# Patient Record
Sex: FEMALE | Race: WHITE | NOT HISPANIC OR LATINO | Employment: OTHER | URBAN - METROPOLITAN AREA
[De-identification: names, ages, dates, MRNs, and addresses within clinical notes are randomized per-mention and may not be internally consistent; named-entity substitution may affect disease eponyms.]

---

## 2017-01-02 ENCOUNTER — PATIENT MESSAGE (OUTPATIENT)
Dept: OTOLARYNGOLOGY | Facility: CLINIC | Age: 67
End: 2017-01-02

## 2017-01-02 ENCOUNTER — PATIENT MESSAGE (OUTPATIENT)
Dept: CARDIOLOGY | Facility: CLINIC | Age: 67
End: 2017-01-02

## 2017-01-03 ENCOUNTER — OFFICE VISIT (OUTPATIENT)
Dept: DERMATOLOGY | Facility: CLINIC | Age: 67
End: 2017-01-03
Payer: MEDICARE

## 2017-01-03 DIAGNOSIS — L63.9 ALOPECIA AREATA: Primary | ICD-10-CM

## 2017-01-03 DIAGNOSIS — I11.0 HYPERTENSIVE CHF (CONGESTIVE HEART FAILURE): Chronic | ICD-10-CM

## 2017-01-03 DIAGNOSIS — I50.32 DIASTOLIC CHF, CHRONIC: Chronic | ICD-10-CM

## 2017-01-03 PROCEDURE — 99999 PR PBB SHADOW E&M-EST. PATIENT-LVL I: CPT | Mod: PBBFAC,,, | Performed by: DERMATOLOGY

## 2017-01-03 PROCEDURE — 99212 OFFICE O/P EST SF 10 MIN: CPT | Mod: 25,S$PBB,, | Performed by: DERMATOLOGY

## 2017-01-03 PROCEDURE — 99211 OFF/OP EST MAY X REQ PHY/QHP: CPT | Mod: PBBFAC,PO | Performed by: DERMATOLOGY

## 2017-01-03 PROCEDURE — 11900 INJECT SKIN LESIONS </W 7: CPT | Mod: PBBFAC,PO | Performed by: DERMATOLOGY

## 2017-01-03 PROCEDURE — 11900 INJECT SKIN LESIONS </W 7: CPT | Mod: S$PBB,,, | Performed by: DERMATOLOGY

## 2017-01-03 RX ORDER — TRIAMCINOLONE ACETONIDE 40 MG/ML
40 INJECTION, SUSPENSION INTRA-ARTICULAR; INTRAMUSCULAR ONCE
Status: COMPLETED | OUTPATIENT
Start: 2017-01-03 | End: 2017-01-03

## 2017-01-03 RX ORDER — NEBIVOLOL 10 MG/1
10 TABLET ORAL DAILY
Qty: 30 TABLET | Refills: 5 | Status: SHIPPED | OUTPATIENT
Start: 2017-01-03 | End: 2017-04-18 | Stop reason: SDUPTHER

## 2017-01-03 RX ORDER — CLOBETASOL PROPIONATE 0.46 MG/ML
SOLUTION TOPICAL
Qty: 60 ML | Refills: 3 | Status: SHIPPED | OUTPATIENT
Start: 2017-01-03 | End: 2017-02-28 | Stop reason: SDUPTHER

## 2017-01-03 RX ADMIN — TRIAMCINOLONE ACETONIDE 10 MG: 10 INJECTION, SUSPENSION INTRA-ARTICULAR; INTRALESIONAL at 09:01

## 2017-01-03 RX ADMIN — TRIAMCINOLONE ACETONIDE 40 MG: 40 INJECTION, SUSPENSION INTRA-ARTICULAR; INTRAMUSCULAR at 09:01

## 2017-01-03 NOTE — PROGRESS NOTES
Subjective:       Patient ID:  Deirdre Yanez is a 66 y.o. female who presents for   Chief Complaint   Patient presents with    Follow-up     HPI Comments: History of alopecia areata, Hepatitis B (Hepatitis B core antibody positive- HbsAb negative, + intolerance to lamivudine, currently undergoing vaccination against HBV) and Hep C (s/p treatment with possible cure), currently s/p ILK x 12 and IM kenalog x 7, last seen on 11/8/16. She is s/p 3 cc of ILK 10 at the last visit. She currently uses clobetasol solution bid, rogaine bid and biotin 5000 mcg qD. She discontinued vitamin supplements, except for astaxanthin 12 mg qD. + improvement with hair growth of the occipital scalp. Dr. Galvan has improved future intermittent IM steroids while pt being vaccinated for HBV.      She has new diagnosis CHF, currently on spironolactone.             Review of Systems   Constitutional: Negative for fever and chills.   Gastrointestinal: Negative for nausea and vomiting.   Skin: Negative for daily sunscreen use, activity-related sunscreen use and recent sunburn.   Hematologic/Lymphatic: Does not bruise/bleed easily.        Objective:    Physical Exam   Constitutional: She appears well-developed and well-nourished. No distress.   Neurological: She is alert and oriented to person, place, and time. She is not disoriented.   Psychiatric: She has a normal mood and affect.   Skin:   Areas Examined (abnormalities noted in diagram):   Scalp / Hair Palpated and Inspected  Head / Face Inspection Performed  Neck Inspection Performed  RUE Inspected  LUE Inspection Performed  Nails and Digits Inspection Performed                 Assessment / Plan:        Alopecia areata  -     triamcinolone acetonide injection 40 mg; Inject 1 mL (40 mg total) into the muscle once.  -     triamcinolone acetonide injection 10 mg; 1 mL (10 mg total) by Other route one time.  -     clobetasol (TEMOVATE) 0.05 % external solution; Use on scalp twice daily   Dispense: 60 mL; Refill: 3  -     Recommend pt continue to follow with Dr. Galvan for Hep B monitoring. ILK #13 and IM steroids #8 done today. Continue clobetasol solution twice daily, rogaine bid, and biotin. After risks, benefits and alternatives explained and side effect profile reviewed, including hypopigmentation, telangiectasia and atrophy, the patient verbally consented to ILK injection. A total of 3 cc of kenalog 10 mg/ml was injected into the areas of alopecia of the scalp. Pt tolerated well without side effects. RTC in 8 weeks for repeat injection.           Return in about 8 weeks (around 2/28/2017).

## 2017-01-03 NOTE — MR AVS SNAPSHOT
[facility] - Dermatology  [address] [place] [address]  Phone: [phone]  Fax: [phone]                  Deirdre Yanez   1/3/2017 8:30 AM   Office Visit    Description:  Female : 1950   Provider:  Darlene Cedillo MD   Department:  [facility] - Dermatology           Reason for Visit     Follow-up           Diagnoses this Visit        Comments    Alopecia areata    -  Primary            To Do List           Future Appointments        Provider Department Dept Phone    2017 2:00 PM Beti lBackburn MD [facility] Internal Medicine [phone]    2017 9:00 AM GASTRO NURSE, [facility] Gastroenterology [phone]    2017 10:10 AM LABORATORY, [address] [facility] [phone]    3/16/2017 9:40 AM LABORATORY, [facility] - Laboratory [phone]    6/15/2017 9:00 AM GASTRO NURSE, [facility] Gastroenterology [phone]      Goals (5 Years of Data)     None      Follow-Up and Disposition     Return in about 8 weeks (around 2017).    Follow-up and Disposition History       These Medications        Disp Refills Start End    clobetasol (TEMOVATE) 0.05 % external solution 60 mL 3 1/3/2017     Use on scalp twice daily    Pharmacy: [facility] [address] [place] [address] [address] AT [facility] [address] Ph #: [phone]         [facility]     [facility] Care [facility] -  Assistance  Registered nurses in the [facility] provide clinical advisement, health education, appointment booking, and other advisory services.  Call for this free service at [phone].             Medications           Message regarding Medications     Verify the changes and/or additions to your medication regime listed below are the same as discussed with your clinician today.  If any of these changes or additions are incorrect, please notify your healthcare provider.        These medications were administered today        Dose Freq     triamcinolone acetonide injection 40 mg 40 mg Once    Sig: Inject 1 mL (40 mg total) into the muscle once.    Class: Normal    Route: Intramuscular    triamcinolone acetonide injection 10 mg 10 mg Clinic/HOD 1 time    Si mL (10 mg total) by Other route one time.    Class: Normal    Route: Other      STOP taking these medications     spironolactone (ALDACTONE) 25 MG tablet TAKE 1 TABLET BY MOUTH ONCE DAILY           Verify that the below list of medications is an accurate representation of the medications you are currently taking.  If none reported, the list may be blank. If incorrect, please contact your healthcare provider. Carry this list with you in case of emergency.           Current Medications     b complex vitamins tablet Take 1 tablet by mouth once daily.    clobetasol (TEMOVATE) 0.05 % external solution Use on scalp twice daily    fluticasone (FLONASE) 50 mcg/actuation nasal spray USE 2 SPRAYS IN EACH NOSTRIL ONCE DAILY    metformin (GLUCOPHAGE-XR) 500 MG 24 hr tablet Take 1-2 tablets (500-1,000 mg total) by mouth daily with breakfast.    minoxidil (ROGAINE) 2 % external solution Apply topically 2 (two) times daily.    nebivolol (BYSTOLIC) 10 MG Tab Take 1 tablet (10 mg total) by mouth once daily.           Clinical Reference Information           Allergies as of 1/3/2017     Arb-angiotensin Receptor Antagonist    Metoprolol    Ace Inhibitors    Sulfa (Sulfonamide Antibiotics)      Immunizations Administered on Date of Encounter - 1/3/2017     None      Administrations This Visit     triamcinolone acetonide injection 10 mg     Admin Date Action Dose Route Administered By             2017 Given 10 mg Other Darlene Cedillo MD

## 2017-01-06 ENCOUNTER — HOSPITAL ENCOUNTER (OUTPATIENT)
Dept: RADIOLOGY | Facility: HOSPITAL | Age: 67
Discharge: HOME | End: 2017-01-06
Attending: INTERNAL MEDICINE
Payer: MEDICARE

## 2017-01-06 ENCOUNTER — OFFICE VISIT (OUTPATIENT)
Dept: INTERNAL MEDICINE | Facility: CLINIC | Age: 67
End: 2017-01-06
Payer: MEDICARE

## 2017-01-06 VITALS
WEIGHT: 227.06 LBS | HEART RATE: 66 BPM | BODY MASS INDEX: 32.51 KG/M2 | OXYGEN SATURATION: 98 % | DIASTOLIC BLOOD PRESSURE: 94 MMHG | HEIGHT: 70 IN | SYSTOLIC BLOOD PRESSURE: 182 MMHG | TEMPERATURE: 97 F

## 2017-01-06 DIAGNOSIS — M79.642 LEFT HAND PAIN: ICD-10-CM

## 2017-01-06 DIAGNOSIS — I10 ESSENTIAL HYPERTENSION: Chronic | ICD-10-CM

## 2017-01-06 DIAGNOSIS — E78.00 PURE HYPERCHOLESTEROLEMIA: Primary | ICD-10-CM

## 2017-01-06 DIAGNOSIS — E11.8 TYPE 2 DIABETES MELLITUS WITH COMPLICATION, WITHOUT LONG-TERM CURRENT USE OF INSULIN: ICD-10-CM

## 2017-01-06 PROCEDURE — 73130 X-RAY EXAM OF HAND: CPT | Mod: 26,LT,, | Performed by: RADIOLOGY

## 2017-01-06 PROCEDURE — 99214 OFFICE O/P EST MOD 30 MIN: CPT | Mod: S$PBB,,, | Performed by: INTERNAL MEDICINE

## 2017-01-06 PROCEDURE — 99999 PR PBB SHADOW E&M-EST. PATIENT-LVL III: CPT | Mod: PBBFAC,,, | Performed by: INTERNAL MEDICINE

## 2017-01-06 PROCEDURE — 73130 X-RAY EXAM OF HAND: CPT | Mod: TC,PO,LT

## 2017-01-06 RX ORDER — NAPROXEN SODIUM 220 MG/1
81 TABLET, FILM COATED ORAL DAILY
Refills: 0 | COMMUNITY
Start: 2017-01-06 | End: 2019-08-07 | Stop reason: ALTCHOICE

## 2017-01-06 NOTE — PROGRESS NOTES
"Subjective:       Patient ID: Deirdre Yanez is a 66 y.o. female.    Chief Complaint: Follow-up    HPI Comments: Here for follow up of medical problems.  Doing well on metformin XR and tolerating well.  BP not check ed lately, but was normal when she checked a month ago.  Fell onto left hand 12/24/16, with pain and swelling since.  Swelling is improving but still pain.  No f/c/sw/cough.  No cp/sob/palp.    Updated/ annual due 9/17:  HM: ref fluvax, 8/16 [id number], 9/14 [id number], 5/13 TDaP, 11/14 BMD rep 5y, 10/13 Cscope rep 5y, 8/16 MMG/Gyn sched, 12/14 Eye Dr. Pena rep scheduled, HCV per Hepatology.        Review of Systems   Constitutional: Negative for chills, diaphoresis and fever.   Respiratory: Negative for cough and shortness of breath.    Cardiovascular: Negative for chest pain, palpitations and leg swelling.   Gastrointestinal: Negative for blood in stool, constipation, diarrhea, nausea and vomiting.   Genitourinary: Negative for dysuria, frequency and hematuria.   Psychiatric/Behavioral: The patient is not nervous/anxious.        Objective:     Visit Vitals    BP (!) 182/94 (BP Location: Right arm)    Pulse 66    Temp 97.3 °F (36.3 °C) (Tympanic)    Ht 5' 9.6" (1.768 m)    Wt 103 kg (227 lb 1.2 oz)    SpO2 98%    BMI 32.96 kg/m2       Physical Exam   Constitutional: She is oriented to person, place, and time. She appears well-developed.   HENT:   Mouth/Throat: Oropharynx is clear and moist.   Neck: Neck supple. Carotid bruit is not present. No thyroid mass present.   Cardiovascular: Normal rate, regular rhythm and intact distal pulses.  Exam reveals no gallop and no friction rub.    No murmur heard.  Pulmonary/Chest: Effort normal and breath sounds normal. She has no wheezes. She has no rales.   Abdominal: Soft. Bowel sounds are normal. She exhibits no mass. There is no hepatosplenomegaly. There is no tenderness.   Musculoskeletal: She exhibits no edema.   Lymphadenopathy:     She has no cervical " adenopathy.   Neurological: She is alert and oriented to person, place, and time.   Psychiatric: She has a normal mood and affect.     Results for DEIRDRE TREVINO (MRN [id number]) as of 1/6/2017 13:59   Ref. Range 8/5/2016 10:25 8/19/2016 10:00 10/14/2016 10:15 11/11/2016 10:15 12/6/2016 09:47 12/12/2016 10:01 12/27/2016 08:41   Triglycerides Latest Ref Range: 30 - 150 mg/dL 71      82   Cholesterol Latest Ref Range: 120 - 199 mg/dL 225 (H)      224 (H)   HDL Latest Ref Range: 40 - 75 mg/dL 59      68   LDL Cholesterol Latest Ref Range: 63.0 - 159.0 mg/dL 151.8      139.6   Total Cholesterol/HDL Ratio Latest Ref Range: 2.0 - 5.0  3.8      3.3   Vit D, 25-Hydroxy Latest Ref Range: 30 - 96 ng/mL 29 (L)         Hemoglobin A1C Latest Ref Range: 4.5 - 6.2 % 6.0      5.7   Estimated Avg Glucose Latest Ref Range: 68 - 131 mg/dL 126      117     Assessment:       1. Pure hypercholesterolemia    2. Essential hypertension    3. Type 2 diabetes mellitus with complication, without long-term current use of insulin    4. Left hand pain        Plan:       Deirdre was seen today for follow-up.    Diagnoses and all orders for this visit:    Pure hypercholesterolemia- father had severe reaction to statin so ref statin, will try red yeast rice.    Essential hypertension, uncontrolled today with pain- monitor an record for RTC 2 weeks check.    Type 2 diabetes mellitus with complication, without long-term current use of insulin- doing well on XR metformin.    Left hand pain  -     X-Ray Hand 3 view Left; Future    RTC 2 wks.  Current 10yr vascular risk 32%.  Will need at least ASA 81mg daily.

## 2017-01-17 ENCOUNTER — CLINICAL SUPPORT (OUTPATIENT)
Dept: GASTROENTEROLOGY | Facility: CLINIC | Age: 67
End: 2017-01-17
Payer: MEDICARE

## 2017-01-17 VITALS
BODY MASS INDEX: 32.01 KG/M2 | HEART RATE: 62 BPM | SYSTOLIC BLOOD PRESSURE: 170 MMHG | DIASTOLIC BLOOD PRESSURE: 94 MMHG | WEIGHT: 223.56 LBS | HEIGHT: 70 IN

## 2017-01-17 DIAGNOSIS — R76.8 HEPATITIS B CORE ANTIBODY POSITIVE: ICD-10-CM

## 2017-01-17 DIAGNOSIS — D84.9 IMMUNOSUPPRESSION: ICD-10-CM

## 2017-01-17 PROCEDURE — 99213 OFFICE O/P EST LOW 20 MIN: CPT | Mod: PBBFAC,PO

## 2017-01-17 PROCEDURE — 99499 UNLISTED E&M SERVICE: CPT | Mod: S$PBB,,, | Performed by: INTERNAL MEDICINE

## 2017-01-17 PROCEDURE — 90746 HEPB VACCINE 3 DOSE ADULT IM: CPT | Mod: PBBFAC,PO

## 2017-01-17 PROCEDURE — 99999 PR PBB SHADOW E&M-EST. PATIENT-LVL III: CPT | Mod: PBBFAC,,,

## 2017-01-17 PROCEDURE — G0010 ADMIN HEPATITIS B VACCINE: HCPCS | Mod: PBBFAC,PO

## 2017-01-17 NOTE — PROGRESS NOTES
Pt received 1mL  Hep B vaccine in right deltoid. Tolerated well. No redness, bruising, or swelling. Denies pain. Denies headache, dizziness, or nausea. Band aid applied. Monitored for 15 min. No reaction noted.

## 2017-01-17 NOTE — MR AVS SNAPSHOT
[facility]  Gastroenterology  [address] [place] [address]  Phone: [phone]  Fax: [phone]                  Deirdre MAGANA    2017 9:00 AM   Clinical Support    Description:  Female : 1950   Provider:  GASTRO NURSE [facility]   Department:  [facility] - Gastroenterology           Diagnoses this Visit        Comments    Hepatitis B core antibody positive         Immunosuppression                To Do List           Future Appointments        Provider Department Dept Phone    2017 1:00 PM MINA Abreu [facility] - Internal Medicine [phone]    2017 10:10 AM LABORATORY, [address] [facility] [phone]    2017 8:30 AM Darlene Cedillo MD [facility] Dermatology [phone]    3/16/2017 9:40 AM LABORATORY, [facility] - Laboratory [phone]    6/15/2017 9:00 AM GASTRO NURSE, [facility] Gastroenterology [phone]      Goals (5 Years of Data)     None      [facility]     [facility] Care [facility] -  Assistance  Registered nurses in the [facility] provide clinical advisement, health education, appointment booking, and other advisory services.  Call for this free service at [phone].             Medications           Message regarding Medications     Verify the changes and/or additions to your medication regime listed below are the same as discussed with your clinician today.  If any of these changes or additions are incorrect, please notify your healthcare provider.             Verify that the below list of medications is an accurate representation of the medications you are currently taking.  If none reported, the list may be blank. If incorrect, please contact your healthcare provider. Carry this list with you in case of emergency.           Current Medications     aspirin 81 MG Chew Take 1 tablet (81 mg total) by mouth once daily.    b complex vitamins tablet Take 1 tablet by mouth once daily.    clobetasol (TEMOVATE)  "0.05 % external solution Use on scalp twice daily    fluticasone (FLONASE) 50 mcg/actuation nasal spray USE 2 SPRAYS IN EACH NOSTRIL ONCE DAILY    metformin (GLUCOPHAGE-XR) 500 MG 24 hr tablet Take 1-2 tablets (500-1,000 mg total) by mouth daily with breakfast.    minoxidil (ROGAINE) 2 % external solution Apply topically 2 (two) times daily.    nebivolol (BYSTOLIC) 10 MG Tab Take 1 tablet (10 mg total) by mouth once daily.           Clinical Reference Information           Vital Signs - Last Recorded  Most recent update: 1/17/2017  9:16 AM by Yanni Krueger LPN    BP Pulse Ht Wt BMI    (!) 170/94 62 5' 10" (1.778 m) 101.4 kg (223 lb 8.7 oz) 32.08 kg/m2      Blood Pressure          Most Recent Value    BP  (!)  170/94      Allergies as of 1/17/2017     Arb-angiotensin Receptor Antagonist    Metoprolol    Ace Inhibitors    Sulfa (Sulfonamide Antibiotics)      Immunizations Administered on Date of Encounter - 1/17/2017     Name Date Dose VIS Date Route    Hepatitis B, Adult 1/17/2017 1 mL 7/20/2016 Intramuscular      Orders Placed During Today's Visit      Normal Orders This Visit    Hepatitis B Vaccine (Adult) (IM)       "

## 2017-01-20 ENCOUNTER — PATIENT MESSAGE (OUTPATIENT)
Dept: CARDIOLOGY | Facility: CLINIC | Age: 67
End: 2017-01-20

## 2017-01-20 ENCOUNTER — OFFICE VISIT (OUTPATIENT)
Dept: INTERNAL MEDICINE | Facility: CLINIC | Age: 67
End: 2017-01-20
Payer: MEDICARE

## 2017-01-20 VITALS
OXYGEN SATURATION: 95 % | HEART RATE: 73 BPM | WEIGHT: 218.25 LBS | SYSTOLIC BLOOD PRESSURE: 156 MMHG | BODY MASS INDEX: 31.25 KG/M2 | TEMPERATURE: 98 F | HEIGHT: 70 IN | DIASTOLIC BLOOD PRESSURE: 96 MMHG

## 2017-01-20 DIAGNOSIS — E11.8 TYPE 2 DIABETES MELLITUS WITH COMPLICATION, WITHOUT LONG-TERM CURRENT USE OF INSULIN: ICD-10-CM

## 2017-01-20 DIAGNOSIS — I10 ESSENTIAL HYPERTENSION: Primary | ICD-10-CM

## 2017-01-20 DIAGNOSIS — I11.0 HYPERTENSIVE CHF (CONGESTIVE HEART FAILURE): Chronic | ICD-10-CM

## 2017-01-20 PROCEDURE — 99213 OFFICE O/P EST LOW 20 MIN: CPT | Mod: S$PBB,,, | Performed by: PHYSICIAN ASSISTANT

## 2017-01-20 PROCEDURE — 99999 PR PBB SHADOW E&M-EST. PATIENT-LVL III: CPT | Mod: PBBFAC,,, | Performed by: PHYSICIAN ASSISTANT

## 2017-01-20 PROCEDURE — 99213 OFFICE O/P EST LOW 20 MIN: CPT | Mod: PBBFAC,PO | Performed by: PHYSICIAN ASSISTANT

## 2017-01-20 RX ORDER — AMLODIPINE BESYLATE 2.5 MG/1
2.5 TABLET ORAL DAILY
Qty: 30 TABLET | Refills: 1 | Status: SHIPPED | OUTPATIENT
Start: 2017-01-20 | End: 2017-01-24

## 2017-01-20 NOTE — PROGRESS NOTES
Subjective:       Patient ID: Deirdre Yanez is a 66 y.o. female.    Chief Complaint: Hypertension    HPI Comments: 66 year old presents to clinic for 2 week f/u from last PCP visit (1/6/17) for HTN. She reports taking her Bystolic 10mg QD as directed and BPs at home have been 134-172/76-95. Glucose has been . She reports diuretics give her gout and she cannot tolerate ACEs or ARBs. She reports she has recently been drinking more caffeine than usual and not sleeping as much due to her dog having an injury with secondary infection and upcoming surgery. She reports no CP, SOB, fever, chills, or other medical complaints. She reports her pedal edema is stable (intermittent).    Past Medical History:    Abrasion                                                        Comment:left eye    Abrasion and/or friction burn of abdominal wal*                 Comment:left eye    Diabetes mellitus                                               Comment:2011  11/17/2013    Diverticulosis                                                DM (diabetes mellitus)                          2011            Comment: 12/03/2014  A1C 6.0 x 2 weeks    Hepatitis                                                       Comment:Hx of B/C from cadaver in past    Hiatal hernia                                                 Hip bursitis, left                                            HTN (hypertension)                                            Hypertensive CHF (congestive heart failure)     3/28/2014     Positive ALBIN (antinuclear antibody)                           Pure hypercholesterolemia                       9/30/2016         Hypertension   Pertinent negatives include no chest pain, headaches, palpitations or shortness of breath.     Review of Systems   Constitutional: Negative for chills and fever.   Respiratory: Negative for cough and shortness of breath.    Cardiovascular: Negative for chest pain and palpitations.    Gastrointestinal: Negative for nausea and vomiting.   Skin: Negative for rash.   Neurological: Negative for dizziness, weakness, numbness and headaches.   Psychiatric/Behavioral: Negative for confusion.       Objective:      Physical Exam   Constitutional: She is oriented to person, place, and time. She appears well-developed and well-nourished. No distress.   HENT:   Head: Normocephalic and atraumatic.   Eyes: EOM are normal. No scleral icterus.   Neck: Neck supple.   Cardiovascular: Normal rate and regular rhythm.    Pulmonary/Chest: Effort normal and breath sounds normal. No respiratory distress. She has no decreased breath sounds. She has no wheezes. She has no rhonchi. She has no rales.   Musculoskeletal: Normal range of motion. She exhibits edema (minimal, B pedal).   Neurological: She is alert and oriented to person, place, and time. No cranial nerve deficit.   Skin: Skin is warm and dry. No rash noted.   Psychiatric: She has a normal mood and affect. Her speech is normal and behavior is normal. Thought content normal.       Assessment:       1. Essential hypertension    2. Hypertensive CHF (congestive heart failure)    3. Type 2 diabetes mellitus with complication, without long-term current use of insulin        Plan:         1. Continue current medications. Start amlodipine 2.5mg PO QD. Recommend pt inform her cardiologist of new medication as well.  2. Monitor BPs. Decrease caffeine intake and follow healthy diet. RTC in one week with prior BMP for further eval. ER if severe BP readings occur.

## 2017-01-24 ENCOUNTER — OFFICE VISIT (OUTPATIENT)
Dept: CARDIOLOGY | Facility: CLINIC | Age: 67
End: 2017-01-24
Payer: MEDICARE

## 2017-01-24 VITALS
BODY MASS INDEX: 31.68 KG/M2 | WEIGHT: 221.25 LBS | HEART RATE: 64 BPM | DIASTOLIC BLOOD PRESSURE: 90 MMHG | HEIGHT: 70 IN | SYSTOLIC BLOOD PRESSURE: 182 MMHG

## 2017-01-24 DIAGNOSIS — I50.9 CHF (NYHA CLASS I, ACC/AHA STAGE B): Chronic | ICD-10-CM

## 2017-01-24 DIAGNOSIS — I50.32 DIASTOLIC CHF, CHRONIC: Chronic | ICD-10-CM

## 2017-01-24 DIAGNOSIS — I11.0 HYPERTENSIVE CHF (CONGESTIVE HEART FAILURE): Chronic | ICD-10-CM

## 2017-01-24 DIAGNOSIS — I10 ESSENTIAL HYPERTENSION: Primary | Chronic | ICD-10-CM

## 2017-01-24 PROCEDURE — 99213 OFFICE O/P EST LOW 20 MIN: CPT | Mod: PBBFAC | Performed by: NURSE PRACTITIONER

## 2017-01-24 PROCEDURE — 99999 PR PBB SHADOW E&M-EST. PATIENT-LVL III: CPT | Mod: PBBFAC,,, | Performed by: NURSE PRACTITIONER

## 2017-01-24 PROCEDURE — 99214 OFFICE O/P EST MOD 30 MIN: CPT | Mod: S$PBB,,, | Performed by: NURSE PRACTITIONER

## 2017-01-24 RX ORDER — NEBIVOLOL 5 MG/1
5 TABLET ORAL DAILY
Qty: 30 TABLET | Refills: 11 | Status: SHIPPED | OUTPATIENT
Start: 2017-01-24 | End: 2017-04-18 | Stop reason: SDUPTHER

## 2017-01-24 RX ORDER — CHLORTHALIDONE 25 MG/1
25 TABLET ORAL DAILY
Qty: 30 TABLET | Refills: 11 | Status: SHIPPED | OUTPATIENT
Start: 2017-01-24 | End: 2017-03-01 | Stop reason: SINTOL

## 2017-01-24 NOTE — PATIENT INSTRUCTIONS
Low-Salt Diet  This diet removes foods that are high in salt. It also limits the amount of salt you use when cooking. It is most often used for people with high blood pressure, edema (fluid retention), and kidney, liver, or heart disease.  Table salt contains the mineral sodium. Your body needs sodium to work normally. But too much sodium can make your health problems worse. Your healthcare provider is recommending a low-salt (also called low-sodium) diet for you. Your total daily allowance of salt is 1,500 to 2,300 milligrams (mg). It is less than 1 teaspoon of table salt. This means you can have only about 500 to 700 mg of sodium at each meal. People with certain health problems should limit salt intake to the lower end of the recommended range.    When you cook, dont add much salt. If you can cook without using salt, even better. Dont add salt to your food at the table.  When shopping, read food labels. Salt is often called sodium on the label. Choose foods that are salt-free, low salt, or very low salt. Note that foods with reduced salt may not lower your salt intake enough.    Beans, potatoes, and pasta  Ok: Dry beans, split peas, lentils, potatoes, rice, macaroni, pasta, spaghetti without added salt  Avoid: Potato chips, tortilla chips, and similar products  Breads and cereals  Ok: Low-sodium breads, rolls, cereals, and cakes; low-salt crackers, matzo crackers  Avoid: Salted crackers, pretzels, popcorn, Armenian toast, pancakes, muffins  Dairy  Ok: Milk, chocolate milk, hot chocolate mix, low-salt cheeses, and yogurt  Avoid: Processed cheese and cheese spreads; Roquefort, Camembert, and cottage cheese; buttermilk, instant breakfast drink  Desserts  Ok: Ice cream, frozen yogurt, juice bars, gelatin, cookies and pies, sugar, honey, jelly, hard candy  Avoid: Most pies, cakes and cookies prepared or processed with salt; instant pudding  Drinks  Ok: Tea, coffee, fizzy (carbonated) drinks, juices  Avoid: Flavored  coffees, electrolyte replacement drinks, sports drinks  Meats  Ok: All fresh meat, fish, poultry, low-salt tuna, eggs, egg substitute  Avoid: Smoked, pickled, brine-cured, or salted meats and fish. This includes stephens, chipped beef, corned beef, hot dogs, deli meats, ham, kosher meats, salt pork, sausage, canned tuna, salted codfish, smoked salmon, herring, sardines, or anchovies.  Seasonings and spices  Ok: Most seasonings are okay. Good substitutes for salt include: fresh herb blends, hot sauce, lemon, garlic, rehman, vinegar, dry mustard, parsley, cilantro, horseradish, tomato paste, regular margarine, mayonnaise, unsalted butter, cream cheese, vegetable oil, cream, low-salt salad dressing and gravy.  Avoid: Regular ketchup, relishes, pickles, soy sauce, teriyaki sauce, Worcestershire sauce, BBQ sauce, tartar sauce, meat tenderizer, chili sauce, regular gravy, regular salad dressing, salted butter  Soups  Ok: Low-salt soups and broths made with allowed foods  Avoid: Bouillon cubes, soups with smoked or salted meats, regular soup and broth  Vegetables  Ok: Most vegetables are okay; also low-salt tomato and vegetable juices  Avoid: Sauerkraut and other brine-soaked vegetables; pickles and other pickled vegetables; tomato juice, olives  © 2150-2262 [employer] [address] [place] [address]. All rights reserved. This information is not intended as a substitute for professional medical care. Always follow your healthcare professional's instructions.        Tips for Using Less Salt    Most people with heart problems need to eat less salt (sodium). Reducing the amount of salt you eat may help control your blood pressure. The higher your blood pressure, the greater your risk for heart disease, stroke, blindness, and kidney problems.  At the store  · Make low-salt choices by reading labels carefully. Look for the total amount of sodium per serving.  · Use more fresh food. Buy more fruits and  vegetables. Select lean meats, fish, and poultry.  · Use fewer frozen, canned, and packaged foods which often contain a lot of sodium.  · Use plain frozen vegetables without sauces or toppings. These products are often low- or no-sodium.  · Opt for reduced-sodium or no-salt-added versions of canned vegetables and soups.  In the kitchen  · Don't add salt to food when you're cooking. Season with flavorings such as onion, garlic, pepper, salt-free herbal blends, and lemon or lime juice.  · Use a cookbook containing low-salt recipes. It can give you ideas for tasty meals that are healthy for your heart.  · Sprinkle salt-free herbal blends on vegetables and meat.  · Drain and rinse canned foods, such as canned beans and vegetables, before cooking or eating.  Eating out  · Tell the  you're on a low-salt diet. Ask questions about the menu.  · Order fish, chicken, and meat broiled, baked, poached, or grilled without salt, butter, or breading.  · Use lemon, pepper, and salt-free herb mixes to add flavor.  · Choose plain steamed rice, boiled noodles, and baked or boiled potatoes. Top potatoes with chives and a little sour cream.     Beware! Salt goes by many other names. Limit foods with these words listed as ingredients: salt, sodium, soy sauce, baking soda, baking powder, MSG, monosodium, Na (the chemical symbol for sodium). Some antacids are also high in salt.   © 8062-6361 [employer]. [address] [place] [address], [place] [address]. All rights reserved. This information is not intended as a substitute for professional medical care. Always follow your healthcare professional's instructions.

## 2017-01-24 NOTE — MR AVS SNAPSHOT
[facility]  [address] [facility] [address]  Phone: [phone]  Fax: [phone]                  Deirdre MAGANA    2017 9:30 AM   Office Visit    Description:  Female : 1950   Provider:  Deon Barkley NP   Department:  [facility]           Reason for Visit     Hypertension           Diagnoses this Visit        Comments    Essential hypertension    -  Primary     Hypertensive CHF (congestive heart failure)         CHF (NYHA class I, ACC/AHA stage B)         Diastolic CHF, chronic                To Do List           Future Appointments        Provider Department Dept Phone    2017 10:40 AM LABORATORY, [facility] [phone]    2017 8:00 AM MINA Abreu [facility] - Internal Medicine [phone]    2017 10:10 AM LABORATORY, [address] [facility] [phone]    2017 8:30 AM Darlene Cedillo MD [facility] Dermatology [phone]    3/16/2017 9:40 AM LABORATORY, [facility] - Laboratory [phone]      Goals (5 Years of Data)     None      Follow-Up and Disposition     Return in about 4 weeks (around 2017) for Dr Castañeda.       These Medications        Disp Refills Start End    chlorthalidone (HYGROTEN) 25 MG Tab 30 tablet 11 2017     Take 1 tablet (25 mg total) by mouth once daily. - Oral    Pharmacy: [facility] [address] [place], [place] [address] MAIN  AT [facility] [address] Ph #: [phone]       nebivolol (BYSTOLIC) 5 MG Tab 30 tablet 11 2017    Take 1 tablet (5 mg total) by mouth once daily. - Oral    Pharmacy: [facility] [address] [place] [address] MAIN  AT [facility] [address] Ph #: [phone]       Notes to Pharmacy: Total of 15 mg daily      [facility] On Call     [facility] On Call Nurse Care Line -  Assistance  Registered nurses in the [facility] provide clinical advisement, health education, appointment booking, and  other advisory services.  Call for this free service at [phone].             Medications           Message regarding Medications     Verify the changes and/or additions to your medication regime listed below are the same as discussed with your clinician today.  If any of these changes or additions are incorrect, please notify your healthcare provider.        START taking these NEW medications        Refills    chlorthalidone (HYGROTEN) 25 MG Tab 11    Sig: Take 1 tablet (25 mg total) by mouth once daily.    Class: Normal    Route: Oral    nebivolol (BYSTOLIC) 5 MG Tab 11    Sig: Take 1 tablet (5 mg total) by mouth once daily.    Class: Normal    Route: Oral      STOP taking these medications     amlodipine (NORVASC) 2.5 MG tablet Take 1 tablet (2.5 mg total) by mouth once daily.           Verify that the below list of medications is an accurate representation of the medications you are currently taking.  If none reported, the list may be blank. If incorrect, please contact your healthcare provider. Carry this list with you in case of emergency.           Current Medications     aspirin 81 MG Chew Take 1 tablet (81 mg total) by mouth once daily.    b complex vitamins tablet Take 1 tablet by mouth once daily.    chlorthalidone (HYGROTEN) 25 MG Tab Take 1 tablet (25 mg total) by mouth once daily.    clobetasol (TEMOVATE) 0.05 % external solution Use on scalp twice daily    fluticasone (FLONASE) 50 mcg/actuation nasal spray USE 2 SPRAYS IN EACH NOSTRIL ONCE DAILY    metformin (GLUCOPHAGE-XR) 500 MG 24 hr tablet Take 1-2 tablets (500-1,000 mg total) by mouth daily with breakfast.    minoxidil (ROGAINE) 2 % external solution Apply topically 2 (two) times daily.    nebivolol (BYSTOLIC) 10 MG Tab Take 1 tablet (10 mg total) by mouth once daily.    nebivolol (BYSTOLIC) 5 MG Tab Take 1 tablet (5 mg total) by mouth once daily.           Clinical Reference Information           Vital Signs - Last Recorded  Most recent  "update: 1/24/2017  9:45 AM by Deon Barkley NP    BP Pulse Ht Wt BMI    (!) 182/90 (BP Location: Right arm, Patient Position: Sitting, BP Method: Manual) 64 5' 10" (1.778 m) 100.4 kg (221 lb 3.7 oz) 31.74 kg/m2      Blood Pressure          Most Recent Value    BP  (!)  182/90      Allergies as of 1/24/2017     Arb-angiotensin Receptor Antagonist    Metoprolol    Ace Inhibitors    Sulfa (Sulfonamide Antibiotics)      Immunizations Administered on Date of Encounter - 1/24/2017     None      Instructions      Low-Salt Diet  This diet removes foods that are high in salt. It also limits the amount of salt you use when cooking. It is most often used for people with high blood pressure, edema (fluid retention), and kidney, liver, or heart disease.  Table salt contains the mineral sodium. Your body needs sodium to work normally. But too much sodium can make your health problems worse. Your healthcare provider is recommending a low-salt (also called low-sodium) diet for you. Your total daily allowance of salt is 1,500 to 2,300 milligrams (mg). It is less than 1 teaspoon of table salt. This means you can have only about 500 to 700 mg of sodium at each meal. People with certain health problems should limit salt intake to the lower end of the recommended range.    When you cook, dont add much salt. If you can cook without using salt, even better. Dont add salt to your food at the table.  When shopping, read food labels. Salt is often called sodium on the label. Choose foods that are salt-free, low salt, or very low salt. Note that foods with reduced salt may not lower your salt intake enough.    Beans, potatoes, and pasta  Ok: Dry beans, split peas, lentils, potatoes, rice, macaroni, pasta, spaghetti without added salt  Avoid: Potato chips, tortilla chips, and similar products  Breads and cereals  Ok: Low-sodium breads, rolls, cereals, and cakes; low-salt crackers, matzo crackers  Avoid: Salted crackers, pretzels, " popcorn, Persian toast, pancakes, muffins  Dairy  Ok: Milk, chocolate milk, hot chocolate mix, low-salt cheeses, and yogurt  Avoid: Processed cheese and cheese spreads; Roquefort, Camembert, and cottage cheese; buttermilk, instant breakfast drink  Desserts  Ok: Ice cream, frozen yogurt, juice bars, gelatin, cookies and pies, sugar, honey, jelly, hard candy  Avoid: Most pies, cakes and cookies prepared or processed with salt; instant pudding  Drinks  Ok: Tea, coffee, fizzy (carbonated) drinks, juices  Avoid: Flavored coffees, electrolyte replacement drinks, sports drinks  Meats  Ok: All fresh meat, fish, poultry, low-salt tuna, eggs, egg substitute  Avoid: Smoked, pickled, brine-cured, or salted meats and fish. This includes stephens, chipped beef, corned beef, hot dogs, deli meats, ham, kosher meats, salt pork, sausage, canned tuna, salted codfish, smoked salmon, herring, sardines, or anchovies.  Seasonings and spices  Ok: Most seasonings are okay. Good substitutes for salt include: fresh herb blends, hot sauce, lemon, garlic, rehman, vinegar, dry mustard, parsley, cilantro, horseradish, tomato paste, regular margarine, mayonnaise, unsalted butter, cream cheese, vegetable oil, cream, low-salt salad dressing and gravy.  Avoid: Regular ketchup, relishes, pickles, soy sauce, teriyaki sauce, Worcestershire sauce, BBQ sauce, tartar sauce, meat tenderizer, chili sauce, regular gravy, regular salad dressing, salted butter  Soups  Ok: Low-salt soups and broths made with allowed foods  Avoid: Bouillon cubes, soups with smoked or salted meats, regular soup and broth  Vegetables  Ok: Most vegetables are okay; also low-salt tomato and vegetable juices  Avoid: Sauerkraut and other brine-soaked vegetables; pickles and other pickled vegetables; tomato juice, olives  © 8289-4234 [employer]. [address] [place] [address], [place] [address]. All rights reserved. This information is not intended as a substitute for professional  medical care. Always follow your healthcare professional's instructions.        Tips for Using Less Salt    Most people with heart problems need to eat less salt (sodium). Reducing the amount of salt you eat may help control your blood pressure. The higher your blood pressure, the greater your risk for heart disease, stroke, blindness, and kidney problems.  At the store  · Make low-salt choices by reading labels carefully. Look for the total amount of sodium per serving.  · Use more fresh food. Buy more fruits and vegetables. Select lean meats, fish, and poultry.  · Use fewer frozen, canned, and packaged foods which often contain a lot of sodium.  · Use plain frozen vegetables without sauces or toppings. These products are often low- or no-sodium.  · Opt for reduced-sodium or no-salt-added versions of canned vegetables and soups.  In the kitchen  · Don't add salt to food when you're cooking. Season with flavorings such as onion, garlic, pepper, salt-free herbal blends, and lemon or lime juice.  · Use a cookbook containing low-salt recipes. It can give you ideas for tasty meals that are healthy for your heart.  · Sprinkle salt-free herbal blends on vegetables and meat.  · Drain and rinse canned foods, such as canned beans and vegetables, before cooking or eating.  Eating out  · Tell the  you're on a low-salt diet. Ask questions about the menu.  · Order fish, chicken, and meat broiled, baked, poached, or grilled without salt, butter, or breading.  · Use lemon, pepper, and salt-free herb mixes to add flavor.  · Choose plain steamed rice, boiled noodles, and baked or boiled potatoes. Top potatoes with chives and a little sour cream.     Beware! Salt goes by many other names. Limit foods with these words listed as ingredients: salt, sodium, soy sauce, baking soda, baking powder, MSG, monosodium, Na (the chemical symbol for sodium). Some antacids are also high in salt.   © 9432-8164 [employer]. [address]  [place] [address]. All rights reserved. This information is not intended as a substitute for professional medical care. Always follow your healthcare professional's instructions.

## 2017-01-24 NOTE — PROGRESS NOTES
Subjective:    Patient ID:  Deirdre Yanez is a 66 y.o. female who presents for follow-up of Hypertension      HPI   Ms Yanez is a 66 year old female with a PMHx of HTN, Diastolic CHF, HLD, DM and HTN. She present to for follow up for HTN. She was started on amlodipine 2.5 mg daily by PCP. Patient took amlodipine for 3 day and started felling numbness and tingling to lips. Simular to reaction experienced with taking ARB medication. She did not take amlodipine this morning. Denies associated symptoms of chest pain, shortness of breath, palpitations, syncope or dizziness. Patient reports compliance with low sodium diet, however she did have some soy sauce on yesterday.     Review of Systems   Constitution: Negative for diaphoresis, weakness, malaise/fatigue, weight gain and weight loss.   HENT: Negative for congestion and nosebleeds.         Numbness to lips    Eyes: Negative for blurred vision and double vision.   Cardiovascular: Negative for chest pain, claudication, cyanosis, dyspnea on exertion, irregular heartbeat, leg swelling, near-syncope, orthopnea, palpitations, paroxysmal nocturnal dyspnea and syncope.   Respiratory: Negative for cough, hemoptysis, shortness of breath, sputum production and wheezing.    Hematologic/Lymphatic: Negative for bleeding problem. Bruises/bleeds easily.   Skin: Negative for rash.   Musculoskeletal: Negative for back pain, falls, joint pain, joint swelling and neck pain.   Gastrointestinal: Negative for abdominal pain, heartburn and vomiting.   Genitourinary: Negative for dysuria, frequency and hematuria.   Neurological: Negative for difficulty with concentration, dizziness, focal weakness, light-headedness, numbness and seizures.   Psychiatric/Behavioral: Negative for depression, memory loss and substance abuse.   Allergic/Immunologic: Negative for HIV exposure and hives.               Past Medical History   Diagnosis Date    Abrasion      left eye    Abrasion and/or friction  burn of abdominal wall with infection      left eye    Diabetes mellitus      2011  11/17/2013    Diverticulosis     DM (diabetes mellitus) 2011      12/03/2014  A1C 6.0 x 2 weeks    Hepatitis      Hx of B/C from cadaver in past    Hiatal hernia     Hip bursitis, left     HTN (hypertension)     Hypertensive CHF (congestive heart failure) 3/28/2014    Positive ALBIN (antinuclear antibody)     Pure hypercholesterolemia 9/30/2016     Past Surgical History   Procedure Laterality Date    Bone grafts      Appendectomy      Tonsillectomy, adenoidectomy      Tubal ligation      Dilation and curettage of uterus      Knee arthroscopy w/ meniscal repair       Family History   Problem Relation Age of Onset    Colon cancer Maternal Grandfather     Diabetes Maternal Grandmother     Hypertension Maternal Grandmother     Cataracts Mother     Glaucoma Mother     Macular degeneration Mother     Hypertension Mother     Diabetes Paternal Grandmother     Cataracts Maternal Aunt     Glaucoma Maternal Aunt     Macular degeneration Maternal Aunt     Melanoma Maternal Aunt     Heart disease       pat side     Social History     Social History    Marital status:      Spouse name: N/A    Number of children: 0    Years of education: N/A     Occupational History    Retired Teacher      Social History Main Topics    Smoking status: Never Smoker    Smokeless tobacco: Never Used    Alcohol use 1.2 oz/week     2 Glasses of wine per week      Comment: occ use    Drug use: No    Sexual activity: Yes     Partners: Male     Other Topics Concern    Are You Pregnant Or Think You May Be? No    Breast-Feeding No     Social History Narrative     Review of patient's allergies indicates:   Allergen Reactions    Arb-angiotensin receptor antagonist Edema    Metoprolol Other (See Comments)     Alopecia    Ace inhibitors Other (See Comments)    Sulfa (sulfonamide antibiotics)      Current Outpatient  Prescriptions on File Prior to Visit   Medication Sig Dispense Refill    aspirin 81 MG Chew Take 1 tablet (81 mg total) by mouth once daily.  0    b complex vitamins tablet Take 1 tablet by mouth once daily.      clobetasol (TEMOVATE) 0.05 % external solution Use on scalp twice daily 60 mL 3    fluticasone (FLONASE) 50 mcg/actuation nasal spray USE 2 SPRAYS IN EACH NOSTRIL ONCE DAILY 1 Bottle 12    metformin (GLUCOPHAGE-XR) 500 MG 24 hr tablet Take 1-2 tablets (500-1,000 mg total) by mouth daily with breakfast. 60 tablet 6    minoxidil (ROGAINE) 2 % external solution Apply topically 2 (two) times daily.      nebivolol (BYSTOLIC) 10 MG Tab Take 1 tablet (10 mg total) by mouth once daily. 30 tablet 5    [DISCONTINUED] amlodipine (NORVASC) 2.5 MG tablet Take 1 tablet (2.5 mg total) by mouth once daily. 30 tablet 1     No current facility-administered medications on file prior to visit.      .  Objective:    Physical Exam   Constitutional: She is oriented to person, place, and time. She appears well-developed and well-nourished.   HENT:   Head: Normocephalic and atraumatic.   Eyes: Pupils are equal, round, and reactive to light.   Neck: Normal range of motion. No JVD present.   Cardiovascular: Exam reveals no friction rub.    No murmur heard.  Pulmonary/Chest: Effort normal and breath sounds normal. No respiratory distress. She has no wheezes. She has no rales. She exhibits no tenderness.   Abdominal: Soft. Bowel sounds are normal. She exhibits no distension and no mass. There is no tenderness. There is no rebound and no guarding.   Musculoskeletal: Normal range of motion. She exhibits no edema or deformity.   Neurological: She is alert and oriented to person, place, and time.   Skin: Skin is warm and dry.   Bruising to upper extremities    Psychiatric: She has a normal mood and affect. Her behavior is normal. Judgment and thought content normal.   Nursing note and vitals reviewed.        Lab Results   Component  "Value Date    CHOL 224 (H) 12/27/2016    CHOL 225 (H) 08/05/2016    CHOL 190 11/20/2015     Lab Results   Component Value Date    HDL 68 12/27/2016    HDL 59 08/05/2016    HDL 52 11/20/2015     Lab Results   Component Value Date    LDLCALC 139.6 12/27/2016    LDLCALC 151.8 08/05/2016    LDLCALC 126.0 11/20/2015     Lab Results   Component Value Date    TRIG 82 12/27/2016    TRIG 71 08/05/2016    TRIG 60 11/20/2015     Lab Results   Component Value Date    CHOLHDL 30.4 12/27/2016    CHOLHDL 26.2 08/05/2016    CHOLHDL 27.4 11/20/2015       Chemistry        Component Value Date/Time     08/05/2016 1025    K 4.5 08/05/2016 1025     08/05/2016 1025    CO2 25 08/05/2016 1025    BUN 15 08/05/2016 1025    CREATININE 0.8 08/05/2016 1025     08/05/2016 1025        Component Value Date/Time    CALCIUM 9.6 08/05/2016 1025    ALKPHOS 65 12/12/2016 1001    AST 31 12/12/2016 1001    ALT 29 12/12/2016 1001    BILITOT 0.9 12/12/2016 1001          Lab Results   Component Value Date    TSH 0.951 08/05/2016     No results found for: INR, PROTIME  Lab Results   Component Value Date    WBC 6.81 08/05/2016    HGB 13.3 08/05/2016    HCT 40.5 08/05/2016    MCV 87 08/05/2016     08/05/2016     Visit Vitals    BP (!) 182/90 (BP Location: Right arm, Patient Position: Sitting, BP Method: Manual)    Pulse 64  Comment: radial    Ht 5' 10" (1.778 m)    Wt 100.4 kg (221 lb 3.7 oz)    BMI 31.74 kg/m2       Assessment:       1. Essential hypertension    2. Hypertensive CHF (congestive heart failure)    3. CHF (NYHA class I, ACC/AHA stage B)    4. Diastolic CHF, chronic      Patient blood pressure continues to be elevated. She reports reaction to amlodipine at numbness to lips and some facial swelling. Similar to what she experienced with ARB medication. Case reviewed with Dr Castañeda.     Plan:       Will continue current medications and treatment plan  Risk modification  Low sodium diet    Stop amlodipine   Increase " Bystolic 15 mg daily   Start Chlorthalidone 25 mg daily   Follow up with Dr Castañeda in 4 weeks         Chart reviewed. Dr. Castañeda agrees with plan as outlined above.

## 2017-01-25 ENCOUNTER — TELEPHONE (OUTPATIENT)
Dept: INTERNAL MEDICINE | Facility: CLINIC | Age: 67
End: 2017-01-25

## 2017-01-25 ENCOUNTER — PATIENT MESSAGE (OUTPATIENT)
Dept: INTERNAL MEDICINE | Facility: CLINIC | Age: 67
End: 2017-01-25

## 2017-01-25 NOTE — TELEPHONE ENCOUNTER
----- Message from MINA Abreu sent at 1/25/2017  8:03 AM CST -----  See [employer] messages. Please cancel pt's lab appt and clinic appt with me later this month. Please schedule a 3 month f/u with PCP around beginning of April and inform pt.

## 2017-02-13 ENCOUNTER — LAB VISIT (OUTPATIENT)
Dept: LAB | Facility: HOSPITAL | Age: 67
End: 2017-02-13
Attending: INTERNAL MEDICINE
Payer: MEDICARE

## 2017-02-13 DIAGNOSIS — R76.8 HEPATITIS B CORE ANTIBODY POSITIVE: ICD-10-CM

## 2017-02-13 PROCEDURE — 36415 COLL VENOUS BLD VENIPUNCTURE: CPT

## 2017-02-13 PROCEDURE — 80076 HEPATIC FUNCTION PANEL: CPT

## 2017-02-14 LAB
ALBUMIN SERPL BCP-MCNC: 3.9 G/DL
ALP SERPL-CCNC: 65 U/L
ALT SERPL W/O P-5'-P-CCNC: 21 U/L
AST SERPL-CCNC: 21 U/L
BILIRUB DIRECT SERPL-MCNC: 0.3 MG/DL
BILIRUB SERPL-MCNC: 0.9 MG/DL
PROT SERPL-MCNC: 6.9 G/DL

## 2017-02-16 RX ORDER — FLUTICASONE PROPIONATE 50 MCG
SPRAY, SUSPENSION (ML) NASAL
Qty: 1 BOTTLE | Refills: 12 | Status: SHIPPED | OUTPATIENT
Start: 2017-02-16 | End: 2017-03-09 | Stop reason: SDUPTHER

## 2017-02-28 ENCOUNTER — OFFICE VISIT (OUTPATIENT)
Dept: DERMATOLOGY | Facility: CLINIC | Age: 67
End: 2017-02-28
Payer: MEDICARE

## 2017-02-28 DIAGNOSIS — L63.9 ALOPECIA AREATA: Primary | ICD-10-CM

## 2017-02-28 PROCEDURE — 99213 OFFICE O/P EST LOW 20 MIN: CPT | Mod: 25,S$PBB,, | Performed by: DERMATOLOGY

## 2017-02-28 PROCEDURE — 11900 INJECT SKIN LESIONS </W 7: CPT | Mod: PBBFAC,PO | Performed by: DERMATOLOGY

## 2017-02-28 PROCEDURE — 99999 PR PBB SHADOW E&M-EST. PATIENT-LVL I: CPT | Mod: PBBFAC,,, | Performed by: DERMATOLOGY

## 2017-02-28 PROCEDURE — 11900 INJECT SKIN LESIONS </W 7: CPT | Mod: S$PBB,,, | Performed by: DERMATOLOGY

## 2017-02-28 PROCEDURE — 99211 OFF/OP EST MAY X REQ PHY/QHP: CPT | Mod: PBBFAC,PO,25 | Performed by: DERMATOLOGY

## 2017-02-28 RX ORDER — CLOBETASOL PROPIONATE 0.46 MG/ML
SOLUTION TOPICAL
Qty: 60 ML | Refills: 3 | Status: SHIPPED | OUTPATIENT
Start: 2017-02-28 | End: 2017-04-12 | Stop reason: SDUPTHER

## 2017-02-28 RX ADMIN — TRIAMCINOLONE ACETONIDE 10 MG: 10 INJECTION, SUSPENSION INTRA-ARTICULAR; INTRALESIONAL at 09:02

## 2017-02-28 NOTE — MR AVS SNAPSHOT
[facility] Dermatology  [address] [facility] [place] [address]  Phone: [phone]  Fax: [phone]                  Deirdre Yanez   2017 8:30 AM   Office Visit    Description:  Female : 1950   Provider:  Darlene Cedillo MD   Department:  [facility] - Dermatology           Reason for Visit     Follow-up           Diagnoses this Visit        Comments    Alopecia areata    -  Primary            To Do List           Future Appointments        Provider Department Dept Phone    3/1/2017 1:20 PM Jesús Castañeda MD [facility] [phone]    3/16/2017 9:40 AM LABORATORY, [facility] - Laboratory [phone]    2017 9:55 AM LABORATORY, [facility] [phone]    2017 10:00 AM Darlene Cedillo MD [facility] [phone]    2017 4:20 PM Beti Blackburn MD [facility] - Internal Medicine [phone]      Goals (5 Years of Data)     None      Follow-Up and Disposition     Return in about 8 weeks (around 2017).    Follow-up and Disposition History       These Medications        Disp Refills Start End    clobetasol (TEMOVATE) 0.05 % external solution 60 mL 3 2017     Use on scalp twice daily    Pharmacy: [facility] [address] [place] [address] MAIN  AT [facility] [address] Ph #: [phone]         [facility] -  Assistance  Registered nurses in the [facility] provide clinical advisement, health education, appointment booking, and other advisory services.  Call for this free service at [phone].             Medications           Message regarding Medications     Verify the changes and/or additions to your medication regime listed below are the same as discussed with your clinician today.  If any of these changes or additions are incorrect, please notify your healthcare provider.        These medications were administered today        Dose Freq    triamcinolone  acetonide injection 10 mg 10 mg Clinic/HOD 1 time    Si mL (10 mg total) by Other route one time.    Class: Normal    Route: Other      STOP taking these medications     metformin (GLUCOPHAGE-XR) 500 MG 24 hr tablet Take 1-2 tablets (500-1,000 mg total) by mouth daily with breakfast.           Verify that the below list of medications is an accurate representation of the medications you are currently taking.  If none reported, the list may be blank. If incorrect, please contact your healthcare provider. Carry this list with you in case of emergency.           Current Medications     aspirin 81 MG Chew Take 1 tablet (81 mg total) by mouth once daily.    clobetasol (TEMOVATE) 0.05 % external solution Use on scalp twice daily    fluticasone (FLONASE) 50 mcg/actuation nasal spray USE 2 SPRAYS IN EACH NOSTRIL DAILY    minoxidil (ROGAINE) 2 % external solution Apply topically 2 (two) times daily.    nebivolol (BYSTOLIC) 10 MG Tab Take 1 tablet (10 mg total) by mouth once daily.    nebivolol (BYSTOLIC) 5 MG Tab Take 1 tablet (5 mg total) by mouth once daily.    b complex vitamins tablet Take 1 tablet by mouth once daily.    chlorthalidone (HYGROTEN) 25 MG Tab Take 1 tablet (25 mg total) by mouth once daily.           Clinical Reference Information           Allergies as of 2017     Arb-angiotensin Receptor Antagonist    Metoprolol    Ace Inhibitors    Sulfa (Sulfonamide Antibiotics)      Immunizations Administered on Date of Encounter - 2017     None      Administrations This Visit     triamcinolone acetonide injection 10 mg     Admin Date Action Dose Route Administered By             2017 Given 10 mg Other Darlene Cedillo MD                      Language Assistance Services     ATTENTION: Language assistance services are available, free of charge. Please call [phone].      ATENCIÓN: Si habla español, tiene a marie disposición servicios gratuitos de asistencia lingüística. Llame al  [phone].     MIRZA Ý: N?u b?n nói Ti?ng Vi?t, có các d?ch v? h? tr? ngôn ng? mi?n phí dành cho b?n. G?i s? [phone].         [facility] - Dermatology complies with applicable Federal civil rights laws and does not discriminate on the basis of race, color, national origin, age, disability, or sex.

## 2017-02-28 NOTE — PROGRESS NOTES
Subjective:       Patient ID:  Deirdre Yanez is a 66 y.o. female who presents for   Chief Complaint   Patient presents with    Follow-up     HPI Comments: HPI Comments: History of alopecia areata, Hepatitis B (Hepatitis B core antibody positive- HbsAb negative, + intolerance to lamivudine, currently undergoing vaccination against HBV) and Hep C (s/p treatment with possible cure), currently s/p ILK x 13 and IM kenalog x 8, last seen on 1/3/17.  She currently uses clobetasol solution bid, rogaine bid and biotin 5000 mcg qD. She discontinued all vitamin supplements. + improvement with hair growth of the scalp. Dr. Galvan has approved future intermittent IM steroids while pt being vaccinated for HBV.       She has new diagnosis CHF, currently on spironolactone.       Review of Systems   Constitutional: Negative for fever and chills.   Gastrointestinal: Negative for nausea and vomiting.   Skin: Negative for daily sunscreen use, activity-related sunscreen use and recent sunburn.   Hematologic/Lymphatic: Does not bruise/bleed easily.        Objective:    Physical Exam   Constitutional: She appears well-developed and well-nourished. No distress.   Neurological: She is alert and oriented to person, place, and time. She is not disoriented.   Psychiatric: She has a normal mood and affect.   Skin:   Areas Examined (abnormalities noted in diagram):   Scalp / Hair Palpated and Inspected  Head / Face Inspection Performed  Neck Inspection Performed  RUE Inspected  LUE Inspection Performed  Nails and Digits Inspection Performed               Assessment / Plan:        Alopecia areata  -     triamcinolone acetonide injection 10 mg; 1 mL (10 mg total) by Other route one time.  -     clobetasol (TEMOVATE) 0.05 % external solution; Use on scalp twice daily  Dispense: 60 mL; Refill: 3  -     Recommend pt continue to follow with Dr. Galvan for Hep B monitoring, continue Hep B vaccination series. ILK #14 done today.  Pt s/p IM steroids #8  done at last visit. Continue clobetasol solution twice daily, rogaine bid, and biotin. After risks, benefits and alternatives explained and side effect profile reviewed, including hypopigmentation, telangiectasia and atrophy, the patient verbally consented to ILK injection. A total of 3 cc of kenalog 10 mg/ml was injected into the areas of alopecia of the scalp. Pt tolerated well without side effects. RTC in 8 weeks for repeat injection.             Return in about 8 weeks (around 4/25/2017).

## 2017-03-01 ENCOUNTER — OFFICE VISIT (OUTPATIENT)
Dept: CARDIOLOGY | Facility: CLINIC | Age: 67
End: 2017-03-01
Payer: MEDICARE

## 2017-03-01 VITALS
BODY MASS INDEX: 30.68 KG/M2 | HEIGHT: 70 IN | SYSTOLIC BLOOD PRESSURE: 200 MMHG | HEART RATE: 52 BPM | DIASTOLIC BLOOD PRESSURE: 92 MMHG | WEIGHT: 214.31 LBS

## 2017-03-01 DIAGNOSIS — I50.32 DIASTOLIC CHF, CHRONIC: Chronic | ICD-10-CM

## 2017-03-01 DIAGNOSIS — I11.0 HYPERTENSIVE CHF (CONGESTIVE HEART FAILURE): Chronic | ICD-10-CM

## 2017-03-01 DIAGNOSIS — I10 ESSENTIAL HYPERTENSION: Primary | Chronic | ICD-10-CM

## 2017-03-01 PROCEDURE — 99999 PR PBB SHADOW E&M-EST. PATIENT-LVL II: CPT | Mod: PBBFAC,,, | Performed by: NUCLEAR MEDICINE

## 2017-03-01 PROCEDURE — 99212 OFFICE O/P EST SF 10 MIN: CPT | Mod: PBBFAC | Performed by: NUCLEAR MEDICINE

## 2017-03-01 PROCEDURE — 99215 OFFICE O/P EST HI 40 MIN: CPT | Mod: S$PBB,,, | Performed by: NUCLEAR MEDICINE

## 2017-03-01 RX ORDER — SPIRONOLACTONE 25 MG/1
25 TABLET ORAL DAILY
Qty: 30 TABLET | Refills: 3 | Status: SHIPPED | OUTPATIENT
Start: 2017-03-01 | End: 2017-03-15 | Stop reason: SDUPTHER

## 2017-03-01 NOTE — MR AVS SNAPSHOT
[facility]  [address] [facility] [address]  Phone: [phone]  Fax: [phone]                  Deirdre Yanez   3/1/2017 1:20 PM   Office Visit    Description:  Female : 1950   Provider:  Jesús Castañeda MD   Department:  [facility]           Diagnoses this Visit        Comments    Essential hypertension    -  Primary     Hypertensive CHF (congestive heart failure)         Diastolic CHF, chronic                To Do List           Future Appointments        Provider Department Dept Phone    3/16/2017 9:40 AM LABORATORY, [facility] - Laboratory [phone]    2017 9:55 AM LABORATORY, [facility] [phone]    2017 10:00 AM Darlene Cedillo MD [facility] Dermatology [phone]    2017 4:20 PM Beti Blackburn MD [facility] Internal Medicine [phone]    6/15/2017 9:00 AM GASTRO NURSE, [facility] Gastroenterology [phone]      Goals (5 Years of Data)     None      Follow-Up and Disposition     Return in about 2 weeks (around 3/15/2017).       These Medications        Disp Refills Start End    spironolactone (ALDACTONE) 25 MG tablet 30 tablet 3 3/1/2017 3/31/2017    Take 1 tablet (25 mg total) by mouth once daily. - Oral    Pharmacy: [facility] [address] [place] [address] MAIN  AT [facility] [address] Ph #: [phone]         [facility] On Call     [facility] -  Assistance  Registered nurses in the [facility] provide clinical advisement, health education, appointment booking, and other advisory services.  Call for this free service at [phone].             Medications           Message regarding Medications     Verify the changes and/or additions to your medication regime listed below are the same as discussed with your clinician today.  If any of these changes or additions are incorrect, please notify your healthcare provider.        START taking  these NEW medications        Refills    spironolactone (ALDACTONE) 25 MG tablet 3    Sig: Take 1 tablet (25 mg total) by mouth once daily.    Class: Normal    Route: Oral      STOP taking these medications     chlorthalidone (HYGROTEN) 25 MG Tab Take 1 tablet (25 mg total) by mouth once daily.           Verify that the below list of medications is an accurate representation of the medications you are currently taking.  If none reported, the list may be blank. If incorrect, please contact your healthcare provider. Carry this list with you in case of emergency.           Current Medications     aspirin 81 MG Chew Take 1 tablet (81 mg total) by mouth once daily.    b complex vitamins tablet Take 1 tablet by mouth once daily.    clobetasol (TEMOVATE) 0.05 % external solution Use on scalp twice daily    fluticasone (FLONASE) 50 mcg/actuation nasal spray USE 2 SPRAYS IN EACH NOSTRIL DAILY    minoxidil (ROGAINE) 2 % external solution Apply topically 2 (two) times daily.    nebivolol (BYSTOLIC) 10 MG Tab Take 1 tablet (10 mg total) by mouth once daily.    nebivolol (BYSTOLIC) 5 MG Tab Take 1 tablet (5 mg total) by mouth once daily.    spironolactone (ALDACTONE) 25 MG tablet Take 1 tablet (25 mg total) by mouth once daily.           Clinical Reference Information           Your Vitals Were     BP                   200/92           Blood Pressure          Most Recent Value    Right Arm BP - Sitting  200/92    Left Arm BP - Sitting  180/100    Left Arm BP - Standing  170/100    BP  (!)  200/92      Allergies as of 3/1/2017     Arb-angiotensin Receptor Antagonist    Metoprolol    Ace Inhibitors    Sulfa (Sulfonamide Antibiotics)      Immunizations Administered on Date of Encounter - 3/1/2017     None      Orders Placed During Today's Visit     Future Labs/Procedures Expected by Expires    2D echo with color flow doppler  As directed 3/1/2018      Smoking Cessation     If you would like to quit smoking:   You may be eligible  for free services if you are a [place] resident and started smoking cigarettes before September 1, 1988.  Call the Smoking Cessation Trust (SCT) toll free at (767) 130-2267 or (108) 353-4876.   Call 1-800-QUIT-NOW if you do not meet the above criteria.            Language Assistance Services     ATTENTION: Language assistance services are available, free of charge. Please call [phone].      ATENCIÓN: Si habla español, tiene a marie disposición servicios gratuitos de asistencia lingüística. Llame al [phone].     CHÚ Ý: N?u b?n nói Ti?ng Vi?t, có các d?ch v? h? tr? ngôn ng? mi?n phí dành cho b?n. G?i s? [phone].         [facility] complies with applicable Federal civil rights laws and does not discriminate on the basis of race, color, national origin, age, disability, or sex.

## 2017-03-01 NOTE — PROGRESS NOTES
Subjective:   Patient ID:  Deirdre Yanez is a 66 y.o. female who presents for follow-up of No chief complaint on file.      HPI1- UNCONTROLLED ESSENTIAL HTN, WITH LVH AND DD, HFPEF STAGE B  UNABLE TO TOLERATED CHLORTHALIDONE- GI SIDE EFFECTS  TAKING ONLY BYSTOLIC  HOME BP RECORDING WERE REVIEWED AND STILL NOT WELL CONTROLLED , PREDOMINANTLY SBP  NO HX OF FOCAL CNS SYMPTOMS OR SIGNS TO SUGGEST TIA OR STROKE  NO CHEST DISCOMFORT. NO PALPITATIONS, NO UNUSUAL DESAI. NO ORTHOPNEA OR PND  NO EDEMA. NO INTERMITTENT CLAUDICATION    Review of Systems   Constitution: Negative for chills, fever, weakness, night sweats, weight gain and weight loss.   HENT: Negative for headaches and nosebleeds.    Eyes: Negative for blurred vision, double vision and visual disturbance.   Cardiovascular: Negative for chest pain, dyspnea on exertion, irregular heartbeat, leg swelling, orthopnea, palpitations, paroxysmal nocturnal dyspnea and syncope.   Respiratory: Negative for cough, hemoptysis and wheezing.    Endocrine: Negative for polydipsia and polyuria.   Hematologic/Lymphatic: Does not bruise/bleed easily.   Skin: Negative for rash.   Musculoskeletal: Negative for joint pain, joint swelling, muscle weakness and myalgias.   Gastrointestinal: Negative for abdominal pain, hematemesis, jaundice and melena.   Genitourinary: Negative for dysuria, hematuria and nocturia.   Neurological: Negative for dizziness, focal weakness and sensory change.   Psychiatric/Behavioral: Negative for depression. The patient is nervous/anxious. The patient does not have insomnia.      Family History   Problem Relation Age of Onset    Colon cancer Maternal Grandfather     Diabetes Maternal Grandmother     Hypertension Maternal Grandmother     Cataracts Mother     Glaucoma Mother     Macular degeneration Mother     Hypertension Mother     Diabetes Paternal Grandmother     Cataracts Maternal Aunt     Glaucoma Maternal Aunt     Macular degeneration Maternal  Aunt     Melanoma Maternal Aunt     Heart disease       pat side     Past Medical History:   Diagnosis Date    Abrasion     left eye    Abrasion and/or friction burn of abdominal wall with infection     left eye    Diabetes mellitus     2011  11/17/2013    Diverticulosis     DM (diabetes mellitus) 2011     12/03/2014  A1C 6.0 x 2 weeks    Hepatitis     Hx of B/C from cadaver in past    Hiatal hernia     Hip bursitis, left     HTN (hypertension)     Hypertensive CHF (congestive heart failure) 3/28/2014    Positive ALBIN (antinuclear antibody)     Pure hypercholesterolemia 9/30/2016     Current Outpatient Prescriptions on File Prior to Visit   Medication Sig Dispense Refill    aspirin 81 MG Chew Take 1 tablet (81 mg total) by mouth once daily.  0    b complex vitamins tablet Take 1 tablet by mouth once daily.      clobetasol (TEMOVATE) 0.05 % external solution Use on scalp twice daily 60 mL 3    fluticasone (FLONASE) 50 mcg/actuation nasal spray USE 2 SPRAYS IN EACH NOSTRIL DAILY 1 Bottle 12    minoxidil (ROGAINE) 2 % external solution Apply topically 2 (two) times daily.      nebivolol (BYSTOLIC) 10 MG Tab Take 1 tablet (10 mg total) by mouth once daily. 30 tablet 5    nebivolol (BYSTOLIC) 5 MG Tab Take 1 tablet (5 mg total) by mouth once daily. 30 tablet 11    [DISCONTINUED] chlorthalidone (HYGROTEN) 25 MG Tab Take 1 tablet (25 mg total) by mouth once daily. 30 tablet 11     No current facility-administered medications on file prior to visit.      Review of patient's allergies indicates:   Allergen Reactions    Arb-angiotensin receptor antagonist Edema    Metoprolol Other (See Comments)     Alopecia    Ace inhibitors Other (See Comments)    Sulfa (sulfonamide antibiotics)        Objective:     Physical Exam   Constitutional: She is oriented to person, place, and time. She appears well-developed. No distress.   HENT:   Head: Normocephalic.   Eyes: Conjunctivae are normal. Pupils  are equal, round, and reactive to light. No scleral icterus.   Neck: Normal range of motion. Neck supple. Normal carotid pulses, no hepatojugular reflux and no JVD present. Carotid bruit is not present. No edema present. No thyroid mass and no thyromegaly present.   Cardiovascular: Normal rate, regular rhythm, S1 normal, S2 normal, normal heart sounds and intact distal pulses.  PMI is not displaced.  Exam reveals no gallop and no friction rub.    No murmur heard.  Pulses:       Carotid pulses are 2+ on the right side, and 2+ on the left side.       Radial pulses are 2+ on the right side, and 2+ on the left side.        Femoral pulses are 2+ on the right side, and 2+ on the left side.       Popliteal pulses are 2+ on the right side, and 2+ on the left side.        Dorsalis pedis pulses are 2+ on the right side, and 2+ on the left side.        Posterior tibial pulses are 2+ on the right side, and 2+ on the left side.   Pulmonary/Chest: Effort normal and breath sounds normal. She has no wheezes. She has no rales. She exhibits no tenderness.   Abdominal: Soft. Bowel sounds are normal. She exhibits no pulsatile midline mass and no mass. There is no hepatosplenomegaly. There is no tenderness.   Musculoskeletal: Normal range of motion. She exhibits no edema or tenderness.        Cervical back: Normal.        Thoracic back: Normal.        Lumbar back: Normal.   Lymphadenopathy:     She has no cervical adenopathy.     She has no axillary adenopathy.        Right: No supraclavicular adenopathy present.        Left: No supraclavicular adenopathy present.   Neurological: She is alert and oriented to person, place, and time. She has normal strength and normal reflexes. No sensory deficit. Gait normal.   Skin: Skin is warm. No rash noted. No cyanosis. No pallor. Nails show no clubbing.   Psychiatric: She has a normal mood and affect. Her speech is normal and behavior is normal. Cognition and memory are normal.       Assessment:      1. Essential hypertension    2. Hypertensive CHF (congestive heart failure)    3. Diastolic CHF, chronic      UNCONTROLLED HTN STAGE 2  NO CLINICAL EVIDENCE OF TIA OR STROKE  NO CLINICAL EVIDENCE OF ACTIVE MYOCARDIAL ISCHEMIA. NO ARRHYTHMIAS. NO ADHF  Plan:     Essential hypertension  -     spironolactone (ALDACTONE) 25 MG tablet; Take 1 tablet (25 mg total) by mouth once daily.  Dispense: 30 tablet; Refill: 3  -     2D echo with color flow doppler; Future    Hypertensive CHF (congestive heart failure)  -     spironolactone (ALDACTONE) 25 MG tablet; Take 1 tablet (25 mg total) by mouth once daily.  Dispense: 30 tablet; Refill: 3  -     2D echo with color flow doppler; Future    Diastolic CHF, chronic  -     spironolactone (ALDACTONE) 25 MG tablet; Take 1 tablet (25 mg total) by mouth once daily.  Dispense: 30 tablet; Refill: 3    1- DC CHLORTHALIDONE    2- START ALDACTONE    3- RETURN IN 2 WEEKS WITH BMP

## 2017-03-09 RX ORDER — FLUTICASONE PROPIONATE 50 MCG
SPRAY, SUSPENSION (ML) NASAL
Qty: 1 BOTTLE | Refills: 12 | Status: SHIPPED | OUTPATIENT
Start: 2017-03-09 | End: 2018-06-25 | Stop reason: SDUPTHER

## 2017-03-15 ENCOUNTER — OFFICE VISIT (OUTPATIENT)
Dept: CARDIOLOGY | Facility: CLINIC | Age: 67
End: 2017-03-15
Payer: MEDICARE

## 2017-03-15 ENCOUNTER — LAB VISIT (OUTPATIENT)
Dept: LAB | Facility: HOSPITAL | Age: 67
End: 2017-03-15
Attending: INTERNAL MEDICINE
Payer: MEDICARE

## 2017-03-15 VITALS
SYSTOLIC BLOOD PRESSURE: 178 MMHG | HEART RATE: 60 BPM | HEIGHT: 70 IN | WEIGHT: 210.13 LBS | BODY MASS INDEX: 30.08 KG/M2 | DIASTOLIC BLOOD PRESSURE: 96 MMHG

## 2017-03-15 DIAGNOSIS — I10 ESSENTIAL HYPERTENSION: Chronic | ICD-10-CM

## 2017-03-15 DIAGNOSIS — I50.9 CHF (NYHA CLASS I, ACC/AHA STAGE B): Chronic | ICD-10-CM

## 2017-03-15 DIAGNOSIS — I11.0 HYPERTENSIVE CHF (CONGESTIVE HEART FAILURE): Chronic | ICD-10-CM

## 2017-03-15 DIAGNOSIS — I50.32 DIASTOLIC CHF, CHRONIC: Chronic | ICD-10-CM

## 2017-03-15 DIAGNOSIS — R76.8 HEPATITIS B CORE ANTIBODY POSITIVE: ICD-10-CM

## 2017-03-15 DIAGNOSIS — D84.9 IMMUNOSUPPRESSION: ICD-10-CM

## 2017-03-15 DIAGNOSIS — I10 ESSENTIAL HYPERTENSION: Primary | Chronic | ICD-10-CM

## 2017-03-15 LAB
ALBUMIN SERPL BCP-MCNC: 4 G/DL
ALP SERPL-CCNC: 71 U/L
ALT SERPL W/O P-5'-P-CCNC: 21 U/L
ANION GAP SERPL CALC-SCNC: 8 MMOL/L
AST SERPL-CCNC: 24 U/L
BILIRUB DIRECT SERPL-MCNC: 0.5 MG/DL
BILIRUB SERPL-MCNC: 1.4 MG/DL
BUN SERPL-MCNC: 17 MG/DL
CALCIUM SERPL-MCNC: 9.9 MG/DL
CHLORIDE SERPL-SCNC: 101 MMOL/L
CO2 SERPL-SCNC: 30 MMOL/L
CREAT SERPL-MCNC: 0.8 MG/DL
EST. GFR  (AFRICAN AMERICAN): >60 ML/MIN/1.73 M^2
EST. GFR  (NON AFRICAN AMERICAN): >60 ML/MIN/1.73 M^2
GLUCOSE SERPL-MCNC: 98 MG/DL
POTASSIUM SERPL-SCNC: 5 MMOL/L
PROT SERPL-MCNC: 7.3 G/DL
SODIUM SERPL-SCNC: 139 MMOL/L

## 2017-03-15 PROCEDURE — 99212 OFFICE O/P EST SF 10 MIN: CPT | Mod: PBBFAC | Performed by: NUCLEAR MEDICINE

## 2017-03-15 PROCEDURE — 99999 PR PBB SHADOW E&M-EST. PATIENT-LVL II: CPT | Mod: PBBFAC,,, | Performed by: NUCLEAR MEDICINE

## 2017-03-15 PROCEDURE — 99214 OFFICE O/P EST MOD 30 MIN: CPT | Mod: S$PBB,,, | Performed by: NUCLEAR MEDICINE

## 2017-03-15 RX ORDER — SPIRONOLACTONE 50 MG/1
50 TABLET, FILM COATED ORAL DAILY
Qty: 30 TABLET | Refills: 3 | Status: SHIPPED | OUTPATIENT
Start: 2017-03-15 | End: 2017-07-14 | Stop reason: SDUPTHER

## 2017-03-15 NOTE — MR AVS SNAPSHOT
[facility]  [address] [facility] [address]  Phone: [phone]  Fax: [phone]                  Deirdre Yanez   3/15/2017 1:40 PM   Office Visit    Description:  Female : 1950   Provider:  Jesús Castañeda MD   Department:  [facility]           Diagnoses this Visit        Comments    Essential hypertension    -  Primary     Hypertensive CHF (congestive heart failure)         Diastolic CHF, chronic         CHF (NYHA class I, ACC/AHA stage B)                To Do List           Future Appointments        Provider Department Dept Phone    2017 9:55 AM LABORATORY, [facility] [phone]    2017 10:00 AM Darlene Cedillo MD [facility] Dermatology [phone]    2017 4:20 PM Beti Blackburn MD [facility] Internal Medicine [phone]    6/15/2017 9:00 AM GASTRO NURSE, [facility] Gastroenterology [phone]    2017 9:00 AM [facility] - [phone]      Goals (5 Years of Data)     None      Follow-Up and Disposition     Return in about 4 weeks (around 2017).       These Medications        Disp Refills Start End    spironolactone (ALDACTONE) 50 MG tablet 30 tablet 3 3/15/2017 2017    Take 1 tablet (50 mg total) by mouth once daily. - Oral    Pharmacy: [facility] [address] [place] [address] MAIN  AT [facility] [address] Ph #: [phone]         [facility] On Call     [facility] On Call Nurse Care Line -  Assistance  Registered nurses in the [facility] provide clinical advisement, health education, appointment booking, and other advisory services.  Call for this free service at [phone].             Medications           Message regarding Medications     Verify the changes and/or additions to your medication regime listed below are the same as discussed with your clinician today.  If any of these changes or additions are incorrect, please notify your  "healthcare provider.        CHANGE how you are taking these medications     Start Taking Instead of    spironolactone (ALDACTONE) 50 MG tablet spironolactone (ALDACTONE) 25 MG tablet    Dosage:  Take 1 tablet (50 mg total) by mouth once daily. Dosage:  Take 1 tablet (25 mg total) by mouth once daily.    Reason for Change:  Reorder            Verify that the below list of medications is an accurate representation of the medications you are currently taking.  If none reported, the list may be blank. If incorrect, please contact your healthcare provider. Carry this list with you in case of emergency.           Current Medications     aspirin 81 MG Chew Take 1 tablet (81 mg total) by mouth once daily.    b complex vitamins tablet Take 1 tablet by mouth once daily.    clobetasol (TEMOVATE) 0.05 % external solution Use on scalp twice daily    fluticasone (FLONASE) 50 mcg/actuation nasal spray SHAKE LIQUID AND USE 2 SPRAYS IN EACH NOSTRIL DAILY    minoxidil (ROGAINE) 2 % external solution Apply topically 2 (two) times daily.    nebivolol (BYSTOLIC) 10 MG Tab Take 1 tablet (10 mg total) by mouth once daily.    nebivolol (BYSTOLIC) 5 MG Tab Take 1 tablet (5 mg total) by mouth once daily.    spironolactone (ALDACTONE) 50 MG tablet Take 1 tablet (50 mg total) by mouth once daily.           Clinical Reference Information           Your Vitals Were     BP Pulse Height Weight BMI    178/96 60 5' 10" (1.778 m) 95.3 kg (210 lb 1.6 oz) 30.15 kg/m2      Blood Pressure          Most Recent Value    Right Arm BP - Sitting  178/96    Left Arm BP - Sitting  160/100    BP  (!)  178/96      Allergies as of 3/15/2017     Arb-angiotensin Receptor Antagonist    Metoprolol    Ace Inhibitors    Sulfa (Sulfonamide Antibiotics)      Immunizations Administered on Date of Encounter - 3/15/2017     None      Smoking Cessation     If you would like to quit smoking:   You may be eligible for free services if you are a [place] resident and started " smoking cigarettes before September 1, 1988.  Call the Smoking Cessation Trust (SCT) toll free at (017) 902-1737 or (974) 332-3479.   Call 1-800-QUIT-NOW if you do not meet the above criteria.            Language Assistance Services     ATTENTION: Language assistance services are available, free of charge. Please call [phone].      ATENCIÓN: Si habla español, tiene a marie disposición servicios gratuitos de asistencia lingüística. Llame al [phone].     CHÚ Ý: N?u b?n nói Ti?ng Vi?t, có các d?ch v? h? tr? ngôn ng? mi?n phí dành cho b?n. G?i s? [phone].         [facility] complies with applicable Federal civil rights laws and does not discriminate on the basis of race, color, national origin, age, disability, or sex.

## 2017-03-15 NOTE — PROGRESS NOTES
Subjective:   Patient ID:  Deirdre Yanez is a 66 y.o. female who presents for follow-up of No chief complaint on file.      HPI1- ESSENTIAL HTN, WITH HFPEF, STAGE B,DD.  FEELING BETTER , TOLERATING ALDACTONE  NO EDEMA. NO INTERMITTENT CLAUDICATION  NO UNUSUAL DESAI. NO ORTHOPNEA OR PND.  NO PALPITATIONS. NO SYNCOPE  NO ABDOMINAL PAIN  NO FOCAL CNS SYMPTOMS OR SIGNS TO SUGGEST TIA OR STROKE      Review of Systems   Constitution: Negative for chills, fever, weakness, night sweats, weight gain and weight loss.   HENT: Negative for headaches and nosebleeds.    Eyes: Negative for blurred vision, double vision and visual disturbance.   Cardiovascular: Negative for chest pain, dyspnea on exertion, irregular heartbeat, leg swelling, orthopnea, palpitations, paroxysmal nocturnal dyspnea and syncope.   Respiratory: Negative for cough, hemoptysis and wheezing.    Endocrine: Negative for polydipsia and polyuria.   Hematologic/Lymphatic: Does not bruise/bleed easily.   Skin: Negative for rash.   Musculoskeletal: Negative for joint pain, joint swelling, muscle weakness and myalgias.   Gastrointestinal: Negative for abdominal pain, hematemesis, jaundice and melena.   Genitourinary: Negative for dysuria, hematuria and nocturia.   Neurological: Negative for dizziness, focal weakness and sensory change.   Psychiatric/Behavioral: Negative for depression. The patient does not have insomnia and is not nervous/anxious.      Family History   Problem Relation Age of Onset    Colon cancer Maternal Grandfather     Diabetes Maternal Grandmother     Hypertension Maternal Grandmother     Cataracts Mother     Glaucoma Mother     Macular degeneration Mother     Hypertension Mother     Diabetes Paternal Grandmother     Cataracts Maternal Aunt     Glaucoma Maternal Aunt     Macular degeneration Maternal Aunt     Melanoma Maternal Aunt     Heart disease       pat side     Past Medical History:   Diagnosis Date    Abrasion     left eye     Abrasion and/or friction burn of abdominal wall with infection     left eye    Diabetes mellitus     2011  11/17/2013    Diverticulosis     DM (diabetes mellitus) 2011     12/03/2014  A1C 6.0 x 2 weeks    Hepatitis     Hx of B/C from cadaver in past    Hiatal hernia     Hip bursitis, left     HTN (hypertension)     Hypertensive CHF (congestive heart failure) 3/28/2014    Positive ALBIN (antinuclear antibody)     Pure hypercholesterolemia 9/30/2016     Current Outpatient Prescriptions on File Prior to Visit   Medication Sig Dispense Refill    aspirin 81 MG Chew Take 1 tablet (81 mg total) by mouth once daily.  0    b complex vitamins tablet Take 1 tablet by mouth once daily.      clobetasol (TEMOVATE) 0.05 % external solution Use on scalp twice daily 60 mL 3    fluticasone (FLONASE) 50 mcg/actuation nasal spray SHAKE LIQUID AND USE 2 SPRAYS IN EACH NOSTRIL DAILY 1 Bottle 12    minoxidil (ROGAINE) 2 % external solution Apply topically 2 (two) times daily.      nebivolol (BYSTOLIC) 10 MG Tab Take 1 tablet (10 mg total) by mouth once daily. 30 tablet 5    nebivolol (BYSTOLIC) 5 MG Tab Take 1 tablet (5 mg total) by mouth once daily. 30 tablet 11    [DISCONTINUED] spironolactone (ALDACTONE) 25 MG tablet Take 1 tablet (25 mg total) by mouth once daily. 30 tablet 3     No current facility-administered medications on file prior to visit.      Review of patient's allergies indicates:   Allergen Reactions    Arb-angiotensin receptor antagonist Edema    Metoprolol Other (See Comments)     Alopecia    Ace inhibitors Other (See Comments)    Sulfa (sulfonamide antibiotics)        Objective:     Physical Exam   Constitutional: She is oriented to person, place, and time. She appears well-developed. No distress.   HENT:   Head: Normocephalic.   Eyes: Conjunctivae are normal. Pupils are equal, round, and reactive to light. No scleral icterus.   Neck: Normal range of motion. Neck supple. Normal  carotid pulses, no hepatojugular reflux and no JVD present. Carotid bruit is not present. No edema present. No thyroid mass and no thyromegaly present.   Cardiovascular: Normal rate, regular rhythm, S1 normal, S2 normal, normal heart sounds and intact distal pulses.  PMI is not displaced.  Exam reveals no gallop and no friction rub.    No murmur heard.  Pulses:       Carotid pulses are 2+ on the right side, and 2+ on the left side.       Radial pulses are 2+ on the right side, and 2+ on the left side.        Femoral pulses are 2+ on the right side, and 2+ on the left side.       Popliteal pulses are 2+ on the right side, and 2+ on the left side.        Dorsalis pedis pulses are 2+ on the right side, and 2+ on the left side.        Posterior tibial pulses are 2+ on the right side, and 2+ on the left side.   Pulmonary/Chest: Effort normal and breath sounds normal. She has no wheezes. She has no rales. She exhibits no tenderness.   Abdominal: Soft. Bowel sounds are normal. She exhibits no pulsatile midline mass and no mass. There is no hepatosplenomegaly. There is no tenderness.   Musculoskeletal: Normal range of motion. She exhibits no edema or tenderness.        Cervical back: Normal.        Thoracic back: Normal.        Lumbar back: Normal.   Lymphadenopathy:     She has no cervical adenopathy.     She has no axillary adenopathy.        Right: No supraclavicular adenopathy present.        Left: No supraclavicular adenopathy present.   Neurological: She is alert and oriented to person, place, and time. She has normal strength and normal reflexes. No sensory deficit. Gait normal.   Skin: Skin is warm. No rash noted. No cyanosis. No pallor. Nails show no clubbing.   Psychiatric: She has a normal mood and affect. Her speech is normal and behavior is normal. Cognition and memory are normal.       Assessment:     1. Essential hypertension    2. Hypertensive CHF (congestive heart failure)    3. Diastolic CHF, chronic    4.  CHF (NYHA class I, ACC/AHA stage B)      HOME BP MONITORING RECORDS WERE REVIEWED  AND DISCUSSED- IMPROVING SBP AND DBP LEVELS  NO EVIDENCE OF ACTIVE MYOCARDIAL ISCHEMIA. NO ARRHYTHMIAS  NO ADHF  CNS STATUS STABLE    Plan:     Essential hypertension  -     spironolactone (ALDACTONE) 50 MG tablet; Take 1 tablet (50 mg total) by mouth once daily.  Dispense: 30 tablet; Refill: 3    Hypertensive CHF (congestive heart failure)  -     spironolactone (ALDACTONE) 50 MG tablet; Take 1 tablet (50 mg total) by mouth once daily.  Dispense: 30 tablet; Refill: 3    Diastolic CHF, chronic  -     spironolactone (ALDACTONE) 50 MG tablet; Take 1 tablet (50 mg total) by mouth once daily.  Dispense: 30 tablet; Refill: 3    CHF (NYHA class I, ACC/AHA stage B)    OMT MCA- ALDACTONE- TO 50 MG DAILY    RETURN IN 4 WEEKS WITH BMP

## 2017-04-12 ENCOUNTER — PATIENT MESSAGE (OUTPATIENT)
Dept: DERMATOLOGY | Facility: CLINIC | Age: 67
End: 2017-04-12

## 2017-04-12 ENCOUNTER — PATIENT OUTREACH (OUTPATIENT)
Dept: ADMINISTRATIVE | Facility: HOSPITAL | Age: 67
End: 2017-04-12

## 2017-04-12 DIAGNOSIS — L63.9 ALOPECIA AREATA: ICD-10-CM

## 2017-04-12 RX ORDER — CLOBETASOL PROPIONATE 0.46 MG/ML
SOLUTION TOPICAL
Qty: 60 ML | Refills: 3 | Status: SHIPPED | OUTPATIENT
Start: 2017-04-12 | End: 2017-12-27

## 2017-04-18 ENCOUNTER — OFFICE VISIT (OUTPATIENT)
Dept: CARDIOLOGY | Facility: CLINIC | Age: 67
End: 2017-04-18
Payer: MEDICARE

## 2017-04-18 ENCOUNTER — LAB VISIT (OUTPATIENT)
Dept: LAB | Facility: HOSPITAL | Age: 67
End: 2017-04-18
Attending: INTERNAL MEDICINE
Payer: MEDICARE

## 2017-04-18 VITALS
HEART RATE: 64 BPM | DIASTOLIC BLOOD PRESSURE: 90 MMHG | HEIGHT: 70 IN | SYSTOLIC BLOOD PRESSURE: 160 MMHG | BODY MASS INDEX: 30.38 KG/M2 | WEIGHT: 212.19 LBS

## 2017-04-18 DIAGNOSIS — I10 ESSENTIAL HYPERTENSION: ICD-10-CM

## 2017-04-18 DIAGNOSIS — R76.8 HEPATITIS B CORE ANTIBODY POSITIVE: ICD-10-CM

## 2017-04-18 DIAGNOSIS — I50.9 CHF (NYHA CLASS I, ACC/AHA STAGE B): Chronic | ICD-10-CM

## 2017-04-18 DIAGNOSIS — I10 ESSENTIAL HYPERTENSION: Chronic | ICD-10-CM

## 2017-04-18 DIAGNOSIS — D84.9 IMMUNOSUPPRESSION: ICD-10-CM

## 2017-04-18 DIAGNOSIS — I15.9 SECONDARY HYPERTENSION: Primary | ICD-10-CM

## 2017-04-18 DIAGNOSIS — I11.0 HYPERTENSIVE CHF: ICD-10-CM

## 2017-04-18 LAB
ALBUMIN SERPL BCP-MCNC: 4.2 G/DL
ALP SERPL-CCNC: 70 U/L
ALT SERPL W/O P-5'-P-CCNC: 21 U/L
ANION GAP SERPL CALC-SCNC: 8 MMOL/L
AST SERPL-CCNC: 26 U/L
BILIRUB DIRECT SERPL-MCNC: 0.4 MG/DL
BILIRUB SERPL-MCNC: 1 MG/DL
BUN SERPL-MCNC: 12 MG/DL
CALCIUM SERPL-MCNC: 10 MG/DL
CHLORIDE SERPL-SCNC: 102 MMOL/L
CO2 SERPL-SCNC: 28 MMOL/L
CREAT SERPL-MCNC: 0.7 MG/DL
EST. GFR  (AFRICAN AMERICAN): >60 ML/MIN/1.73 M^2
EST. GFR  (NON AFRICAN AMERICAN): >60 ML/MIN/1.73 M^2
GLUCOSE SERPL-MCNC: 93 MG/DL
POTASSIUM SERPL-SCNC: 4.5 MMOL/L
PROT SERPL-MCNC: 7.6 G/DL
SODIUM SERPL-SCNC: 138 MMOL/L

## 2017-04-18 PROCEDURE — 99999 PR PBB SHADOW E&M-EST. PATIENT-LVL III: CPT | Mod: PBBFAC,,, | Performed by: NUCLEAR MEDICINE

## 2017-04-18 PROCEDURE — 86706 HEP B SURFACE ANTIBODY: CPT

## 2017-04-18 PROCEDURE — 80048 BASIC METABOLIC PNL TOTAL CA: CPT

## 2017-04-18 PROCEDURE — 87517 HEPATITIS B DNA QUANT: CPT

## 2017-04-18 PROCEDURE — 36415 COLL VENOUS BLD VENIPUNCTURE: CPT

## 2017-04-18 PROCEDURE — 99214 OFFICE O/P EST MOD 30 MIN: CPT | Mod: S$PBB,,, | Performed by: NUCLEAR MEDICINE

## 2017-04-18 PROCEDURE — 80076 HEPATIC FUNCTION PANEL: CPT

## 2017-04-18 RX ORDER — NEBIVOLOL 5 MG/1
5 TABLET ORAL DAILY
Qty: 30 TABLET | Refills: 11 | Status: SHIPPED | OUTPATIENT
Start: 2017-04-18 | End: 2018-02-20 | Stop reason: SDUPTHER

## 2017-04-18 NOTE — MR AVS SNAPSHOT
[facility]  [address] [address]  Phone: [phone]  Fax: [phone]                  Deirdre Yanez   2017 2:20 PM   Office Visit    Description:  Female : 1950   Provider:  Jesús Castañeda MD   Department:  [facility]           Reason for Visit     Hypertension           Diagnoses this Visit        Comments    Secondary hypertension    -  Primary     Hypertensive CHF         Essential hypertension         CHF (NYHA class I, ACC/AHA stage B)                To Do List           Future Appointments        Provider Department Dept Phone    2017 4:00 PM LABORATORY, [address] [facility] [phone]    2017 4:20 PM Beti Blackburn MD [facility] - Internal Medicine [phone]    6/15/2017 9:00 AM GASTRO NURSE, [facility] Gastro[facility]ology [phone]    2017 9:00 AM [facility] -  [phone]    2017 10:30 AM MD Denisha [facility]s - Otohinolaryngology [phone]      Goals (5 Years of Data)     None      Follow-Up and Disposition     Return in about 4 months (around 2017).       These Medications        Disp Refills Start End    nebivolol (BYSTOLIC) 5 MG Tab 30 tablet 11 2017    Take 1 tablet (5 mg total) by mouth once daily. - Oral    Pharmacy: [facility] [address] [place] [address] MAIN  AT [facility] [address] Ph #: [phone]       Notes to Pharmacy: Total of 15 mg daily      [facility] - 24/ Assistance  Unless otherwise directed by your provider, please contact [facility], our nurse care line that is available for 24/7 assistance.     Registered nurses in the [facility] provide: appointment scheduling, clinical advisement, health education, and other advisory services.  Call: [phone] (toll free)               Medications           Message regarding Medications      "Verify the changes and/or additions to your medication regime listed below are the same as discussed with your clinician today.  If any of these changes or additions are incorrect, please notify your healthcare provider.             Verify that the below list of medications is an accurate representation of the medications you are currently taking.  If none reported, the list may be blank. If incorrect, please contact your healthcare provider. Carry this list with you in case of emergency.           Current Medications     aspirin 81 MG Chew Take 1 tablet (81 mg total) by mouth once daily.    b complex vitamins tablet Take 1 tablet by mouth once daily.    clobetasol (TEMOVATE) 0.05 % external solution Use on scalp twice daily    fluticasone (FLONASE) 50 mcg/actuation nasal spray SHAKE LIQUID AND USE 2 SPRAYS IN EACH NOSTRIL DAILY    minoxidil (ROGAINE) 2 % external solution Apply topically 2 (two) times daily.    nebivolol (BYSTOLIC) 5 MG Tab Take 1 tablet (5 mg total) by mouth once daily.    spironolactone (ALDACTONE) 50 MG tablet Take 1 tablet (50 mg total) by mouth once daily.           Clinical Reference Information           Your Vitals Were     BP Pulse Height Weight BMI    160/90 (BP Location: Left arm, Patient Position: Sitting, BP Method: Manual) 64 5' 10" (1.778 m) 96.2 kg (212 lb 3.1 oz) 30.45 kg/m2      Blood Pressure          Most Recent Value    BP  (!)  160/90      Allergies as of 4/18/2017     Arb-angiotensin Receptor Antagonist    Metoprolol    Ace Inhibitors    Sulfa (Sulfonamide Antibiotics)      Immunizations Administered on Date of Encounter - 4/18/2017     None      Language Assistance Services     ATTENTION: Language assistance services are available, free of charge. Please call [phone].      ATENCIÓN: Si lisala jesi, tiene a marie disposición servicios gratuitos de asistencia lingüística. Llame al [phone].     MIRZA Ý: N?u b?n nói Ti?ng Vi?t, có các d?ch v? h? tr? ngôn ng? mi?n phí " dành cho b?n. G?i s? [phone].         [facility] complies with applicable Federal civil rights laws and does not discriminate on the basis of race, color, national origin, age, disability, or sex.

## 2017-04-18 NOTE — PROGRESS NOTES
Subjective:   Patient ID:  Deirdre Yanez is a 67 y.o. female who presents for follow-up of Hypertension      HPI 1-ESSENTIAL HTN,  WITH  HFPEF, STAGE B  FEELING BETTER. HOME BP MONITORING RESULTS WERE REVIEWED-  BP UNDER CONTROL LESS 140/80  NO HX OF ANGINA OR EQUIVALENT  NO HX OF PALPITATIONS OR SYNCOPE  NO HX OF TIA OR STROKE  NO EDEMA. NO ABDOMINAL PAIN. NO DESAI . NO ORTHOPNEA OR PND  CARD MED- NO SIDE EFFECTS  Review of Systems   Constitution: Negative for chills, fever, weakness, night sweats, weight gain and weight loss.   HENT: Negative for headaches and nosebleeds.    Eyes: Negative for blurred vision, double vision and visual disturbance.   Cardiovascular: Negative for chest pain, dyspnea on exertion, irregular heartbeat, leg swelling, orthopnea, palpitations, paroxysmal nocturnal dyspnea and syncope.   Respiratory: Negative for cough, hemoptysis and wheezing.    Endocrine: Negative for polydipsia and polyuria.   Hematologic/Lymphatic: Does not bruise/bleed easily.   Skin: Negative for rash.   Musculoskeletal: Negative for joint pain, joint swelling, muscle weakness and myalgias.   Gastrointestinal: Negative for abdominal pain, hematemesis, jaundice and melena.   Genitourinary: Negative for dysuria, hematuria and nocturia.   Neurological: Negative for dizziness, focal weakness and sensory change.   Psychiatric/Behavioral: Negative for depression. The patient does not have insomnia and is not nervous/anxious.      Family History   Problem Relation Age of Onset    Colon cancer Maternal Grandfather     Diabetes Maternal Grandmother     Hypertension Maternal Grandmother     Cataracts Mother     Glaucoma Mother     Macular degeneration Mother     Hypertension Mother     Diabetes Paternal Grandmother     Cataracts Maternal Aunt     Glaucoma Maternal Aunt     Macular degeneration Maternal Aunt     Melanoma Maternal Aunt     Heart disease       pat side     Past Medical History:   Diagnosis Date     Abrasion     left eye    Abrasion and/or friction burn of abdominal wall with infection     left eye    Diabetes mellitus     2011  11/17/2013    Diverticulosis     DM (diabetes mellitus) 2011     12/03/2014  A1C 6.0 x 2 weeks    Hepatitis     Hx of B/C from cadaver in past    Hiatal hernia     Hip bursitis, left     HTN (hypertension)     Hypertensive CHF (congestive heart failure) 3/28/2014    Positive ALBIN (antinuclear antibody)     Pure hypercholesterolemia 9/30/2016     Current Outpatient Prescriptions on File Prior to Visit   Medication Sig Dispense Refill    aspirin 81 MG Chew Take 1 tablet (81 mg total) by mouth once daily.  0    b complex vitamins tablet Take 1 tablet by mouth once daily.      clobetasol (TEMOVATE) 0.05 % external solution Use on scalp twice daily 60 mL 3    fluticasone (FLONASE) 50 mcg/actuation nasal spray SHAKE LIQUID AND USE 2 SPRAYS IN EACH NOSTRIL DAILY 1 Bottle 12    minoxidil (ROGAINE) 2 % external solution Apply topically 2 (two) times daily.      [DISCONTINUED] nebivolol (BYSTOLIC) 10 MG Tab Take 1 tablet (10 mg total) by mouth once daily. 30 tablet 5    [DISCONTINUED] nebivolol (BYSTOLIC) 5 MG Tab Take 1 tablet (5 mg total) by mouth once daily. 30 tablet 11    spironolactone (ALDACTONE) 50 MG tablet Take 1 tablet (50 mg total) by mouth once daily. (Patient taking differently: Take 50 mg by mouth every evening. ) 30 tablet 3     No current facility-administered medications on file prior to visit.      Review of patient's allergies indicates:   Allergen Reactions    Arb-angiotensin receptor antagonist Edema    Metoprolol Other (See Comments)     Alopecia    Ace inhibitors Other (See Comments)    Sulfa (sulfonamide antibiotics)        Objective:     Physical Exam   Constitutional: She is oriented to person, place, and time. She appears well-developed. No distress.   HENT:   Head: Normocephalic.   Eyes: Conjunctivae are normal. Pupils are equal,  round, and reactive to light. No scleral icterus.   Neck: Normal range of motion. Neck supple. Normal carotid pulses, no hepatojugular reflux and no JVD present. Carotid bruit is not present. No edema present. No thyroid mass and no thyromegaly present.   Cardiovascular: Normal rate, regular rhythm, S1 normal, S2 normal, normal heart sounds and intact distal pulses.  PMI is not displaced.  Exam reveals no gallop and no friction rub.    No murmur heard.  Pulses:       Carotid pulses are 2+ on the right side, and 2+ on the left side.       Radial pulses are 2+ on the right side, and 2+ on the left side.        Femoral pulses are 2+ on the right side, and 2+ on the left side.       Popliteal pulses are 2+ on the right side, and 2+ on the left side.        Dorsalis pedis pulses are 2+ on the right side, and 2+ on the left side.        Posterior tibial pulses are 2+ on the right side, and 2+ on the left side.   Pulmonary/Chest: Effort normal and breath sounds normal. She has no wheezes. She has no rales. She exhibits no tenderness.   Abdominal: Soft. Bowel sounds are normal. She exhibits no pulsatile midline mass and no mass. There is no hepatosplenomegaly. There is no tenderness.   Musculoskeletal: Normal range of motion. She exhibits no edema or tenderness.        Cervical back: Normal.        Thoracic back: Normal.        Lumbar back: Normal.   Lymphadenopathy:     She has no cervical adenopathy.     She has no axillary adenopathy.        Right: No supraclavicular adenopathy present.        Left: No supraclavicular adenopathy present.   Neurological: She is alert and oriented to person, place, and time. She has normal strength and normal reflexes. No sensory deficit. Gait normal.   Skin: Skin is warm. No rash noted. No cyanosis. No pallor. Nails show no clubbing.   Psychiatric: She has a normal mood and affect. Her speech is normal and behavior is normal. Cognition and memory are normal.       Assessment:     1.  Secondary hypertension    2. Hypertensive CHF    3. Essential hypertension    4. CHF (NYHA class I, ACC/AHA stage B)      BP WELL CONTROLLED  CARD MED WELL TOLERATED  CV STATUS STABLE  CARD MED WELL TOLERATED  Plan:     Secondary hypertension    Hypertensive CHF  -     nebivolol (BYSTOLIC) 5 MG Tab; Take 1 tablet (5 mg total) by mouth once daily.  Dispense: 30 tablet; Refill: 11    Essential hypertension    CHF (NYHA class I, ACC/AHA stage B)    CONTINUE PRESENT CARD MANAGEMENT    RETURN IN 4 MONTHS.

## 2017-04-19 ENCOUNTER — PATIENT MESSAGE (OUTPATIENT)
Dept: CARDIOLOGY | Facility: CLINIC | Age: 67
End: 2017-04-19

## 2017-04-19 LAB — HBV SURFACE AB SER-ACNC: POSITIVE M[IU]/ML

## 2017-04-19 RX ORDER — NEBIVOLOL 10 MG/1
10 TABLET ORAL DAILY
Qty: 30 TABLET | Refills: 11 | Status: SHIPPED | OUTPATIENT
Start: 2017-04-19 | End: 2018-02-20 | Stop reason: SDUPTHER

## 2017-04-20 LAB
HEPATITIS B VIRAL DNA - QUANTITATIVE: <10 IU/ML
HEPATITIS B VIRUS DNA: NOT DETECTED
LOG HBV IU/ML: <1 LOG (10) IU/ML

## 2017-04-26 ENCOUNTER — OFFICE VISIT (OUTPATIENT)
Dept: INTERNAL MEDICINE | Facility: CLINIC | Age: 67
End: 2017-04-26
Payer: MEDICARE

## 2017-04-26 VITALS
DIASTOLIC BLOOD PRESSURE: 86 MMHG | HEIGHT: 70 IN | SYSTOLIC BLOOD PRESSURE: 136 MMHG | BODY MASS INDEX: 29.54 KG/M2 | TEMPERATURE: 98 F | WEIGHT: 206.38 LBS | HEART RATE: 72 BPM | OXYGEN SATURATION: 94 %

## 2017-04-26 DIAGNOSIS — I10 ESSENTIAL HYPERTENSION: Primary | Chronic | ICD-10-CM

## 2017-04-26 DIAGNOSIS — L03.114 CELLULITIS OF ARM, LEFT: ICD-10-CM

## 2017-04-26 DIAGNOSIS — E11.69 HYPERLIPIDEMIA ASSOCIATED WITH TYPE 2 DIABETES MELLITUS: ICD-10-CM

## 2017-04-26 DIAGNOSIS — Z29.9 PREVENTIVE MEASURE: ICD-10-CM

## 2017-04-26 DIAGNOSIS — I50.32 DIASTOLIC CHF, CHRONIC: Chronic | ICD-10-CM

## 2017-04-26 DIAGNOSIS — E55.9 VITAMIN D DEFICIENCY: ICD-10-CM

## 2017-04-26 DIAGNOSIS — E78.5 HYPERLIPIDEMIA ASSOCIATED WITH TYPE 2 DIABETES MELLITUS: ICD-10-CM

## 2017-04-26 DIAGNOSIS — E11.8 TYPE 2 DIABETES MELLITUS WITH COMPLICATION, WITHOUT LONG-TERM CURRENT USE OF INSULIN: ICD-10-CM

## 2017-04-26 PROCEDURE — 99213 OFFICE O/P EST LOW 20 MIN: CPT | Mod: PBBFAC,PO | Performed by: INTERNAL MEDICINE

## 2017-04-26 PROCEDURE — 99214 OFFICE O/P EST MOD 30 MIN: CPT | Mod: S$PBB,,, | Performed by: INTERNAL MEDICINE

## 2017-04-26 PROCEDURE — 99999 PR PBB SHADOW E&M-EST. PATIENT-LVL III: CPT | Mod: PBBFAC,,, | Performed by: INTERNAL MEDICINE

## 2017-04-26 RX ORDER — CEPHALEXIN 500 MG/1
500 CAPSULE ORAL EVERY 8 HOURS
Qty: 30 CAPSULE | Refills: 0 | Status: SHIPPED | OUTPATIENT
Start: 2017-04-26 | End: 2017-06-19 | Stop reason: ALTCHOICE

## 2017-04-26 NOTE — PROGRESS NOTES
"Subjective:       Patient ID: Deirdre Yanez is a 67 y.o. female.    Chief Complaint: Follow-up    HPI Comments: Here for follow up of medical problems.  Intol of metformin.  BPs now good range on meds per Cardiology.  Left forearm cut on landscape timbers 2 days ago.  Cleaned the wound and having some pain but not very painful.  Staying red.  No drainage.  No cp/sob/palp.  BMs normal with metamucil.  AC breakfast sugars 90s.  Off metformin x 2 months.  Walking and recumbent bike, down 3 sizes in 9 months!  Taking vit D3 1K daily.    Updated/ annual due 9/17:  HM: ref fluvax, 8/16 [id number], 9/14 [id number], 5/13 TDaP, 11/14 BMD rep 5y, 10/13 Cscope rep 5y, 8/16 MMG/Gyn sched, 12/14 Eye Dr. Pena rep scheduled, HCV per Hepatology.        Review of Systems   Constitutional: Negative for chills, diaphoresis and fever.   Respiratory: Negative for cough and shortness of breath.    Cardiovascular: Negative for chest pain, palpitations and leg swelling.   Gastrointestinal: Negative for blood in stool, constipation, diarrhea, nausea and vomiting.   Genitourinary: Negative for dysuria, frequency and hematuria.   Psychiatric/Behavioral: The patient is not nervous/anxious.        Objective:   /86  Pulse 72  Temp 97.5 °F (36.4 °C) (Tympanic)   Ht 5' 10" (1.778 m)  Wt 93.6 kg (206 lb 5.6 oz)  SpO2 (!) 94%  BMI 29.61 kg/m2    Physical Exam   Constitutional: She is oriented to person, place, and time. She appears well-developed.   HENT:   Mouth/Throat: Oropharynx is clear and moist.   Neck: Neck supple. Carotid bruit is not present. No thyroid mass present.   Cardiovascular: Normal rate, regular rhythm and intact distal pulses.  Exam reveals no gallop and no friction rub.    No murmur heard.  Pulmonary/Chest: Effort normal and breath sounds normal. She has no wheezes. She has no rales.   Abdominal: Soft. Bowel sounds are normal. She exhibits no mass. There is no hepatosplenomegaly. There is no tenderness. "   Musculoskeletal: She exhibits no edema.   Lymphadenopathy:     She has no cervical adenopathy.   Neurological: She is alert and oriented to person, place, and time.   Skin:   Left forearm torn skin flap, surrounding erythema.  No drainage.  Mild tender.  No calor.   Psychiatric: She has a normal mood and affect.       Assessment:       1. Essential hypertension    2. Diastolic CHF, chronic    3. Type 2 diabetes mellitus with complication, without long-term current use of insulin    4. Preventive measure    5. Hyperlipidemia associated with type 2 diabetes mellitus    6. Vitamin D deficiency        Plan:       Deirdre was seen today for follow-up.    Diagnoses and all orders for this visit:    Essential hypertension- stable at home on rxs.  Cont to follow.    Diastolic CHF, chronic    Type 2 diabetes mellitus with complication, without long-term current use of insulin- cont diet and exercise, recheck 9/17.  -     Hemoglobin A1c; Future  -     Microalbumin/creatinine urine ratio; Future    Preventive measure- due in 9/17 for annual.  -     Hemoglobin A1c; Future  -     Comprehensive metabolic panel; Future  -     Lipid panel; Future  -     CBC auto differential; Future  -     TSH; Future  -     Vitamin D; Future  -     Microalbumin/creatinine urine ratio; Future    Hyperlipidemia associated with type 2 diabetes mellitus- intol of statin.  Will try red yeast rice.    Vitamin D deficiency- cont supplement, check lab.  -     Vitamin D; Future    RTC 5 mo.

## 2017-04-26 NOTE — MR AVS SNAPSHOT
[facility] Internal Medicine  [address] [facility] [place] [address]  Phone: [phone]  Fax: [phone]                  Deirdre Yanez   2017 4:20 PM   Office Visit    Description:  Female : 1950   Provider:  Beti Blackburn MD   Department:  [facility] - Internal Medicine           Reason for Visit     Follow-up           Diagnoses this Visit        Comments    Essential hypertension    -  Primary     Diastolic CHF, chronic         Type 2 diabetes mellitus with complication, without long-term current use of insulin         Preventive measure         Hyperlipidemia associated with type 2 diabetes mellitus         Vitamin D deficiency         Cellulitis of arm, left                To Do List           Future Appointments        Provider Department Dept Phone    6/15/2017 9:00 AM GASTRO NURSE, [facility] Gastroenterology [phone]    2017 9:00 AM [facility] US[facility] -  [phone]    2017 10:30 AM Lashae Norwood MD [facility] - Otohinolaryngology [phone]    2017 9:00 AM Minerva Galvan MD [facility] Gastroenterology [phone]    2017 8:45 AM Darlene Cedillo MD [facility] Dermatology [phone]      Goals (5 Years of Data)     None      Follow-Up and Disposition     Return in about 5 months (around 2017).    Follow-up and Disposition History       These Medications        Disp Refills Start End    cephALEXin (KEFLEX) 500 MG capsule 30 capsule 0 2017     Take 1 capsule (500 mg total) by mouth every 8 (eight) hours. - Oral    Pharmacy: [facility] [address] [place] [address] MAIN  AT [facility] Ph #: [phone]         [facility]     [facility] -  Assistance  Unless otherwise directed by your provider, please contact [facility], our nurse care line that is available for / assistance.     Registered nurses in the [facility] provide: appointment scheduling, clinical advisement,  "health education, and other advisory services.  Call: [phone] (toll free)               Medications           Message regarding Medications     Verify the changes and/or additions to your medication regime listed below are the same as discussed with your clinician today.  If any of these changes or additions are incorrect, please notify your healthcare provider.        START taking these NEW medications        Refills    cephALEXin (KEFLEX) 500 MG capsule 0    Sig: Take 1 capsule (500 mg total) by mouth every 8 (eight) hours.    Class: Normal    Route: Oral           Verify that the below list of medications is an accurate representation of the medications you are currently taking.  If none reported, the list may be blank. If incorrect, please contact your healthcare provider. Carry this list with you in case of emergency.           Current Medications     aspirin 81 MG Chew Take 1 tablet (81 mg total) by mouth once daily.    b complex vitamins tablet Take 1 tablet by mouth once daily.    clobetasol (TEMOVATE) 0.05 % external solution Use on scalp twice daily    fluticasone (FLONASE) 50 mcg/actuation nasal spray SHAKE LIQUID AND USE 2 SPRAYS IN EACH NOSTRIL DAILY    minoxidil (ROGAINE) 2 % external solution Apply topically 2 (two) times daily.    nebivolol (BYSTOLIC) 10 MG Tab Take 1 tablet (10 mg total) by mouth once daily.    nebivolol (BYSTOLIC) 5 MG Tab Take 1 tablet (5 mg total) by mouth once daily.    spironolactone (ALDACTONE) 50 MG tablet Take 1 tablet (50 mg total) by mouth once daily.    cephALEXin (KEFLEX) 500 MG capsule Take 1 capsule (500 mg total) by mouth every 8 (eight) hours.           Clinical Reference Information           Your Vitals Were     BP Pulse Temp Height Weight SpO2    136/86 72 97.5 °F (36.4 °C) (Tympanic) 5' 10" (1.778 m) 93.6 kg (206 lb 5.6 oz) 94%    BMI                29.61 kg/m2          Blood Pressure          Most Recent Value    BP  136/86      Allergies as of 4/26/2017  "    Arb-angiotensin Receptor Antagonist    Metoprolol    Ace Inhibitors    Calcium Channel Blocking Agents-dihydropyridines    Sulfa (Sulfonamide Antibiotics)      Immunizations Administered on Date of Encounter - 4/26/2017     None      Orders Placed During Today's Visit     Future Labs/Procedures Expected by Expires    CBC auto differential  4/26/2017 4/26/2018    Comprehensive metabolic panel  4/26/2017 4/26/2018    Hemoglobin A1c  4/26/2017 6/25/2018    Lipid panel  4/26/2017 4/26/2018    Microalbumin/creatinine urine ratio  4/26/2017 6/25/2018    TSH  4/26/2017 4/26/2018    Vitamin D  As directed 4/26/2018      Language Assistance Services     ATTENTION: Language assistance services are available, free of charge. Please call [phone].      ATENCIÓN: Si lisala jesi, tiene a marie disposición servicios gratuitos de asistencia lingüística. Llame al [phone].     CHÚ Ý: N?u b?n nói Ti?ng Vi?t, có các d?ch v? h? tr? ngôn ng? mi?n phí dành cho b?n. G?i s? [phone].         [facility] complies with applicable Federal civil rights laws and does not discriminate on the basis of race, color, national origin, age, disability, or sex.

## 2017-06-15 ENCOUNTER — CLINICAL SUPPORT (OUTPATIENT)
Dept: GASTROENTEROLOGY | Facility: CLINIC | Age: 67
End: 2017-06-15
Payer: MEDICARE

## 2017-06-15 VITALS
DIASTOLIC BLOOD PRESSURE: 98 MMHG | BODY MASS INDEX: 32.32 KG/M2 | WEIGHT: 205.94 LBS | HEART RATE: 68 BPM | SYSTOLIC BLOOD PRESSURE: 190 MMHG | HEIGHT: 67 IN

## 2017-06-15 DIAGNOSIS — R76.8 HEPATITIS B CORE ANTIBODY POSITIVE: ICD-10-CM

## 2017-06-15 DIAGNOSIS — D84.9 IMMUNOSUPPRESSION: ICD-10-CM

## 2017-06-15 PROCEDURE — 99999 PR PBB SHADOW E&M-EST. PATIENT-LVL III: CPT | Mod: PBBFAC,,,

## 2017-06-15 PROCEDURE — 99213 OFFICE O/P EST LOW 20 MIN: CPT | Mod: PBBFAC,PO

## 2017-06-15 PROCEDURE — 90746 HEPB VACCINE 3 DOSE ADULT IM: CPT | Mod: PBBFAC,PO

## 2017-06-15 PROCEDURE — G0010 ADMIN HEPATITIS B VACCINE: HCPCS | Mod: PBBFAC,PO

## 2017-06-15 NOTE — PROGRESS NOTES
Two patient identifier verified.  Administered  third dose of Hep B vaccine , 1 ml, IM to LD  per provider order. Patient tolerated well.  Band aid applied aseptic technique to injection site.  Patient requested to wait 15 minutes in exam room. No reaction noted.  Patient ambulated off unit.

## 2017-06-16 NOTE — PROGRESS NOTES
"Subjective:       Patient ID: Deirdre Yanez is a 67 y.o. female.    Chief Complaint: Follow-up (6 month follow up, u/s done today)    Patient is a very pleasant 64 year old female here to see me today in followup for evaluation of her thyroid nodules.  Overall, since her last visit she has been doing well since her last visit from a thyroid standpoint.  Her most recent TSH was 8/2016 and that was normal (Dr. Blackburn has ordered as a part of her next set of labs).  She is continuing with alopecia.  We are following her for thyroid nodules, and she feels that her left side of her neck feels swollen (thinks her necklace is tighter) and also has some pain over left SCM.  She has some difficulty swallowing soups and solids, and thinks it gets "stuck" at times.  She has gotten into the habit of eating smaller bites.  She has no difficulty breathing or hoarseness.  She has been using Flonase since her last visit, and is very pleased with her progress.   I last saw her in December, and at that time had sent her for an US guided FNA of her thyroid nodule as it has slightly enlarged.  However, they were unable to complete the FNA due to blood vessels in the area.  Therefore, we elected to follow her with serial US, and had her six month followup US today.      Review of Systems   Constitutional: Negative for chills, fatigue, fever and unexpected weight change.   HENT: Positive for tinnitus and trouble swallowing. Negative for congestion, dental problem, ear discharge, ear pain, facial swelling, hearing loss, nosebleeds, postnasal drip, rhinorrhea, sinus pressure, sneezing, sore throat and voice change.    Eyes: Negative for redness, itching and visual disturbance.   Respiratory: Negative for cough, choking and wheezing.    Cardiovascular: Negative for chest pain and palpitations.   Gastrointestinal: Negative for abdominal pain.        No reflux.   Musculoskeletal: Positive for neck pain. Negative for gait problem.   Skin: " Negative for rash.        Hair loss as above   Allergic/Immunologic: Positive for environmental allergies.   Neurological: Negative for dizziness, light-headedness and headaches.       Objective:      Physical Exam   Constitutional: She is oriented to person, place, and time. She appears well-developed and well-nourished. No distress.   HENT:   Head: Normocephalic and atraumatic.   Right Ear: Tympanic membrane, external ear and ear canal normal.   Left Ear: Tympanic membrane, external ear and ear canal normal.   Nose: Nose normal. No mucosal edema, rhinorrhea, nasal deformity or septal deviation. No epistaxis. Right sinus exhibits no maxillary sinus tenderness and no frontal sinus tenderness. Left sinus exhibits no maxillary sinus tenderness and no frontal sinus tenderness.   Mouth/Throat: Uvula is midline, oropharynx is clear and moist and mucous membranes are normal. Mucous membranes are not pale and not dry. No dental caries. No oropharyngeal exudate or posterior oropharyngeal erythema.   Eyes: Conjunctivae, EOM and lids are normal. Pupils are equal, round, and reactive to light. Right eye exhibits no chemosis. Left eye exhibits no chemosis. Right conjunctiva is not injected. Left conjunctiva is not injected. No scleral icterus. Right eye exhibits normal extraocular motion and no nystagmus. Left eye exhibits normal extraocular motion and no nystagmus.   Neck: Trachea normal and phonation normal. Muscular tenderness (over left SCM) present. No tracheal tenderness present. No tracheal deviation present. Thyroid mass (small nodule palpated in the right lower lobe, elevates with swallow) present. No thyromegaly present.   Cardiovascular: Intact distal pulses.    Pulmonary/Chest: Effort normal. No stridor. No respiratory distress.   Abdominal: She exhibits no distension.   Lymphadenopathy:        Head (right side): No submental, no submandibular, no preauricular, no posterior auricular and no occipital adenopathy  present.        Head (left side): No submental, no submandibular, no preauricular, no posterior auricular and no occipital adenopathy present.     She has no cervical adenopathy.   Neurological: She is alert and oriented to person, place, and time. No cranial nerve deficit. Gait normal.   Skin: Skin is warm and dry. No rash noted. No erythema.   Wearing a wig today   Psychiatric: She has a normal mood and affect. Her behavior is normal.       THYROID US:  Findings: The right thyroid lobe measures 4.8 x 2.2 by 1.9 cm and contains a hypoechoic solid 17 x 12 x 11 mm nodule in the lower pole, similar to prior ultrasound. There are 5 colloid cysts in the upper-midpole measuring 3-5 mm. The left lobe measures 4.0 x 1.9 x 1.3 cm and contains a 14 x 10 x 9 mm cystic nodule in the lower pole with a small solid mural component, without definite detrimental change. There are 2 colloid cysts in the upper pole measuring 3-4 mm.        Assessment:       1. Thyroid nodule        Plan:       1.  Thyroid nodule:  Her thyroid nodule has now been stable over the last year, and has not demonstrated any significant growth.  Due to her anatomy (relationship of nodule to carotid), FNA of that nodule is not possible.  I would recommend continued surveillance, and will see her back in one year with a repeat US, sooner if she notes any change in her neck.

## 2017-06-19 ENCOUNTER — OFFICE VISIT (OUTPATIENT)
Dept: OTOLARYNGOLOGY | Facility: CLINIC | Age: 67
End: 2017-06-19
Payer: MEDICARE

## 2017-06-19 ENCOUNTER — HOSPITAL ENCOUNTER (OUTPATIENT)
Dept: RADIOLOGY | Facility: HOSPITAL | Age: 67
Discharge: HOME | End: 2017-06-19
Attending: ORTHOPAEDIC SURGERY
Payer: MEDICARE

## 2017-06-19 VITALS
TEMPERATURE: 99 F | DIASTOLIC BLOOD PRESSURE: 90 MMHG | HEART RATE: 60 BPM | SYSTOLIC BLOOD PRESSURE: 172 MMHG | WEIGHT: 204.13 LBS | BODY MASS INDEX: 31.97 KG/M2

## 2017-06-19 DIAGNOSIS — E04.1 THYROID NODULE: ICD-10-CM

## 2017-06-19 DIAGNOSIS — E04.1 THYROID NODULE: Primary | ICD-10-CM

## 2017-06-19 PROCEDURE — 99213 OFFICE O/P EST LOW 20 MIN: CPT | Mod: PBBFAC,25 | Performed by: ORTHOPAEDIC SURGERY

## 2017-06-19 PROCEDURE — 99999 PR PBB SHADOW E&M-EST. PATIENT-LVL III: CPT | Mod: PBBFAC,,, | Performed by: ORTHOPAEDIC SURGERY

## 2017-06-19 PROCEDURE — 1159F MED LIST DOCD IN RCRD: CPT | Mod: ,,, | Performed by: ORTHOPAEDIC SURGERY

## 2017-06-19 PROCEDURE — 99213 OFFICE O/P EST LOW 20 MIN: CPT | Mod: S$PBB,,, | Performed by: ORTHOPAEDIC SURGERY

## 2017-06-19 PROCEDURE — 1126F AMNT PAIN NOTED NONE PRSNT: CPT | Mod: ,,, | Performed by: ORTHOPAEDIC SURGERY

## 2017-07-14 ENCOUNTER — OFFICE VISIT (OUTPATIENT)
Dept: GASTROENTEROLOGY | Facility: CLINIC | Age: 67
End: 2017-07-14
Payer: MEDICARE

## 2017-07-14 VITALS
WEIGHT: 200.63 LBS | BODY MASS INDEX: 31.49 KG/M2 | DIASTOLIC BLOOD PRESSURE: 80 MMHG | HEART RATE: 60 BPM | HEIGHT: 67 IN | SYSTOLIC BLOOD PRESSURE: 142 MMHG

## 2017-07-14 DIAGNOSIS — R76.8 HEPATITIS B CORE ANTIBODY POSITIVE: Primary | ICD-10-CM

## 2017-07-14 PROCEDURE — 99999 PR PBB SHADOW E&M-EST. PATIENT-LVL III: CPT | Mod: PBBFAC,,, | Performed by: INTERNAL MEDICINE

## 2017-07-14 PROCEDURE — 99213 OFFICE O/P EST LOW 20 MIN: CPT | Mod: PBBFAC,PO | Performed by: INTERNAL MEDICINE

## 2017-07-14 PROCEDURE — 99213 OFFICE O/P EST LOW 20 MIN: CPT | Mod: S$PBB,,, | Performed by: INTERNAL MEDICINE

## 2017-07-14 PROCEDURE — 1126F AMNT PAIN NOTED NONE PRSNT: CPT | Mod: ,,, | Performed by: INTERNAL MEDICINE

## 2017-07-14 PROCEDURE — 1159F MED LIST DOCD IN RCRD: CPT | Mod: ,,, | Performed by: INTERNAL MEDICINE

## 2017-07-14 RX ORDER — SPIRONOLACTONE 50 MG/1
50 TABLET, FILM COATED ORAL DAILY
Refills: 2 | COMMUNITY
Start: 2017-07-03 | End: 2017-08-18 | Stop reason: SDUPTHER

## 2017-07-14 NOTE — PROGRESS NOTES
Subjective:     Deirdre Yanez is here for follow up of Hep B core antibody positive      HPI  Since Deirdre Yanez's last visit she denies any acute issues.  Overall she is feeling well.  She did complete hepatitis B vaccination series.  Hepatitis B surface antibody is now positive.    She does have lower extremity edema but reports that she has been on a low salt diet.  Her blood pressure elevated today reports usually at home it is normal.  She reports having white coat syndrome.    Review of Systems    Objective:     Physical Exam   Constitutional: She is oriented to person, place, and time. She appears well-developed and well-nourished. No distress.   HENT:   Head: Normocephalic and atraumatic.   Mouth/Throat: Oropharynx is clear and moist. No oropharyngeal exudate.   Eyes: Conjunctivae are normal. Pupils are equal, round, and reactive to light. Right eye exhibits no discharge. Left eye exhibits no discharge. No scleral icterus.   Pulmonary/Chest: Effort normal and breath sounds normal. No respiratory distress. She has no wheezes.   Abdominal: Soft. She exhibits no distension. There is no tenderness.   Musculoskeletal: She exhibits edema (1+ BLE ).   Neurological: She is alert and oriented to person, place, and time.   Psychiatric: She has a normal mood and affect. Her behavior is normal.   Vitals reviewed.      Computed MELD-Na score unavailable. Necessary lab results were not found in the last year.  Computed MELD score unavailable. Necessary lab results were not found in the last year.    WBC   Date Value Ref Range Status   08/05/2016 6.81 3.90 - 12.70 K/uL Final     Hemoglobin   Date Value Ref Range Status   08/05/2016 13.3 12.0 - 16.0 g/dL Final     Hematocrit   Date Value Ref Range Status   08/05/2016 40.5 37.0 - 48.5 % Final     Platelets   Date Value Ref Range Status   08/05/2016 193 150 - 350 K/uL Final     BUN, Bld   Date Value Ref Range Status   04/18/2017 12 8 - 23 mg/dL Final     Creatinine    Date Value Ref Range Status   04/18/2017 0.7 0.5 - 1.4 mg/dL Final     Glucose   Date Value Ref Range Status   04/18/2017 93 70 - 110 mg/dL Final     Calcium   Date Value Ref Range Status   04/18/2017 10.0 8.7 - 10.5 mg/dL Final     Sodium   Date Value Ref Range Status   04/18/2017 138 136 - 145 mmol/L Final     Potassium   Date Value Ref Range Status   04/18/2017 4.5 3.5 - 5.1 mmol/L Final     Chloride   Date Value Ref Range Status   04/18/2017 102 95 - 110 mmol/L Final     AST   Date Value Ref Range Status   04/18/2017 26 10 - 40 U/L Final     ALT   Date Value Ref Range Status   04/18/2017 21 10 - 44 U/L Final     Alkaline Phosphatase   Date Value Ref Range Status   04/18/2017 70 55 - 135 U/L Final     Total Bilirubin   Date Value Ref Range Status   04/18/2017 1.0 0.1 - 1.0 mg/dL Final     Comment:     For infants and newborns, interpretation of results should be based  on gestational age, weight and in agreement with clinical  observations.  Premature Infant recommended reference ranges:  Up to 24 hours.............<8.0 mg/dL  Up to 48 hours............<12.0 mg/dL  3-5 days..................<15.0 mg/dL  6-29 days.................<15.0 mg/dL       Albumin   Date Value Ref Range Status   04/18/2017 4.2 3.5 - 5.2 g/dL Final     No results found for: INR      Assessment/Plan:     1. Hepatitis B core antibody positive      Deirdre Yanez is a 67 y.o. female withHep B core antibody positive    Hepatitis B core antibody positive-status post vaccination with successful conversion hepatitis B surface antibody positive.  Therefore the patient should be safe to undergo continued treatments for her alopecia whether it's local/topical or systemic.  She should be protected against hepatitis B reactivation.    RTC PRN          Minerva Galvan MD

## 2017-08-15 ENCOUNTER — OFFICE VISIT (OUTPATIENT)
Dept: CARDIOLOGY | Facility: CLINIC | Age: 67
End: 2017-08-15
Payer: MEDICARE

## 2017-08-15 VITALS
HEART RATE: 54 BPM | HEIGHT: 67 IN | BODY MASS INDEX: 31.08 KG/M2 | WEIGHT: 198 LBS | SYSTOLIC BLOOD PRESSURE: 142 MMHG | DIASTOLIC BLOOD PRESSURE: 72 MMHG

## 2017-08-15 DIAGNOSIS — I10 BENIGN ESSENTIAL HTN: ICD-10-CM

## 2017-08-15 DIAGNOSIS — I50.30 DIASTOLIC HEART FAILURE, UNSPECIFIED HEART FAILURE CHRONICITY: Primary | ICD-10-CM

## 2017-08-15 DIAGNOSIS — I11.0 HYPERTENSIVE CHF (CONGESTIVE HEART FAILURE): Chronic | ICD-10-CM

## 2017-08-15 DIAGNOSIS — I50.30 DIASTOLIC HEART FAILURE, UNSPECIFIED HEART FAILURE CHRONICITY: ICD-10-CM

## 2017-08-15 DIAGNOSIS — I50.9 CHF (NYHA CLASS I, ACC/AHA STAGE B): Chronic | ICD-10-CM

## 2017-08-15 DIAGNOSIS — I50.32 DIASTOLIC CHF, CHRONIC: Chronic | ICD-10-CM

## 2017-08-15 DIAGNOSIS — I10 ESSENTIAL HYPERTENSION: Primary | Chronic | ICD-10-CM

## 2017-08-15 PROCEDURE — 3077F SYST BP >= 140 MM HG: CPT | Mod: ,,, | Performed by: NUCLEAR MEDICINE

## 2017-08-15 PROCEDURE — 99999 PR PBB SHADOW E&M-EST. PATIENT-LVL III: CPT | Mod: PBBFAC,,, | Performed by: NUCLEAR MEDICINE

## 2017-08-15 PROCEDURE — 1159F MED LIST DOCD IN RCRD: CPT | Mod: ,,, | Performed by: NUCLEAR MEDICINE

## 2017-08-15 PROCEDURE — 99213 OFFICE O/P EST LOW 20 MIN: CPT | Mod: PBBFAC | Performed by: NUCLEAR MEDICINE

## 2017-08-15 PROCEDURE — 3078F DIAST BP <80 MM HG: CPT | Mod: ,,, | Performed by: NUCLEAR MEDICINE

## 2017-08-15 PROCEDURE — 99214 OFFICE O/P EST MOD 30 MIN: CPT | Mod: S$PBB,,, | Performed by: NUCLEAR MEDICINE

## 2017-08-15 PROCEDURE — 93005 ELECTROCARDIOGRAM TRACING: CPT | Mod: PBBFAC | Performed by: INTERNAL MEDICINE

## 2017-08-15 PROCEDURE — 1126F AMNT PAIN NOTED NONE PRSNT: CPT | Mod: ,,, | Performed by: NUCLEAR MEDICINE

## 2017-08-15 PROCEDURE — 93010 ELECTROCARDIOGRAM REPORT: CPT | Mod: S$PBB,,, | Performed by: INTERNAL MEDICINE

## 2017-08-15 PROCEDURE — 3008F BODY MASS INDEX DOCD: CPT | Mod: ,,, | Performed by: NUCLEAR MEDICINE

## 2017-08-15 NOTE — PROGRESS NOTES
Subjective:   Patient ID:  Deirdre Yanez is a 67 y.o. female who presents for follow-up of Hypertension and Congestive Heart Failure      HPI1- ESSENTIAL HTN WITH CVD, HFPEF , STAGE B, DD,  DOING WELL. NO RECENT HOSPITALIZATIONS OR ED VISITS FOR ADHF, ACS OR TIA OR STROKE  NO HX OF ANGINA OR EQUIVALENT  NO UNUSUAL DESAI. NO ORTHOPNEA OR PND  NO EDEMA. NO INTERMITTENT CLAUDICATION  NO CALVE TENDERNESS  NO FOCAL CNS SYMPTOMS OR SIGNS TO SUGGEST TIA OR STROKE  CARD MED- GOOD COMPLIANCE  Review of Systems   Constitution: Negative for chills, fever, weakness, night sweats, weight gain and weight loss.   HENT: Negative for headaches and nosebleeds.    Eyes: Negative for blurred vision, double vision and visual disturbance.   Cardiovascular: Negative for chest pain, dyspnea on exertion, irregular heartbeat, leg swelling, orthopnea, palpitations, paroxysmal nocturnal dyspnea and syncope.   Respiratory: Negative for cough, hemoptysis and wheezing.    Endocrine: Negative for polydipsia and polyuria.   Hematologic/Lymphatic: Does not bruise/bleed easily.   Skin: Negative for rash.   Musculoskeletal: Negative for joint pain, joint swelling, muscle weakness and myalgias.   Gastrointestinal: Negative for abdominal pain, hematemesis, jaundice and melena.   Genitourinary: Negative for dysuria, hematuria and nocturia.   Neurological: Negative for dizziness, focal weakness and sensory change.   Psychiatric/Behavioral: Negative for depression. The patient does not have insomnia and is not nervous/anxious.      Family History   Problem Relation Age of Onset    Colon cancer Maternal Grandfather     Diabetes Maternal Grandmother     Hypertension Maternal Grandmother     Cataracts Mother     Glaucoma Mother     Macular degeneration Mother     Hypertension Mother     Diabetes Paternal Grandmother     Cataracts Maternal Aunt     Glaucoma Maternal Aunt     Macular degeneration Maternal Aunt     Melanoma Maternal Aunt     Heart  disease       pat side     Past Medical History:   Diagnosis Date    Abrasion     left eye    Abrasion and/or friction burn of abdominal wall with infection     left eye    Diabetes mellitus     2011  11/17/2013    Diverticulosis     DM (diabetes mellitus) 2011     12/03/2014  A1C 6.0 x 2 weeks    Hepatitis     Hx of B/C from cadaver in past    Hiatal hernia     Hip bursitis, left     HTN (hypertension)     Hypertensive CHF (congestive heart failure) 3/28/2014    Positive ALBIN (antinuclear antibody)     Pure hypercholesterolemia 9/30/2016     Current Outpatient Prescriptions on File Prior to Visit   Medication Sig Dispense Refill    aspirin 81 MG Chew Take 1 tablet (81 mg total) by mouth once daily. (Patient taking differently: Take 81 mg by mouth every other day. )  0    b complex vitamins tablet Take 1 tablet by mouth once daily.      clobetasol (TEMOVATE) 0.05 % external solution Use on scalp twice daily 60 mL 3    fluticasone (FLONASE) 50 mcg/actuation nasal spray SHAKE LIQUID AND USE 2 SPRAYS IN EACH NOSTRIL DAILY 1 Bottle 12    minoxidil (ROGAINE) 2 % external solution Apply topically 2 (two) times daily.      nebivolol (BYSTOLIC) 10 MG Tab Take 1 tablet (10 mg total) by mouth once daily. 30 tablet 11    nebivolol (BYSTOLIC) 5 MG Tab Take 1 tablet (5 mg total) by mouth once daily. 30 tablet 11    spironolactone (ALDACTONE) 50 MG tablet Take 50 mg by mouth once daily at 6am.  2     No current facility-administered medications on file prior to visit.      Review of patient's allergies indicates:   Allergen Reactions    Arb-angiotensin receptor antagonist Edema    Metoprolol Other (See Comments)     Alopecia    Ace inhibitors Other (See Comments)     cough    Calcium channel blocking agents-dihydropyridines      Edema      Sulfa (sulfonamide antibiotics)        Objective:     Physical Exam   Constitutional: She is oriented to person, place, and time. She appears well-developed.  No distress.   HENT:   Head: Normocephalic.   Eyes: Conjunctivae are normal. Pupils are equal, round, and reactive to light. No scleral icterus.   Neck: Normal range of motion. Neck supple. Normal carotid pulses, no hepatojugular reflux and no JVD present. Carotid bruit is not present. No edema present. No thyroid mass and no thyromegaly present.   Cardiovascular: Normal rate, regular rhythm, S1 normal, S2 normal, normal heart sounds and intact distal pulses.  PMI is not displaced.  Exam reveals no gallop and no friction rub.    No murmur heard.  Pulses:       Carotid pulses are 2+ on the right side, and 2+ on the left side.       Radial pulses are 2+ on the right side, and 2+ on the left side.        Femoral pulses are 2+ on the right side, and 2+ on the left side.       Popliteal pulses are 2+ on the right side, and 2+ on the left side.        Dorsalis pedis pulses are 2+ on the right side, and 2+ on the left side.        Posterior tibial pulses are 2+ on the right side, and 2+ on the left side.   Pulmonary/Chest: Effort normal and breath sounds normal. She has no wheezes. She has no rales. She exhibits no tenderness.   Abdominal: Soft. Bowel sounds are normal. She exhibits no pulsatile midline mass and no mass. There is no hepatosplenomegaly. There is no tenderness.   Musculoskeletal: Normal range of motion. She exhibits no edema or tenderness.        Cervical back: Normal.        Thoracic back: Normal.        Lumbar back: Normal.   Lymphadenopathy:     She has no cervical adenopathy.     She has no axillary adenopathy.        Right: No supraclavicular adenopathy present.        Left: No supraclavicular adenopathy present.   Neurological: She is alert and oriented to person, place, and time. She has normal strength and normal reflexes. No sensory deficit. Gait normal.   Skin: Skin is warm. No rash noted. No cyanosis. No pallor. Nails show no clubbing.   Psychiatric: She has a normal mood and affect. Her speech is  normal and behavior is normal. Cognition and memory are normal.       Assessment:     1. Essential hypertension    2. Hypertensive CHF (congestive heart failure)    3. CHF (NYHA class I, ACC/AHA stage B)    4. Diastolic CHF, chronic      ECG TODAY- MILD SB, OTHERWISE NORMAL   WELL CONTROLLED BP  NO CLINICAL EVIDENCE OF ACUTE HF. NO ACTIVE MYOCARDIAL ISCHEMIA. NO ARRHYTHMIAS  CNS STATUS STABLE  CARD MED WELL TOLERATED  Plan:     Essential hypertension    Hypertensive CHF (congestive heart failure)    CHF (NYHA class I, ACC/AHA stage B)    Diastolic CHF, chronic    1- CONTINUE PRESENT CARD MANAGEMENT    2- RETURN IN 6 MONTHS.

## 2017-08-18 ENCOUNTER — PATIENT MESSAGE (OUTPATIENT)
Dept: CARDIOLOGY | Facility: CLINIC | Age: 67
End: 2017-08-18

## 2017-08-18 RX ORDER — SPIRONOLACTONE 50 MG/1
50 TABLET, FILM COATED ORAL DAILY
Qty: 90 TABLET | Refills: 3 | Status: SHIPPED | OUTPATIENT
Start: 2017-08-18 | End: 2018-02-22 | Stop reason: SDUPTHER

## 2017-08-31 ENCOUNTER — OFFICE VISIT (OUTPATIENT)
Dept: DERMATOLOGY | Facility: CLINIC | Age: 67
End: 2017-08-31
Payer: MEDICARE

## 2017-08-31 DIAGNOSIS — L63.9 ALOPECIA AREATA: Primary | ICD-10-CM

## 2017-08-31 PROCEDURE — 11901 INJECT SKIN LESIONS >7: CPT | Mod: S$PBB,,, | Performed by: DERMATOLOGY

## 2017-08-31 PROCEDURE — 99213 OFFICE O/P EST LOW 20 MIN: CPT | Mod: 25,S$PBB,, | Performed by: DERMATOLOGY

## 2017-08-31 PROCEDURE — 1159F MED LIST DOCD IN RCRD: CPT | Mod: ,,, | Performed by: DERMATOLOGY

## 2017-08-31 PROCEDURE — 99999 PR PBB SHADOW E&M-EST. PATIENT-LVL II: CPT | Mod: PBBFAC,,, | Performed by: DERMATOLOGY

## 2017-08-31 PROCEDURE — 99212 OFFICE O/P EST SF 10 MIN: CPT | Mod: PBBFAC,PO | Performed by: DERMATOLOGY

## 2017-08-31 RX ORDER — TRIAMCINOLONE ACETONIDE 40 MG/ML
40 INJECTION, SUSPENSION INTRA-ARTICULAR; INTRAMUSCULAR ONCE
Status: COMPLETED | OUTPATIENT
Start: 2017-08-31 | End: 2017-08-31

## 2017-08-31 RX ADMIN — TRIAMCINOLONE ACETONIDE 40 MG: 40 INJECTION, SUSPENSION INTRA-ARTICULAR; INTRAMUSCULAR at 12:08

## 2017-08-31 RX ADMIN — TRIAMCINOLONE ACETONIDE 10 MG: 10 INJECTION, SUSPENSION INTRA-ARTICULAR; INTRALESIONAL at 11:08

## 2017-08-31 NOTE — PROGRESS NOTES
Subjective:       Patient ID:  Deirdre Yanez is a 67 y.o. female who presents for   Chief Complaint   Patient presents with    Hair Loss     f/u tx rogaine and clobetasol solution     History of alopecia areata, Hepatitis B (Hepatitis B core antibody positive c/ recent conversion to Hep B surface antibody positive s/p vaccination, + intolerance to lamivudine) and Hep C (s/p treatment with possible cure), s/p ILK x 14 and IM kenalog x 8, last seen on 2/28/17.  She has been discharged from Dr. Galvan's clinic due to recent seroconversion for Hep B surface antigen positivity.  She currently uses clobetasol solution bid, rogaine bid and biotin 5000 mcg qD. + recalcitrance of several areas of the scalp.        She has new diagnosis CHF, currently on spironolactone.         Review of Systems   Constitutional: Negative for fever and chills.   Gastrointestinal: Negative for nausea and vomiting.   Skin: Negative for rash, daily sunscreen use, activity-related sunscreen use and recent sunburn.   Hematologic/Lymphatic: Does not bruise/bleed easily.        Objective:    Physical Exam   Constitutional: She appears well-developed and well-nourished. No distress.   Neurological: She is alert and oriented to person, place, and time. She is not disoriented.   Psychiatric: She has a normal mood and affect.   Skin:   Areas Examined (abnormalities noted in diagram):   Scalp / Hair Palpated and Inspected  Head / Face Inspection Performed  Neck Inspection Performed  RUE Inspected  LUE Inspection Performed  Nails and Digits Inspection Performed                 Assessment / Plan:        Alopecia areata  -     triamcinolone acetonide injection 10 mg; 1 mL (10 mg total) by Other route once.  -     triamcinolone acetonide injection 40 mg; Inject 1 mL (40 mg total) into the muscle once.  -     In setting of prior hep C infection (currently cured) and Pt recently discharged from Dr. Galvan's clinic due to hep B surface antigen positivity s/p  vaccination.  Pt with low risk for hep B reactivation per Dr. Galvan's recommendations.  ILK #15 done today.  IM steroids #9 done today. Continue clobetasol solution twice daily, rogaine bid, and biotin. After risks, benefits and alternatives explained and side effect profile reviewed, including hypopigmentation, telangiectasia and atrophy, the patient verbally consented to ILK injection. A total of 3 cc of kenalog 10 mg/ml was injected into the areas of alopecia of the scalp. Pt tolerated well without side effects. RTC in 8 weeks for repeat injection.             Return in about 8 weeks (around 10/26/2017).

## 2017-09-19 ENCOUNTER — PATIENT OUTREACH (OUTPATIENT)
Dept: ADMINISTRATIVE | Facility: HOSPITAL | Age: 67
End: 2017-09-19

## 2017-09-20 ENCOUNTER — LAB VISIT (OUTPATIENT)
Dept: LAB | Facility: HOSPITAL | Age: 67
End: 2017-09-20
Attending: INTERNAL MEDICINE
Payer: MEDICARE

## 2017-09-20 DIAGNOSIS — E78.5 HYPERLIPIDEMIA ASSOCIATED WITH TYPE 2 DIABETES MELLITUS: ICD-10-CM

## 2017-09-20 DIAGNOSIS — E55.9 VITAMIN D DEFICIENCY: ICD-10-CM

## 2017-09-20 DIAGNOSIS — Z29.9 PREVENTIVE MEASURE: ICD-10-CM

## 2017-09-20 DIAGNOSIS — E11.8 TYPE 2 DIABETES MELLITUS WITH COMPLICATION, WITHOUT LONG-TERM CURRENT USE OF INSULIN: ICD-10-CM

## 2017-09-20 DIAGNOSIS — E11.69 HYPERLIPIDEMIA ASSOCIATED WITH TYPE 2 DIABETES MELLITUS: ICD-10-CM

## 2017-09-20 LAB
25(OH)D3+25(OH)D2 SERPL-MCNC: 32 NG/ML
ALBUMIN SERPL BCP-MCNC: 3.9 G/DL
ALP SERPL-CCNC: 63 U/L
ALT SERPL W/O P-5'-P-CCNC: 22 U/L
ANION GAP SERPL CALC-SCNC: 11 MMOL/L
AST SERPL-CCNC: 28 U/L
BASOPHILS # BLD AUTO: 0.01 K/UL
BASOPHILS NFR BLD: 0.1 %
BILIRUB SERPL-MCNC: 1.6 MG/DL
BUN SERPL-MCNC: 16 MG/DL
CALCIUM SERPL-MCNC: 9.9 MG/DL
CHLORIDE SERPL-SCNC: 103 MMOL/L
CHOLEST SERPL-MCNC: 192 MG/DL
CHOLEST/HDLC SERPL: 3.1 {RATIO}
CO2 SERPL-SCNC: 24 MMOL/L
CREAT SERPL-MCNC: 0.9 MG/DL
DIFFERENTIAL METHOD: NORMAL
EOSINOPHIL # BLD AUTO: 0 K/UL
EOSINOPHIL NFR BLD: 0.3 %
ERYTHROCYTE [DISTWIDTH] IN BLOOD BY AUTOMATED COUNT: 13.8 %
EST. GFR  (AFRICAN AMERICAN): >60 ML/MIN/1.73 M^2
EST. GFR  (NON AFRICAN AMERICAN): >60 ML/MIN/1.73 M^2
ESTIMATED AVG GLUCOSE: 117 MG/DL
GLUCOSE SERPL-MCNC: 98 MG/DL
HBA1C MFR BLD HPLC: 5.7 %
HCT VFR BLD AUTO: 42.4 %
HDLC SERPL-MCNC: 62 MG/DL
HDLC SERPL: 32.3 %
HGB BLD-MCNC: 13.9 G/DL
LDLC SERPL CALC-MCNC: 121.6 MG/DL
LYMPHOCYTES # BLD AUTO: 1.8 K/UL
LYMPHOCYTES NFR BLD: 22.2 %
MCH RBC QN AUTO: 28.4 PG
MCHC RBC AUTO-ENTMCNC: 32.8 G/DL
MCV RBC AUTO: 87 FL
MONOCYTES # BLD AUTO: 0.5 K/UL
MONOCYTES NFR BLD: 6.4 %
NEUTROPHILS # BLD AUTO: 5.6 K/UL
NEUTROPHILS NFR BLD: 70.9 %
NONHDLC SERPL-MCNC: 130 MG/DL
PLATELET # BLD AUTO: 175 K/UL
PMV BLD AUTO: 11.2 FL
POTASSIUM SERPL-SCNC: 5.7 MMOL/L
PROT SERPL-MCNC: 7.2 G/DL
RBC # BLD AUTO: 4.89 M/UL
SODIUM SERPL-SCNC: 138 MMOL/L
TRIGL SERPL-MCNC: 42 MG/DL
TSH SERPL DL<=0.005 MIU/L-ACNC: 0.73 UIU/ML
WBC # BLD AUTO: 7.96 K/UL

## 2017-09-20 PROCEDURE — 82306 VITAMIN D 25 HYDROXY: CPT

## 2017-09-20 PROCEDURE — 83036 HEMOGLOBIN GLYCOSYLATED A1C: CPT

## 2017-09-20 PROCEDURE — 85025 COMPLETE CBC W/AUTO DIFF WBC: CPT

## 2017-09-20 PROCEDURE — 80061 LIPID PANEL: CPT

## 2017-09-20 PROCEDURE — 36415 COLL VENOUS BLD VENIPUNCTURE: CPT | Mod: PO

## 2017-09-20 PROCEDURE — 80053 COMPREHEN METABOLIC PANEL: CPT

## 2017-09-20 PROCEDURE — 84443 ASSAY THYROID STIM HORMONE: CPT

## 2017-09-26 NOTE — PROGRESS NOTES
"Subjective:       Patient ID: Deirdre Yanez is a 67 y.o. female.    Chief Complaint: Annual Exam    Here for f/u medical problems and preventive exam.  BPs at home 114-137/ 70s.  AC sugars 80s.  No low sugars.  Energy better overall.  Has lost 30# since January, attributes this to her dog being ill.  No f/c/sw/cough.  No cp/sob/palp.  BMs normal.  Urine normal.  Taking vit D3 2K three days per week.  Rides recumbent bike daily 15-30min.    HM: ref fluvax, 8/16 [id number], 9/14 [id number], 5/13 TDaP, 11/14 BMD rep 5y, 10/13 Cscope rep 5y, 8/16 MMG/Gyn Dr. Cantrell/ will call to schedule, 12/14 Eye Dr. Chowdhury, HCV per Hepatology.          Review of Systems   Constitutional: Negative for activity change, appetite change, chills, diaphoresis, fever and unexpected weight change.   HENT: Negative for congestion, ear pain, hearing loss, rhinorrhea, sinus pressure, sore throat and trouble swallowing.    Eyes: Negative for discharge and visual disturbance.   Respiratory: Negative for cough, chest tightness, shortness of breath and wheezing.    Cardiovascular: Negative for chest pain and palpitations.   Gastrointestinal: Negative for blood in stool, constipation, diarrhea, nausea and vomiting.   Endocrine: Negative for polydipsia and polyuria.   Genitourinary: Negative for difficulty urinating, dysuria, frequency, hematuria, menstrual problem, urgency and vaginal discharge.   Musculoskeletal: Negative for arthralgias, joint swelling and neck pain.   Skin: Negative for rash.   Neurological: Negative for dizziness, weakness and headaches.   Psychiatric/Behavioral: Negative for confusion, dysphoric mood and sleep disturbance. The patient is not nervous/anxious.        Objective:   /84 (BP Location: Right arm, Patient Position: Sitting)   Pulse 60   Temp 96.2 °F (35.7 °C) (Tympanic)   Ht 5' 7" (1.702 m)   Wt 89.5 kg (197 lb 5 oz)   SpO2 97%   BMI 30.90 kg/m²     Physical Exam   Constitutional: She is oriented to person, " place, and time. She appears well-developed and well-nourished.   HENT:   Right Ear: External ear normal. Tympanic membrane is not injected.   Left Ear: External ear normal. Tympanic membrane is not injected.   Mouth/Throat: Oropharynx is clear and moist.   Eyes: Conjunctivae are normal.   Neck: Normal range of motion. Neck supple. No thyromegaly present.   Cardiovascular: Normal rate, regular rhythm and intact distal pulses.  Exam reveals no gallop and no friction rub.    No murmur heard.  Pulses:       Dorsalis pedis pulses are 2+ on the right side, and 2+ on the left side.        Posterior tibial pulses are 2+ on the right side, and 2+ on the left side.   Pulmonary/Chest: Effort normal and breath sounds normal. She has no wheezes. She has no rales.   Abdominal: Soft. Bowel sounds are normal. She exhibits no mass. There is no tenderness.   Musculoskeletal: She exhibits no edema.   Feet:   Right Foot:   Protective Sensation: 10 sites tested. 10 sites sensed.   Skin Integrity: Negative for ulcer, blister, skin breakdown, erythema, warmth, callus or dry skin.   Left Foot:   Protective Sensation: 10 sites tested. 10 sites sensed.   Skin Integrity: Negative for ulcer, blister, skin breakdown, erythema, warmth, callus or dry skin.   Lymphadenopathy:     She has no cervical adenopathy.   Neurological: She is alert and oriented to person, place, and time.   Skin: Skin is warm. No rash noted.   Psychiatric: She has a normal mood and affect.         Results for GILA TREVINO (MRN [id number]) as of 9/26/2017 12:29   Ref. Range 9/20/2017 08:45 9/20/2017 09:11   WBC Latest Ref Range: 3.90 - 12.70 K/uL  7.96   RBC Latest Ref Range: 4.00 - 5.40 M/uL  4.89   Hemoglobin Latest Ref Range: 12.0 - 16.0 g/dL  13.9   Hematocrit Latest Ref Range: 37.0 - 48.5 %  42.4   MCV Latest Ref Range: 82 - 98 fL  87   MCH Latest Ref Range: 27.0 - 31.0 pg  28.4   MCHC Latest Ref Range: 32.0 - 36.0 g/dL  32.8   RDW Latest Ref Range: 11.5 - 14.5 %   13.8   Platelets Latest Ref Range: 150 - 350 K/uL  175   MPV Latest Ref Range: 9.2 - 12.9 fL  11.2   Gran% Latest Ref Range: 38.0 - 73.0 %  70.9   Gran # Latest Ref Range: 1.8 - 7.7 K/uL  5.6   Lymph% Latest Ref Range: 18.0 - 48.0 %  22.2   Lymph # Latest Ref Range: 1.0 - 4.8 K/uL  1.8   Mono% Latest Ref Range: 4.0 - 15.0 %  6.4   Mono # Latest Ref Range: 0.3 - 1.0 K/uL  0.5   Eosinophil% Latest Ref Range: 0.0 - 8.0 %  0.3   Eos # Latest Ref Range: 0.0 - 0.5 K/uL  0.0   Basophil% Latest Ref Range: 0.0 - 1.9 %  0.1   Baso # Latest Ref Range: 0.00 - 0.20 K/uL  0.01   Sodium Latest Ref Range: 136 - 145 mmol/L  138   Potassium Latest Ref Range: 3.5 - 5.1 mmol/L  5.7 (H)   Chloride Latest Ref Range: 95 - 110 mmol/L  103   CO2 Latest Ref Range: 23 - 29 mmol/L  24   Anion Gap Latest Ref Range: 8 - 16 mmol/L  11   BUN, Bld Latest Ref Range: 8 - 23 mg/dL  16   Creatinine Latest Ref Range: 0.5 - 1.4 mg/dL  0.9   eGFR if non African American Latest Ref Range: >60 mL/min/1.73 m^2  >60.0   eGFR if African American Latest Ref Range: >60 mL/min/1.73 m^2  >60.0   Glucose Latest Ref Range: 70 - 110 mg/dL  98   Calcium Latest Ref Range: 8.7 - 10.5 mg/dL  9.9   Alkaline Phosphatase Latest Ref Range: 55 - 135 U/L  63   Total Protein Latest Ref Range: 6.0 - 8.4 g/dL  7.2   Albumin Latest Ref Range: 3.5 - 5.2 g/dL  3.9   Total Bilirubin Latest Ref Range: 0.1 - 1.0 mg/dL  1.6 (H)   AST Latest Ref Range: 10 - 40 U/L  28   ALT Latest Ref Range: 10 - 44 U/L  22   Triglycerides Latest Ref Range: 30 - 150 mg/dL  42   Cholesterol Latest Ref Range: 120 - 199 mg/dL  192   HDL Latest Ref Range: 40 - 75 mg/dL  62   LDL Cholesterol Latest Ref Range: 63.0 - 159.0 mg/dL  121.6   Total Cholesterol/HDL Ratio Latest Ref Range: 2.0 - 5.0   3.1   Vit D, 25-Hydroxy Latest Ref Range: 30 - 96 ng/mL  32   Hemoglobin A1C Latest Ref Range: 4.0 - 5.6 %  5.7 (H)   Estimated Avg Glucose Latest Ref Range: 68 - 131 mg/dL  117   TSH Latest Ref Range: 0.400 - 4.000  uIU/mL  0.729   Microalbum.,U,Random Latest Units: ug/mL <2.5    Microalb Creat Ratio Latest Ref Range: 0.0 - 30.0 ug/mg Unable to calculate      Assessment:       1. Essential hypertension    2. Type 2 diabetes mellitus with complication, without long-term current use of insulin    3. Vitamin D deficiency    4. Hyperlipidemia associated with type 2 diabetes mellitus    5. Encounter for preventive health examination        Plan:       Deirdre was seen today for annual exam.    Diagnoses and all orders for this visit:    Essential hypertension- stable onr x, cont.    Type 2 diabetes mellitus with complication, without long-term current use of insulin- doing well.  -     Ambulatory referral to Optometry    Vitamin D deficiency- cont supplement.    Hyperlipidemia associated with type 2 diabetes mellitus- 10yr vasc risk 17% so start very low dose statin and recheck 3mo.  Father had bad rx to lovastatin.  Cont ASA.  -     pravastatin (PRAVACHOL) 10 MG tablet; Take 1 tablet (10 mg total) by mouth once daily.  -     Lipid panel; Future  -     ALT (SGPT); Future    Encounter for preventive health examination- utd.  Cont exercise.  Follow weight loss/ had been stressed about her dog and lost her appetite.

## 2017-09-27 ENCOUNTER — OFFICE VISIT (OUTPATIENT)
Dept: INTERNAL MEDICINE | Facility: CLINIC | Age: 67
End: 2017-09-27
Payer: MEDICARE

## 2017-09-27 VITALS
SYSTOLIC BLOOD PRESSURE: 124 MMHG | BODY MASS INDEX: 30.97 KG/M2 | HEIGHT: 67 IN | WEIGHT: 197.31 LBS | HEART RATE: 60 BPM | TEMPERATURE: 96 F | DIASTOLIC BLOOD PRESSURE: 84 MMHG | OXYGEN SATURATION: 97 %

## 2017-09-27 DIAGNOSIS — E78.5 HYPERLIPIDEMIA ASSOCIATED WITH TYPE 2 DIABETES MELLITUS: ICD-10-CM

## 2017-09-27 DIAGNOSIS — E11.69 HYPERLIPIDEMIA ASSOCIATED WITH TYPE 2 DIABETES MELLITUS: ICD-10-CM

## 2017-09-27 DIAGNOSIS — Z00.00 ENCOUNTER FOR PREVENTIVE HEALTH EXAMINATION: ICD-10-CM

## 2017-09-27 DIAGNOSIS — E11.8 TYPE 2 DIABETES MELLITUS WITH COMPLICATION, WITHOUT LONG-TERM CURRENT USE OF INSULIN: ICD-10-CM

## 2017-09-27 DIAGNOSIS — I10 ESSENTIAL HYPERTENSION: Primary | Chronic | ICD-10-CM

## 2017-09-27 DIAGNOSIS — E55.9 VITAMIN D DEFICIENCY: ICD-10-CM

## 2017-09-27 PROCEDURE — 99213 OFFICE O/P EST LOW 20 MIN: CPT | Mod: PBBFAC,PO | Performed by: INTERNAL MEDICINE

## 2017-09-27 PROCEDURE — 99214 OFFICE O/P EST MOD 30 MIN: CPT | Mod: S$PBB,,, | Performed by: INTERNAL MEDICINE

## 2017-09-27 PROCEDURE — 1159F MED LIST DOCD IN RCRD: CPT | Mod: ,,, | Performed by: INTERNAL MEDICINE

## 2017-09-27 PROCEDURE — 3074F SYST BP LT 130 MM HG: CPT | Mod: ,,, | Performed by: INTERNAL MEDICINE

## 2017-09-27 PROCEDURE — 3079F DIAST BP 80-89 MM HG: CPT | Mod: ,,, | Performed by: INTERNAL MEDICINE

## 2017-09-27 PROCEDURE — 1126F AMNT PAIN NOTED NONE PRSNT: CPT | Mod: ,,, | Performed by: INTERNAL MEDICINE

## 2017-09-27 PROCEDURE — 99999 PR PBB SHADOW E&M-EST. PATIENT-LVL III: CPT | Mod: PBBFAC,,, | Performed by: INTERNAL MEDICINE

## 2017-09-27 RX ORDER — PRAVASTATIN SODIUM 10 MG/1
10 TABLET ORAL DAILY
Qty: 90 TABLET | Refills: 3 | Status: SHIPPED | OUTPATIENT
Start: 2017-09-27 | End: 2017-10-16

## 2017-10-06 ENCOUNTER — TELEPHONE (OUTPATIENT)
Dept: OBSTETRICS AND GYNECOLOGY | Facility: CLINIC | Age: 67
End: 2017-10-06

## 2017-10-16 ENCOUNTER — OFFICE VISIT (OUTPATIENT)
Dept: OPHTHALMOLOGY | Facility: CLINIC | Age: 67
End: 2017-10-16
Payer: MEDICARE

## 2017-10-16 DIAGNOSIS — H52.4 BILATERAL PRESBYOPIA: ICD-10-CM

## 2017-10-16 DIAGNOSIS — E11.9 DIABETES MELLITUS TYPE 2 WITHOUT RETINOPATHY: ICD-10-CM

## 2017-10-16 DIAGNOSIS — Z83.518 FAMILY HISTORY OF MACULAR DEGENERATION: ICD-10-CM

## 2017-10-16 DIAGNOSIS — H25.13 CATARACT, NUCLEAR SCLEROTIC SENILE, BILATERAL: Primary | ICD-10-CM

## 2017-10-16 PROCEDURE — 99999 PR PBB SHADOW E&M-EST. PATIENT-LVL I: CPT | Mod: PBBFAC,,, | Performed by: OPTOMETRIST

## 2017-10-16 PROCEDURE — 92014 COMPRE OPH EXAM EST PT 1/>: CPT | Mod: S$PBB,,, | Performed by: OPTOMETRIST

## 2017-10-16 PROCEDURE — 99211 OFF/OP EST MAY X REQ PHY/QHP: CPT | Mod: PBBFAC,PO | Performed by: OPTOMETRIST

## 2017-10-16 PROCEDURE — 92015 DETERMINE REFRACTIVE STATE: CPT | Mod: ,,, | Performed by: OPTOMETRIST

## 2017-10-16 NOTE — LETTER
October 16, 2017      Beti Blackburn MD  [address] [place] [address]           [facility]  [address] [place] [address]  Phone: [phone]  Fax: [phone]          Patient: Deirdre Yanez   MR Number: [id number]   YOB: 1950   Date of Visit: 10/16/2017       Dear Dr. Beti Blackburn:    Thank you for referring Deirdre Yanez to me for evaluation. Attached you will find relevant portions of my assessment and plan of care.    If you have questions, please do not hesitate to call me. I look forward to following Deirdre Yanez along with you.    Sincerely,    Santino Chowdhury, OD    Enclosure  CC:  No Recipients    If you would like to receive this communication electronically, please contact [email or web address] or [phone] to request more information on [employer] e[employer] Link access.    For providers and/or their staff who would like to refer a patient to [facility], please contact us through our one-stop-shop provider referral line, [facility], at [phone].    If you feel you have received this communication in error or would no longer like to receive these types of communications, please e-mail [email or web address]

## 2017-10-16 NOTE — PROGRESS NOTES
HPI     NIDDM exam. No visual complaints. Update glasses RX. Last eye exam   12/04/2014 TRF. Sees better at near with one pair of glasses and distance   with the other.    Last edited by Amberly Bourgeois on 10/16/2017  9:07 AM. (History)            Assessment /Plan     For exam results, see Encounter Report.    Cataract, nuclear sclerotic senile, bilateral    Family history of macular degeneration    Diabetes mellitus type 2 without retinopathy    Bilateral presbyopia      Mild cataracts OU, not surgical.    No macular pathology, takes multivits    No Background Diabetic Retinopathy    Dispense Final Rx for glasses.  RTC 1 year

## 2017-11-02 ENCOUNTER — TELEPHONE (OUTPATIENT)
Dept: OBSTETRICS AND GYNECOLOGY | Facility: CLINIC | Age: 67
End: 2017-11-02

## 2017-11-02 DIAGNOSIS — Z12.31 VISIT FOR SCREENING MAMMOGRAM: Primary | ICD-10-CM

## 2017-11-02 NOTE — TELEPHONE ENCOUNTER
----- Message from Manda Rai sent at 11/2/2017  9:56 AM CDT -----  pls input [facility]...[phone] (home)

## 2017-11-02 NOTE — TELEPHONE ENCOUNTER
----- Message from Manda Rai sent at 11/2/2017  9:56 AM CDT -----  pls input [facility]...[phone] (home)

## 2017-12-01 ENCOUNTER — OFFICE VISIT (OUTPATIENT)
Dept: OBSTETRICS AND GYNECOLOGY | Facility: CLINIC | Age: 67
End: 2017-12-01
Payer: MEDICARE

## 2017-12-01 ENCOUNTER — HOSPITAL ENCOUNTER (OUTPATIENT)
Dept: RADIOLOGY | Facility: HOSPITAL | Age: 67
Discharge: HOME | End: 2017-12-01
Attending: OBSTETRICS & GYNECOLOGY
Payer: MEDICARE

## 2017-12-01 VITALS
DIASTOLIC BLOOD PRESSURE: 80 MMHG | BODY MASS INDEX: 32.18 KG/M2 | HEIGHT: 67 IN | SYSTOLIC BLOOD PRESSURE: 128 MMHG | WEIGHT: 205 LBS

## 2017-12-01 DIAGNOSIS — Z12.31 VISIT FOR SCREENING MAMMOGRAM: ICD-10-CM

## 2017-12-01 DIAGNOSIS — R10.9 ABDOMINAL PRESSURE: Primary | ICD-10-CM

## 2017-12-01 DIAGNOSIS — R63.4 UNEXPLAINED WEIGHT LOSS: ICD-10-CM

## 2017-12-01 DIAGNOSIS — Z01.419 WELL WOMAN EXAM WITH ROUTINE GYNECOLOGICAL EXAM: ICD-10-CM

## 2017-12-01 DIAGNOSIS — Z87.410 HISTORY OF CERVICAL DYSPLASIA: ICD-10-CM

## 2017-12-01 PROCEDURE — 99213 OFFICE O/P EST LOW 20 MIN: CPT | Mod: S$PBB,,, | Performed by: OBSTETRICS & GYNECOLOGY

## 2017-12-01 PROCEDURE — 99999 PR PBB SHADOW E&M-EST. PATIENT-LVL II: CPT | Mod: PBBFAC,,, | Performed by: OBSTETRICS & GYNECOLOGY

## 2017-12-01 PROCEDURE — 77067 SCR MAMMO BI INCL CAD: CPT | Mod: 26,,, | Performed by: RADIOLOGY

## 2017-12-01 PROCEDURE — 99212 OFFICE O/P EST SF 10 MIN: CPT | Mod: PBBFAC | Performed by: OBSTETRICS & GYNECOLOGY

## 2017-12-01 PROCEDURE — 77067 SCR MAMMO BI INCL CAD: CPT | Mod: TC

## 2017-12-01 PROCEDURE — 88175 CYTOPATH C/V AUTO FLUID REDO: CPT

## 2017-12-04 ENCOUNTER — TELEPHONE (OUTPATIENT)
Dept: RADIOLOGY | Facility: HOSPITAL | Age: 67
End: 2017-12-04

## 2017-12-04 ENCOUNTER — HOSPITAL ENCOUNTER (OUTPATIENT)
Dept: RADIOLOGY | Facility: HOSPITAL | Age: 67
Discharge: HOME | End: 2017-12-04
Attending: OBSTETRICS & GYNECOLOGY
Payer: MEDICARE

## 2017-12-04 DIAGNOSIS — R10.9 ABDOMINAL PRESSURE: ICD-10-CM

## 2017-12-04 DIAGNOSIS — R63.4 UNEXPLAINED WEIGHT LOSS: ICD-10-CM

## 2017-12-04 PROCEDURE — 76830 TRANSVAGINAL US NON-OB: CPT | Mod: 26,,, | Performed by: RADIOLOGY

## 2017-12-04 PROCEDURE — 76856 US EXAM PELVIC COMPLETE: CPT | Mod: TC

## 2017-12-04 PROCEDURE — 76856 US EXAM PELVIC COMPLETE: CPT | Mod: 26,,, | Performed by: RADIOLOGY

## 2017-12-04 NOTE — PROGRESS NOTES
HPI:  67 y.o. female patient  presents today for complaint of lower abdominal pressure and back pain over recent months.  She has also had unexplained weight loss of 40 pounds over past 6 months.  She is  and not on HRT.  She is also due for annual exam.  She requests pap due to prior history of dysplasia and CKC.      Past Medical History:   Diagnosis Date    Abrasion     left eye    Abrasion and/or friction burn of abdominal wall with infection     left eye    Diabetes mellitus       2013    Diverticulosis     DM (diabetes mellitus)      2014  A1C 6.0 x 2 weeks    DM (diabetes mellitus)     BS 89 10/14/2017 A1C 5.7     Hepatitis     Hx of B/C from cadaver in past    Hiatal hernia     Hip bursitis, left     HTN (hypertension)     Hypertensive CHF (congestive heart failure) 3/28/2014    Positive ALBIN (antinuclear antibody)     Pure hypercholesterolemia 2016       Past Surgical History:   Procedure Laterality Date    APPENDECTOMY      bone grafts      DILATION AND CURETTAGE OF UTERUS      KNEE ARTHROSCOPY W/ MENISCAL REPAIR      TONSILLECTOMY, ADENOIDECTOMY      TUBAL LIGATION         REVIEW OF SYSTEMS:  GENERAL:  No fever, chills, fatigue, or weight loss  ABDOMEN:  Normal appetite, no weight loss or abdominal pain  URINARY:  No flank pain, dysuria, or hematuria  REPRODUCTIVE:  No abnormal vaginal bleeding  BREASTS:  No tenderness, masses, or nipple discharge noted of breasts    PHYSICAL EXAM:    APPEARANCE:  Well nourished, well developed, in no acute distress  NECK:  Symmetric without masses or thyromegaly  BREASTS:  Symmetrical, no skin changes or visible lesions.  No palpable masses, nipple discharge, or adenopathy bilaterally.  ABDOMEN:  Soft, no tenderness or masses, no distension noted  PELVIC:  VULVA:  No lesions.  Normal female genitalia  URETHRAL MEATUS:  Normal size and location.  No lesions.  No prolapse  URETHRA:  No masses, tenderness,  prolapse, or scarring  VAGINA:  No lesions or discharge.  No significant cystocele or rectocele  CERVIX:  No lesions.  Normal diameter, no cervical motion tenderness.  UTERUS:  4-6 week size, regular shape, mobile, non-tender, normal position, good support  ADNEXA:  No masses or tenderness  ANUS AND PERINEUM:  No lesions.  No external hemorrhoids    1. Abdominal pressure  US Pelvis Comp with Transvag NON-OB (xpd   2. Well woman exam with routine gynecological exam  Liquid-based pap smear, screening   3. Unexplained weight loss  US Pelvis Comp with Transvag NON-OB (xpd     4.      History of cervical dysplasia    PLAN:  Pap done today.  MMG today.  Will check pelvic u/s.  Patient counseled regarding recommended routine health maintenance for her age.

## 2017-12-08 ENCOUNTER — PATIENT MESSAGE (OUTPATIENT)
Dept: OBSTETRICS AND GYNECOLOGY | Facility: HOSPITAL | Age: 67
End: 2017-12-08

## 2017-12-08 ENCOUNTER — TELEPHONE (OUTPATIENT)
Dept: OBSTETRICS AND GYNECOLOGY | Facility: CLINIC | Age: 67
End: 2017-12-08

## 2017-12-08 DIAGNOSIS — N94.89 ENDOMETRIAL MASS: Primary | ICD-10-CM

## 2017-12-08 NOTE — TELEPHONE ENCOUNTER
Patient notified by phone of pelvic u/s results which showed a cystic mass within endometrium.  Please schedule patient for D&C/hysteroscopy.

## 2017-12-11 ENCOUNTER — TELEPHONE (OUTPATIENT)
Dept: RADIOLOGY | Facility: HOSPITAL | Age: 67
End: 2017-12-11

## 2017-12-11 ENCOUNTER — OFFICE VISIT (OUTPATIENT)
Dept: DERMATOLOGY | Facility: CLINIC | Age: 67
End: 2017-12-11
Payer: MEDICARE

## 2017-12-11 DIAGNOSIS — L63.0 ALOPECIA TOTALIS: Primary | ICD-10-CM

## 2017-12-11 PROCEDURE — 99212 OFFICE O/P EST SF 10 MIN: CPT | Mod: PBBFAC,PO | Performed by: DERMATOLOGY

## 2017-12-11 PROCEDURE — 99999 PR PBB SHADOW E&M-EST. PATIENT-LVL II: CPT | Mod: PBBFAC,,, | Performed by: DERMATOLOGY

## 2017-12-11 PROCEDURE — 99213 OFFICE O/P EST LOW 20 MIN: CPT | Mod: S$PBB,,, | Performed by: DERMATOLOGY

## 2017-12-11 RX ORDER — PIMECROLIMUS 10 MG/G
CREAM TOPICAL 2 TIMES DAILY
Qty: 60 G | Refills: 1 | Status: SHIPPED | OUTPATIENT
Start: 2017-12-11 | End: 2018-02-21

## 2017-12-11 NOTE — PROGRESS NOTES
Subjective:       Patient ID:  Deirdre Yanez is a 67 y.o. female who presents for   Chief Complaint   Patient presents with    Hair Loss     f/u     History of alopecia areata, Hepatitis B (Hepatitis B core antibody positive c/ recent conversion to Hep B surface antibody positive s/p vaccination, + intolerance to lamivudine) and Hep C (s/p treatment with possible cure), s/p ILK x 15 and IM kenalog x 9, last seen on 8/31/17. She states she experienced mild hyperglycemia (130's fasting) x 1 week.  She also experienced increased hair loss of scalp and eyelashes since the last shot.  She denies new meds or supplements since last visit.  She discontinued clobetasol solution bid and rogaine bid.  She continues biotin 5000 mcg qD.    + 40 lb weight loss in the past year    TSH wnl 9/20/17         Prior history: She has been discharged from Dr. Galvan's clinic due to recent seroconversion for Hep B surface antigen positivity.    She has new diagnosis CHF, currently on spironolactone.         Review of Systems   Constitutional: Negative for fever and chills.   Gastrointestinal: Negative for nausea and vomiting.   Skin: Negative for daily sunscreen use, activity-related sunscreen use and recent sunburn.   Hematologic/Lymphatic: Does not bruise/bleed easily.        Objective:    Physical Exam   Constitutional: She appears well-developed and well-nourished. No distress.   Neurological: She is alert and oriented to person, place, and time. She is not disoriented.   Psychiatric: She has a normal mood and affect.   Skin:   Areas Examined (abnormalities noted in diagram):   Scalp / Hair Palpated and Inspected  Head / Face Inspection Performed  Neck Inspection Performed  Back Inspection Performed  RUE Inspected  LUE Inspection Performed  Nails and Digits Inspection Performed                  Assessment / Plan:        Alopecia totalis  -     pimecrolimus (ELIDEL) 1 % cream; Apply topically 2 (two) times daily.  Dispense: 60 g;  Refill: 1  -     Recalcitrant to traditional therapies (ILK, topical steroids, systemic steroids).  Will refer to Dr. Peres to see if any other recommend treatment.  Will try non-steroid, topical immunomodulator.               Return in about 3 months (around 3/11/2018).

## 2017-12-12 ENCOUNTER — HOSPITAL ENCOUNTER (OUTPATIENT)
Dept: RADIOLOGY | Facility: HOSPITAL | Age: 67
Discharge: HOME | End: 2017-12-12
Attending: OBSTETRICS & GYNECOLOGY
Payer: MEDICARE

## 2017-12-12 DIAGNOSIS — R92.8 ABNORMAL MAMMOGRAM: ICD-10-CM

## 2017-12-12 PROCEDURE — 76642 ULTRASOUND BREAST LIMITED: CPT | Mod: 26,RT,, | Performed by: RADIOLOGY

## 2017-12-12 PROCEDURE — 77065 DX MAMMO INCL CAD UNI: CPT | Mod: 26,,, | Performed by: RADIOLOGY

## 2017-12-12 PROCEDURE — 77061 BREAST TOMOSYNTHESIS UNI: CPT | Mod: 26,,, | Performed by: RADIOLOGY

## 2017-12-12 PROCEDURE — 77061 BREAST TOMOSYNTHESIS UNI: CPT | Mod: TC,PO

## 2017-12-12 PROCEDURE — 76642 ULTRASOUND BREAST LIMITED: CPT | Mod: TC,PO,RT

## 2017-12-20 ENCOUNTER — LAB VISIT (OUTPATIENT)
Dept: LAB | Facility: HOSPITAL | Age: 67
End: 2017-12-20
Attending: INTERNAL MEDICINE
Payer: MEDICARE

## 2017-12-20 DIAGNOSIS — E11.69 HYPERLIPIDEMIA ASSOCIATED WITH TYPE 2 DIABETES MELLITUS: ICD-10-CM

## 2017-12-20 DIAGNOSIS — E78.5 HYPERLIPIDEMIA ASSOCIATED WITH TYPE 2 DIABETES MELLITUS: ICD-10-CM

## 2017-12-20 LAB
ALT SERPL W/O P-5'-P-CCNC: 22 U/L
CHOLEST SERPL-MCNC: 190 MG/DL
CHOLEST/HDLC SERPL: 3.4 {RATIO}
HDLC SERPL-MCNC: 56 MG/DL
HDLC SERPL: 29.5 %
LDLC SERPL CALC-MCNC: 123.6 MG/DL
NONHDLC SERPL-MCNC: 134 MG/DL
TRIGL SERPL-MCNC: 52 MG/DL

## 2017-12-20 PROCEDURE — 80061 LIPID PANEL: CPT

## 2017-12-20 PROCEDURE — 84460 ALANINE AMINO (ALT) (SGPT): CPT

## 2017-12-20 PROCEDURE — 36415 COLL VENOUS BLD VENIPUNCTURE: CPT | Mod: PO

## 2017-12-22 ENCOUNTER — OFFICE VISIT (OUTPATIENT)
Dept: OBSTETRICS AND GYNECOLOGY | Facility: CLINIC | Age: 67
End: 2017-12-22
Payer: MEDICARE

## 2017-12-22 ENCOUNTER — HOSPITAL ENCOUNTER (OUTPATIENT)
Dept: PREADMISSION TESTING | Facility: HOSPITAL | Age: 67
Discharge: HOME | End: 2017-12-22
Attending: OBSTETRICS & GYNECOLOGY
Payer: MEDICARE

## 2017-12-22 VITALS
DIASTOLIC BLOOD PRESSURE: 90 MMHG | HEIGHT: 67 IN | BODY MASS INDEX: 31.8 KG/M2 | WEIGHT: 202.63 LBS | SYSTOLIC BLOOD PRESSURE: 150 MMHG

## 2017-12-22 VITALS — HEIGHT: 67 IN | BODY MASS INDEX: 31.71 KG/M2 | WEIGHT: 202 LBS

## 2017-12-22 DIAGNOSIS — Z01.818 PREOP EXAMINATION: Primary | ICD-10-CM

## 2017-12-22 PROCEDURE — 99499 UNLISTED E&M SERVICE: CPT | Mod: S$PBB,,, | Performed by: OBSTETRICS & GYNECOLOGY

## 2017-12-22 PROCEDURE — 99212 OFFICE O/P EST SF 10 MIN: CPT | Mod: PBBFAC | Performed by: OBSTETRICS & GYNECOLOGY

## 2017-12-22 PROCEDURE — 99999 PR PBB SHADOW E&M-EST. PATIENT-LVL II: CPT | Mod: PBBFAC,,, | Performed by: OBSTETRICS & GYNECOLOGY

## 2017-12-22 RX ORDER — EPINEPHRINE 0.22MG
100 AEROSOL WITH ADAPTER (ML) INHALATION DAILY
COMMUNITY
End: 2019-09-11

## 2017-12-22 RX ORDER — GLUCOSAMINE/CHONDRO SU A 500-400 MG
1 TABLET ORAL 3 TIMES DAILY
COMMUNITY
End: 2018-10-23

## 2017-12-22 RX ORDER — PNV NO.95/FERROUS FUM/FOLIC AC 28MG-0.8MG
1000 TABLET ORAL DAILY
COMMUNITY
End: 2018-10-23

## 2017-12-22 NOTE — DISCHARGE INSTRUCTIONS
To confirm, Your doctor has instructed you that surgery is scheduled for 01/02/18  at  12:00 p.m.       Please report to [facility], [address], 1st floor, main lobby by 10:30 a.m.  Pre admit office will call afternoon prior to surgery with final arrival time    INSTRUCTIONS IMPORTANT!!!   Do not eat, drink, or smoke after 12 midnight, may have water and clear juice until 3 hours prior to surgery. OK to brush teeth, no gum, candy or mints!    ¨ Take only these medicines with a small swallow of water-morning of surgery.  Bystolic        Pre operative instructions:  Please review the Pre-Operative Instruction booklet that you were given.        Bathing Instructions--See page 6 in the Pre-operative booklet.      Prevention of surgical site infections:     -Keep incisions clean and dry.   -Do not soak/submerge incisions in water until completely healed.   -Do not apply lotions, powders, creams, or deodorants to site.   -Always make sure hands are cleaned with antibacterial soap/ alcohol-based                 prior to touching the surgical site.  (This includes doctors,                 nurses, staff, and yourself.)    Signs and symptoms:   -Redness and pain around the area where you had surgery   -Drainage of cloudy fluid from your surgical wound   -Fever over 100.4       I have read or had read and explained to me, and understand the above information.  Additional comments or instructions:  Received a copy of Pre-operative instructions booklet, FAQ surgical site infection sheet, and packets of hibiclens (if indicated).

## 2017-12-23 NOTE — PROGRESS NOTES
Patient presents today for preoperative visit.  She is scheduled for D&C/hysteroscopy on 1/2/18.  Procedure was explained to the patient and procedure specific consent form reviewed with the patient and signed by her.  All questions were answered to the patient's satisfaction.  Patient seems to understand the procedure, as well as risks and benefits associated with it.  Will plan to proceed with surgery as planned.

## 2017-12-27 ENCOUNTER — LAB VISIT (OUTPATIENT)
Dept: LAB | Facility: HOSPITAL | Age: 67
End: 2017-12-27
Attending: INTERNAL MEDICINE
Payer: MEDICARE

## 2017-12-27 ENCOUNTER — OFFICE VISIT (OUTPATIENT)
Dept: INTERNAL MEDICINE | Facility: CLINIC | Age: 67
End: 2017-12-27
Payer: MEDICARE

## 2017-12-27 ENCOUNTER — PATIENT MESSAGE (OUTPATIENT)
Dept: INTERNAL MEDICINE | Facility: CLINIC | Age: 67
End: 2017-12-27

## 2017-12-27 VITALS
DIASTOLIC BLOOD PRESSURE: 79 MMHG | SYSTOLIC BLOOD PRESSURE: 125 MMHG | HEIGHT: 67 IN | TEMPERATURE: 96 F | BODY MASS INDEX: 29.79 KG/M2 | WEIGHT: 189.81 LBS | HEART RATE: 68 BPM

## 2017-12-27 DIAGNOSIS — R63.4 WEIGHT LOSS, UNINTENTIONAL: ICD-10-CM

## 2017-12-27 DIAGNOSIS — E78.5 HYPERLIPIDEMIA ASSOCIATED WITH TYPE 2 DIABETES MELLITUS: ICD-10-CM

## 2017-12-27 DIAGNOSIS — R10.2 SUPRAPUBIC PRESSURE: ICD-10-CM

## 2017-12-27 DIAGNOSIS — E11.8 TYPE 2 DIABETES MELLITUS WITH COMPLICATION, WITHOUT LONG-TERM CURRENT USE OF INSULIN: ICD-10-CM

## 2017-12-27 DIAGNOSIS — I50.9 CHF (NYHA CLASS I, ACC/AHA STAGE B): Chronic | ICD-10-CM

## 2017-12-27 DIAGNOSIS — E11.69 HYPERLIPIDEMIA ASSOCIATED WITH TYPE 2 DIABETES MELLITUS: ICD-10-CM

## 2017-12-27 DIAGNOSIS — I10 ESSENTIAL HYPERTENSION: Primary | Chronic | ICD-10-CM

## 2017-12-27 LAB
BACTERIA #/AREA URNS HPF: ABNORMAL /HPF
BILIRUB UR QL STRIP: NEGATIVE
CLARITY UR: CLEAR
COLOR UR: ABNORMAL
GLUCOSE UR QL STRIP: ABNORMAL
HGB UR QL STRIP: NEGATIVE
KETONES UR QL STRIP: NEGATIVE
LEUKOCYTE ESTERASE UR QL STRIP: ABNORMAL
MICROSCOPIC COMMENT: ABNORMAL
NITRITE UR QL STRIP: ABNORMAL
PH UR STRIP: 6 [PH] (ref 5–8)
PROT UR QL STRIP: ABNORMAL
SP GR UR STRIP: 1.01 (ref 1–1.03)
URN SPEC COLLECT METH UR: ABNORMAL
WBC #/AREA URNS HPF: 30 /HPF (ref 0–5)
WBC CLUMPS URNS QL MICRO: ABNORMAL

## 2017-12-27 PROCEDURE — 99214 OFFICE O/P EST MOD 30 MIN: CPT | Mod: S$PBB,,, | Performed by: INTERNAL MEDICINE

## 2017-12-27 PROCEDURE — 81000 URINALYSIS NONAUTO W/SCOPE: CPT | Mod: PO

## 2017-12-27 PROCEDURE — 99213 OFFICE O/P EST LOW 20 MIN: CPT | Mod: PBBFAC,PO | Performed by: INTERNAL MEDICINE

## 2017-12-27 PROCEDURE — 99999 PR PBB SHADOW E&M-EST. PATIENT-LVL III: CPT | Mod: PBBFAC,,, | Performed by: INTERNAL MEDICINE

## 2017-12-27 RX ORDER — CIPROFLOXACIN 250 MG/1
250 TABLET, FILM COATED ORAL 2 TIMES DAILY
Qty: 14 TABLET | Refills: 0 | Status: SHIPPED | OUTPATIENT
Start: 2017-12-27 | End: 2018-01-03

## 2017-12-27 NOTE — PROGRESS NOTES
"Subjective:       Patient ID: Deirdre Yanez is a 67 y.o. female.    Chief Complaint: Follow-up    Here for follow up of medical problems.  Urine turned dark and had some vision problems and sugar problems, so stopped pravastatin about 6 weeks ago.  Sx have resolved.  AC breakfast 86-93 sugars.  Exercises aerobically daily.  Controlled on diet and exercise.  No cp/sob/palp.  BMs pasty since Thanksgiving, about twice a day.  Is improving with metamucil.  Urine color now normal.  Mild nausea and thinks has cystitis, took Azo two doses.  No f/c/sw.  BP at home 120-130/76-82.    Updated/annual due 9/18:  HM: ref fluvax, 8/16 [id number], 9/14 [id number], 5/13 TDaP, 11/14 BMD rep 5y, 10/13 Cscope rep 5y, 8/16 MMG/Gyn Dr. Cantrell/ will call to schedule, 12/14 Eye Dr. Chowdhury, HCV per Hepatology.          Review of Systems   Constitutional: Negative for activity change, chills, diaphoresis, fever and unexpected weight change.   HENT: Negative for hearing loss.    Eyes: Negative for discharge and visual disturbance.   Respiratory: Negative for cough, shortness of breath and wheezing.    Cardiovascular: Negative for chest pain, palpitations and leg swelling.   Gastrointestinal: Negative for blood in stool, constipation, diarrhea, nausea and vomiting.   Genitourinary: Negative for difficulty urinating, dysuria, frequency and hematuria.   Musculoskeletal: Negative for neck pain.   Neurological: Negative for headaches.   Psychiatric/Behavioral: Negative for dysphoric mood. The patient is not nervous/anxious.        Objective:   /79   Pulse 68   Temp (!) 95.9 °F (35.5 °C) (Tympanic)   Ht 5' 7" (1.702 m)   Wt 86.1 kg (189 lb 13.1 oz)   BMI 29.73 kg/m²     Physical Exam   Constitutional: She is oriented to person, place, and time. She appears well-developed.   HENT:   Mouth/Throat: Oropharynx is clear and moist.   Neck: Neck supple. Carotid bruit is not present. No thyroid mass present.   Cardiovascular: Normal rate, regular " rhythm and intact distal pulses.  Exam reveals no gallop and no friction rub.    No murmur heard.  Pulmonary/Chest: Effort normal and breath sounds normal. She has no wheezes. She has no rales.   Abdominal: Soft. Bowel sounds are normal. She exhibits no mass. There is no hepatosplenomegaly. There is no tenderness.   Musculoskeletal: She exhibits no edema.   Lymphadenopathy:     She has no cervical adenopathy.   Neurological: She is alert and oriented to person, place, and time.   Psychiatric: She has a normal mood and affect.     Results for DEIRDRE TREVINO (MRN [id number]) as of 12/27/2017 12:59   Ref. Range 9/20/2017 09:11 12/20/2017 08:06   ALT Latest Ref Range: 10 - 44 U/L 22 22   Triglycerides Latest Ref Range: 30 - 150 mg/dL 42 52   Cholesterol Latest Ref Range: 120 - 199 mg/dL 192 190   HDL Latest Ref Range: 40 - 75 mg/dL 62 56   LDL Cholesterol Latest Ref Range: 63.0 - 159.0 mg/dL 121.6 123.6   Total Cholesterol/HDL Ratio Latest Ref Range: 2.0 - 5.0  3.1 3.4     Assessment:       1. Essential hypertension    2. Hyperlipidemia associated with type 2 diabetes mellitus    3. Type 2 diabetes mellitus with complication, without long-term current use of insulin    4. CHF (NYHA class I, ACC/AHA stage B)    5. Suprapubic pressure    6. Weight loss, unintentional        Plan:       Deirdre was seen today for follow-up.    Diagnoses and all orders for this visit:    Essential hypertension- stable at home.    Urine pressure- r/o UTI with UA now.    Hyperlipidemia associated with type 2 diabetes mellitus- 10yr vasc risk 28.2% but intol of statins.  Cont exercise.  On ASA.    Type 2 diabetes mellitus with complication, without long-term current use of insulin- cont diet and exercise, recheck 4mo.    CHF (NYHA class I, ACC/AHA stage B)- clin stable.    Unexplained weight loss- will follow.  Gyn to do hystoscopy and D&C.

## 2017-12-29 ENCOUNTER — ANESTHESIA EVENT (OUTPATIENT)
Dept: SURGERY | Facility: HOSPITAL | Age: 67
End: 2017-12-29
Payer: MEDICARE

## 2018-01-02 ENCOUNTER — HOSPITAL ENCOUNTER (OUTPATIENT)
Facility: HOSPITAL | Age: 68
Discharge: HOME | End: 2018-01-02
Attending: OBSTETRICS & GYNECOLOGY | Admitting: OBSTETRICS & GYNECOLOGY
Payer: MEDICARE

## 2018-01-02 ENCOUNTER — ANESTHESIA (OUTPATIENT)
Dept: SURGERY | Facility: HOSPITAL | Age: 68
End: 2018-01-02
Payer: MEDICARE

## 2018-01-02 ENCOUNTER — SURGERY (OUTPATIENT)
Age: 68
End: 2018-01-02

## 2018-01-02 DIAGNOSIS — N85.9 LESION OF ENDOMETRIUM: Primary | ICD-10-CM

## 2018-01-02 DIAGNOSIS — N84.0 ENDOMETRIAL POLYP: ICD-10-CM

## 2018-01-02 PROCEDURE — 25000003 PHARM REV CODE 250: Performed by: OBSTETRICS & GYNECOLOGY

## 2018-01-02 PROCEDURE — 58558 HYSTEROSCOPY BIOPSY: CPT | Mod: ,,, | Performed by: OBSTETRICS & GYNECOLOGY

## 2018-01-02 PROCEDURE — 71000015 HC POSTOP RECOV 1ST HR: Performed by: OBSTETRICS & GYNECOLOGY

## 2018-01-02 PROCEDURE — 36000707: Performed by: OBSTETRICS & GYNECOLOGY

## 2018-01-02 PROCEDURE — 63600175 PHARM REV CODE 636 W HCPCS: Performed by: NURSE ANESTHETIST, CERTIFIED REGISTERED

## 2018-01-02 PROCEDURE — 88305 TISSUE EXAM BY PATHOLOGIST: CPT | Mod: 26,,, | Performed by: PATHOLOGY

## 2018-01-02 PROCEDURE — 25000003 PHARM REV CODE 250: Performed by: NURSE ANESTHETIST, CERTIFIED REGISTERED

## 2018-01-02 PROCEDURE — 37000009 HC ANESTHESIA EA ADD 15 MINS: Performed by: OBSTETRICS & GYNECOLOGY

## 2018-01-02 PROCEDURE — 37000008 HC ANESTHESIA 1ST 15 MINUTES: Performed by: OBSTETRICS & GYNECOLOGY

## 2018-01-02 PROCEDURE — 88305 TISSUE EXAM BY PATHOLOGIST: CPT | Mod: 59 | Performed by: PATHOLOGY

## 2018-01-02 PROCEDURE — 71000033 HC RECOVERY, INTIAL HOUR: Performed by: OBSTETRICS & GYNECOLOGY

## 2018-01-02 PROCEDURE — 36000706: Performed by: OBSTETRICS & GYNECOLOGY

## 2018-01-02 PROCEDURE — C1782 MORCELLATOR: HCPCS | Performed by: OBSTETRICS & GYNECOLOGY

## 2018-01-02 RX ORDER — ONDANSETRON 2 MG/ML
4 INJECTION INTRAMUSCULAR; INTRAVENOUS DAILY PRN
Status: CANCELLED | OUTPATIENT
Start: 2018-01-02

## 2018-01-02 RX ORDER — SODIUM CHLORIDE 0.9 % (FLUSH) 0.9 %
3 SYRINGE (ML) INJECTION
Status: DISCONTINUED | OUTPATIENT
Start: 2018-01-02 | End: 2018-01-02 | Stop reason: HOSPADM

## 2018-01-02 RX ORDER — KETOROLAC TROMETHAMINE 30 MG/ML
INJECTION, SOLUTION INTRAMUSCULAR; INTRAVENOUS
Status: DISCONTINUED | OUTPATIENT
Start: 2018-01-02 | End: 2018-01-02

## 2018-01-02 RX ORDER — FENTANYL CITRATE 50 UG/ML
INJECTION, SOLUTION INTRAMUSCULAR; INTRAVENOUS
Status: DISCONTINUED | OUTPATIENT
Start: 2018-01-02 | End: 2018-01-02

## 2018-01-02 RX ORDER — ONDANSETRON 2 MG/ML
INJECTION INTRAMUSCULAR; INTRAVENOUS
Status: DISCONTINUED | OUTPATIENT
Start: 2018-01-02 | End: 2018-01-02

## 2018-01-02 RX ORDER — PROPOFOL 10 MG/ML
VIAL (ML) INTRAVENOUS
Status: DISCONTINUED | OUTPATIENT
Start: 2018-01-02 | End: 2018-01-02

## 2018-01-02 RX ORDER — SODIUM CHLORIDE 0.9 % (FLUSH) 0.9 %
3 SYRINGE (ML) INJECTION
Status: CANCELLED | OUTPATIENT
Start: 2018-01-02

## 2018-01-02 RX ORDER — DIPHENHYDRAMINE HYDROCHLORIDE 50 MG/ML
25 INJECTION INTRAMUSCULAR; INTRAVENOUS EVERY 6 HOURS PRN
Status: CANCELLED | OUTPATIENT
Start: 2018-01-02

## 2018-01-02 RX ORDER — FENTANYL CITRATE 50 UG/ML
25 INJECTION, SOLUTION INTRAMUSCULAR; INTRAVENOUS EVERY 5 MIN PRN
Status: CANCELLED | OUTPATIENT
Start: 2018-01-02

## 2018-01-02 RX ORDER — MEPERIDINE HYDROCHLORIDE 50 MG/ML
12.5 INJECTION INTRAMUSCULAR; INTRAVENOUS; SUBCUTANEOUS ONCE AS NEEDED
Status: DISCONTINUED | OUTPATIENT
Start: 2018-01-02 | End: 2018-01-02 | Stop reason: HOSPADM

## 2018-01-02 RX ORDER — SODIUM CHLORIDE 0.9 % (FLUSH) 0.9 %
3 SYRINGE (ML) INJECTION EVERY 8 HOURS
Status: CANCELLED | OUTPATIENT
Start: 2018-01-02

## 2018-01-02 RX ORDER — MIDAZOLAM HYDROCHLORIDE 1 MG/ML
INJECTION, SOLUTION INTRAMUSCULAR; INTRAVENOUS
Status: DISCONTINUED | OUTPATIENT
Start: 2018-01-02 | End: 2018-01-02

## 2018-01-02 RX ORDER — SODIUM CHLORIDE, SODIUM LACTATE, POTASSIUM CHLORIDE, CALCIUM CHLORIDE 600; 310; 30; 20 MG/100ML; MG/100ML; MG/100ML; MG/100ML
INJECTION, SOLUTION INTRAVENOUS CONTINUOUS
Status: DISCONTINUED | OUTPATIENT
Start: 2018-01-02 | End: 2018-01-02 | Stop reason: HOSPADM

## 2018-01-02 RX ORDER — LIDOCAINE HYDROCHLORIDE 10 MG/ML
INJECTION INFILTRATION; PERINEURAL
Status: DISCONTINUED | OUTPATIENT
Start: 2018-01-02 | End: 2018-01-02

## 2018-01-02 RX ORDER — SODIUM CHLORIDE 0.9 % (FLUSH) 0.9 %
3 SYRINGE (ML) INJECTION EVERY 8 HOURS
Status: DISCONTINUED | OUTPATIENT
Start: 2018-01-02 | End: 2018-01-02 | Stop reason: HOSPADM

## 2018-01-02 RX ORDER — HYDROMORPHONE HYDROCHLORIDE 1 MG/ML
0.2 INJECTION, SOLUTION INTRAMUSCULAR; INTRAVENOUS; SUBCUTANEOUS EVERY 5 MIN PRN
Status: CANCELLED | OUTPATIENT
Start: 2018-01-02

## 2018-01-02 RX ORDER — OXYCODONE HYDROCHLORIDE 5 MG/1
5 TABLET ORAL
Status: CANCELLED | OUTPATIENT
Start: 2018-01-02

## 2018-01-02 RX ORDER — ACETAMINOPHEN 10 MG/ML
1000 INJECTION, SOLUTION INTRAVENOUS ONCE
Status: DISCONTINUED | OUTPATIENT
Start: 2018-01-02 | End: 2018-01-02 | Stop reason: HOSPADM

## 2018-01-02 RX ORDER — LIDOCAINE HYDROCHLORIDE 10 MG/ML
5 INJECTION, SOLUTION EPIDURAL; INFILTRATION; INTRACAUDAL; PERINEURAL ONCE
Status: DISCONTINUED | OUTPATIENT
Start: 2018-01-02 | End: 2018-01-02 | Stop reason: HOSPADM

## 2018-01-02 RX ORDER — HYDROMORPHONE HYDROCHLORIDE 1 MG/ML
0.2 INJECTION, SOLUTION INTRAMUSCULAR; INTRAVENOUS; SUBCUTANEOUS EVERY 5 MIN PRN
Status: DISCONTINUED | OUTPATIENT
Start: 2018-01-02 | End: 2018-01-02 | Stop reason: HOSPADM

## 2018-01-02 RX ORDER — MEPERIDINE HYDROCHLORIDE 50 MG/ML
12.5 INJECTION INTRAMUSCULAR; INTRAVENOUS; SUBCUTANEOUS ONCE AS NEEDED
Status: CANCELLED | OUTPATIENT
Start: 2018-01-02 | End: 2018-01-02

## 2018-01-02 RX ORDER — HYDROCODONE BITARTRATE AND ACETAMINOPHEN 5; 325 MG/1; MG/1
1 TABLET ORAL EVERY 4 HOURS PRN
Qty: 10 TABLET | Refills: 0 | Status: SHIPPED | OUTPATIENT
Start: 2018-01-02 | End: 2018-02-21

## 2018-01-02 RX ADMIN — MIDAZOLAM HYDROCHLORIDE 2 MG: 1 INJECTION, SOLUTION INTRAMUSCULAR; INTRAVENOUS at 12:01

## 2018-01-02 RX ADMIN — SODIUM CHLORIDE, SODIUM LACTATE, POTASSIUM CHLORIDE, AND CALCIUM CHLORIDE: 600; 310; 30; 20 INJECTION, SOLUTION INTRAVENOUS at 01:01

## 2018-01-02 RX ADMIN — SODIUM CHLORIDE, SODIUM LACTATE, POTASSIUM CHLORIDE, AND CALCIUM CHLORIDE: 600; 310; 30; 20 INJECTION, SOLUTION INTRAVENOUS at 12:01

## 2018-01-02 RX ADMIN — FENTANYL CITRATE 100 MCG: 50 INJECTION, SOLUTION INTRAMUSCULAR; INTRAVENOUS at 01:01

## 2018-01-02 RX ADMIN — ONDANSETRON 4 MG: 2 INJECTION, SOLUTION INTRAMUSCULAR; INTRAVENOUS at 01:01

## 2018-01-02 RX ADMIN — LIDOCAINE HYDROCHLORIDE 80 MG: 10 INJECTION, SOLUTION INFILTRATION; PERINEURAL at 01:01

## 2018-01-02 RX ADMIN — PROPOFOL 200 MG: 10 INJECTION, EMULSION INTRAVENOUS at 01:01

## 2018-01-02 RX ADMIN — KETOROLAC TROMETHAMINE 30 MG: 30 INJECTION, SOLUTION INTRAMUSCULAR; INTRAVENOUS at 01:01

## 2018-01-02 RX ADMIN — PROPOFOL 50 MG: 10 INJECTION, EMULSION INTRAVENOUS at 01:01

## 2018-01-02 NOTE — HPI
67 y.o. female  with pelvic pressure, pain, and unexplained weight loss.  Pelvic ultrasound shows 9 mm complex cystic mass within endometrium.

## 2018-01-02 NOTE — TRANSFER OF CARE
"Anesthesia Transfer of Care Note    Patient: Deirdre Yanez    Procedure(s) Performed: Procedure(s) (LRB):  [id number] MYOSURE (N/A)    Patient location: PACU    Anesthesia Type: general    Transport from OR: Transported from OR on room air with adequate spontaneous ventilation    Post pain: adequate analgesia    Post assessment: no apparent anesthetic complications    Post vital signs: stable    Level of consciousness: awake, alert and oriented    Nausea/Vomiting: no nausea/vomiting    Complications: none    Transfer of care protocol was followed      Last vitals:   Visit Vitals  BP (!) 173/79   Pulse 60   Temp 36.7 °C (98.1 °F) (Temporal)   Resp (!) 9   Ht 5' 9" (1.753 m)   Wt 90.8 kg (200 lb 2.8 oz)   SpO2 99%   Breastfeeding? No   BMI 29.56 kg/m²     "

## 2018-01-02 NOTE — ANESTHESIA PREPROCEDURE EVALUATION
01/02/2018  Deirdre Yanez is a 67 y.o., female.    Anesthesia Evaluation    I have reviewed the Patient Summary Reports.    I have reviewed the Nursing Notes.      Review of Systems  Social:  Non-Smoker, Alcohol Use    Cardiovascular:   Exercise tolerance: good Denies Pacemaker. Hypertension  Denies Valvular problems/Murmurs.  Denies MI.   Denies CABG/stent.   Denies Angina. CHF hyperlipidemia Hx diastolic CHF.    Hepatic/GI:   Hiatal Hernia, Hepatitis Diverticulosis   Musculoskeletal:   Arthritis     Endocrine:   Diabetes        Physical Exam  General:  Well nourished    Airway/Jaw/Neck:  Airway Findings: Mouth Opening: Normal General Airway Assessment: Adult  Mallampati: II  TM Distance: Normal, at least 6 cm       Chest/Lungs:  Chest/Lungs Findings: Normal Respiratory Rate, Clear to auscultation     Heart/Vascular:  Heart Findings: Rate: Normal        Mental Status:  Mental Status Findings:  Cooperative, Alert and Oriented         Anesthesia Plan  Type of Anesthesia, risks & benefits discussed:  Anesthesia Type:  general  Patient's Preference:   Intra-op Monitoring Plan: standard ASA monitors  Intra-op Monitoring Plan Comments:   Post Op Pain Control Plan: IV/PO Opioids PRN  Post Op Pain Control Plan Comments:   Induction:   IV  Beta Blocker:  Patient is on a Beta-Blocker and has received one dose within the past 24 hours (No further documentation required).       Informed Consent: Patient understands risks and agrees with Anesthesia plan.  Questions answered. Anesthesia consent signed with patient.  ASA Score: 3     Day of Surgery Review of History & Physical: I have interviewed and examined the patient. I have reviewed the patient's H&P dated:            Ready For Surgery From Anesthesia Perspective.

## 2018-01-02 NOTE — OP NOTE
OPERATIVE NOTE      PREOPERATIVE DIAGNOSIS:  Endometrial mass    POSTOPERATIVE DIAGNOSIS  Endometrial polyp    OPERATIVE PROCEDURE:    1.  Dilation and Curettage  2.  Hysteroscopy    SURGEON:  Aliyah Cantrell MD    ASSISTANT:  Liane Garrett CST    ANESTHESIA:  General    ESTIMATED BLOOD LOSS:  50 ml    FINDINGS:  Endometrial polyp, atrophic endometrium    COMPLICATIONS:  None    PROCEDURE IN DETAIL:    The procedure was explained to the patient and informed consent was obtained.  The patient was brought to the operating room where general anesthesia was administered.  An in and out catheterization was done, and she was positioned in the dorsal lithotomy position and  prepped and draped in the usual sterile manner.  A time out was performed.      A weighted speculum was placed in the vagina and the anterior lip of the cervix was grasped with a single tooth tenaculum.  The uterus was sounded to 6 cm.  The cervix was dilated to a number 6 Hegar dilator without difficulty.  A 5 mm diagnostic hysteroscope was then gently advanced to the uterine fundus with normal saline used as the distending medium.  The entire endometrial cavity was well visualized with a large polyp noted.  A Myosure lite was used to remove the entire polyp.  The hysteroscope was then removed.  A sharp curettage was then performed with a small amount of tissue removed.  The tissue removed was sent to pathology.      Following the sharp curettage, the endometrial cavity was visualized once again with no abnormalities noted.    At this time all instruments were removed, and the patient was awakened from anesthesia and brought to the recovery room in stable condition.  The patient tolerated the procedure well.  All counts were correct x 3.

## 2018-01-02 NOTE — PLAN OF CARE
Pt resting on stretcher, denies pain at present. Respirations even and unlabored on 98% face tent with O2 sats of 100%. Meme Pad with small amt of brownish/ss drainage, will cont to monitor. See flow sheet for detailed assessment.

## 2018-01-02 NOTE — SUBJECTIVE & OBJECTIVE
"    Obstetric History       T0      L0     SAB0   TAB0   Ectopic0   Multiple0   Live Births0         Past Medical History:   Diagnosis Date    Abrasion     left eye    Abrasion and/or friction burn of abdominal wall with infection     left eye    Arthritis     gouty arthritis    Diabetes mellitus       2013    Diverticulosis     DM (diabetes mellitus)      2014  A1C 6.0 x 2 weeks    DM (diabetes mellitus)     BS 89 10/14/2017 A1C 5.7   Diet controlled    Hepatitis     Hx of B/C from cadaver in past    Hiatal hernia     Hip bursitis, left     HTN (hypertension)     Hypertensive CHF (congestive heart failure) 3/28/2014    Positive ALBIN (antinuclear antibody)     Pure hypercholesterolemia 2016     Past Surgical History:   Procedure Laterality Date    APPENDECTOMY      bone grafts      CERVICAL CONIZATION   W/ LASER      COSMETIC SURGERY Bilateral     ears    DILATION AND CURETTAGE OF UTERUS      KNEE ARTHROSCOPY W/ MENISCAL REPAIR Right     LIVER BIOPSY      TONSILLECTOMY, ADENOIDECTOMY      TUBAL LIGATION         No prescriptions prior to admission.       Review of patient's allergies indicates:   Allergen Reactions    Arb-angiotensin receptor antagonist Edema    Hydralazine analogues      "Lupus reaction"    Metoprolol Other (See Comments)     Alopecia    Sulfa (sulfonamide antibiotics)      Passed out and almost stopped breathing    Ace inhibitors Other (See Comments)     cough    Calcium channel blocking agents-dihydropyridines      Edema          Family History     Problem Relation (Age of Onset)    Breast cancer Maternal Grandfather    Cataracts Mother, Maternal Aunt    Colon cancer Maternal Grandfather    Diabetes Maternal Grandmother, Paternal Grandmother    Glaucoma Mother, Maternal Aunt    Heart disease     Hypertension Maternal Grandmother, Mother    Macular degeneration Mother, Maternal Aunt    Melanoma Maternal Aunt    "     Social History Main Topics    Smoking status: Never Smoker    Smokeless tobacco: Never Used    Alcohol use 1.2 oz/week     2 Glasses of wine per week      Comment: occ use  No alcohol 72h prior to sx    Drug use: No    Sexual activity: Yes     Partners: Male     Review of Systems   Constitutional: Negative for chills and fever.   Respiratory: Negative for cough and shortness of breath.    Cardiovascular: Negative for chest pain.   Gastrointestinal: Positive for abdominal pain. Negative for nausea and vomiting.   Genitourinary: Positive for pelvic pain. Negative for dysuria, menorrhagia and vaginal bleeding.      Objective:     Vital Signs (Most Recent):    Vital Signs (24h Range):           There is no height or weight on file to calculate BMI.    No LMP recorded. Patient is postmenopausal.    Physical Exam:   Constitutional: She is oriented to person, place, and time. She appears well-developed and well-nourished. No distress.    HENT:   Head: Normocephalic and atraumatic.     Neck: Neck supple.    Cardiovascular: Normal rate and regular rhythm.     Pulmonary/Chest: Effort normal.        Abdominal: Soft. She exhibits no distension. There is no tenderness.             Musculoskeletal: She exhibits no edema or tenderness.       Neurological: She is alert and oriented to person, place, and time.    Skin: Skin is warm and dry.    Psychiatric: She has a normal mood and affect.       Laboratory:  I have personallly reviewed all pertinent lab results from the last 24 hours.    Diagnostic Results:  Pelvic u/s reviewed - 9 mm complex cystic mass in endometrium

## 2018-01-02 NOTE — H&P
"[facility] -   Obstetrics & Gynecology  History & Physical    Patient Name: Deirdre Yanez  MRN: [id number]  Admission Date: (Not on file)  Primary Care Provider: Beti Turner MD    Subjective:     Chief Complaint/Reason for Admission:  Endometrial mass    History of Present Illness:  67 y.o. female  with pelvic pressure, pain, and unexplained weight loss.  Pelvic ultrasound shows 9 mm complex cystic mass within endometrium.          Obstetric History       T0      L0     SAB0   TAB0   Ectopic0   Multiple0   Live Births0         Past Medical History:   Diagnosis Date    Abrasion     left eye    Abrasion and/or friction burn of abdominal wall with infection     left eye    Arthritis     gouty arthritis    Diabetes mellitus       2013    Diverticulosis     DM (diabetes mellitus)      2014  A1C 6.0 x 2 weeks    DM (diabetes mellitus)     BS 89 10/14/2017 A1C 5.7   Diet controlled    Hepatitis     Hx of B/C from cadaver in past    Hiatal hernia     Hip bursitis, left     HTN (hypertension)     Hypertensive CHF (congestive heart failure) 3/28/2014    Positive ALBIN (antinuclear antibody)     Pure hypercholesterolemia 2016     Past Surgical History:   Procedure Laterality Date    APPENDECTOMY      bone grafts      CERVICAL CONIZATION   W/ LASER      COSMETIC SURGERY Bilateral     ears    DILATION AND CURETTAGE OF UTERUS      KNEE ARTHROSCOPY W/ MENISCAL REPAIR Right     LIVER BIOPSY      TONSILLECTOMY, ADENOIDECTOMY      TUBAL LIGATION         No prescriptions prior to admission.       Review of patient's allergies indicates:   Allergen Reactions    Arb-angiotensin receptor antagonist Edema    Hydralazine analogues      "Lupus reaction"    Metoprolol Other (See Comments)     Alopecia    Sulfa (sulfonamide antibiotics)      Passed out and almost stopped breathing    Ace inhibitors Other (See Comments)     cough    Calcium " channel blocking agents-dihydropyridines      Edema          Family History     Problem Relation (Age of Onset)    Breast cancer Maternal Grandfather    Cataracts Mother, Maternal Aunt    Colon cancer Maternal Grandfather    Diabetes Maternal Grandmother, Paternal Grandmother    Glaucoma Mother, Maternal Aunt    Heart disease     Hypertension Maternal Grandmother, Mother    Macular degeneration Mother, Maternal Aunt    Melanoma Maternal Aunt        Social History Main Topics    Smoking status: Never Smoker    Smokeless tobacco: Never Used    Alcohol use 1.2 oz/week     2 Glasses of wine per week      Comment: occ use  No alcohol 72h prior to sx    Drug use: No    Sexual activity: Yes     Partners: Male     Review of Systems   Constitutional: Negative for chills and fever.   Respiratory: Negative for cough and shortness of breath.    Cardiovascular: Negative for chest pain.   Gastrointestinal: Positive for abdominal pain. Negative for nausea and vomiting.   Genitourinary: Positive for pelvic pain. Negative for dysuria, menorrhagia and vaginal bleeding.      Objective:     Vital Signs (Most Recent):    Vital Signs (24h Range):           There is no height or weight on file to calculate BMI.    No LMP recorded. Patient is postmenopausal.    Physical Exam:   Constitutional: She is oriented to person, place, and time. She appears well-developed and well-nourished. No distress.    HENT:   Head: Normocephalic and atraumatic.     Neck: Neck supple.    Cardiovascular: Normal rate and regular rhythm.     Pulmonary/Chest: Effort normal.        Abdominal: Soft. She exhibits no distension. There is no tenderness.             Musculoskeletal: She exhibits no edema or tenderness.       Neurological: She is alert and oriented to person, place, and time.    Skin: Skin is warm and dry.    Psychiatric: She has a normal mood and affect.       Laboratory:  I have personallly reviewed all pertinent lab results from the last 24  hours.    Diagnostic Results:  Pelvic u/s reviewed - 9 mm complex cystic mass in endometrium    Assessment/Plan:     * Lesion of endometrium    Proceed with D&C/hysteroscopy as planned.            Aliyah Cantrell MD  Obstetrics & Gynecology  [facility] [place]

## 2018-01-02 NOTE — DISCHARGE INSTRUCTIONS
General Information:    1. Do not drink alcoholic beverages including beer for 24 hours or as long as you are on pain medication..  2. Do not drive a motor vehicle, operate machinery or power tools, or signs legal papers for 24 hours or as long as you are on pain medication.   3. You may experience light-headedness, dizziness, and sleepiness following surgery. Please do not stay alone. A responsible adult should be with you for this 24 hour period.  4. Go home and rest.  5. Progress slowly to a normal diet unless instructed.  Otherwise, begin with liquids such as soft drinks, then soup and crackers working up to solid foods. Drink plenty of nonalcoholic fluids.  6. Certain anesthetics and pain medications produce nausea and vomiting in certain individuals. If nausea becomes a problem at home, call you doctor.  7. A nurse will be calling you sometime after surgery. Do not be alarmed. This is our way of finding out how you are doing.  8. Several times every hour while you are awake, take 2-3 deep breaths and cough. If you had stomach surgery hold a pillow or rolled towel firmly against your stomach before you cough. This will help with any pain the cough might cause.  9. Several times every hour while you are awake, pump and flex your feet 5-6 times and do foot circles. This will help prevent blood clots.  10. Call your doctor for severe pain, bleeding, fever, or signs or symptoms of infection (pain, swelling, redness, foul odor, drainage).  11. You can contact your doctor anytime by [phone] for the [facility] (at [facility]) or [phone] for the [facility] on [facility].   [email or web address] is another way to contact your doctor if you are an active participant online with [facility].

## 2018-01-02 NOTE — ANESTHESIA POSTPROCEDURE EVALUATION
"Anesthesia Post Evaluation    Patient: Deirdre Yanez    Procedure(s) Performed: Procedure(s) (LRB):  [id number] MYOSURE (N/A)    Final Anesthesia Type: general  Patient location during evaluation: PACU  Patient participation: Yes- Able to Participate  Level of consciousness: awake and alert  Post-procedure vital signs: reviewed and not stable  Pain management: adequate  Airway patency: patent  PONV status at discharge: No PONV  Anesthetic complications: no      Cardiovascular status: hemodynamically stable  Respiratory status: unassisted  Hydration status: euvolemic  Follow-up not needed.        Visit Vitals  BP (!) 169/81   Pulse (!) 52   Temp 36.7 °C (98.1 °F) (Temporal)   Resp 16   Ht 5' 9" (1.753 m)   Wt 90.8 kg (200 lb 2.8 oz)   SpO2 99%   Breastfeeding? No   BMI 29.56 kg/m²       Pain/Markel Score: Pain Assessment Performed: Yes (1/2/2018  2:25 PM)  Presence of Pain: denies (1/2/2018  2:25 PM)  Markel Score: 10 (1/2/2018  2:25 PM)      "

## 2018-01-02 NOTE — DISCHARGE SUMMARY
[facility] -   Short Stay  Discharge Summary    Admit Date: 1/2/2018    Discharge Date: 1/2/2018     Discharge Attending Physician: Aliyah Cantrell MD    Procedures:  D&C/hysteroscopy, removal of endometrial polyp    Final Diagnoses:    Principal Problem: Endometrial polyp       Discharged Condition:  Stable    Disposition: Home    Follow up/Patient Instructions:    Medications:   Deirdre Yanez   Home Medication Instructions MAKENNA:[id number]    Printed on:01/02/18 1377   Medication Information                      aspirin 81 MG Chew  Take 1 tablet (81 mg total) by mouth once daily.             b complex vitamins tablet  Take 1 tablet by mouth once daily.             ciprofloxacin HCl (CIPRO) 250 MG tablet  Take 1 tablet (250 mg total) by mouth 2 (two) times daily.             coenzyme Q10 (CO Q-10) 100 mg capsule  Take 100 mg by mouth once daily.             DOCOSAHEXANOIC ACID/EPA (FISH OIL ORAL)  Take by mouth.             fluticasone (FLONASE) 50 mcg/actuation nasal spray  SHAKE LIQUID AND USE 2 SPRAYS IN EACH NOSTRIL DAILY             glucosamine-chondroitin 500-400 mg tablet  Take 1 tablet by mouth 3 (three) times daily.             hydrocodone-acetaminophen 5-325mg (NORCO) 5-325 mg per tablet  Take 1 tablet by mouth every 4 (four) hours as needed for Pain.             inositol 500 mg Tab  Take 500 mg by mouth 2 (two) times daily.             nebivolol (BYSTOLIC) 10 MG Tab  Take 1 tablet (10 mg total) by mouth once daily.             nebivolol (BYSTOLIC) 5 MG Tab  Take 1 tablet (5 mg total) by mouth once daily.             pimecrolimus (ELIDEL) 1 % cream  Apply topically 2 (two) times daily.             spironolactone (ALDACTONE) 50 MG tablet  Take 1 tablet (50 mg total) by mouth once daily.             vitamin E 1000 UNIT capsule  Take 1,000 Units by mouth once daily.                 Discharge Procedure Orders  Call MD for:  temperature >100.4     Call MD for:  persistent nausea and vomiting or  diarrhea     Call MD for:  severe uncontrolled pain     Call MD for:  difficulty breathing or increased cough     Call MD for:  persistent dizziness, light-headedness, or visual disturbances       Follow-up Information     Aliyah Cantrell MD In 4 weeks.    Specialties:  Obstetrics, Obstetrics and Gynecology  Why:  Postop check  Contact information:  [address] [place] [address] [address]iza GTZ [id number]  [phone]

## 2018-01-03 LAB — POCT GLUCOSE: 101 MG/DL (ref 70–110)

## 2018-01-09 VITALS
RESPIRATION RATE: 16 BRPM | OXYGEN SATURATION: 99 % | BODY MASS INDEX: 29.65 KG/M2 | WEIGHT: 200.19 LBS | SYSTOLIC BLOOD PRESSURE: 169 MMHG | HEART RATE: 52 BPM | TEMPERATURE: 98 F | HEIGHT: 69 IN | DIASTOLIC BLOOD PRESSURE: 81 MMHG

## 2018-02-01 ENCOUNTER — OFFICE VISIT (OUTPATIENT)
Dept: OBSTETRICS AND GYNECOLOGY | Facility: CLINIC | Age: 68
End: 2018-02-01
Payer: MEDICARE

## 2018-02-01 VITALS
BODY MASS INDEX: 30.33 KG/M2 | HEIGHT: 69 IN | DIASTOLIC BLOOD PRESSURE: 86 MMHG | WEIGHT: 204.81 LBS | SYSTOLIC BLOOD PRESSURE: 156 MMHG

## 2018-02-01 DIAGNOSIS — Z98.890 POST-OPERATIVE STATE: Primary | ICD-10-CM

## 2018-02-01 PROCEDURE — 99024 POSTOP FOLLOW-UP VISIT: CPT | Mod: POP,,, | Performed by: OBSTETRICS & GYNECOLOGY

## 2018-02-01 PROCEDURE — 99999 PR PBB SHADOW E&M-EST. PATIENT-LVL II: CPT | Mod: PBBFAC,,, | Performed by: OBSTETRICS & GYNECOLOGY

## 2018-02-01 PROCEDURE — 99212 OFFICE O/P EST SF 10 MIN: CPT | Mod: PBBFAC | Performed by: OBSTETRICS & GYNECOLOGY

## 2018-02-01 NOTE — PROGRESS NOTES
Patient presents today for postoperative follow up.  Pt underwent D&C/hysteroscopy with removal of endometrial polyp.  Patient is recovering well and has no complaints today.  No vaginal bleeding at this time.    Pathology report - benign endometrial polyp    Physical Exam:  General - no acute distress  Abdomen - soft, nontender and nondistended.  No masses.  Pelvic - vaginal cuff intact.  Sutures still present.  No evidence of infection or granulation tissue.  Bimanual exam shows no masses or tenderness.  Extremities - nontender and no edema noted.    Encounter Diagnoses   Name Primary?    Post-operative state Yes       PLAN:  Follow up as needed.

## 2018-02-20 DIAGNOSIS — I11.0 HYPERTENSIVE CHF: ICD-10-CM

## 2018-02-20 RX ORDER — NEBIVOLOL 5 MG/1
5 TABLET ORAL DAILY
Qty: 30 TABLET | Refills: 11 | Status: SHIPPED | OUTPATIENT
Start: 2018-02-20 | End: 2018-02-22 | Stop reason: SDUPTHER

## 2018-02-21 ENCOUNTER — OFFICE VISIT (OUTPATIENT)
Dept: CARDIOLOGY | Facility: CLINIC | Age: 68
End: 2018-02-21
Payer: MEDICARE

## 2018-02-21 VITALS
DIASTOLIC BLOOD PRESSURE: 80 MMHG | SYSTOLIC BLOOD PRESSURE: 130 MMHG | HEART RATE: 60 BPM | BODY MASS INDEX: 30.36 KG/M2 | HEIGHT: 69 IN | WEIGHT: 205 LBS

## 2018-02-21 DIAGNOSIS — I50.9 CHF (NYHA CLASS I, ACC/AHA STAGE B): Chronic | ICD-10-CM

## 2018-02-21 DIAGNOSIS — I50.32 DIASTOLIC CHF, CHRONIC: Chronic | ICD-10-CM

## 2018-02-21 DIAGNOSIS — E11.69 HYPERLIPIDEMIA ASSOCIATED WITH TYPE 2 DIABETES MELLITUS: ICD-10-CM

## 2018-02-21 DIAGNOSIS — E78.5 HYPERLIPIDEMIA ASSOCIATED WITH TYPE 2 DIABETES MELLITUS: ICD-10-CM

## 2018-02-21 DIAGNOSIS — I10 ESSENTIAL HYPERTENSION: Primary | Chronic | ICD-10-CM

## 2018-02-21 PROCEDURE — 1159F MED LIST DOCD IN RCRD: CPT | Mod: ,,, | Performed by: NUCLEAR MEDICINE

## 2018-02-21 PROCEDURE — 1126F AMNT PAIN NOTED NONE PRSNT: CPT | Mod: ,,, | Performed by: NUCLEAR MEDICINE

## 2018-02-21 PROCEDURE — 99213 OFFICE O/P EST LOW 20 MIN: CPT | Mod: PBBFAC,PO | Performed by: NUCLEAR MEDICINE

## 2018-02-21 PROCEDURE — 99999 PR PBB SHADOW E&M-EST. PATIENT-LVL III: CPT | Mod: PBBFAC,,, | Performed by: NUCLEAR MEDICINE

## 2018-02-21 PROCEDURE — 99214 OFFICE O/P EST MOD 30 MIN: CPT | Mod: S$PBB,,, | Performed by: NUCLEAR MEDICINE

## 2018-02-21 RX ORDER — FUROSEMIDE 20 MG/1
20 TABLET ORAL DAILY
Qty: 30 TABLET | Refills: 3 | Status: SHIPPED | OUTPATIENT
Start: 2018-02-21 | End: 2018-02-22 | Stop reason: SDUPTHER

## 2018-02-21 NOTE — PROGRESS NOTES
Subjective:   Patient ID:  Deirdre Yanez is a 67 y.o. female who presents for follow-up of Congestive Heart Failure and Hypertension      HPI1- ESSENTIAL HTN WITH HFPEF- STAGE B, LVH   2- DYSLIPIDEMIA WITH DM T 2  DOING WELL. NO PALPITATIONS  NO UNUSUAL DESAI WITH ORDINARY DAILY ACTIVITIES. NO ORTHOPNEA OR PND  NO ANGINA OR EQUIVALENT  NO INCREASE EDEMA. NO CALVE TENDERNESS  NO FOCAL CNS SYMPTOMS OR SIGNS TO SUGGEST TIA OR STROKE  CARD MED - GOOD COMPLIANCE- NO SIDE EFFECTS    Review of Systems   Constitution: Negative for chills, fever, weakness, night sweats, weight gain and weight loss.   HENT: Negative for nosebleeds.    Eyes: Negative for blurred vision, double vision and visual disturbance.   Cardiovascular: Negative for chest pain, dyspnea on exertion, irregular heartbeat, leg swelling, orthopnea, palpitations, paroxysmal nocturnal dyspnea and syncope.   Respiratory: Negative for cough, hemoptysis and wheezing.    Endocrine: Negative for polydipsia and polyuria.   Hematologic/Lymphatic: Does not bruise/bleed easily.   Skin: Negative for rash.   Musculoskeletal: Negative for joint pain, joint swelling, muscle weakness and myalgias.   Gastrointestinal: Negative for abdominal pain, hematemesis, jaundice and melena.   Genitourinary: Negative for dysuria, hematuria and nocturia.   Neurological: Negative for dizziness, focal weakness, headaches and sensory change.   Psychiatric/Behavioral: Negative for depression. The patient does not have insomnia and is not nervous/anxious.      Family History   Problem Relation Age of Onset    Colon cancer Maternal Grandfather     Breast cancer Maternal Grandfather     Diabetes Maternal Grandmother     Hypertension Maternal Grandmother     Cataracts Mother     Glaucoma Mother     Macular degeneration Mother     Hypertension Mother     Diabetes Paternal Grandmother     Cataracts Maternal Aunt     Glaucoma Maternal Aunt     Macular degeneration Maternal Aunt      Melanoma Maternal Aunt     Heart disease       pat side     Past Medical History:   Diagnosis Date    Abrasion     left eye    Abrasion and/or friction burn of abdominal wall with infection     left eye    Arthritis     gouty arthritis    Diabetes mellitus     2011  11/17/2013    Diverticulosis     DM (diabetes mellitus) 2011     12/03/2014  A1C 6.0 x 2 weeks    DM (diabetes mellitus) 2011    BS 89 10/14/2017 A1C 5.7   Diet controlled    Hepatitis     Hx of B/C from cadaver in past    Hiatal hernia     Hip bursitis, left     HTN (hypertension)     Hypertensive CHF (congestive heart failure) 3/28/2014    Positive ALBIN (antinuclear antibody)     Pure hypercholesterolemia 9/30/2016     Current Outpatient Prescriptions on File Prior to Visit   Medication Sig Dispense Refill    aspirin 81 MG Chew Take 1 tablet (81 mg total) by mouth once daily. (Patient taking differently: Take 81 mg by mouth daily as needed. )  0    b complex vitamins tablet Take 1 tablet by mouth once daily.      coenzyme Q10 (CO Q-10) 100 mg capsule Take 100 mg by mouth once daily.      DOCOSAHEXANOIC ACID/EPA (FISH OIL ORAL) Take 1 capsule by mouth once daily.       fluticasone (FLONASE) 50 mcg/actuation nasal spray SHAKE LIQUID AND USE 2 SPRAYS IN EACH NOSTRIL DAILY 1 Bottle 12    glucosamine-chondroitin 500-400 mg tablet Take 1 tablet by mouth 3 (three) times daily.      inositol 500 mg Tab Take 500 mg by mouth 2 (two) times daily.      nebivolol (BYSTOLIC) 5 MG Tab Take 1 tablet (5 mg total) by mouth once daily. (Patient taking differently: Take 15 mg by mouth once daily. ) 30 tablet 11    spironolactone (ALDACTONE) 50 MG tablet Take 1 tablet (50 mg total) by mouth once daily. (Patient taking differently: Take 50 mg by mouth every evening. ) 90 tablet 3    vitamin E 1000 UNIT capsule Take 1,000 Units by mouth once daily.      [DISCONTINUED] hydrocodone-acetaminophen 5-325mg (NORCO) 5-325 mg per tablet Take 1  "tablet by mouth every 4 (four) hours as needed for Pain. 10 tablet 0    [DISCONTINUED] pimecrolimus (ELIDEL) 1 % cream Apply topically 2 (two) times daily. 60 g 1     No current facility-administered medications on file prior to visit.      Review of patient's allergies indicates:   Allergen Reactions    Arb-angiotensin receptor antagonist Edema    Hydralazine analogues      "Lupus reaction"    Metoprolol Other (See Comments)     Alopecia    Sulfa (sulfonamide antibiotics)      Passed out and almost stopped breathing    Ace inhibitors Other (See Comments)     cough    Calcium channel blocking agents-dihydropyridines      Edema         Objective:     Physical Exam   Constitutional: She is oriented to person, place, and time. She appears well-developed. No distress.   HENT:   Head: Normocephalic.   Eyes: Conjunctivae are normal. Pupils are equal, round, and reactive to light. No scleral icterus.   Neck: Normal range of motion. Neck supple. Normal carotid pulses, no hepatojugular reflux and no JVD present. Carotid bruit is not present. No edema present. No thyroid mass and no thyromegaly present.   Cardiovascular: Normal rate, regular rhythm, S1 normal, S2 normal and intact distal pulses.  PMI is not displaced.  Exam reveals no gallop and no friction rub.    Murmur heard.   Medium-pitched harsh crescendo-decrescendo systolic murmur is present with a grade of 3/6  at the upper right sternal border, upper left sternal border, lower left sternal border  Pulses:       Carotid pulses are 2+ on the right side, and 2+ on the left side.       Radial pulses are 2+ on the right side, and 2+ on the left side.        Femoral pulses are 2+ on the right side, and 2+ on the left side.       Popliteal pulses are 2+ on the right side, and 2+ on the left side.        Dorsalis pedis pulses are 2+ on the right side, and 2+ on the left side.        Posterior tibial pulses are 2+ on the right side, and 2+ on the left side. "   Pulmonary/Chest: Effort normal and breath sounds normal. She has no wheezes. She has no rales. She exhibits no tenderness.   Abdominal: Soft. Bowel sounds are normal. She exhibits no pulsatile midline mass and no mass. There is no hepatosplenomegaly. There is no tenderness.   Musculoskeletal: Normal range of motion. She exhibits no edema or tenderness.        Cervical back: Normal.        Thoracic back: Normal.        Lumbar back: Normal.   Lymphadenopathy:     She has no cervical adenopathy.     She has no axillary adenopathy.        Right: No supraclavicular adenopathy present.        Left: No supraclavicular adenopathy present.   Neurological: She is alert and oriented to person, place, and time. She has normal strength and normal reflexes. No sensory deficit. Gait normal.   Skin: Skin is warm. No rash noted. No cyanosis. No pallor. Nails show no clubbing.   Psychiatric: She has a normal mood and affect. Her speech is normal and behavior is normal. Cognition and memory are normal.       Assessment:     1. Essential hypertension    2. Diastolic CHF, chronic    3. CHF (NYHA class I, ACC/AHA stage B)    4. Hyperlipidemia associated with type 2 diabetes mellitus      WELL CONTROLLED BP  CV STATUS STABLE  CNS STATUS STABLE  CARD MED WELL TOLERATED  Plan:     Essential hypertension    Diastolic CHF, chronic    CHF (NYHA class I, ACC/AHA stage B)    Hyperlipidemia associated with type 2 diabetes mellitus    Other orders  -     furosemide (LASIX) 20 MG tablet; Take 1 tablet (20 mg total) by mouth once daily.  Dispense: 30 tablet; Refill: 3      1- CONTINUE PRESENT CARD MANAGEMENT    2- RETURN IN 6 MONTHS.

## 2018-02-22 ENCOUNTER — PATIENT MESSAGE (OUTPATIENT)
Dept: CARDIOLOGY | Facility: CLINIC | Age: 68
End: 2018-02-22

## 2018-02-22 DIAGNOSIS — I11.0 HYPERTENSIVE CHF: ICD-10-CM

## 2018-02-22 RX ORDER — FUROSEMIDE 20 MG/1
20 TABLET ORAL DAILY
Qty: 90 TABLET | Refills: 3 | Status: SHIPPED | OUTPATIENT
Start: 2018-02-22 | End: 2019-02-26 | Stop reason: SDUPTHER

## 2018-02-22 RX ORDER — SPIRONOLACTONE 50 MG/1
50 TABLET, FILM COATED ORAL NIGHTLY
Qty: 90 TABLET | Refills: 3 | Status: SHIPPED | OUTPATIENT
Start: 2018-02-22 | End: 2019-02-26 | Stop reason: SDUPTHER

## 2018-02-22 RX ORDER — NEBIVOLOL 5 MG/1
15 TABLET ORAL DAILY
Qty: 270 TABLET | Refills: 3 | Status: SHIPPED | OUTPATIENT
Start: 2018-02-22 | End: 2018-03-26 | Stop reason: SDUPTHER

## 2018-03-24 ENCOUNTER — PATIENT MESSAGE (OUTPATIENT)
Dept: CARDIOLOGY | Facility: CLINIC | Age: 68
End: 2018-03-24

## 2018-03-26 DIAGNOSIS — I11.0 HYPERTENSIVE CHF: ICD-10-CM

## 2018-03-26 RX ORDER — NEBIVOLOL 10 MG/1
10 TABLET ORAL DAILY
Qty: 90 TABLET | Refills: 3 | Status: SHIPPED | OUTPATIENT
Start: 2018-03-26 | End: 2019-02-26 | Stop reason: SDUPTHER

## 2018-03-26 RX ORDER — NEBIVOLOL 5 MG/1
5 TABLET ORAL DAILY
Qty: 90 TABLET | Refills: 3 | Status: SHIPPED | OUTPATIENT
Start: 2018-03-26 | End: 2019-02-26 | Stop reason: SDUPTHER

## 2018-04-13 ENCOUNTER — PATIENT OUTREACH (OUTPATIENT)
Dept: ADMINISTRATIVE | Facility: HOSPITAL | Age: 68
End: 2018-04-13

## 2018-04-18 ENCOUNTER — LAB VISIT (OUTPATIENT)
Dept: LAB | Facility: HOSPITAL | Age: 68
End: 2018-04-18
Attending: INTERNAL MEDICINE
Payer: MEDICARE

## 2018-04-18 DIAGNOSIS — E11.8 TYPE 2 DIABETES MELLITUS WITH COMPLICATION, WITHOUT LONG-TERM CURRENT USE OF INSULIN: ICD-10-CM

## 2018-04-18 LAB
ESTIMATED AVG GLUCOSE: 111 MG/DL
HBA1C MFR BLD HPLC: 5.5 %

## 2018-04-18 PROCEDURE — 83036 HEMOGLOBIN GLYCOSYLATED A1C: CPT

## 2018-04-18 PROCEDURE — 36415 COLL VENOUS BLD VENIPUNCTURE: CPT | Mod: PO

## 2018-04-23 NOTE — PROGRESS NOTES
"Subjective:      Patient ID: Deirdre Yanez is a 68 y.o. female.    Chief Complaint: Follow-up      HPI  Here for follow up of medical problems.  Benign uterine polyp removed.  More active lately, has gained back 11# lost before.  AC breakfast sugars 70s, on no meds now.  PC sugar 106 recently.  No f/c/sw/cough. No cp/sob/palp.  Urine normal now.  BMs doing ok with fiber, has been soft since gastroenteritis about 3mo ago.    Updated/annual due 9/18:  HM: ref fluvax, 8/16 [id number], 9/14 [id number], 5/13 TDaP, 11/14 BMD rep 5y, 10/13 Cscope rep 5y, 12/17 MMG/Gyn Dr. Cantrell, 12/14 Eye Dr. Chowdhury, HCV per Hepatology.       Review of Systems   Constitutional: Negative for activity change, chills, diaphoresis, fever and unexpected weight change.   HENT: Negative for hearing loss, rhinorrhea and trouble swallowing.    Eyes: Negative for discharge and visual disturbance.   Respiratory: Negative for cough, chest tightness, shortness of breath and wheezing.    Cardiovascular: Negative for chest pain, palpitations and leg swelling.   Gastrointestinal: Negative for blood in stool, constipation, diarrhea, nausea and vomiting.   Endocrine: Negative for polydipsia and polyuria.   Genitourinary: Negative for difficulty urinating, dysuria, frequency, hematuria and menstrual problem.   Musculoskeletal: Negative for arthralgias, joint swelling and neck pain.   Neurological: Negative for weakness and headaches.   Psychiatric/Behavioral: Negative for confusion and dysphoric mood. The patient is not nervous/anxious.          Objective:   /80 (BP Location: Right arm, Patient Position: Sitting)   Pulse 80   Temp 98 °F (36.7 °C) (Tympanic)   Ht 5' 9" (1.753 m)   Wt 91.6 kg (201 lb 15.1 oz)   SpO2 97%   BMI 29.82 kg/m²     Physical Exam   Constitutional: She is oriented to person, place, and time. She appears well-developed.   HENT:   Mouth/Throat: Oropharynx is clear and moist.   Neck: Neck supple. Carotid bruit is not present. " No thyroid mass present.   Cardiovascular: Normal rate, regular rhythm and intact distal pulses.  Exam reveals no gallop and no friction rub.    No murmur heard.  Pulmonary/Chest: Effort normal and breath sounds normal. She has no wheezes. She has no rales.   Abdominal: Soft. Bowel sounds are normal. She exhibits no mass. There is no hepatosplenomegaly. There is no tenderness.   Musculoskeletal: She exhibits no edema.   Lymphadenopathy:     She has no cervical adenopathy.   Neurological: She is alert and oriented to person, place, and time.   Psychiatric: She has a normal mood and affect.       Results for GILA TREVINO (MRN [id number]) as of 4/25/2018 14:18   Ref. Range 9/20/2017 09:11 12/20/2017 08:06 12/22/2017 15:22 4/18/2018 09:33   Hemoglobin A1C Latest Ref Range: 4.0 - 5.6 % 5.7 (H)   5.5   Estimated Avg Glucose Latest Ref Range: 68 - 131 mg/dL 117   111       Assessment:       1. Type 2 diabetes mellitus with complication, without long-term current use of insulin    2. Hypertension associated with diabetes    3. Hyperlipidemia associated with type 2 diabetes mellitus    4. Vitamin D deficiency    5. Preventive measure          Plan:     Type 2 diabetes mellitus with complication, without long-term current use of insulin- doing well on diet alone, recheck 6mo.  -     Hemoglobin A1c; Future; Expected date: 04/25/2018  -     Microalbumin/creatinine urine ratio; Future; Expected date: 04/25/2018    Hypertension associated with diabetes- stable on rx.    Hyperlipidemia associated with type 2 diabetes mellitus, intol of statins, check lab 6mo.    Vitamin D deficiency  -     Vitamin D; Future    Preventive measure- will do in 6mo;  -     CBC auto differential; Future; Expected date: 04/25/2018  -     Comprehensive metabolic panel; Future; Expected date: 04/25/2018  -     Lipid panel; Future; Expected date: 04/25/2018  -     TSH; Future; Expected date: 04/25/2018  -     Vitamin D; Future  -     Hemoglobin A1c;  Future; Expected date: 04/25/2018  -     Microalbumin/creatinine urine ratio; Future; Expected date: 04/25/2018

## 2018-04-24 RX ORDER — PIMECROLIMUS 10 MG/G
CREAM TOPICAL
COMMUNITY
Start: 2018-03-05 | End: 2018-06-25

## 2018-04-25 ENCOUNTER — OFFICE VISIT (OUTPATIENT)
Dept: INTERNAL MEDICINE | Facility: CLINIC | Age: 68
End: 2018-04-25
Payer: MEDICARE

## 2018-04-25 VITALS
HEIGHT: 69 IN | HEART RATE: 80 BPM | WEIGHT: 201.94 LBS | DIASTOLIC BLOOD PRESSURE: 80 MMHG | BODY MASS INDEX: 29.91 KG/M2 | SYSTOLIC BLOOD PRESSURE: 122 MMHG | TEMPERATURE: 98 F | OXYGEN SATURATION: 97 %

## 2018-04-25 DIAGNOSIS — E78.5 HYPERLIPIDEMIA ASSOCIATED WITH TYPE 2 DIABETES MELLITUS: ICD-10-CM

## 2018-04-25 DIAGNOSIS — E55.9 VITAMIN D DEFICIENCY: ICD-10-CM

## 2018-04-25 DIAGNOSIS — Z29.9 PREVENTIVE MEASURE: ICD-10-CM

## 2018-04-25 DIAGNOSIS — E11.69 HYPERLIPIDEMIA ASSOCIATED WITH TYPE 2 DIABETES MELLITUS: ICD-10-CM

## 2018-04-25 DIAGNOSIS — E11.8 TYPE 2 DIABETES MELLITUS WITH COMPLICATION, WITHOUT LONG-TERM CURRENT USE OF INSULIN: Primary | ICD-10-CM

## 2018-04-25 DIAGNOSIS — I15.2 HYPERTENSION ASSOCIATED WITH DIABETES: Chronic | ICD-10-CM

## 2018-04-25 DIAGNOSIS — E11.59 HYPERTENSION ASSOCIATED WITH DIABETES: Chronic | ICD-10-CM

## 2018-04-25 PROCEDURE — 99213 OFFICE O/P EST LOW 20 MIN: CPT | Mod: PBBFAC,PO | Performed by: INTERNAL MEDICINE

## 2018-04-25 PROCEDURE — 99214 OFFICE O/P EST MOD 30 MIN: CPT | Mod: S$PBB,,, | Performed by: INTERNAL MEDICINE

## 2018-04-25 PROCEDURE — 99999 PR PBB SHADOW E&M-EST. PATIENT-LVL III: CPT | Mod: PBBFAC,,, | Performed by: INTERNAL MEDICINE

## 2018-06-18 ENCOUNTER — HOSPITAL ENCOUNTER (OUTPATIENT)
Dept: RADIOLOGY | Facility: HOSPITAL | Age: 68
Discharge: HOME | End: 2018-06-18
Attending: ORTHOPAEDIC SURGERY
Payer: MEDICARE

## 2018-06-18 DIAGNOSIS — E04.1 THYROID NODULE: ICD-10-CM

## 2018-06-18 PROCEDURE — 76536 US EXAM OF HEAD AND NECK: CPT | Mod: TC

## 2018-06-18 PROCEDURE — 76536 US EXAM OF HEAD AND NECK: CPT | Mod: 26,,, | Performed by: RADIOLOGY

## 2018-06-25 ENCOUNTER — OFFICE VISIT (OUTPATIENT)
Dept: OTOLARYNGOLOGY | Facility: CLINIC | Age: 68
End: 2018-06-25
Payer: MEDICARE

## 2018-06-25 VITALS
DIASTOLIC BLOOD PRESSURE: 84 MMHG | WEIGHT: 216.25 LBS | HEART RATE: 56 BPM | TEMPERATURE: 97 F | BODY MASS INDEX: 31.94 KG/M2 | SYSTOLIC BLOOD PRESSURE: 174 MMHG

## 2018-06-25 DIAGNOSIS — E04.1 THYROID NODULE: Primary | ICD-10-CM

## 2018-06-25 DIAGNOSIS — J30.1 NON-SEASONAL ALLERGIC RHINITIS DUE TO POLLEN: ICD-10-CM

## 2018-06-25 PROCEDURE — 99214 OFFICE O/P EST MOD 30 MIN: CPT | Mod: S$PBB,,, | Performed by: ORTHOPAEDIC SURGERY

## 2018-06-25 PROCEDURE — 99213 OFFICE O/P EST LOW 20 MIN: CPT | Mod: PBBFAC | Performed by: ORTHOPAEDIC SURGERY

## 2018-06-25 PROCEDURE — 99999 PR PBB SHADOW E&M-EST. PATIENT-LVL III: CPT | Mod: PBBFAC,,, | Performed by: ORTHOPAEDIC SURGERY

## 2018-06-25 RX ORDER — FLUTICASONE PROPIONATE 50 MCG
SPRAY, SUSPENSION (ML) NASAL
Qty: 1 BOTTLE | Refills: 12 | Status: SHIPPED | OUTPATIENT
Start: 2018-06-25 | End: 2019-07-14 | Stop reason: SDUPTHER

## 2018-06-25 NOTE — PROGRESS NOTES
"Subjective:      Patient ID: Deirdre Yanez is a 68 y.o. female.    Chief Complaint: Results (Discuss results of thyroid ultrasound)    Patient is a very pleasant 64 year old female here to see me today in followup for evaluation of her thyroid nodules.  Overall, since her last visit she has been doing well since her last visit from a thyroid standpoint.  Her most recent TSH was 9/2017 and that was normal (Dr. Blackburn has ordered as a part of her next set of labs).  She is continuing with alopecia.  We are following her for thyroid nodules.  She had previously noted some swelling in her left neck, resolved with massage.  She has some difficulty swallowing soups and solids, and thinks it gets "stuck" at times.  She has gotten into the habit of eating smaller bites.  She has no difficulty breathing or hoarseness.  She has been using Flonase since her last visit, and is very pleased with her progress.   We have previously sent her for an US guided FNA of her thyroid nodule as it has slightly enlarged.  However, they were unable to complete the FNA due to blood vessels in the area.  Therefore, we elected to follow her with serial US, and had her annual followup US today.          Review of Systems   Constitutional: Positive for unexpected weight change (Loss 50 pounds in past year). Negative for chills, fatigue and fever.   HENT: Positive for trouble swallowing. Negative for congestion, dental problem, ear discharge, ear pain, facial swelling, hearing loss, nosebleeds, postnasal drip, rhinorrhea, sinus pressure, sneezing, sore throat, tinnitus and voice change.    Eyes: Negative for redness, itching and visual disturbance.   Respiratory: Negative for cough, choking, shortness of breath and wheezing.    Cardiovascular: Negative for chest pain and palpitations.   Gastrointestinal: Negative for abdominal pain.        No reflux.   Musculoskeletal: Negative for gait problem.   Skin: Negative for rash.   Neurological: " Negative for dizziness, light-headedness and headaches.       Objective:       Physical Exam   Constitutional: She is oriented to person, place, and time. She appears well-developed and well-nourished. No distress.   HENT:   Head: Normocephalic and atraumatic.   Right Ear: Tympanic membrane, external ear and ear canal normal.   Left Ear: Tympanic membrane, external ear and ear canal normal.   Nose: Nose normal. No mucosal edema, rhinorrhea, nasal deformity or septal deviation. No epistaxis. Right sinus exhibits no maxillary sinus tenderness and no frontal sinus tenderness. Left sinus exhibits no maxillary sinus tenderness and no frontal sinus tenderness.   Mouth/Throat: Uvula is midline, oropharynx is clear and moist and mucous membranes are normal. Mucous membranes are not pale and not dry. No dental caries. No oropharyngeal exudate or posterior oropharyngeal erythema.   Eyes: Conjunctivae, EOM and lids are normal. Pupils are equal, round, and reactive to light. Right eye exhibits no chemosis. Left eye exhibits no chemosis. Right conjunctiva is not injected. Left conjunctiva is not injected. No scleral icterus. Right eye exhibits normal extraocular motion and no nystagmus. Left eye exhibits normal extraocular motion and no nystagmus.   Neck: Trachea normal and phonation normal. No tracheal tenderness and no muscular tenderness present. No tracheal deviation present. Thyroid mass (small nodule palpated in the right lower lobe, elevates with swallow) present. No thyromegaly present.   Cardiovascular: Intact distal pulses.    Pulmonary/Chest: Effort normal. No stridor. No respiratory distress.   Abdominal: She exhibits no distension.   Lymphadenopathy:        Head (right side): No submental, no submandibular, no preauricular, no posterior auricular and no occipital adenopathy present.        Head (left side): No submental, no submandibular, no preauricular, no posterior auricular and no occipital adenopathy present.      She has no cervical adenopathy.   Neurological: She is alert and oriented to person, place, and time. No cranial nerve deficit. Gait normal.   Skin: Skin is warm and dry. No rash noted. No erythema.   Wearing a wig today   Psychiatric: She has a normal mood and affect. Her behavior is normal.       Assessment:       1. Thyroid nodule    2. Non-seasonal allergic rhinitis due to pollen        Plan:     Thyroid nodule:  Her thyroid nodule has now been stable over the last year, and has not demonstrated any significant growth.  Due to her anatomy (relationship of nodule to carotid), FNA of that nodule is not possible.  I would recommend continued surveillance, and will see her back in one year with a repeat US, sooner if she notes any change in her neck.    -     US Soft Tissue Head Neck Thyroid; Future; Expected date: 06/25/2018    Non-seasonal allergic rhinitis due to pollen:  Refill on Flonase sent in, also use saline spray or nasal rinses after cutting the grass.  -     fluticasone (FLONASE) 50 mcg/actuation nasal spray; SHAKE LIQUID AND USE 2 SPRAYS IN EACH NOSTRIL DAILY  Dispense: 1 Bottle; Refill: 12

## 2018-08-22 ENCOUNTER — OFFICE VISIT (OUTPATIENT)
Dept: CARDIOLOGY | Facility: CLINIC | Age: 68
End: 2018-08-22
Payer: MEDICARE

## 2018-08-22 VITALS
BODY MASS INDEX: 32.58 KG/M2 | WEIGHT: 220 LBS | SYSTOLIC BLOOD PRESSURE: 140 MMHG | HEIGHT: 69 IN | DIASTOLIC BLOOD PRESSURE: 80 MMHG | HEART RATE: 60 BPM

## 2018-08-22 DIAGNOSIS — I11.0 HYPERTENSIVE HEART DISEASE WITH CHRONIC DIASTOLIC CONGESTIVE HEART FAILURE: Primary | Chronic | ICD-10-CM

## 2018-08-22 DIAGNOSIS — E78.5 HYPERLIPIDEMIA ASSOCIATED WITH TYPE 2 DIABETES MELLITUS: ICD-10-CM

## 2018-08-22 DIAGNOSIS — I50.9 CHF (NYHA CLASS I, ACC/AHA STAGE B): Chronic | ICD-10-CM

## 2018-08-22 DIAGNOSIS — I50.32 HYPERTENSIVE HEART DISEASE WITH CHRONIC DIASTOLIC CONGESTIVE HEART FAILURE: Primary | Chronic | ICD-10-CM

## 2018-08-22 DIAGNOSIS — I50.32 CHRONIC DIASTOLIC HEART FAILURE: ICD-10-CM

## 2018-08-22 DIAGNOSIS — E11.69 HYPERLIPIDEMIA ASSOCIATED WITH TYPE 2 DIABETES MELLITUS: ICD-10-CM

## 2018-08-22 PROCEDURE — 99214 OFFICE O/P EST MOD 30 MIN: CPT | Mod: S$PBB,,, | Performed by: NUCLEAR MEDICINE

## 2018-08-22 PROCEDURE — 99999 PR PBB SHADOW E&M-EST. PATIENT-LVL III: CPT | Mod: PBBFAC,,, | Performed by: NUCLEAR MEDICINE

## 2018-08-22 PROCEDURE — 99213 OFFICE O/P EST LOW 20 MIN: CPT | Mod: PBBFAC,PO,25 | Performed by: NUCLEAR MEDICINE

## 2018-08-22 PROCEDURE — 93010 ELECTROCARDIOGRAM REPORT: CPT | Mod: ,,, | Performed by: INTERNAL MEDICINE

## 2018-08-22 PROCEDURE — 93005 ELECTROCARDIOGRAM TRACING: CPT | Mod: PBBFAC,PO | Performed by: NUCLEAR MEDICINE

## 2018-08-22 NOTE — PROGRESS NOTES
Subjective:   Patient ID:  Deirdre Yanez is a 68 y.o. female who presents for follow-up of Congestive Heart Failure (6 month followup) and Hypertension      HPI ESSENTIAL HTN WITH HFPEF, STAGE B, DD.    HAVING MULTIPLE SIDE EFFECTS FROM MCRA- ALDACTONE, - EASY BRUISING, DIARRHEA, WEAKNESS?  NO CHEST PAIN. NO UNUSUAL DESAI. NO ORTHOPNEA OR PND  NO PALPITATIONS  NO NEAR SYNCOPE OR SYNCOPE  NO EDEMA.NO CALVE TENDERNESS  NO FOCAL CNS SYMPTOMS OR SIGNS TO SUGGEST TIA OR STROKE  NO ABDOMINAL DISCOMFORT    Review of Systems   Constitution: Positive for weakness. Negative for chills, fever, night sweats, weight gain and weight loss.   HENT: Negative for nosebleeds.    Eyes: Negative for blurred vision, double vision and visual disturbance.   Cardiovascular: Negative for chest pain, dyspnea on exertion, irregular heartbeat, leg swelling, orthopnea, palpitations, paroxysmal nocturnal dyspnea and syncope.   Respiratory: Negative for cough, hemoptysis and wheezing.    Endocrine: Negative for polydipsia and polyuria.   Hematologic/Lymphatic: Bruises/bleeds easily.   Skin: Negative for rash.   Musculoskeletal: Negative for joint pain, joint swelling, muscle weakness and myalgias.   Gastrointestinal: Positive for diarrhea. Negative for abdominal pain, hematemesis, jaundice and melena.   Genitourinary: Negative for dysuria, hematuria and nocturia.   Neurological: Negative for dizziness, focal weakness, headaches and sensory change.   Psychiatric/Behavioral: Negative for depression. The patient does not have insomnia and is not nervous/anxious.      Family History   Problem Relation Age of Onset    Colon cancer Maternal Grandfather     Breast cancer Maternal Grandfather     Diabetes Maternal Grandmother     Hypertension Maternal Grandmother     Cataracts Mother     Glaucoma Mother     Macular degeneration Mother     Hypertension Mother     Diabetes Paternal Grandmother     Cataracts Maternal Aunt     Glaucoma Maternal Aunt      Macular degeneration Maternal Aunt     Melanoma Maternal Aunt     Heart disease Unknown         pat side     Past Medical History:   Diagnosis Date    Abrasion     left eye    Abrasion and/or friction burn of abdominal wall with infection     left eye    Arthritis     gouty arthritis    Diabetes mellitus     2011  11/17/2013    Diverticulosis     DM (diabetes mellitus) 2011     12/03/2014  A1C 6.0 x 2 weeks    DM (diabetes mellitus) 2011    BS 89 10/14/2017 A1C 5.7   Diet controlled    Hepatitis     Hx of B/C from cadaver in past    Hiatal hernia     Hip bursitis, left     HTN (hypertension)     Hypertensive CHF (congestive heart failure) 3/28/2014    Positive ALBIN (antinuclear antibody)     Pure hypercholesterolemia 9/30/2016     Current Outpatient Medications on File Prior to Visit   Medication Sig Dispense Refill    aspirin 81 MG Chew Take 1 tablet (81 mg total) by mouth once daily. (Patient taking differently: Take 81 mg by mouth daily as needed. )  0    b complex vitamins tablet Take 1 tablet by mouth once daily.      coenzyme Q10 (CO Q-10) 100 mg capsule Take 100 mg by mouth once daily.      DOCOSAHEXANOIC ACID/EPA (FISH OIL ORAL) Take 1 capsule by mouth once daily.       fluticasone (FLONASE) 50 mcg/actuation nasal spray SHAKE LIQUID AND USE 2 SPRAYS IN EACH NOSTRIL DAILY 1 Bottle 12    furosemide (LASIX) 20 MG tablet Take 1 tablet (20 mg total) by mouth once daily. (Patient taking differently: Take 20 mg by mouth daily as needed. ) 90 tablet 3    glucosamine-chondroitin 500-400 mg tablet Take 1 tablet by mouth 3 (three) times daily.      inositol 500 mg Tab Take 500 mg by mouth 2 (two) times daily.      nebivolol (BYSTOLIC) 10 MG Tab Take 1 tablet (10 mg total) by mouth once daily. Take along with Bystolic 10mg for a total of 15mg daily. 90 tablet 3    nebivolol (BYSTOLIC) 5 MG Tab Take 1 tablet (5 mg total) by mouth once daily. Take with Bystolic 10mg for a total of  "15mg by mouth daily. 90 tablet 3    spironolactone (ALDACTONE) 50 MG tablet Take 1 tablet (50 mg total) by mouth every evening. 90 tablet 3    vitamin E 1000 UNIT capsule Take 1,000 Units by mouth once daily.       No current facility-administered medications on file prior to visit.      Review of patient's allergies indicates:   Allergen Reactions    Arb-angiotensin receptor antagonist Edema    Hydralazine analogues      "Lupus reaction"    Metoprolol Other (See Comments)     Alopecia    Sulfa (sulfonamide antibiotics)      Passed out and almost stopped breathing    Ace inhibitors Other (See Comments)     cough    Calcium channel blocking agents-dihydropyridines      Edema         Objective:     Physical Exam   Constitutional: She is oriented to person, place, and time. She appears well-developed. No distress.   HENT:   Head: Normocephalic.   Eyes: Conjunctivae are normal. Pupils are equal, round, and reactive to light. No scleral icterus.   Neck: Normal range of motion. Neck supple. Normal carotid pulses, no hepatojugular reflux and no JVD present. Carotid bruit is not present. No edema present. No thyroid mass and no thyromegaly present.   Cardiovascular: Normal rate, regular rhythm, S1 normal, S2 normal, normal heart sounds and intact distal pulses. PMI is not displaced. Exam reveals no gallop and no friction rub.   No murmur heard.  Pulses:       Carotid pulses are 2+ on the right side, and 2+ on the left side.       Radial pulses are 2+ on the right side, and 2+ on the left side.        Femoral pulses are 2+ on the right side, and 2+ on the left side.       Popliteal pulses are 2+ on the right side, and 2+ on the left side.        Dorsalis pedis pulses are 2+ on the right side, and 2+ on the left side.        Posterior tibial pulses are 2+ on the right side, and 2+ on the left side.   Pulmonary/Chest: Effort normal and breath sounds normal. She has no wheezes. She has no rales. She exhibits no " tenderness.   Abdominal: Soft. Bowel sounds are normal. She exhibits no pulsatile midline mass and no mass. There is no hepatosplenomegaly. There is no tenderness.   Musculoskeletal: Normal range of motion. She exhibits no edema or tenderness.        Cervical back: Normal.        Thoracic back: Normal.        Lumbar back: Normal.   Lymphadenopathy:     She has no cervical adenopathy.     She has no axillary adenopathy.        Right: No supraclavicular adenopathy present.        Left: No supraclavicular adenopathy present.   Neurological: She is alert and oriented to person, place, and time. She has normal strength and normal reflexes. No sensory deficit. Gait normal.   Skin: Skin is warm. No rash noted. No cyanosis. No pallor. Nails show no clubbing.   Psychiatric: She has a normal mood and affect. Her speech is normal and behavior is normal. Cognition and memory are normal.       Assessment:     1. Hypertensive heart disease with chronic diastolic congestive heart failure    2. CHF (NYHA class I, ACC/AHA stage B)    3. Hyperlipidemia associated with type 2 diabetes mellitus      BP WELL CONTROLLED  NO CLINICAL EVIDENCE OF ADHF.  NO ARRHYTHMIAS- ECG TODAY- SR, WNL  CNS STATUS STABLE    Plan:     Hypertensive heart disease with chronic diastolic congestive heart failure    CHF (NYHA class I, ACC/AHA stage B)    Hyperlipidemia associated with type 2 diabetes mellitus      1- DECREASE SPIRONOLACTONE TO 12.5 MG Monday AND Friday FOR 2  WEEKS. IF OK, THEN INCREASE TO 25 MG  BIWEEKLY    2- CONTINUE OTHER CARD MANAGEMENT    3- RETURN IN 6 MONTHS

## 2018-08-23 DIAGNOSIS — I50.32 CHRONIC DIASTOLIC HEART FAILURE: Primary | ICD-10-CM

## 2018-10-09 NOTE — PROGRESS NOTES
"Subjective:      Patient ID: Deirdre Yaenz is a 68 y.o. female.    Chief Complaint: Annual Exam      HPI  Here for f/u medical problems and preventive exam.  In 2003 had "sinus lift with titanium implants."  Had past infection at the site, and had to have a "redo."  Has recently had another surgery there where he cleaned and scraped it out at the metal.  S/p amoxil 875mg tid for 3 weeks.  Has new low back pain x 3 months, has to lean forward or has pain in the low back.  Also a pain at the bottom of the ribs on the right, worse with lying down.  Not able to exercise as much now due to the pain.  Had been taking ibuprofen 600mg tid for the sinus pain.  BPs checked at home, good range when not in pain.  120s/80 average.  No f/c/sw/cough.  No cp/sob/palp.  BMs little loose for the past year.  Urine normal.  Taking 1K D3 daily.  Sugars have been higher with back pain.  AC breakfast around 90 when not in pain.  Intol of statins.    HM: ref fluvax, 8/16 [id number], 9/14 [id number], 5/13 TDaP, 11/14 BMD rep 5y, 10/13 Cscope rep 5y, 12/17 MM[facility]/Gyn Dr. Cantrell, 10/18 Eye Dr. Chowdhury, HCV per Hepatology.     Review of Systems   Constitutional: Negative for activity change, appetite change, chills, diaphoresis, fever and unexpected weight change.   HENT: Negative for congestion, ear pain, hearing loss, rhinorrhea, sinus pressure, sore throat and trouble swallowing.    Eyes: Negative for discharge.   Respiratory: Negative for cough, chest tightness, shortness of breath and wheezing.    Cardiovascular: Negative for chest pain and palpitations.   Gastrointestinal: Negative for blood in stool, constipation, diarrhea, nausea and vomiting.   Endocrine: Negative for polydipsia and polyuria.   Genitourinary: Negative for difficulty urinating, dysuria, frequency, hematuria, menstrual problem, urgency and vaginal discharge.   Musculoskeletal: Negative for arthralgias, joint swelling and neck pain.   Skin: Negative for rash.   Neurological: " "Negative for dizziness, weakness and headaches.   Psychiatric/Behavioral: Negative for confusion, dysphoric mood and sleep disturbance. The patient is not nervous/anxious.          Objective:   /80   Pulse 78   Temp 97.5 °F (36.4 °C) (Tympanic)   Ht 5' 9" (1.753 m)   Wt 99.4 kg (219 lb 2.2 oz)   SpO2 98%   BMI 32.36 kg/m²     Physical Exam   Constitutional: She is oriented to person, place, and time. She appears well-developed and well-nourished.   HENT:   Right Ear: External ear normal. Tympanic membrane is not injected.   Left Ear: External ear normal. Tympanic membrane is not injected.   Mouth/Throat: Oropharynx is clear and moist.   Eyes: Conjunctivae are normal.   Neck: Normal range of motion. Neck supple. No thyromegaly present.   Cardiovascular: Normal rate, regular rhythm and intact distal pulses. Exam reveals no gallop and no friction rub.   No murmur heard.  Pulses:       Dorsalis pedis pulses are 2+ on the right side, and 2+ on the left side.        Posterior tibial pulses are 2+ on the right side, and 2+ on the left side.   Pulmonary/Chest: Effort normal and breath sounds normal. She has no wheezes. She has no rales.   Abdominal: Soft. Bowel sounds are normal. She exhibits no mass. There is no tenderness.   Musculoskeletal: She exhibits no edema.        Lumbar back: She exhibits no tenderness and no bony tenderness.   Feet:   Right Foot:   Protective Sensation: 10 sites tested. 10 sites sensed.   Skin Integrity: Negative for ulcer, blister, skin breakdown, erythema, warmth, callus or dry skin.   Left Foot:   Protective Sensation: 10 sites tested. 10 sites sensed.   Skin Integrity: Negative for ulcer, blister, skin breakdown, erythema, warmth, callus or dry skin.   Lymphadenopathy:     She has no cervical adenopathy.   Neurological: She is alert and oriented to person, place, and time.   Skin: Skin is warm. No rash noted.   Psychiatric: She has a normal mood and affect.     Results for orders " placed or performed in visit on 10/17/18   CBC auto differential   Result Value Ref Range    WBC 5.80 3.90 - 12.70 K/uL    RBC 4.73 4.00 - 5.40 M/uL    Hemoglobin 13.2 12.0 - 16.0 g/dL    Hematocrit 42.7 37.0 - 48.5 %    MCV 90 82 - 98 fL    MCH 27.9 27.0 - 31.0 pg    MCHC 30.9 (L) 32.0 - 36.0 g/dL    RDW 12.9 11.5 - 14.5 %    Platelets 177 150 - 350 K/uL    MPV 11.7 9.2 - 12.9 fL    Immature Granulocytes 0.3 0.0 - 0.5 %    Gran # (ANC) 3.5 1.8 - 7.7 K/uL    Immature Grans (Abs) 0.02 0.00 - 0.04 K/uL    Lymph # 1.8 1.0 - 4.8 K/uL    Mono # 0.4 0.3 - 1.0 K/uL    Eos # 0.1 0.0 - 0.5 K/uL    Baso # 0.02 0.00 - 0.20 K/uL    nRBC 0 0 /100 WBC    Gran% 59.5 38.0 - 73.0 %    Lymph% 31.6 18.0 - 48.0 %    Mono% 6.9 4.0 - 15.0 %    Eosinophil% 1.4 0.0 - 8.0 %    Basophil% 0.3 0.0 - 1.9 %    Differential Method Automated    Comprehensive metabolic panel   Result Value Ref Range    Sodium 139 136 - 145 mmol/L    Potassium 4.6 3.5 - 5.1 mmol/L    Chloride 103 95 - 110 mmol/L    CO2 27 23 - 29 mmol/L    Glucose 99 70 - 110 mg/dL    BUN, Bld 14 8 - 23 mg/dL    Creatinine 0.8 0.5 - 1.4 mg/dL    Calcium 9.8 8.7 - 10.5 mg/dL    Total Protein 7.2 6.0 - 8.4 g/dL    Albumin 4.1 3.5 - 5.2 g/dL    Total Bilirubin 1.3 (H) 0.1 - 1.0 mg/dL    Alkaline Phosphatase 80 55 - 135 U/L    AST 28 10 - 40 U/L    ALT 23 10 - 44 U/L    Anion Gap 9 8 - 16 mmol/L    eGFR if African American >60.0 >60 mL/min/1.73 m^2    eGFR if non African American >60.0 >60 mL/min/1.73 m^2   Lipid panel   Result Value Ref Range    Cholesterol 204 (H) 120 - 199 mg/dL    Triglycerides 74 30 - 150 mg/dL    HDL 52 40 - 75 mg/dL    LDL Cholesterol 137.2 63.0 - 159.0 mg/dL    HDL/Chol Ratio 25.5 20.0 - 50.0 %    Total Cholesterol/HDL Ratio 3.9 2.0 - 5.0    Non-HDL Cholesterol 152 mg/dL   TSH   Result Value Ref Range    TSH 1.101 0.400 - 4.000 uIU/mL   Vitamin D   Result Value Ref Range    Vit D, 25-Hydroxy 36 30 - 96 ng/mL   Hemoglobin A1c   Result Value Ref Range     Hemoglobin A1C 6.0 (H) 4.0 - 5.6 %    Estimated Avg Glucose 126 68 - 131 mg/dL           Assessment:       1. Type 2 diabetes mellitus with complication, without long-term current use of insulin    2. Vitamin D deficiency    3. Hypertension associated with diabetes    4. Hyperlipidemia associated with type 2 diabetes mellitus    5. Encounter for preventive health examination    6. Midline thoracic back pain, unspecified chronicity    7. Screen for colon cancer    8. Statin intolerance          Plan:     Type 2 diabetes mellitus with complication, without long-term current use of insulin- adeq control with diet alone.    Vitamin D deficiency- cont supplement.    Hypertension associated with diabetes- stable at home, cont to monitor.    Hyperlipidemia associated with type 2 diabetes mellitus, 23.6% 10yr vasc risk, but Statin intolerance.    Encounter for preventive health examination- sched Cscope.  Ref fluvax.  Discussed pt needs to get Shingrix vaccination at pharmacy.    Midline thoracic back pain, unspecified chronicity- CXR now, PMR eval.  RTC 2 mo.  -     Ambulatory referral to Physical Medicine Rehab  -     X-Ray Chest PA And Lateral; Future; Expected date: 10/23/2018    Screen for colon cancer  -     Case request GI: COLONOSCOPY  -     sodium,potassium,mag sulfates (SUPREP BOWEL PREP KIT) 17.5-3.13-1.6 gram SolR; Take 354 mLs by mouth daily 2 hours after breakfast.  Dispense: 1 Bottle; Refill: 0

## 2018-10-17 ENCOUNTER — LAB VISIT (OUTPATIENT)
Dept: LAB | Facility: HOSPITAL | Age: 68
End: 2018-10-17
Attending: INTERNAL MEDICINE
Payer: MEDICARE

## 2018-10-17 DIAGNOSIS — E11.8 TYPE 2 DIABETES MELLITUS WITH COMPLICATION, WITHOUT LONG-TERM CURRENT USE OF INSULIN: ICD-10-CM

## 2018-10-17 DIAGNOSIS — E11.69 HYPERLIPIDEMIA ASSOCIATED WITH TYPE 2 DIABETES MELLITUS: ICD-10-CM

## 2018-10-17 DIAGNOSIS — E78.5 HYPERLIPIDEMIA ASSOCIATED WITH TYPE 2 DIABETES MELLITUS: ICD-10-CM

## 2018-10-17 DIAGNOSIS — E55.9 VITAMIN D DEFICIENCY: ICD-10-CM

## 2018-10-17 DIAGNOSIS — Z29.9 PREVENTIVE MEASURE: ICD-10-CM

## 2018-10-17 LAB
ALBUMIN SERPL BCP-MCNC: 4.1 G/DL
ALP SERPL-CCNC: 80 U/L
ALT SERPL W/O P-5'-P-CCNC: 23 U/L
ANION GAP SERPL CALC-SCNC: 9 MMOL/L
AST SERPL-CCNC: 28 U/L
BASOPHILS # BLD AUTO: 0.02 K/UL
BASOPHILS NFR BLD: 0.3 %
BILIRUB SERPL-MCNC: 1.3 MG/DL
BUN SERPL-MCNC: 14 MG/DL
CALCIUM SERPL-MCNC: 9.8 MG/DL
CHLORIDE SERPL-SCNC: 103 MMOL/L
CHOLEST SERPL-MCNC: 204 MG/DL
CHOLEST/HDLC SERPL: 3.9 {RATIO}
CO2 SERPL-SCNC: 27 MMOL/L
CREAT SERPL-MCNC: 0.8 MG/DL
DIFFERENTIAL METHOD: ABNORMAL
EOSINOPHIL # BLD AUTO: 0.1 K/UL
EOSINOPHIL NFR BLD: 1.4 %
ERYTHROCYTE [DISTWIDTH] IN BLOOD BY AUTOMATED COUNT: 12.9 %
EST. GFR  (AFRICAN AMERICAN): >60 ML/MIN/1.73 M^2
EST. GFR  (NON AFRICAN AMERICAN): >60 ML/MIN/1.73 M^2
ESTIMATED AVG GLUCOSE: 126 MG/DL
GLUCOSE SERPL-MCNC: 99 MG/DL
HBA1C MFR BLD HPLC: 6 %
HCT VFR BLD AUTO: 42.7 %
HDLC SERPL-MCNC: 52 MG/DL
HDLC SERPL: 25.5 %
HGB BLD-MCNC: 13.2 G/DL
IMM GRANULOCYTES # BLD AUTO: 0.02 K/UL
IMM GRANULOCYTES NFR BLD AUTO: 0.3 %
LDLC SERPL CALC-MCNC: 137.2 MG/DL
LYMPHOCYTES # BLD AUTO: 1.8 K/UL
LYMPHOCYTES NFR BLD: 31.6 %
MCH RBC QN AUTO: 27.9 PG
MCHC RBC AUTO-ENTMCNC: 30.9 G/DL
MCV RBC AUTO: 90 FL
MONOCYTES # BLD AUTO: 0.4 K/UL
MONOCYTES NFR BLD: 6.9 %
NEUTROPHILS # BLD AUTO: 3.5 K/UL
NEUTROPHILS NFR BLD: 59.5 %
NONHDLC SERPL-MCNC: 152 MG/DL
NRBC BLD-RTO: 0 /100 WBC
PLATELET # BLD AUTO: 177 K/UL
PMV BLD AUTO: 11.7 FL
POTASSIUM SERPL-SCNC: 4.6 MMOL/L
PROT SERPL-MCNC: 7.2 G/DL
RBC # BLD AUTO: 4.73 M/UL
SODIUM SERPL-SCNC: 139 MMOL/L
TRIGL SERPL-MCNC: 74 MG/DL
TSH SERPL DL<=0.005 MIU/L-ACNC: 1.1 UIU/ML
WBC # BLD AUTO: 5.8 K/UL

## 2018-10-17 PROCEDURE — 80053 COMPREHEN METABOLIC PANEL: CPT

## 2018-10-17 PROCEDURE — 85025 COMPLETE CBC W/AUTO DIFF WBC: CPT

## 2018-10-17 PROCEDURE — 80061 LIPID PANEL: CPT

## 2018-10-17 PROCEDURE — 83036 HEMOGLOBIN GLYCOSYLATED A1C: CPT

## 2018-10-17 PROCEDURE — 36415 COLL VENOUS BLD VENIPUNCTURE: CPT | Mod: PO

## 2018-10-17 PROCEDURE — 82306 VITAMIN D 25 HYDROXY: CPT

## 2018-10-17 PROCEDURE — 84443 ASSAY THYROID STIM HORMONE: CPT

## 2018-10-18 LAB — 25(OH)D3+25(OH)D2 SERPL-MCNC: 36 NG/ML

## 2018-10-23 ENCOUNTER — HOSPITAL ENCOUNTER (OUTPATIENT)
Dept: RADIOLOGY | Facility: HOSPITAL | Age: 68
Discharge: HOME | End: 2018-10-23
Attending: INTERNAL MEDICINE
Payer: MEDICARE

## 2018-10-23 ENCOUNTER — OFFICE VISIT (OUTPATIENT)
Dept: OPHTHALMOLOGY | Facility: CLINIC | Age: 68
End: 2018-10-23
Payer: MEDICARE

## 2018-10-23 ENCOUNTER — OFFICE VISIT (OUTPATIENT)
Dept: INTERNAL MEDICINE | Facility: CLINIC | Age: 68
End: 2018-10-23
Payer: MEDICARE

## 2018-10-23 VITALS
TEMPERATURE: 98 F | SYSTOLIC BLOOD PRESSURE: 125 MMHG | OXYGEN SATURATION: 98 % | WEIGHT: 219.13 LBS | DIASTOLIC BLOOD PRESSURE: 80 MMHG | BODY MASS INDEX: 32.46 KG/M2 | HEART RATE: 78 BPM | HEIGHT: 69 IN

## 2018-10-23 DIAGNOSIS — Z78.9 STATIN INTOLERANCE: ICD-10-CM

## 2018-10-23 DIAGNOSIS — E11.59 HYPERTENSION ASSOCIATED WITH DIABETES: Chronic | ICD-10-CM

## 2018-10-23 DIAGNOSIS — E78.5 HYPERLIPIDEMIA ASSOCIATED WITH TYPE 2 DIABETES MELLITUS: ICD-10-CM

## 2018-10-23 DIAGNOSIS — M54.6 MIDLINE THORACIC BACK PAIN, UNSPECIFIED CHRONICITY: ICD-10-CM

## 2018-10-23 DIAGNOSIS — H25.13 CATARACT, NUCLEAR SCLEROTIC SENILE, BILATERAL: ICD-10-CM

## 2018-10-23 DIAGNOSIS — E11.9 DIABETES MELLITUS TYPE 2 WITHOUT RETINOPATHY: Primary | ICD-10-CM

## 2018-10-23 DIAGNOSIS — E55.9 VITAMIN D DEFICIENCY: ICD-10-CM

## 2018-10-23 DIAGNOSIS — E11.69 HYPERLIPIDEMIA ASSOCIATED WITH TYPE 2 DIABETES MELLITUS: ICD-10-CM

## 2018-10-23 DIAGNOSIS — Z12.11 SCREEN FOR COLON CANCER: ICD-10-CM

## 2018-10-23 DIAGNOSIS — Z83.518 FAMILY HISTORY OF MACULAR DEGENERATION: ICD-10-CM

## 2018-10-23 DIAGNOSIS — Z00.00 ENCOUNTER FOR PREVENTIVE HEALTH EXAMINATION: ICD-10-CM

## 2018-10-23 DIAGNOSIS — H52.4 BILATERAL PRESBYOPIA: ICD-10-CM

## 2018-10-23 DIAGNOSIS — I15.2 HYPERTENSION ASSOCIATED WITH DIABETES: Chronic | ICD-10-CM

## 2018-10-23 DIAGNOSIS — E11.8 TYPE 2 DIABETES MELLITUS WITH COMPLICATION, WITHOUT LONG-TERM CURRENT USE OF INSULIN: Primary | ICD-10-CM

## 2018-10-23 PROCEDURE — 71046 X-RAY EXAM CHEST 2 VIEWS: CPT | Mod: TC,FY,PO

## 2018-10-23 PROCEDURE — 99999 PR PBB SHADOW E&M-EST. PATIENT-LVL III: CPT | Mod: PBBFAC,,, | Performed by: INTERNAL MEDICINE

## 2018-10-23 PROCEDURE — 99213 OFFICE O/P EST LOW 20 MIN: CPT | Mod: PBBFAC,PO | Performed by: INTERNAL MEDICINE

## 2018-10-23 PROCEDURE — 99211 OFF/OP EST MAY X REQ PHY/QHP: CPT | Mod: PBBFAC,27,PO,25 | Performed by: OPTOMETRIST

## 2018-10-23 PROCEDURE — 99214 OFFICE O/P EST MOD 30 MIN: CPT | Mod: S$PBB,,, | Performed by: INTERNAL MEDICINE

## 2018-10-23 PROCEDURE — 92015 DETERMINE REFRACTIVE STATE: CPT | Mod: ,,, | Performed by: OPTOMETRIST

## 2018-10-23 PROCEDURE — 92014 COMPRE OPH EXAM EST PT 1/>: CPT | Mod: S$PBB,,, | Performed by: OPTOMETRIST

## 2018-10-23 PROCEDURE — 99999 PR PBB SHADOW E&M-EST. PATIENT-LVL I: CPT | Mod: PBBFAC,,, | Performed by: OPTOMETRIST

## 2018-10-23 PROCEDURE — 71046 X-RAY EXAM CHEST 2 VIEWS: CPT | Mod: 26,,, | Performed by: RADIOLOGY

## 2018-10-23 RX ORDER — SODIUM, POTASSIUM,MAG SULFATES 17.5-3.13G
1 SOLUTION, RECONSTITUTED, ORAL ORAL
Qty: 1 BOTTLE | Refills: 0 | Status: SHIPPED | OUTPATIENT
Start: 2018-10-23 | End: 2019-03-12 | Stop reason: ALTCHOICE

## 2018-10-23 NOTE — PROGRESS NOTES
HPI     NIDDM exam.  No visual complaints.  Update glasses RX.   HX of bilateral NS.  Last eye exam 10/16/2017 TR[facility].       Last edited by Amberly Bourgeois on 10/23/2018  9:53 AM. (History)            Assessment /Plan     For exam results, see Encounter Report.    Diabetes mellitus type 2 without retinopathy    Bilateral presbyopia    Cataract, nuclear sclerotic senile, bilateral    Family history of macular degeneration      No Background Diabetic Retinopathy    Mild cataracts OU, not surgical.    No macular pathology    Dispense Final Rx for glasses.  RTC 1 year  Discussed above and answered questions.

## 2018-10-26 ENCOUNTER — TELEPHONE (OUTPATIENT)
Dept: ENDOSCOPY | Facility: HOSPITAL | Age: 68
End: 2018-10-26

## 2018-10-29 ENCOUNTER — INITIAL CONSULT (OUTPATIENT)
Dept: PHYSICAL MEDICINE AND REHAB | Facility: CLINIC | Age: 68
End: 2018-10-29
Payer: MEDICARE

## 2018-10-29 VITALS
HEIGHT: 69 IN | RESPIRATION RATE: 14 BRPM | HEART RATE: 58 BPM | SYSTOLIC BLOOD PRESSURE: 171 MMHG | BODY MASS INDEX: 32.44 KG/M2 | WEIGHT: 219 LBS | DIASTOLIC BLOOD PRESSURE: 81 MMHG

## 2018-10-29 DIAGNOSIS — M41.20 SCOLIOSIS (AND KYPHOSCOLIOSIS), IDIOPATHIC: Primary | ICD-10-CM

## 2018-10-29 DIAGNOSIS — M51.36 DDD (DEGENERATIVE DISC DISEASE), LUMBAR: ICD-10-CM

## 2018-10-29 DIAGNOSIS — R29.898 WEAKNESS OF BOTH HIPS: ICD-10-CM

## 2018-10-29 PROCEDURE — 99213 OFFICE O/P EST LOW 20 MIN: CPT | Mod: PBBFAC,PO | Performed by: PHYSICAL MEDICINE & REHABILITATION

## 2018-10-29 PROCEDURE — 99999 PR PBB SHADOW E&M-EST. PATIENT-LVL III: CPT | Mod: PBBFAC,,, | Performed by: PHYSICAL MEDICINE & REHABILITATION

## 2018-10-29 PROCEDURE — 99204 OFFICE O/P NEW MOD 45 MIN: CPT | Mod: S$PBB,,, | Performed by: PHYSICAL MEDICINE & REHABILITATION

## 2018-10-29 NOTE — PROGRESS NOTES
PM&R NEW PATIENT HISTORY & PHYSICAL :    Referring Physician:Bere    Chief Complaint   Patient presents with    Back Pain     middle       HPI: This is a 68 y.o.  female being seen in clinic today for evaluation of chronic low and mid back achy pain and tightness.  Her symptoms began/worsened over a year ago after helping with her dog and with her own health limitations.  Since then she feels her posture has worsened and her legs are weaker.  At times she has tightness in the morning or with prolonged sitting-difficulty standing and then ambulating.  She denies numbness/tingling into her legs.  Change of position and stationary bike both provide some relief.     History obtained from patient    Functional History:  Walking: Not limited  Transfers: Independent  Assistive devices: No  Power mobility: No  Falls: None   Directional preference:change or forward flex  Employment status: retired     Needs help with:  Nothing - all ADLS normal    Cooking   Cleaning  Bathing   Dressing   Toileting     Past family, medical, social, and surgical history reviewed in chart    Review of Systems:     General- denies lethargy, weight change, fever, chills  Head/neck- denies swallowing difficulties  ENT- denies hearing changes  Cardiovascular-denies chest pain  Pulmonary- denies shortness of breath  GI- denies constipation or bowel incontinence  - denies bladder incontinence  Skin- denies wounds or rashes  Musculoskeletal- denies weakness, +pain  Neurologic- denies numbness and tingling  Psychiatric- denies depressive or psychotic features, denies anxiety  Lymphatic-denies swelling  Endocrine- denies hypoglycemic symptoms/+DM history  All other pertinent systems negative     Physical Examination:  General: Well developed, well nourished female, NAD  HEENT:NCAT EOMI bilaterally   Pulmonary:Normal respirations    Spinal Examination: CERVICAL  Active ROM is within normal limits.  Inspection: No deformity of spinal  alignment.  Palpation: No vertebral tenderness to percussion.    Spurling test:     Spinal Examination: LUMBAR or THORACIC  Active ROM is within normal limits except limited in ext.  Inspection: obvious scoliotic curvature, right hip higher  No palpable olisthesis.  Palpation: No vertebral tenderness to percussion.  Tight paraspinals, ttp at si joints, gt bursas, and piriformis  Facet loading +bilaterally  SLR Test (seated ):negative bilaterally  Able to stand on heels and toes      Bilateral Upper and Lower Extremities:  Pulses are 2+ at radial,bilaterally.  Shoulder/Elbow/Wrist/Hand ROM   Hip/Knee/Ankle ROM   Bilateral Extremities show normal capillary refill.  No signs of cyanosis, rubor, edema, skin changes, or dysvascular changes of appendages.  Nails appear intact.    Neurological Exam:  Cranial Nerves:  II-XII grossly intact    Manual Muscle Testing: (Motor 5=normal)  RIGHT Lower extremity: Hip flexion 4+/5, Hip Abduction 4+/5, Hip Adduction 4+5, Knee extension 5/5, Knee flexion 5/5, Ankle dorsiflexion 5/5, Extensor hallucis longus 5/5, Ankle plantarflexion 5/5  LEFT Lower extremity:  Hip flexion 4+/5, Hip Abduction 4+/5,Hip Adduction 4+/5, Knee extension 5/5, Knee flexion 5/5, Ankle dorsiflexion 5/5, Extensor hallucis longus 5/5, Ankle plantarflexion 5/5    No focal atrophy is noted of either lower extremity.    Bilateral Reflexes:hypo at patellar    No clonus at knee or ankle.    Sensation: tested to light touch  - intact in legs  Gait: using arms for sit to stand. Limited hip flex on gait      IMPRESSION/PLAN: This is a 68 y.o.  female with lumbar DJD/DDD, scoliosis, muscle tightness, mild weakness     1. Rx for PT-Melania-core, hip, and lower ext strengthening, pelvic stabilization, posture, stretch, ROM, gait eval, modalities, myofascial release  2. Reviewed imaging with patient  3. Ice/heat modalities, cont topical agents-lidocaine and arnica salve  4. Handouts on back care, exercise, etc provided  5.  Jonathan Nunez M.D.  Physical Medicine and Rehab

## 2018-10-29 NOTE — LETTER
October 29, 2018      Beti Blackburn MD  [address] [facility] [place] [address]           [facility] - Physiatry  [address] [place] [address]  Phone: [phone]  Fax: [phone]          Patient: Deirdre Yanez   MR Number: [id number]   YOB: 1950   Date of Visit: 10/29/2018       Dear Dr. Beti Blackburn:    Thank you for referring Deirdre Yanez to me for evaluation. Attached you will find relevant portions of my assessment and plan of care.    If you have questions, please do not hesitate to call me. I look forward to following Deirdre Yanez along with you.    Sincerely,    Deisy Vargas, DELTA    Enclosure  CC:  No Recipients    If you would like to receive this communication electronically, please contact [email or web address] or [phone] to request more information on [employer] access.    For providers and/or their staff who would like to refer a patient to [facility], please contact us through our one-stop-shop provider referral line, [facility], at [phone].    If you feel you have received this communication in error or would no longer like to receive these types of communications, please e-mail [email or web address]

## 2018-10-29 NOTE — PATIENT INSTRUCTIONS
Relieving Back Pain  Back pain is a common problem. You can strain back muscles by lifting too much weight or just by moving the wrong way. Back strain can be uncomfortable, even painful. And it can take weeks or months to improve. To help yourself feel better and prevent future back strains, try these tips.  Important Note: Do not give aspirin to children or teens without first discussing it with your healthcare provider.      ? Ice    Ice reduces muscle pain and swelling. It helps most during the first 24 to 48 hours after an injury.  · Wrap an ice pack or a bag of frozen peas in a thin towel. (Never place ice directly on your skin.)  · Place the ice where your back hurts the most.  · Dont ice for more than 20 minutes at a time.  · You can use ice several times a day.  ? Medicines  Over-the-counter pain relievers can include acetaminophen and anti-inflammatory medicines, which includes aspirin or ibuprofen. They can help ease discomfort. Some also reduce swelling.  · Tell your healthcare provider about any medicines you are already taking.  · Take medicines only as directed.  ? Heat  After the first 48 hours, heat can relax sore muscles and improve blood flow.  · Try a warm bath or shower. Or use a heating pad set on low. To prevent a burn, keep a cloth between you and the heating pad.  · Dont use a heating pad for more than 15 minutes at a time. Never sleep on a heating pad.  Date Last Reviewed: 9/1/2015  © 0527-6309 [employer]. [address] [place] [address]. All rights reserved. This information is not intended as a substitute for professional medical care. Always follow your healthcare professional's instructions.        Back Safety: Poor Posture Hurts  An unhealthy spine often starts with bad habits. Poor movement patterns and posture problems are common causes of back pain. Disk, bone, nerve, and soft tissue problems can all be affected by poor posture. They can lead to pain,  stiffness, and other symptoms.    Poor posture backfires  Poor posture can cause pain. Too much slouching puts increased pressure on the disks. An excessive lumbar curve can overload and inflame the vertebrae. As a result, the back muscles may tighten or spasm to splint and protect the spine. This adds to the pain you feel.    Proper posture: The key to safe movement  Your spine bears your weight throughout the day. This is true whether youre sleeping, standing, or bending. Certain positions strain your spine more than others. But by maintaining proper posture in all positions, you can reduce the stress on your spine.       To improve your standing posture, follow these steps:  · Breathe deeply.  · Relax your shoulders, hips, and ankles. · Think of the ears, shoulders, hips, and ankles as a series of dots. Now, adjust your body to connect the dots in a straight line.  · Tuck your buttocks in just a bit if you need to.      Date Last Reviewed: 10/28/2015  © 6401-7520 [employer]. [address] [place] [address]. All rights reserved. This information is not intended as a substitute for professional medical care. Always follow your healthcare professional's instructions.        Caring for Your Back Throughout the Day  Take care of your back throughout the day. You will likely have fewer back problems if you do. Try to warm up before you move. Shift positions often. Also do your best to form healthy habits.    Warm up for the day  Do a few slow, catlike stretches before starting your day. This simple warmup can soften your disks, stretch your back muscles, and help prevent injuries.  Shift positions often  At work and at home, change positions often. This helps keep your body from getting stiff. Stand up or lean back while you sit. If you can, get up and move every 1/2 hour.  Form healthy habits  Here are some suggestions:   · Keep a healthy weight. When you weigh too much, your back is under  excess strain. But losing just a few extra pounds can help a lot.  · Try not to overeat. Learn about serving sizes. The size of a serving depends on the food and the food group. Many foods list serving sizes on the labels.  · Handle minor aches with cold and heat. Apply cold the first 24 to 48 hours. Use heat after that. Always place a thin cloth between your skin and the source of cold or heat.  · Take medicines as directed. This helps keep pain under control. Always read labels, and call your healthcare provider or pharmacist if you have any questions.  Walk each day  A daily walk keeps your back and thigh muscles stretched and strong. This gives your back better support. Be sure to walk with your spines three curves aligned, by keeping your head, hips, and toes connected by a vertical line.   Date Last Reviewed: 10/18/2015  © 7433-9092 [employer]. [address] [place] [address], [place] [address]. All rights reserved. This information is not intended as a substitute for professional medical care. Always follow your healthcare professional's instructions.        Relieving Tension in Your Back  Being relaxed helps keep your mind healthy and your back ready to move. Take short breaks often. Walk around. Stretch. Switch tasks. Also give the following a try.  Make time to relax. Start by setting aside 5 minutes daily.   Deep breathing    Deep breathing is a simple way to reduce stress. You can do it almost any time you need to relax.  · Inhale slowly through your nose. Let your lungs and stomach expand.  · Hold your breath for 2 to 3 seconds.  · Exhale slowly through your mouth until your lungs feel empty. Repeat 3 to 4 times.  Relieve tension  Muscle tension can create tender spots called trigger points. The tips below may help relieve muscle tension.  · Press the trigger point if you can reach it. If not, lie on a soft tennis ball, or ask a friend to press the spot. Use steady pressure for 10 to 15 seconds.  Breathe deeply. Repeat a few times.  · Massage trigger points with ice for 2 to 5 minutes. Press lightly at first. Slowly increase firmness.  Date Last Reviewed: 10/18/2015  © 1896-9011 [employer]. [address] [place] [address], [place] [address]. All rights reserved. This information is not intended as a substitute for professional medical care. Always follow your healthcare professional's instructions.        Back Safety: Basics of Good Posture  Good posture helps protect you from injury. It also increases your comfort. Aim for good posture throughout the day.    Check your posture  The human body works best when it is properly aligned. To improve your standing posture, follow these steps:  · Take a moment to close your eyes and feel your body. Then breathe deeply and relax your shoulders, hips, and knees.  · Now, from the very top of your head, lift up just a bit. Think of a line linking your ears, shoulders, hips, and ankles. Adjust your body to follow the line. You may need to relax your hips and tuck your buttocks under a bit.  · Next, take a look at yourself in a mirror. Is one ear, shoulder, or hip higher than the other? They should be level.  Check how you sit  When you sit properly, pressure on your back is reduced. Try these steps:  · Sit so that the curve of your lower back fits easily against the chair. Keep your gaze level.  · Support your feet. They should be flat on the floor or on a footrest. Your knees should be level with your hips.  · Adjust the chair height as needed. Sit so your forearms are level with the work surface.  Proper posture helps  When your back is aligned, its more likely to stay safe throughout the day.  · Standing in place. Rest one foot on a stool or low box to ease pressure on your lower back. Switch feet often. If you can, adjust the height of your work surface so your neck and shoulders arent under strain.  · Driving. Sit close enough to the steering wheel to keep  your knees slightly bent. For comfort, your knees should be level with your hips or just a bit lower. Sit as straight as you can. The curve of your lower back should be fully supported.  · Walking. Stand tall and walk with your head up. Let your arms swing while you walk. This helps relax muscles. Wear shoes that fit and support your feet. If you will be standing or walking for a long time, dont wear high heels.  · Sitting and sleeping. Choose your furniture with care. Make sure its not causing or increasing your back pain. Chairs should allow for comfortable, correct sitting posture. Use pillows for added support if needed. Your bed should support your backs natural curves without being too hard or too soft.  Date Last Reviewed: 10/18/2015  © 9910-3708 [employer]. [address] [place] [address], [place] [address]. All rights reserved. This information is not intended as a substitute for professional medical care. Always follow your healthcare professional's instructions.        Back Safety: Getting Into and Out of Bed  Good posture protects your back when you sit, stand, and walk. It is also important while getting into and out of bed. Follow the steps below to get out of bed. Reverse them to get into bed.  Safety tip: Sit at the side of the bed for a few seconds before standing up. Then, after you stand up, wait a moment before walking to be sure youre not dizzy.      Roll onto your side.   1. Roll onto your side  · Keep your knees together.  · Flatten your stomach muscles to keep your back from arching.  · Put your hands on the bed in front of you.     Raise your body.   2. Raise your body  · Push your upper body off the bed as you swing your legs to the floor.  · Keeping your back straight, move your whole body as one unit. Dont bend or twist at the waist.  · Let the weight of your legs help you move.     Stand up.   3. Stand up  · Lean forward from your hip and roll onto the balls of your  feet.  · Flatten your stomach muscles to keep your back from arching.  · Using your arm and leg muscles, push yourself to a standing position.  Date Last Reviewed: 8/31/2015  © 1654-4032 [employer]. [address] [place] [address]. All rights reserved. This information is not intended as a substitute for professional medical care. Always follow your healthcare professional's instructions.        Common Spine and Disk Problems  The most common serious back problems happen when disks tear, bulge, or rupture. In such cases, an injured disk can no longer cushion the vertebrae and absorb shock. As a result, the rest of your spine may also weaken. This can lead to pain, stiffness, and other symptoms.     Torn annulus      Contained herniated disk      Extruded herniated disk   · Torn annulus. A sudden movement may cause a tiny tear in an annulus. Nearby ligaments may stretch.  · Contained herniated disk. As a disk wears out, the nucleus may bulge into the annulus and press on nerves.  · Extruded herniated disk. When a disk ruptures, its nucleus can squeeze out and irritate a nerve.     Arthritis      Instability      Spondylolisthesis   · Arthritis. As disks wear out over time, bone spurs form. These growths can irritate nerves and inflame facets.  · Instability. As a disk stretches, the vertebrae slip back and forth. This can put pressure on the annulus.  · Spondylolisthesis. Listhesis is a condition in which one vertebra has moved forward or backward, in relation to the one above or below it. This causes a crack (stress fracture) in the areas that link the vertebrae together. This may put pressure on the annulus, stretch the disk, and irritate nerves.  Date Last Reviewed: 10/18/2015  © 3873-4744 [employer]. [address] [place] [address]. All rights reserved. This information is not intended as a substitute for professional medical care. Always follow your healthcare  professional's instructions.        Exercises to Strengthen Your Lower Back  Strong lower back and abdominal muscles work together to support your spine. The exercises below will help strengthen the lower back. It is important that you begin exercising slowly and increase levels gradually.  Always begin any exercise program with stretching. If you feel pain while doing any of these exercises, stop and talk to your doctor about a more specific exercise program that better suits your condition.   Low back stretch  The point of stretching is to make you more flexible and increase your range of motion. Stretch only as much as you are able. Stretch slowly. Do not push your stretch to the limit. If at any point you feel pain while stretching, this is your (temporary) limit.  · Lie on your back with your knees bent and both feet on the ground.  · Slowly raise your left knee to your chest as you flatten your lower back against the floor. Hold for 5 seconds.  · Relax and repeat the exercise with your right knee.  · Do 10 of these exercises for each leg.  · Repeat hugging both knees to your chest at the same time.  Building lower back strength  Start your exercise routine with 10 to 30 minutes a day, 1 to 3 times a day.  Initial exercises  Lying on your back:  1. Ankle pumps: Move your foot up and down, towards your head, and then away. Repeat 10 times with each foot.  2. Heel slides: Slowly bend your knee, drawing the heel of your foot towards you. Then slide your heel/foot from you, straightening your knee. Do not lift your foot off the floor (this is not a leg lift).  3. Abdominal contraction: Bend your knees and put your hands on your stomach. Tighten your stomach muscles. Hold for 5 seconds, then relax. Repeat 10 times.  4. Straight leg raise: Bend one leg at the knee and keep the other leg straight. Tighten your stomach muscles. Slowly lift your straight leg 6 to 12 inches off the floor and hold for up to 5 seconds.  Repeat 10 times on each side.  Standin. Wall squats: Stand with your back against the wall. Move your feet about 12 inches away from the wall. Tighten your stomach muscles, and slowly bend your knees until they are at about a 45 degree angle. Do not go down too far. Hold about 5 seconds. Then slowly return to your starting position. Repeat 10 times.  2. Heel raises: Stand facing the wall. Slowly raise the heels of your feet up and down, while keeping your toes on the floor. If you have trouble balancing, you can touch the wall with your hands. Repeat 10 times.  More advanced exercises  When you feel comfortable enough, try these exercises.  1. Kneeling lumbar extension: Begin on your hands and knees. At the same time, raise and straighten your right arm and left leg until they are parallel to the ground. Hold for 2 seconds and come back slowly to a starting position. Repeat with left arm and right leg, alternating 10 times.  2. Prone lumbar extension: Lie face down, arms extended overhead, palms on the floor. At the same time, raise your right arm and left leg as high as comfortably possible. Hold for 10 seconds and slowly return to start. Repeat with left arm and right leg, alternating 10 times. Gradually build up to 20 times. (Advanced: Repeat this exercise raising both arms and both legs a few inches off the floor at the same time. Hold for 5 seconds and release.)  3. Pelvic tilt: Lie on the floor on your back with your knees bent at 90 degrees. Your feet should be flat on the floor. Inhale, exhale, then slowly contract your abdominal muscles bringing your navel toward your spine. Let your pelvis rock back until your lower back is flat on the floor. Hold for 10 seconds while breathing smoothly.  4. Abdominal crunch: Perform a pelvic tilt (above) flattening your lower back against the floor. Holding the tension in your abdominal muscles, take another breath and raise your shoulder blades off the ground (this  is not a full sit-up). Keep your head in line with your body (dont bend your neck forward). Hold for 2 seconds, then slowly lower.  Date Last Reviewed: 6/1/2016  © 4997-1818 [employer]. [address] [place] [address]. All rights reserved. This information is not intended as a substitute for professional medical care. Always follow your healthcare professional's instructions.        Back Exercises: Abdominal Lift Brace with Marching    The abdominal lift brace with march strengthens your lower abdominal muscles, helping you keep your pelvis and back stable:  · Lie on the floor with both knees bent. Put your feet flat on the floor and your arms by your sides. Tighten your abdominal muscles. Be sure to continue to breathe.  · Lift one bent knee about 2 inches then return it to the floor and lift the other about 2 inches. Keep your abdominal muscles tight and continue to breathe. These motions should be slow and controlled without your pelvis rocking side to side.  · Repeat 10 times.  Date Last Reviewed: 8/16/2015  © 3864-6514 [employer]. [address] [place] [address]. All rights reserved. This information is not intended as a substitute for professional medical care. Always follow your healthcare professional's instructions.        Back Exercises: Back Press    Do this exercise on your hands and knees. Keep your knees under your hips and your hands under your shoulders. Keep your spine in a neutral position (not arched or sagging). Be sure to maintain your necks natural curve:  · Tighten your stomach and buttock muscles to press your back upward. Let your head drop slightly.  · Hold for 5 seconds. Return to starting position.  · Repeat 5 times.  Date Last Reviewed: 10/11/2015  © 7124-8811 [employer]. [address] [place] [address]. All rights reserved. This information is not intended as a substitute for professional medical care. Always  follow your healthcare professional's instructions.        Back Exercises: Hip Lift    To start, lie on your back with your knees bent and feet flat on the floor. Dont press your neck or lower back to the floor. Breathe deeply. You should feel comfortable and relaxed in this position:  · Tighten your abdomen and buttocks.  · Slowly raise your hips upward. Be careful not to arch your back.  · Hold for 5 seconds. Lower your hips to the floor.  · Repeat 10 times.  For your safety, check with your healthcare provider before starting an exercise program.   Date Last Reviewed: 8/16/2015  © 2434-0379 [employer]. [address] [place] [address]. All rights reserved. This information is not intended as a substitute for professional medical care. Always follow your healthcare professional's instructions.        Back Exercises: Knee Lift         To start, lie on your back with your knees bent and feet flat on the floor. Dont press your neck or lower back to the floor. Breathe deeply. You should feel comfortable and relaxed in this position:  · Start by tightening your abdominal muscles.  · Lift one bent knee off the floor 2 to 4 inches.  · Hold for 10 seconds. Return to start position.  · Repeat 3 times.  · Switch legs.  Date Last Reviewed: 8/16/2015  © 8378-6984 [employer]. [address] [place] [address]. All rights reserved. This information is not intended as a substitute for professional medical care. Always follow your healthcare professional's instructions.        Back Exercises: Lower Back Rotation    To start, lie on your back with your knees bent and feet flat on the floor. Dont press your neck or lower back to the floor. Breathe deeply. You should feel comfortable and relaxed in this position.  · Drop both knees to one side. Turn your head to the other side. Keep your shoulders flat on the floor.  · Do not push through pain.  · Hold for 20 seconds.  · Slowly  switch sides.  · Repeat 2 to 5 times.  Date Last Reviewed: 10/11/2015  © 5937-8271 [employer]. [address] [place] [address]. All rights reserved. This information is not intended as a substitute for professional medical care. Always follow your healthcare professional's instructions.        Back Exercises: Side Stretch      To start, sit in a chair with your feet flat on the floor. Shift your weight slightly forward to avoid rounding your back. Relax. Keep your ears, shoulders, and hips aligned:  · Stretch your right arm overhead.  · Slowly bend to the left. Dont twist your torso. Stay within your pain limits.  · Hold for 20 seconds. Return to starting position.  · Repeat 2 to 5 times. Then, switch to the other side.  Date Last Reviewed: 10/13/2015  © [id number] [employer]. [address] [place] [address]. All rights reserved. This information is not intended as a substitute for professional medical care. Always follow your healthcare professional's instructions.        Lumbar Extension (Flexibility)    1. Lie face down on your stomach, forehead on the floor. You can lie on a mat or towel.  2. Bend your arms next to your body and lift your upper body up on your forearms. Your palms and forearms should be flat on the floor. Keep your stomach and hips on the floor.  3. Hold your upper body up with your forearms for 20 seconds. Slowly lower back down to the floor.  4. Repeat 2 times, or as instructed.  Date Last Reviewed: 3/10/2016  © 7865-7131 [employer]. [address] [place] [address]. All rights reserved. This information is not intended as a substitute for professional medical care. Always follow your healthcare professional's instructions.

## 2018-11-04 ENCOUNTER — PATIENT MESSAGE (OUTPATIENT)
Dept: PHYSICAL MEDICINE AND REHAB | Facility: CLINIC | Age: 68
End: 2018-11-04

## 2018-11-05 RX ORDER — TIZANIDINE 2 MG/1
2 TABLET ORAL NIGHTLY PRN
Qty: 30 TABLET | Refills: 1 | Status: SHIPPED | OUTPATIENT
Start: 2018-11-05 | End: 2018-12-04 | Stop reason: SDUPTHER

## 2018-11-09 ENCOUNTER — DOCUMENTATION ONLY (OUTPATIENT)
Dept: ENDOSCOPY | Facility: HOSPITAL | Age: 68
End: 2018-11-09

## 2018-11-09 NOTE — PROGRESS NOTES
Endoscopy Scheduling Questionnaire:    Call Type:Incoming call via [employer]    1. Have you been admitted overnight to the hospital in the past 3 months? no  2. Do you get CP and SOB while walking up a flight of stairs? no  3. Have you had a stent placed in the past 12 months? no  4. Have you had a stroke or heart attack in the past 6 months? no  5. Have you had any chest pain in the past 3 months? no      If so, have you been evaluated by your PCP or Cardiologist? no  6. Do you take weight loss medications? no  7. Have you been diagnosed with Diverticulitis within the past 3 months? no  8. Are you having any GI symptoms that you feel need to be evaluated prior to your procedure? no  9. Are you on dialysis? no  10. Are you diabetic? no  11. Do you have any other health issues that you feel might limit your ability to safely have the procedure and/or sedation? no  12. Is the patient over 79 yo? no        If so, has the patient been seen by their PCP or GI in the last 3 months? N/A       -I have reviewed the last colonoscopy for recommendations regarding surveillance and bowel prep.   -I have reviewed the patient's medications and allergies. She is not on high risk medications and will not require cardiac clearance.  -I have verified the pharmacy information. The prep being used is Suprep. The patient's prep instructions were sent via [facility] patient portal..    Date Endoscopy Scheduled: (01/10/19)

## 2018-11-29 ENCOUNTER — OFFICE VISIT (OUTPATIENT)
Dept: OBSTETRICS AND GYNECOLOGY | Facility: CLINIC | Age: 68
End: 2018-11-29
Payer: MEDICARE

## 2018-11-29 VITALS
WEIGHT: 219.38 LBS | BODY MASS INDEX: 32.49 KG/M2 | HEIGHT: 69 IN | SYSTOLIC BLOOD PRESSURE: 148 MMHG | DIASTOLIC BLOOD PRESSURE: 99 MMHG

## 2018-11-29 DIAGNOSIS — Z01.419 WELL WOMAN EXAM WITH ROUTINE GYNECOLOGICAL EXAM: Primary | ICD-10-CM

## 2018-11-29 DIAGNOSIS — Z12.31 VISIT FOR SCREENING MAMMOGRAM: ICD-10-CM

## 2018-11-29 DIAGNOSIS — Z87.410 HISTORY OF CERVICAL DYSPLASIA: ICD-10-CM

## 2018-11-29 DIAGNOSIS — R32 URINARY INCONTINENCE, UNSPECIFIED TYPE: ICD-10-CM

## 2018-11-29 PROCEDURE — G0101 CA SCREEN;PELVIC/BREAST EXAM: HCPCS | Mod: PBBFAC

## 2018-11-29 PROCEDURE — 99999 PR PBB SHADOW E&M-EST. PATIENT-LVL III: CPT | Mod: PBBFAC,,, | Performed by: OBSTETRICS & GYNECOLOGY

## 2018-11-29 PROCEDURE — 99213 OFFICE O/P EST LOW 20 MIN: CPT | Mod: PBBFAC,25 | Performed by: OBSTETRICS & GYNECOLOGY

## 2018-11-29 PROCEDURE — 88175 CYTOPATH C/V AUTO FLUID REDO: CPT

## 2018-11-29 PROCEDURE — 87624 HPV HI-RISK TYP POOLED RSLT: CPT

## 2018-11-29 PROCEDURE — G0101 CA SCREEN;PELVIC/BREAST EXAM: HCPCS | Mod: S$PBB,,, | Performed by: OBSTETRICS & GYNECOLOGY

## 2018-11-29 NOTE — PROGRESS NOTES
HPI:  68 y.o. female patient  presents today for annual exam.  She is  and it not on HRT.  Hx of CKC in early 's, with normal paps since.  She requests pap today.  She is c/o urinary leakage associated with lifting of heavy items and associated with low back pain.  History of low back injury from fall several years ago.  No leakage with cough or sneeze.      Past Medical History:   Diagnosis Date    Abrasion     left eye    Abrasion and/or friction burn of abdominal wall with infection     left eye    Arthritis     gouty arthritis    Diabetes mellitus       2013    Diverticulosis     DM (diabetes mellitus)      2014  A1C 6.0 x 2 weeks    DM (diabetes mellitus)     BS 89 10/14/2017 A1C 5.7   Diet controlled    DM (diabetes mellitus)     BS 90 am 10/23/2018     Hepatitis     Hx of B/C from cadaver in past    Hiatal hernia     Hip bursitis, left     HTN (hypertension)     Hypertensive CHF (congestive heart failure) 3/28/2014    Positive ALBIN (antinuclear antibody)     Pure hypercholesterolemia 2016    Scoliosis        Past Surgical History:   Procedure Laterality Date    APPENDECTOMY      bone grafts      CERVICAL CONIZATION   W/ LASER      COLONOSCOPY N/A 10/28/2013    [facility] ENDO    COSMETIC SURGERY Bilateral     ears    DILATION AND CURETTAGE OF UTERUS      [id number] N/A 2018    [facility] at [facility] OR    KNEE ARTHROSCOPY W/ MENISCAL REPAIR Right     LIVER BIOPSY      SHAVING-ENDOMETRIAL  2018    [facility] at [facility] OR    sinus lift      2003    TONSILLECTOMY, ADENOIDECTOMY      TUBAL LIGATION         REVIEW OF SYSTEMS:  GENERAL:  No fever, chills, fatigue, or weight loss  ABDOMEN:  Normal appetite, no weight loss or abdominal pain  URINARY:  No flank pain, dysuria, or hematuria  REPRODUCTIVE:  No abnormal vaginal  bleeding  BREASTS:  No tenderness, masses, or nipple discharge noted of breasts    PHYSICAL EXAM:    APPEARANCE:  Well nourished, well developed, in no acute distress  NECK:  Symmetric without masses or thyromegaly  BREASTS:  Symmetrical, no skin changes or visible lesions.  No palpable masses, nipple discharge, or adenopathy bilaterally.  ABDOMEN:  Soft, no tenderness or masses, no distension noted  PELVIC:  VULVA:  No lesions.  Normal female genitalia  URETHRAL MEATUS:  Normal size and location.  No lesions.  No prolapse  URETHRA:  No masses, tenderness, prolapse, or scarring  VAGINA:  No lesions or discharge.  3rd degree cystocele, minimal rectocele  CERVIX:  No lesions.  Normal diameter, no cervical motion tenderness.  UTERUS:  4-6 week size, regular shape, mobile, non-tender, normal position, good support  ADNEXA:  No masses or tenderness  ANUS AND PERINEUM:  No lesions.  No external hemorrhoids    1. Well woman exam with routine gynecological exam  Liquid-based pap smear, screening    HPV High Risk Genotypes, PCR   2. History of cervical dysplasia     3. Urinary incontinence, unspecified type           PLAN:  MMG annually  Pelvic floor physical therapy referral to [facility]  Patient counseled regarding recommended routine health maintenance for her age.

## 2018-12-04 ENCOUNTER — TELEPHONE (OUTPATIENT)
Dept: INTERNAL MEDICINE | Facility: CLINIC | Age: 68
End: 2018-12-04

## 2018-12-04 RX ORDER — TIZANIDINE 2 MG/1
TABLET ORAL
Qty: 30 TABLET | Refills: 4 | Status: SHIPPED | OUTPATIENT
Start: 2018-12-04 | End: 2019-07-14 | Stop reason: SDUPTHER

## 2018-12-05 LAB
HPV HR 12 DNA CVX QL NAA+PROBE: NEGATIVE
HPV16 AG SPEC QL: NEGATIVE
HPV18 DNA SPEC QL NAA+PROBE: NEGATIVE

## 2018-12-17 ENCOUNTER — TELEPHONE (OUTPATIENT)
Dept: ENDOSCOPY | Facility: HOSPITAL | Age: 68
End: 2018-12-17

## 2018-12-27 ENCOUNTER — HOSPITAL ENCOUNTER (OUTPATIENT)
Dept: RADIOLOGY | Facility: HOSPITAL | Age: 68
Discharge: HOME | End: 2018-12-27
Attending: OBSTETRICS & GYNECOLOGY
Payer: MEDICARE

## 2018-12-27 DIAGNOSIS — Z12.31 VISIT FOR SCREENING MAMMOGRAM: ICD-10-CM

## 2018-12-27 PROCEDURE — 77067 SCR MAMMO BI INCL CAD: CPT | Mod: 26,,, | Performed by: RADIOLOGY

## 2018-12-27 PROCEDURE — 77063 BREAST TOMOSYNTHESIS BI: CPT | Mod: TC

## 2018-12-27 PROCEDURE — 77063 BREAST TOMOSYNTHESIS BI: CPT | Mod: 26,,, | Performed by: RADIOLOGY

## 2018-12-27 PROCEDURE — 77067 SCR MAMMO BI INCL CAD: CPT | Mod: TC

## 2018-12-28 ENCOUNTER — PATIENT MESSAGE (OUTPATIENT)
Dept: PHYSICAL MEDICINE AND REHAB | Facility: CLINIC | Age: 68
End: 2018-12-28

## 2019-01-03 ENCOUNTER — PES CALL (OUTPATIENT)
Dept: ADMINISTRATIVE | Facility: CLINIC | Age: 69
End: 2019-01-03

## 2019-01-03 DIAGNOSIS — R29.898 WEAKNESS OF BOTH HIPS: ICD-10-CM

## 2019-01-03 DIAGNOSIS — M51.36 DDD (DEGENERATIVE DISC DISEASE), LUMBAR: ICD-10-CM

## 2019-01-03 DIAGNOSIS — M41.20 SCOLIOSIS (AND KYPHOSCOLIOSIS), IDIOPATHIC: Primary | ICD-10-CM

## 2019-01-10 ENCOUNTER — ANESTHESIA (OUTPATIENT)
Dept: ENDOSCOPY | Facility: HOSPITAL | Age: 69
End: 2019-01-10
Payer: MEDICARE

## 2019-01-10 ENCOUNTER — ANESTHESIA EVENT (OUTPATIENT)
Dept: ENDOSCOPY | Facility: HOSPITAL | Age: 69
End: 2019-01-10
Payer: MEDICARE

## 2019-01-10 ENCOUNTER — HOSPITAL ENCOUNTER (OUTPATIENT)
Facility: HOSPITAL | Age: 69
Discharge: HOME | End: 2019-01-10
Attending: SURGERY | Admitting: SURGERY
Payer: MEDICARE

## 2019-01-10 VITALS
BODY MASS INDEX: 32.58 KG/M2 | SYSTOLIC BLOOD PRESSURE: 140 MMHG | WEIGHT: 220 LBS | OXYGEN SATURATION: 100 % | TEMPERATURE: 98 F | HEART RATE: 62 BPM | HEIGHT: 69 IN | DIASTOLIC BLOOD PRESSURE: 80 MMHG | RESPIRATION RATE: 16 BRPM

## 2019-01-10 DIAGNOSIS — Z12.11 COLON CANCER SCREENING: ICD-10-CM

## 2019-01-10 DIAGNOSIS — K63.5 POLYP OF COLON, UNSPECIFIED PART OF COLON, UNSPECIFIED TYPE: Primary | ICD-10-CM

## 2019-01-10 LAB — POCT GLUCOSE: 128 MG/DL (ref 70–110)

## 2019-01-10 PROCEDURE — 25000003 PHARM REV CODE 250: Performed by: SURGERY

## 2019-01-10 PROCEDURE — 27201012 HC FORCEPS, HOT/COLD, DISP: Performed by: SURGERY

## 2019-01-10 PROCEDURE — 45380 PR COLONOSCOPY,BIOPSY: ICD-10-PCS | Mod: PT,,, | Performed by: SURGERY

## 2019-01-10 PROCEDURE — 88305 TISSUE EXAM BY PATHOLOGIST: CPT | Mod: 26,,, | Performed by: PATHOLOGY

## 2019-01-10 PROCEDURE — 88305 TISSUE SPECIMEN TO PATHOLOGY - SURGERY: ICD-10-PCS | Mod: 26,,, | Performed by: PATHOLOGY

## 2019-01-10 PROCEDURE — 37000009 HC ANESTHESIA EA ADD 15 MINS: Performed by: SURGERY

## 2019-01-10 PROCEDURE — 63600175 PHARM REV CODE 636 W HCPCS: Performed by: NURSE ANESTHETIST, CERTIFIED REGISTERED

## 2019-01-10 PROCEDURE — 45380 COLONOSCOPY AND BIOPSY: CPT | Mod: PT,,, | Performed by: SURGERY

## 2019-01-10 PROCEDURE — 37000008 HC ANESTHESIA 1ST 15 MINUTES: Performed by: SURGERY

## 2019-01-10 PROCEDURE — 88305 TISSUE EXAM BY PATHOLOGIST: CPT | Mod: 59 | Performed by: PATHOLOGY

## 2019-01-10 PROCEDURE — 45380 COLONOSCOPY AND BIOPSY: CPT | Performed by: SURGERY

## 2019-01-10 RX ORDER — SODIUM CHLORIDE 0.9 % (FLUSH) 0.9 %
3 SYRINGE (ML) INJECTION
Status: DISCONTINUED | OUTPATIENT
Start: 2019-01-10 | End: 2019-01-10 | Stop reason: HOSPADM

## 2019-01-10 RX ORDER — SODIUM CHLORIDE, SODIUM LACTATE, POTASSIUM CHLORIDE, CALCIUM CHLORIDE 600; 310; 30; 20 MG/100ML; MG/100ML; MG/100ML; MG/100ML
INJECTION, SOLUTION INTRAVENOUS CONTINUOUS
Status: DISCONTINUED | OUTPATIENT
Start: 2019-01-10 | End: 2019-01-10 | Stop reason: HOSPADM

## 2019-01-10 RX ORDER — PROPOFOL 10 MG/ML
INJECTION, EMULSION INTRAVENOUS
Status: DISCONTINUED | OUTPATIENT
Start: 2019-01-10 | End: 2019-01-10

## 2019-01-10 RX ORDER — LIDOCAINE HCL/PF 100 MG/5ML
SYRINGE (ML) INTRAVENOUS
Status: DISCONTINUED | OUTPATIENT
Start: 2019-01-10 | End: 2019-01-10

## 2019-01-10 RX ADMIN — SODIUM CHLORIDE, SODIUM LACTATE, POTASSIUM CHLORIDE, AND CALCIUM CHLORIDE: 600; 310; 30; 20 INJECTION, SOLUTION INTRAVENOUS at 09:01

## 2019-01-10 RX ADMIN — PROPOFOL 40 MG: 10 INJECTION, EMULSION INTRAVENOUS at 11:01

## 2019-01-10 RX ADMIN — SODIUM CHLORIDE, SODIUM LACTATE, POTASSIUM CHLORIDE, AND CALCIUM CHLORIDE: 600; 310; 30; 20 INJECTION, SOLUTION INTRAVENOUS at 11:01

## 2019-01-10 RX ADMIN — PROPOFOL 140 MG: 10 INJECTION, EMULSION INTRAVENOUS at 11:01

## 2019-01-10 RX ADMIN — PROPOFOL 60 MG: 10 INJECTION, EMULSION INTRAVENOUS at 11:01

## 2019-01-10 RX ADMIN — LIDOCAINE HYDROCHLORIDE 100 MG: 20 INJECTION, SOLUTION INTRAVENOUS at 11:01

## 2019-01-10 NOTE — PLAN OF CARE
Patient adequately sedated, time out done and agreed by all staff.   See anesthesia notes for vitals signs and medications.

## 2019-01-10 NOTE — DISCHARGE INSTRUCTIONS
Understanding Colon and Rectal Polyps    The colon (also called the large intestine) is a muscular tube that forms the last part of the digestive tract. It absorbs water and stores food waste. The colon is about 4 to 6 feet long. The rectum is the last 6 inches of the colon. The colon and rectum have a smooth lining composed of millions of cells. Changes in these cells can lead to growths in the colon that can become cancerous and should be removed. Multiple tests are available to screen for colon cancer, but the colonoscopy is the most recommended test. During colonoscopy, these polyps can be removed. How often you need this test depends on many things including your condition, your family history, symptoms, and what the findings were at the previous colonoscopy.   When the colon lining changes  Changes that happen in the cells that line the colon or rectum can lead to growths called polyps. Over a period of years, polyps can turn cancerous. Removing polyps early may prevent cancer from ever forming.  Polyps  Polyps are fleshy clumps of tissue that form on the lining of the colon or rectum. Small polyps are usually benign (not cancerous). However, over time, cells in a polyp can change and become cancerous. Certain types of polyps known as adenomatous polyps are premalignant. The risk for invasive cancer increases with the size of the polyp and certain cell and gene features. This means that they can become cancerous if they're not removed. Hyperplastic polyps are benign. They can grow quite large and not turn cancerous.   Cancer  Almost all colorectal cancers start when polyp cells begin growing abnormally. As a cancerous tumor grows, it may involve more and more of the colon or rectum. In time, cancer can also grow beyond the colon or rectum and spread to nearby organs or to glands called lymph nodes. The cells can also travel to other parts of the body. This is known as metastasis. The earlier a cancerous  tumor is removed, the better the chance of preventing its spread.    Date Last Reviewed: 8/1/2016  © 9841-8397 [employer]. [address] [place] [address], [place] [address]. All rights reserved. This information is not intended as a substitute for professional medical care. Always follow your healthcare professional's instructions.        Hemorrhoids    Hemorrhoids are swollen and inflamed veins inside the rectum and near the anus. The rectum is the last several inches of the colon. The anus is the passage between the rectum and the outside of the body.  Causes  The veins can become swollen due to increased pressure in them. This is most often caused by:  · Chronic constipation or diarrhea  · Straining when having a bowel movement  · Sitting too long on the toilet  · A low-fiber diet  · Pregnancy  Symptoms  · Bleeding from the rectum (this may be noticeable after bowel movements)  · Lump near the anus  · Itching around the anus  · Pain around the anus  There are different types of hemorrhoids. Depending on the type you have and the severity, you may be able to treat yourself at home. In some cases, a procedure may be the best treatment option. Your healthcare provider can tell you more about this, if needed.  Home care  General care  · To get relief from pain or itching, try:  ¨ Topical products. Your healthcare provider may prescribe or recommend creams, ointments, or pads that can be applied to the hemorrhoid. Use these exactly as directed.  ¨ Medicines. Your healthcare provider may recommend stool softeners, suppositories, or laxatives to help manage constipation. Use these exactly as directed.  ¨ Sitz baths. A sitz bath involves sitting in a few inches of warm bath water. Be careful not to make the water so hot that you burn yourself--test it before sitting in it. Soak for about 10 to 15 minutes a few times a day. This may help relieve pain.  Tips to help prevent hemorrhoids  · Eat more fiber. Fiber adds  bulk to stool and absorbs water as it moves through your colon. This makes stool softer and easier to pass.  ¨ Increase the fiber in your diet with more fiber-rich foods. These include fresh fruit, vegetables, and whole grains.  ¨ Take a fiber supplement or bulking agent, if advised to by your provider. These include products such as psyllium or methylcellulose.  · Drink plenty of water, if directed to by your provider. This can help keep stool soft.  · Be more active. Frequent exercise aids digestion and helps prevent constipation. It may also help make bowel movements more regular.  · Dont strain during bowel movements. This can make hemorrhoids more likely. Also, dont sit on the toilet for long periods of time.  Follow-up care  Follow up with your healthcare provider, or as advised. If a culture or imaging tests were done, you will be notified of the results when they are ready. This may take a few days or longer.  When to seek medical advice  Call your healthcare provider right away if any of these occur:  · Increased bleeding from the rectum  · Increased pain around the rectum or anus  · Weakness or dizziness  Call 911  Call 911 or return to the emergency department right away if any of these occur:  · Trouble breathing or swallowing  · Fainting or loss of consciousness  · Unusually fast heart rate  · Vomiting blood  · Large amounts of blood in stool  Date Last Reviewed: 6/22/2015  © 1282-7836 [employer]. [address] [place] [address], [place] [address]. All rights reserved. This information is not intended as a substitute for professional medical care. Always follow your healthcare professional's instructions.

## 2019-01-10 NOTE — TRANSFER OF CARE
"Anesthesia Transfer of Care Note    Patient: Deirdre Yanez    Procedure(s) Performed: Procedure(s) (LRB):  COLONOSCOPY (N/A)    Patient location: PACU    Anesthesia Type: MAC    Transport from OR: Transported from OR on room air with adequate spontaneous ventilation    Post pain: adequate analgesia    Post assessment: no apparent anesthetic complications    Post vital signs: stable    Level of consciousness: awake and alert    Nausea/Vomiting: no nausea/vomiting    Complications: none    Transfer of care protocol was followed      Last vitals:   Visit Vitals  BP (!) 159/74 (BP Location: Left arm, Patient Position: Lying)   Pulse 64   Resp 20   Ht 5' 9" (1.753 m)   Wt 99.8 kg (220 lb)   SpO2 98%   Breastfeeding? No   BMI 32.49 kg/m²     "

## 2019-01-10 NOTE — BRIEF OP NOTE
[facility]  Brief Operative Note     SUMMARY     Surgery Date: 1/10/2019     Surgeon(s) and Role:     * Nixon Thornton MD - Primary    Assisting Surgeon: None    Pre-op Diagnosis:  Screening [Z13.9]    Post-op Diagnosis:  Post-Op Diagnosis Codes:     * Screening [Z13.9]    Procedure(s) (LRB):  COLONOSCOPY (N/A)    Anesthesia: Choice    Description of the findings of the procedure: colonoscopy    Findings/Key Components: see op note    Estimated Blood Loss: * No values recorded between 1/10/2019 12:00 AM and 1/10/2019 11:09 AM *         Specimens:   Specimen (12h ago, onward)    None          Discharge Note    SUMMARY     Admit Date: 1/10/2019    Discharge Date and Time:  01/10/2019 11:09 AM    Hospital Course the patient underwent colonoscopy and was discharged post op    Final Diagnosis: Post-Op Diagnosis Codes:     * Screening [Z13.9]    Disposition: Home or Self Care    Follow Up/Patient Instructions:     Medications:  Reconciled Home Medications:      Medication List      CHANGE how you take these medications    aspirin 81 MG Chew  Take 1 tablet (81 mg total) by mouth once daily.  What changed:    · when to take this  · reasons to take this     furosemide 20 MG tablet  Commonly known as:  LASIX  Take 1 tablet (20 mg total) by mouth once daily.  What changed:    · when to take this  · reasons to take this        CONTINUE taking these medications    b complex vitamins tablet  Take 1 tablet by mouth once daily.     CO Q-10 100 mg capsule  Generic drug:  coenzyme Q10  Take 100 mg by mouth once daily.     fluticasone 50 mcg/actuation nasal spray  Commonly known as:  FLONASE  SHAKE LIQUID AND USE 2 SPRAYS IN EACH NOSTRIL DAILY     inositol 500 mg Tab  Take 500 mg by mouth 2 (two) times daily.     * nebivolol 10 MG Tab  Commonly known as:  BYSTOLIC  Take 1 tablet (10 mg total) by mouth once daily. Take along with Bystolic 10mg for a total of 15mg daily.     * nebivolol 5 MG Tab  Commonly known as:   BYSTOLIC  Take 1 tablet (5 mg total) by mouth once daily. Take with Bystolic 10mg for a total of 15mg by mouth daily.     spironolactone 50 MG tablet  Commonly known as:  ALDACTONE  Take 1 tablet (50 mg total) by mouth every evening.     tiZANidine 2 MG tablet  Commonly known as:  ZANAFLEX  TAKE 1 TABLET(2 MG) BY MOUTH EVERY NIGHT AS NEEDED         * This list has 2 medication(s) that are the same as other medications prescribed for you. Read the directions carefully, and ask your doctor or other care provider to review them with you.            ASK your doctor about these medications    sodium,potassium,mag sulfates 17.5-3.13-1.6 gram Solr  Commonly known as:  SUPREP BOWEL PREP KIT  Take 354 mLs by mouth daily 2 hours after breakfast.          Discharge Procedure Orders   Notify your health care provider if you experience any of the following:  temperature >100.4     Notify your health care provider if you experience any of the following:  persistent nausea and vomiting or diarrhea     Notify your health care provider if you experience any of the following:  severe uncontrolled pain     Notify your health care provider if you experience any of the following:  redness, tenderness, or signs of infection (pain, swelling, redness, odor or green/yellow discharge around incision site)     Notify your health care provider if you experience any of the following:  difficulty breathing or increased cough     Notify your health care provider if you experience any of the following:  severe persistent headache     Notify your health care provider if you experience any of the following:  worsening rash     Notify your health care provider if you experience any of the following:  persistent dizziness, light-headedness, or visual disturbances     Notify your health care provider if you experience any of the following:  increased confusion or weakness     Activity as tolerated     Follow-up Information     Nixon Thornton MD.     Specialties:  General Surgery, Bariatrics  Why:  As needed  Contact information:  [address] [place] [address] [address] [place] [id number] [phone]

## 2019-01-10 NOTE — [employer] PATIENT INSTRUCTIONS
Discharge Summary/Instructions after an Endoscopic Procedure  Patient Name: Deirdre Yanez  Patient MRN: [id number]  Patient YOB: 1950  Thursday, January 10, 2019 Nixon Thornton MD  RESTRICTIONS:  During your procedure today, you received medications for sedation.  These   medications may affect your judgment, balance and coordination.  Therefore,   for 24 hours, you have the following restrictions:   - DO NOT drive a car, operate machinery, make legal/financial decisions,   sign important papers or drink alcohol.    ACTIVITY:  Today: no heavy lifting, straining or running due to procedural   sedation/anesthesia.  The following day: return to full activity including work.  DIET:  Eat and drink normally unless instructed otherwise.     TREATMENT FOR COMMON SIDE EFFECTS:  - Mild abdominal pain, nausea, belching, bloating or excessive gas:  rest,   eat lightly and use a heating pad.  - Sore Throat: treat with throat lozenges and/or gargle with warm salt   water.  - Because air was used during the procedure, expelling large amounts of air   from your rectum or belching is normal.  - If a bowel prep was taken, you may not have a bowel movement for 1-3 days.    This is normal.  SYMPTOMS TO WATCH FOR AND REPORT TO YOUR PHYSICIAN:  1. Abdominal pain or bloating, other than gas cramps.  2. Chest pain.  3. Back pain.  4. Signs of infection such as: chills or fever occurring within 24 hours   after the procedure.  5. Rectal bleeding, which would show as bright red, maroon, or black stools.   (A tablespoon of blood from the rectum is not serious, especially if   hemorrhoids are present.)  6. Vomiting.  7. Weakness or dizziness.  GO DIRECTLY TO THE NEAREST EMERGENCY ROOM IF YOU HAVE ANY OF THE FOLLOWING:      Difficulty breathing              Chills and/or fever over 101 F   Persistent vomiting and/or vomiting blood   Severe abdominal pain   Severe chest pain   Black, tarry stools   Bleeding- more than one  tablespoon   Any other symptom or condition that you feel may need urgent attention  Your doctor recommends these additional instructions:  If any biopsies were taken, your doctors clinic will contact you in 1 to 2   weeks with any results.  - Patient has a contact number available for emergencies.  The signs and   symptoms of potential delayed complications were discussed with the   patient.  Return to normal activities tomorrow.  Written discharge   instructions were provided to the patient.   - Resume previous diet.   - Continue present medications.   - Await pathology results.   - Repeat colonoscopy in 5 years for surveillance.   - Return to referring physician as previously scheduled.   - Discharge patient to home.  For questions, problems or results please call your physician Nixon Thornton MD at Work:  [phone]  If you have any questions about the above instructions, call the GI   department at [phone] or call the endoscopy unit at [phone]   from 7am until 3 pm.  [facility], EMERGENCY ROOM PHONE NUMBER:   [phone]  IF A COMPLICATION OR EMERGENCY SITUATION ARISES AND YOU ARE UNABLE TO REACH   YOUR PHYSICIAN - GO DIRECTLY TO THE EMERGENCY ROOM.  I have read or have had read to me these discharge instructions for my   procedure and have received a written copy.  I understand these   instructions and will follow-up with my physician if I have any questions.     __________________________________       _____________________________________  Nurse Signature                                          Patient/Designated   Responsible Party Signature  Nixon Thornton MD  1/10/2019 11:42:52 AM  This report has been verified and signed electronically.  [employer]

## 2019-01-10 NOTE — H&P
Endoscopy H&P    Procedure : Colonoscopy      personal history of colon polyps and most recent endoscopic exam 5 years ago      Past Medical History:   Diagnosis Date    Abrasion     left eye    Abrasion and/or friction burn of abdominal wall with infection     left eye    Arthritis     gouty arthritis    Diabetes mellitus     2011  11/17/2013    Diverticulosis     DM (diabetes mellitus) 2011     12/03/2014  A1C 6.0 x 2 weeks    DM (diabetes mellitus) 2011    BS 89 10/14/2017 A1C 5.7   Diet controlled    DM (diabetes mellitus) 2011    BS 90 am 10/23/2018     Hepatitis     Hx of B/C from cadaver in past    Hiatal hernia     Hip bursitis, left     HTN (hypertension)     Hypertensive CHF (congestive heart failure) 3/28/2014    Positive ALBIN (antinuclear antibody)     Pure hypercholesterolemia 9/30/2016    Scoliosis      Past Surgical History:   Procedure Laterality Date    APPENDECTOMY      bone grafts      CERVICAL CONIZATION   W/ LASER      COLONOSCOPY N/A 10/28/2013    [facility] ENDO    COSMETIC SURGERY Bilateral     ears    DILATION AND CURETTAGE OF UTERUS      [id number] N/A 1/2/2018    [facility] at [facility] OR    KNEE ARTHROSCOPY W/ MENISCAL REPAIR Right     LIVER BIOPSY      SHAVING-ENDOMETRIAL  1/2/2018    [facility] at [facility] OR    sinus lift      2003    TONSILLECTOMY, ADENOIDECTOMY      TUBAL LIGATION       No current facility-administered medications on file prior to encounter.      Current Outpatient Medications on File Prior to Encounter   Medication Sig Dispense Refill    b complex vitamins tablet Take 1 tablet by mouth once daily.      coenzyme Q10 (CO Q-10) 100 mg capsule Take 100 mg by mouth once daily.      fluticasone (FLONASE) 50 mcg/actuation nasal spray SHAKE LIQUID AND USE 2 SPRAYS IN EACH NOSTRIL DAILY 1 Bottle 12    furosemide (LASIX) 20 MG  "tablet Take 1 tablet (20 mg total) by mouth once daily. (Patient taking differently: Take 20 mg by mouth daily as needed. ) 90 tablet 3    inositol 500 mg Tab Take 500 mg by mouth 2 (two) times daily.      nebivolol (BYSTOLIC) 10 MG Tab Take 1 tablet (10 mg total) by mouth once daily. Take along with Bystolic 10mg for a total of 15mg daily. 90 tablet 3    nebivolol (BYSTOLIC) 5 MG Tab Take 1 tablet (5 mg total) by mouth once daily. Take with Bystolic 10mg for a total of 15mg by mouth daily. 90 tablet 3    spironolactone (ALDACTONE) 50 MG tablet Take 1 tablet (50 mg total) by mouth every evening. 90 tablet 3    aspirin 81 MG Chew Take 1 tablet (81 mg total) by mouth once daily. (Patient taking differently: Take 81 mg by mouth daily as needed. )  0    sodium,potassium,mag sulfates (SUPREP BOWEL PREP KIT) 17.5-3.13-1.6 gram SolR Take 354 mLs by mouth daily 2 hours after breakfast. 1 Bottle 0     Review of patient's allergies indicates:   Allergen Reactions    Arb-angiotensin receptor antagonist Edema    Hydralazine analogues      "Lupus reaction"    Metoprolol Other (See Comments)     Alopecia    Sulfa (sulfonamide antibiotics)      Passed out and almost stopped breathing    Ace inhibitors Other (See Comments)     cough    Calcium channel blocking agents-dihydropyridines      Edema               Review of Systems -ROS:  GENERAL: No fever, chills, fatigability or weight loss.  CHEST: Denies DESAI, cyanosis, wheezing, cough and sputum production.  CARDIOVASCULAR: Denies chest pain, PND, orthopnea or reduced exercise tolerance.   Musculoskeletal ROS: negative for - gait disturbance or joint pain  Neurological ROS: negative for - confusion or memory loss        Physical Exam:  General: well developed, well nourished, no distress  Head: normocephalic  Neck: supple, symmetrical, trachea midline  Lungs:  clear to auscultation bilaterally and normal respiratory effort  Heart: regular rate and rhythm, S1, S2 normal, " no murmur, rub or gallop and regular rate and rhythm  Abdomen: soft, non-tender non-distented; bowel sounds normal; no masses,  no organomegaly  Extremities: no cyanosis or edema, or clubbing       Moderate Sedation (choice): Mallampati Score 1    ASA : II    IMP: personal history of colon polyps and most recent endoscopic exam 5 years ago    Plan: Colonoscopy with Moderate sedation.  I have explained the procedure including indications, alternatives, expected outcomes and potential complications. The patient appears to understand and gives informed consent. The patient is medically ready for surgery.

## 2019-01-10 NOTE — ANESTHESIA PREPROCEDURE EVALUATION
01/10/2019  Deirdre Yanez is a 68 y.o., female.    Anesthesia Evaluation    I have reviewed the Patient Summary Reports.    I have reviewed the Nursing Notes.   I have reviewed the Medications.     Review of Systems  Anesthesia Hx:  No problems with previous Anesthesia  Denies Family Hx of Anesthesia complications.   Denies Personal Hx of Anesthesia complications.   Social:  Non-Smoker, Social Alcohol Use    Hematology/Oncology:        Oncology Comments: History of cervical dysplasia   EENT/Dental:   Cataracts, bilateral   Cardiovascular:   Hypertension Denies MI.   Denies CABG/stent.  CHF hyperlipidemia ECG has been reviewed. ekg 8/2018:  Normal sinus rhythm 60  Normal ECG  When compared with ECG of 15-AUG-2017 10:22,  No significant change was found    Diastolic CHF, chronic   Pulmonary:   Denies COPD.  Denies Asthma.  Denies Sleep Apnea.    Renal/:  Renal/ Normal     Hepatic/GI:   Bowel Prep. Hiatal Hernia, Denies GERD. Liver Disease, Hepatitis, C Hep b and c - treated; tested neg   Musculoskeletal:   Arthritis  Scoliosis   Neurological:   Denies CVA. Denies Seizures.    Endocrine:   Diabetes Denies Hypothyroidism. Denies Hyperthyroidism.        Physical Exam  General:  Obesity    Airway/Jaw/Neck:  Airway Findings: Mouth Opening: Normal Tongue: Normal  General Airway Assessment: Adult  Mallampati: II      Dental:  Dental Findings: In tact   Chest/Lungs:  Chest/Lungs Findings: Clear to auscultation, Normal Respiratory Rate     Heart/Vascular:  Heart Findings: Rate: Normal  Rhythm: Regular Rhythm  Sounds: Normal             Anesthesia Plan  Type of Anesthesia, risks & benefits discussed:  Anesthesia Type:  MAC  Patient's Preference:   Intra-op Monitoring Plan: standard ASA monitors  Intra-op Monitoring Plan Comments:   Post Op Pain Control Plan:   Post Op Pain Control Plan Comments:   Induction:    IV  Beta Blocker:  Patient is on a Beta-Blocker and has received one dose within the past 24 hours (No further documentation required).       Informed Consent: Patient understands risks and agrees with Anesthesia plan.  Questions answered. Anesthesia consent signed with patient.  ASA Score: 2     Day of Surgery Review of History & Physical: I have interviewed and examined the patient. I have reviewed the patient's H&P dated:  There are no significant changes.  H&P update referred to the surgeon.         Ready For Surgery From Anesthesia Perspective.

## 2019-01-10 NOTE — ANESTHESIA RELEASE NOTE
"Anesthesia Release from PACU Note    Patient: Deirdre MAGANA Field    Procedure(s) Performed: Procedure(s) (LRB):  COLONOSCOPY (N/A)    Anesthesia type: MAC    Post pain: Adequate analgesia    Post assessment: no apparent anesthetic complications, tolerated procedure well and no evidence of recall    Last Vitals:   Visit Vitals  BP (!) 159/74 (BP Location: Left arm, Patient Position: Lying)   Pulse 64   Resp 20   Ht 5' 9" (1.753 m)   Wt 99.8 kg (220 lb)   SpO2 98%   Breastfeeding? No   BMI 32.49 kg/m²       Post vital signs: stable    Level of consciousness: awake, alert  and oriented    Nausea/Vomiting: no nausea/no vomiting    Complications: none    Airway Patency: patent    Respiratory: unassisted, spontaneous ventilation, room air    Cardiovascular: stable    Hydration: euvolemic  "

## 2019-01-14 ENCOUNTER — PATIENT MESSAGE (OUTPATIENT)
Dept: SURGERY | Facility: HOSPITAL | Age: 69
End: 2019-01-14

## 2019-02-15 ENCOUNTER — OFFICE VISIT (OUTPATIENT)
Dept: INTERNAL MEDICINE | Facility: CLINIC | Age: 69
End: 2019-02-15
Payer: MEDICARE

## 2019-02-15 VITALS
TEMPERATURE: 97 F | HEIGHT: 69 IN | WEIGHT: 220 LBS | OXYGEN SATURATION: 99 % | HEART RATE: 77 BPM | SYSTOLIC BLOOD PRESSURE: 138 MMHG | BODY MASS INDEX: 32.58 KG/M2 | DIASTOLIC BLOOD PRESSURE: 92 MMHG

## 2019-02-15 DIAGNOSIS — I50.9 CHF (NYHA CLASS I, ACC/AHA STAGE B): Chronic | ICD-10-CM

## 2019-02-15 DIAGNOSIS — Z78.9 STATIN INTOLERANCE: ICD-10-CM

## 2019-02-15 DIAGNOSIS — H26.8 OTHER CATARACT OF BOTH EYES: ICD-10-CM

## 2019-02-15 DIAGNOSIS — E78.5 HYPERLIPIDEMIA ASSOCIATED WITH TYPE 2 DIABETES MELLITUS: ICD-10-CM

## 2019-02-15 DIAGNOSIS — I11.0 HYPERTENSIVE HEART DISEASE WITH CHRONIC DIASTOLIC CONGESTIVE HEART FAILURE: Chronic | ICD-10-CM

## 2019-02-15 DIAGNOSIS — E11.8 TYPE 2 DIABETES MELLITUS WITH COMPLICATION, WITHOUT LONG-TERM CURRENT USE OF INSULIN: ICD-10-CM

## 2019-02-15 DIAGNOSIS — E11.69 HYPERLIPIDEMIA ASSOCIATED WITH TYPE 2 DIABETES MELLITUS: ICD-10-CM

## 2019-02-15 DIAGNOSIS — Z00.00 ENCOUNTER FOR PREVENTIVE HEALTH EXAMINATION: Primary | ICD-10-CM

## 2019-02-15 DIAGNOSIS — I15.2 HYPERTENSION ASSOCIATED WITH DIABETES: Chronic | ICD-10-CM

## 2019-02-15 DIAGNOSIS — I50.32 DIASTOLIC CHF, CHRONIC: Chronic | ICD-10-CM

## 2019-02-15 DIAGNOSIS — Z87.410 HISTORY OF CERVICAL DYSPLASIA: ICD-10-CM

## 2019-02-15 DIAGNOSIS — L63.1 ALOPECIA AREATA UNIVERSALIS: ICD-10-CM

## 2019-02-15 DIAGNOSIS — E04.1 THYROID NODULE: ICD-10-CM

## 2019-02-15 DIAGNOSIS — I50.32 HYPERTENSIVE HEART DISEASE WITH CHRONIC DIASTOLIC CONGESTIVE HEART FAILURE: Chronic | ICD-10-CM

## 2019-02-15 DIAGNOSIS — E55.9 VITAMIN D DEFICIENCY: ICD-10-CM

## 2019-02-15 DIAGNOSIS — E11.59 HYPERTENSION ASSOCIATED WITH DIABETES: Chronic | ICD-10-CM

## 2019-02-15 PROBLEM — Z12.11 COLON CANCER SCREENING: Status: RESOLVED | Noted: 2019-01-10 | Resolved: 2019-02-15

## 2019-02-15 PROBLEM — N84.0 ENDOMETRIAL POLYP: Status: RESOLVED | Noted: 2018-01-02 | Resolved: 2019-02-15

## 2019-02-15 PROBLEM — M41.9 SCOLIOSIS OF THORACIC SPINE: Status: RESOLVED | Noted: 2019-02-15 | Resolved: 2019-02-15

## 2019-02-15 PROBLEM — M41.9 SCOLIOSIS OF THORACIC SPINE: Status: ACTIVE | Noted: 2019-02-15

## 2019-02-15 PROCEDURE — G0439 PPPS, SUBSEQ VISIT: HCPCS | Mod: S$GLB,,, | Performed by: NURSE PRACTITIONER

## 2019-02-15 PROCEDURE — 99999 PR PBB SHADOW E&M-EST. PATIENT-LVL V: ICD-10-PCS | Mod: PBBFAC,,, | Performed by: NURSE PRACTITIONER

## 2019-02-15 PROCEDURE — 99215 OFFICE O/P EST HI 40 MIN: CPT | Mod: PBBFAC,PN | Performed by: NURSE PRACTITIONER

## 2019-02-15 PROCEDURE — G0439 PR MEDICARE ANNUAL WELLNESS SUBSEQUENT VISIT: ICD-10-PCS | Mod: S$GLB,,, | Performed by: NURSE PRACTITIONER

## 2019-02-15 PROCEDURE — 99999 PR PBB SHADOW E&M-EST. PATIENT-LVL V: CPT | Mod: PBBFAC,,, | Performed by: NURSE PRACTITIONER

## 2019-02-15 NOTE — PROGRESS NOTES
"Deirdre Yanez presented for a  Medicare AWV and comprehensive Health Risk Assessment today. The following components were reviewed and updated:    · Medical history  · Family History  · Social history  · Allergies and Current Medications  · Health Risk Assessment  · Health Maintenance  · Care Team     ** See Completed Assessments for Annual Wellness Visit within the encounter summary.**       The following assessments were completed:  · Living Situation  · CAGE  · Depression Screening  · Timed Get Up and Go  · Whisper Test  · Cognitive Function Screening  · Nutrition Screening  · ADL Screening  · PAQ Screening    Vitals:    02/15/19 0948   BP: (!) 138/92   BP Location: Left arm   Patient Position: Sitting   BP Method: Medium (Manual)   Pulse: 77   Temp: 97 °F (36.1 °C)   TempSrc: Tympanic   SpO2: 99%   Weight: 99.8 kg (220 lb 0.3 oz)   Height: 5' 9" (1.753 m)     Body mass index is 32.49 kg/m².  Physical Exam   Constitutional: She is oriented to person, place, and time.   HENT:   Head: Normocephalic and atraumatic.   Right Ear: Tympanic membrane normal.   Left Ear: Tympanic membrane normal.   Glasses    Eyes: Conjunctivae and EOM are normal.   Neck: Normal range of motion. Neck supple.   Cardiovascular: Normal rate, regular rhythm, normal heart sounds and intact distal pulses.   Pulmonary/Chest: Effort normal and breath sounds normal.   Abdominal: Soft. Bowel sounds are normal.   Musculoskeletal: Normal range of motion.        Thoracic back: She exhibits deformity (scoliosis/kyphosis ).   Neurological: She is alert and oriented to person, place, and time.   Skin: Skin is warm and dry.         Diagnoses and health risks identified today and associated recommendations/orders:    1. Encounter for preventive health examination  Completed today    2. Other cataract of both eyes  Stable. Followed by     3. Alopecia areata universalis  Stable/chronic. Was seeing derm. Not following at the moment. Follow up with " PCP if wishing to readdress    4, 5, 6 Hypertensive heart disease with chronic diastolic congestive heart failure/ Diastolic CHF, chronic/ CHF (NYHA class I, ACC/AHA stage B)  Stble. Continue present mgt. Follow up scheduled.    CONCLUSIONS     1 - Concentric hypertrophy.     2 - Normal left ventricular systolic function (EF 60-65%).     3 - Left ventricular diastolic dysfunction.     4 - Normal right ventricular systolic function .     5 - Mild mitral regurgitation.     6 - Mild tricuspid regurgitation.     7. Hyperlipidemia associated with type 2 diabetes mellitus  Diet and exercise. Pt has statin intolerance. Follow up with PCP/cards    Component      Latest Ref Rng & Units 10/17/2018   Cholesterol      120 - 199 mg/dL 204 (H)   Triglycerides      30 - 150 mg/dL 74   HDL      40 - 75 mg/dL 52   LDL Cholesterol      63.0 - 159.0 mg/dL 137.2   HDL/Chol Ratio      20.0 - 50.0 % 25.5   Total Cholesterol/HDL Ratio      2.0 - 5.0 3.9   Non-HDL Cholesterol      mg/dL 152     8. Hypertension associated with diabetes  Continue current regime -bystolic/aldactone. F/u with PCP/cards    9. History of cervical dysplasia  Followed yearly by Dr. Cantrell     10. Vitamin D deficiency  Stable. Continue current regime. Follow up with PCP    Component      Latest Ref Rng & Units 10/17/2018   Vit D, 25-Hydroxy      30 - 96 ng/mL 36       11. Type 2 diabetes mellitus with complication, without long-term current use of insulin   stable with diet alone. Follow up with PCP  Component      Latest Ref Rng & Units 10/17/2018   Hemoglobin A1C External      4.0 - 5.6 % 6.0 (H)   Estimated Avg Glucose      68 - 131 mg/dL 126     12.  Thyroid nodule  Stble. Followed annually by Dr. Norwood. US due in 6/2019      Provided Deirdre with a 5-10 year written screening schedule and personal prevention plan. Recommendations were developed using the USPSTF age appropriate recommendations. Education, counseling, and referrals were provided as needed.  After Visit Summary printed and given to patient which includes a list of additional screenings\tests needed.    Follow up with PCP and specialists as scheduled  New labs/tests ordered today: na  Upcoming health maintenance scheduled: pt would like to discuss shingrix with PCP prior to getting  HRA follow up in 1 year    Gita Monae NP

## 2019-02-15 NOTE — PATIENT INSTRUCTIONS
Counseling and Referral of Other Preventative  (Italic type indicates deductible and co-insurance are waived)    Patient Name: Deirdre Yanez  Today's Date: 2/15/2019    Health Maintenance       Date Due Completion Date    Low Dose Statin 03/21/1970 ---    Zoster Vaccine 03/21/2009 ---    Influenza Vaccine 08/01/2018 10/2/2009    Override on 10/2/2009: Done    Lipid Panel 10/17/2019 10/17/2018    Hemoglobin A1c 10/17/2019 10/17/2018    Urine Microalbumin 10/17/2019 10/17/2018    Foot Exam 10/23/2019 10/23/2018    Override on 9/16/2014: Done    Eye Exam 10/23/2019 10/23/2018    Override on 10/23/2018: Done    Override on 10/16/2017: Done    Override on 12/4/2014: Done    DEXA SCAN 11/25/2019 11/25/2014    Mammogram 12/27/2020 12/27/2018    TETANUS VACCINE 05/08/2023 5/8/2013    Colonoscopy 01/10/2024 1/10/2019        No orders of the defined types were placed in this encounter.    The following information is provided to all patients.  This information is to help you find resources for any of the problems found today that may be affecting your health:                Living healthy guide: www.Count includes the [facility].[place].gov      Understanding Diabetes: www.diabetes.org      Eating healthy: www.cdc.gov/healthyweight      [facility] home safety checklist: www.cdc.gov/steadi/patient.html      Agency on Aging: www.goea.[place].[facility]      Alcoholics anonymous (AA): www.aa.org      Physical Activity: www.minerva.nih.gov/ia6tjdg      Tobacco use: www.quitwithusla.org

## 2019-02-26 DIAGNOSIS — I11.0 HYPERTENSIVE CHF: ICD-10-CM

## 2019-02-26 RX ORDER — SPIRONOLACTONE 50 MG/1
50 TABLET, FILM COATED ORAL NIGHTLY
Qty: 90 TABLET | Refills: 3 | Status: SHIPPED | OUTPATIENT
Start: 2019-02-26 | End: 2020-03-11 | Stop reason: SDUPTHER

## 2019-02-26 RX ORDER — FUROSEMIDE 20 MG/1
20 TABLET ORAL DAILY
Qty: 90 TABLET | Refills: 3 | Status: SHIPPED | OUTPATIENT
Start: 2019-02-26 | End: 2019-12-12 | Stop reason: SDUPTHER

## 2019-02-26 RX ORDER — NEBIVOLOL 5 MG/1
5 TABLET ORAL DAILY
Qty: 90 TABLET | Refills: 3 | Status: SHIPPED | OUTPATIENT
Start: 2019-02-26 | End: 2019-05-21 | Stop reason: ALTCHOICE

## 2019-02-26 RX ORDER — NEBIVOLOL 10 MG/1
10 TABLET ORAL DAILY
Qty: 90 TABLET | Refills: 3 | Status: SHIPPED | OUTPATIENT
Start: 2019-02-26 | End: 2019-05-21 | Stop reason: ALTCHOICE

## 2019-03-07 DIAGNOSIS — I50.9 CHF (NYHA CLASS I, ACC/AHA STAGE B): Primary | ICD-10-CM

## 2019-03-12 ENCOUNTER — OFFICE VISIT (OUTPATIENT)
Dept: CARDIOLOGY | Facility: CLINIC | Age: 69
End: 2019-03-12
Payer: MEDICARE

## 2019-03-12 ENCOUNTER — CLINICAL SUPPORT (OUTPATIENT)
Dept: CARDIOLOGY | Facility: CLINIC | Age: 69
End: 2019-03-12
Payer: MEDICARE

## 2019-03-12 VITALS
HEART RATE: 62 BPM | BODY MASS INDEX: 34.53 KG/M2 | HEIGHT: 67 IN | DIASTOLIC BLOOD PRESSURE: 80 MMHG | SYSTOLIC BLOOD PRESSURE: 140 MMHG | WEIGHT: 220 LBS

## 2019-03-12 DIAGNOSIS — I11.0 HYPERTENSIVE HEART DISEASE WITH CHRONIC DIASTOLIC CONGESTIVE HEART FAILURE: Primary | Chronic | ICD-10-CM

## 2019-03-12 DIAGNOSIS — E11.69 HYPERLIPIDEMIA ASSOCIATED WITH TYPE 2 DIABETES MELLITUS: ICD-10-CM

## 2019-03-12 DIAGNOSIS — I50.32 HYPERTENSIVE HEART DISEASE WITH CHRONIC DIASTOLIC CONGESTIVE HEART FAILURE: Primary | Chronic | ICD-10-CM

## 2019-03-12 DIAGNOSIS — E78.5 HYPERLIPIDEMIA ASSOCIATED WITH TYPE 2 DIABETES MELLITUS: ICD-10-CM

## 2019-03-12 DIAGNOSIS — I50.32 DIASTOLIC CHF, CHRONIC: Chronic | ICD-10-CM

## 2019-03-12 DIAGNOSIS — I50.9 CHF (NYHA CLASS I, ACC/AHA STAGE B): ICD-10-CM

## 2019-03-12 PROCEDURE — 93010 ELECTROCARDIOGRAM REPORT: CPT | Mod: S$PBB,,, | Performed by: INTERNAL MEDICINE

## 2019-03-12 PROCEDURE — 99214 PR OFFICE/OUTPT VISIT, EST, LEVL IV, 30-39 MIN: ICD-10-PCS | Mod: S$PBB,,, | Performed by: NUCLEAR MEDICINE

## 2019-03-12 PROCEDURE — 99213 OFFICE O/P EST LOW 20 MIN: CPT | Mod: PBBFAC | Performed by: NUCLEAR MEDICINE

## 2019-03-12 PROCEDURE — 99999 PR PBB SHADOW E&M-EST. PATIENT-LVL III: CPT | Mod: PBBFAC,,, | Performed by: NUCLEAR MEDICINE

## 2019-03-12 PROCEDURE — 93010 EKG 12-LEAD: ICD-10-PCS | Mod: S$PBB,,, | Performed by: INTERNAL MEDICINE

## 2019-03-12 PROCEDURE — 99214 OFFICE O/P EST MOD 30 MIN: CPT | Mod: S$PBB,,, | Performed by: NUCLEAR MEDICINE

## 2019-03-12 PROCEDURE — 93005 ELECTROCARDIOGRAM TRACING: CPT | Mod: PBBFAC | Performed by: INTERNAL MEDICINE

## 2019-03-12 PROCEDURE — 99999 PR PBB SHADOW E&M-EST. PATIENT-LVL III: ICD-10-PCS | Mod: PBBFAC,,, | Performed by: NUCLEAR MEDICINE

## 2019-03-12 RX ORDER — AMOXICILLIN 500 MG
2 CAPSULE ORAL DAILY
COMMUNITY

## 2019-03-12 NOTE — PROGRESS NOTES
Subjective:   Patient ID:  Deirdre Yanez is a 68 y.o. female who presents for follow-up of Hypertension; Congestive Heart Failure; and Hyperlipidemia      HPI  NO RECENT HOSPITALIZATIONS OR ED VISITS FOR UNCONTROLLED BP. ACS.  ADHF. OR ARRHYTHMIAS  NO ANGINA.OR EQUIVALENT  NO UNUSUAL DESAI. NO ORTHOPNEA OR PND  NO PALPITATIONS. NO NEAR SYNCOPE OR SYNCOPE  NO ABDOMINAL DISCOMFORT OR BACK PAIN  NO FOCAL CNS SYMPTOMS OR SIGNS TO SUGGEST TIA OR STROKE  NO EDEMA. NO CALVE TENDERNESS. NO INTERMITTENT CLAUDICATION  CARD MED GOOD COMPLIANCE. NO SIDE EFFECTS    Review of Systems   Constitution: Negative for chills, fever, weakness, night sweats, weight gain and weight loss.   HENT: Negative for nosebleeds.    Eyes: Negative for blurred vision, double vision and visual disturbance.   Cardiovascular: Negative for chest pain, dyspnea on exertion, irregular heartbeat, leg swelling, orthopnea, palpitations, paroxysmal nocturnal dyspnea and syncope.   Respiratory: Negative for cough, hemoptysis and wheezing.    Endocrine: Negative for polydipsia and polyuria.   Hematologic/Lymphatic: Does not bruise/bleed easily.   Skin: Negative for rash.   Musculoskeletal: Negative for joint pain, joint swelling, muscle weakness and myalgias.   Gastrointestinal: Negative for abdominal pain, hematemesis, jaundice and melena.   Genitourinary: Negative for dysuria, hematuria and nocturia.   Neurological: Negative for dizziness, focal weakness, headaches and sensory change.   Psychiatric/Behavioral: Negative for depression. The patient does not have insomnia and is not nervous/anxious.      Family History   Problem Relation Age of Onset    Colon cancer Maternal Grandfather     Breast cancer Maternal Grandfather     Diabetes Maternal Grandmother     Hypertension Maternal Grandmother     Cataracts Mother     Glaucoma Mother     Macular degeneration Mother     Hypertension Mother     Diabetes Paternal Grandmother     Cataracts Maternal Aunt      Glaucoma Maternal Aunt     Macular degeneration Maternal Aunt     Melanoma Maternal Aunt     Heart disease Unknown         pat side     Past Medical History:   Diagnosis Date    Abrasion     left eye    Abrasion and/or friction burn of abdominal wall with infection     left eye    Arthritis     gouty arthritis    Back pain     Diabetes mellitus     2011  11/17/2013    Diverticulosis     DM (diabetes mellitus) 2011     12/03/2014  A1C 6.0 x 2 weeks    DM (diabetes mellitus) 2011    BS 89 10/14/2017 A1C 5.7   Diet controlled    DM (diabetes mellitus) 2011    BS 90 am 10/23/2018     Hepatitis     Hx of B/C from cadaver in past    Hepatitis B     Hepatitis C     Hiatal hernia     Hip bursitis, left     HTN (hypertension)     Hypertensive CHF (congestive heart failure) 3/28/2014    Obesity     Pneumonia     Positive ALBIN (antinuclear antibody)     Pure hypercholesterolemia 9/30/2016    Rheumatoid arthritis     questionable diagnosis    Scoliosis     Urinary incontinence     intermittent- only when lifting heavy items > 20 lbs     Social History     Socioeconomic History    Marital status:      Spouse name: Not on file    Number of children: 0    Years of education: Not on file    Highest education level: Not on file   Social Needs    Financial resource strain: Not on file    Food insecurity - worry: Not on file    Food insecurity - inability: Not on file    Transportation needs - medical: Not on file    Transportation needs - non-medical: Not on file   Occupational History    Occupation: Retired Teacher   Tobacco Use    Smoking status: Never Smoker    Smokeless tobacco: Never Used   Substance and Sexual Activity    Alcohol use: Yes     Alcohol/week: 1.2 oz     Types: 2 Glasses of wine per week     Comment: occasion    Drug use: No    Sexual activity: Yes     Partners: Male   Other Topics Concern    Are you pregnant or think you may be? No    Breast-feeding  "No   Social History Narrative    Not on file     Current Outpatient Medications on File Prior to Visit   Medication Sig Dispense Refill    b complex vitamins tablet Take 1 tablet by mouth once daily.      coenzyme Q10 (CO Q-10) 100 mg capsule Take 100 mg by mouth once daily.      fish oil-omega-3 fatty acids 300-1,000 mg capsule Take 2 capsules by mouth once daily.      fluticasone (FLONASE) 50 mcg/actuation nasal spray SHAKE LIQUID AND USE 2 SPRAYS IN EACH NOSTRIL DAILY 1 Bottle 12    furosemide (LASIX) 20 MG tablet Take 1 tablet (20 mg total) by mouth once daily. 90 tablet 3    inositol 500 mg Tab Take 500 mg by mouth 2 (two) times daily.      nebivolol (BYSTOLIC) 10 MG Tab Take 1 tablet (10 mg total) by mouth once daily. Take along with Bystolic 10mg for a total of 15mg daily. 90 tablet 3    nebivolol (BYSTOLIC) 5 MG Tab Take 1 tablet (5 mg total) by mouth once daily. Take with Bystolic 10mg for a total of 15mg by mouth daily. 90 tablet 3    spironolactone (ALDACTONE) 50 MG tablet Take 1 tablet (50 mg total) by mouth every evening. 90 tablet 3    tiZANidine (ZANAFLEX) 2 MG tablet TAKE 1 TABLET(2 MG) BY MOUTH EVERY NIGHT AS NEEDED 30 tablet 4    aspirin 81 MG Chew Take 1 tablet (81 mg total) by mouth once daily. (Patient taking differently: Take 81 mg by mouth daily as needed. )  0    [DISCONTINUED] sodium,potassium,mag sulfates (SUPREP BOWEL PREP KIT) 17.5-3.13-1.6 gram SolR Take 354 mLs by mouth daily 2 hours after breakfast. 1 Bottle 0     No current facility-administered medications on file prior to visit.      Review of patient's allergies indicates:   Allergen Reactions    Arb-angiotensin receptor antagonist Edema    Hydralazine analogues      "Lupus reaction"    Metoprolol Other (See Comments)     Alopecia    Sulfa (sulfonamide antibiotics)      Passed out and almost stopped breathing    Ace inhibitors Other (See Comments)     cough    Calcium channel blocking agents-dihydropyridines      " Edema         Objective:     Physical Exam   Constitutional: She is oriented to person, place, and time. She appears well-developed. No distress.   HENT:   Head: Normocephalic.   Eyes: Conjunctivae are normal. Pupils are equal, round, and reactive to light. No scleral icterus.   Neck: Normal range of motion. Neck supple. Normal carotid pulses, no hepatojugular reflux and no JVD present. Carotid bruit is not present. No edema present. No thyroid mass and no thyromegaly present.   Cardiovascular: Normal rate, regular rhythm, S1 normal, S2 normal, normal heart sounds and intact distal pulses. PMI is not displaced. Exam reveals no gallop and no friction rub.   No murmur heard.  Pulses:       Carotid pulses are 2+ on the right side, and 2+ on the left side.       Radial pulses are 2+ on the right side, and 2+ on the left side.        Femoral pulses are 2+ on the right side, and 2+ on the left side.       Popliteal pulses are 2+ on the right side, and 2+ on the left side.        Dorsalis pedis pulses are 2+ on the right side, and 2+ on the left side.        Posterior tibial pulses are 2+ on the right side, and 2+ on the left side.   Pulmonary/Chest: Effort normal and breath sounds normal. She has no wheezes. She has no rales. She exhibits no tenderness.   Abdominal: Soft. Bowel sounds are normal. She exhibits no pulsatile midline mass and no mass. There is no hepatosplenomegaly. There is no tenderness.   Musculoskeletal: Normal range of motion. She exhibits no edema or tenderness.        Cervical back: Normal.        Thoracic back: Normal.        Lumbar back: Normal.   Lymphadenopathy:     She has no cervical adenopathy.     She has no axillary adenopathy.        Right: No supraclavicular adenopathy present.        Left: No supraclavicular adenopathy present.   Neurological: She is alert and oriented to person, place, and time. She has normal strength and normal reflexes. No sensory deficit. Gait normal.   Skin: Skin is  warm. No rash noted. No cyanosis. No pallor. Nails show no clubbing.   Psychiatric: She has a normal mood and affect. Her speech is normal and behavior is normal. Cognition and memory are normal.       Assessment:     1. Hypertensive heart disease with chronic diastolic congestive heart failure    2. Diastolic CHF, chronic    3. Hyperlipidemia associated with type 2 diabetes mellitus        Plan:     Hypertensive heart disease with chronic diastolic congestive heart failure  WELL CONTROLLED BP  NO EVIDENCE OF ACTIVE MYOCARDIAL ISCHEMIA  ECG TODAY- SR. WNL  NO ARRHYTHMIAS  ORAL ANTI HYPERTENSIVE MED WELL TOLERATED    Diastolic CHF, chronic  NO EVIDENCE OF ACUTE HF DECOMPENSATION     Hyperlipidemia associated with type 2 diabetes mellitus  FOLLOWING MEDITERRANEAN DIET AND REGULAR WALKING    CONTINUE PRESENT CARD MANAGEMENT  RETURN IN 6 MONTHS.

## 2019-03-19 ENCOUNTER — TELEPHONE (OUTPATIENT)
Dept: CARDIOLOGY | Facility: CLINIC | Age: 69
End: 2019-03-19

## 2019-03-19 ENCOUNTER — DOCUMENTATION ONLY (OUTPATIENT)
Dept: CARDIOLOGY | Facility: CLINIC | Age: 69
End: 2019-03-19

## 2019-03-19 NOTE — TELEPHONE ENCOUNTER
----- Message from Greta Aguilera sent at 3/19/2019  8:04 AM CDT -----  Contact: pt  Pt states she need to speak with [facility] regarding scheduling a nurse appt to set up blood pressure machine, please call pt @ [phone].

## 2019-03-20 ENCOUNTER — PATIENT MESSAGE (OUTPATIENT)
Dept: CARDIOLOGY | Facility: CLINIC | Age: 69
End: 2019-03-20

## 2019-04-22 NOTE — PROGRESS NOTES
"Subjective:      Patient ID: Deridre Yanez is a 69 y.o. female.    Chief Complaint: Follow-up      HPI  Here for follow up of medical problems.  PT has helped her pain, ovidio dry needling.  AC breakfast sugars .  In past 4-5 months has new burping after eating.  No reflux sx.  BMs are pasty for past year.  Had normal colonoscopy 4mo ago.  No f/c/n/v.  No cp/sob/palp.  Stools brown and pasty x 1yr.  Has had several courses of antibiotics in past year, for sinus "bone infection."    Updated/ annual due 11/19:  HM: ref fluvax, 8/16 [id number], 9/14 [id number], 5/13 TDaP, 3/19 Shingrix #1, 11/14 BMD rep 5y, 1/19 Cscope rep 5y, 12/18 MMG/Gyn Dr. Cantrell, 10/18 Eye Dr. Chowdhury, HCV per Hepatology.     Review of Systems   Constitutional: Positive for activity change. Negative for chills, diaphoresis, fever and unexpected weight change.   HENT: Negative for hearing loss, rhinorrhea and trouble swallowing.    Eyes: Negative for discharge and visual disturbance.   Respiratory: Negative for cough, chest tightness, shortness of breath and wheezing.    Cardiovascular: Negative for chest pain, palpitations and leg swelling.   Gastrointestinal: Positive for diarrhea. Negative for blood in stool, constipation, nausea and vomiting.   Endocrine: Negative for polydipsia and polyuria.   Genitourinary: Negative for difficulty urinating, dysuria, frequency, hematuria and menstrual problem.   Musculoskeletal: Positive for arthralgias. Negative for joint swelling and neck pain.   Neurological: Negative for weakness and headaches.   Psychiatric/Behavioral: Negative for confusion and dysphoric mood. The patient is not nervous/anxious.          Objective:   /76 (BP Location: Left arm, Patient Position: Sitting, BP Method: Medium (Manual))   Pulse 70   Temp 97.7 °F (36.5 °C) (Oral)   Ht 5' 7" (1.702 m)   Wt 97.4 kg (214 lb 11.7 oz)   SpO2 98%   BMI 33.63 kg/m²     Physical Exam   Constitutional: She is oriented to person, place, " and time. She appears well-developed.   HENT:   Mouth/Throat: Oropharynx is clear and moist.   Neck: Neck supple. Carotid bruit is not present. No thyroid mass present.   Cardiovascular: Normal rate, regular rhythm and intact distal pulses. Exam reveals no gallop and no friction rub.   No murmur heard.  Pulmonary/Chest: Effort normal and breath sounds normal. She has no wheezes. She has no rales.   Abdominal: Soft. Bowel sounds are normal. She exhibits no mass. There is no hepatosplenomegaly. There is no tenderness.   Musculoskeletal: She exhibits no edema.   Lymphadenopathy:     She has no cervical adenopathy.   Neurological: She is alert and oriented to person, place, and time.   Psychiatric: She has a normal mood and affect.           Assessment:       1. Type 2 diabetes mellitus without complication, without long-term current use of insulin    2. Statin intolerance    3. Hyperlipidemia associated with type 2 diabetes mellitus    4. Hypertension associated with diabetes    5. Preventive measure    6. Vitamin D deficiency    7. Asymptomatic postmenopausal state    8. Burping    9. Change in consistency of stool          Plan:     Type 2 diabetes mellitus without complication, without long-term current use of insulin  -     Hemoglobin A1c; Future; Expected date: 05/03/2019    Hyperlipidemia associated with type 2 diabetes mellitus, Statin intolerance    Hypertension associated with diabetes- stable, cont rx.    Preventive measure- due in 6mo.  -     CBC auto differential; Future; Expected date: 05/03/2019  -     Comprehensive metabolic panel; Future; Expected date: 05/03/2019  -     Lipid panel; Future; Expected date: 05/03/2019  -     TSH; Future; Expected date: 05/03/2019  -     Vitamin D; Future  -     Hemoglobin A1c; Future; Expected date: 05/03/2019  -     Microalbumin/creatinine urine ratio; Future; Expected date: 05/03/2019  -     Cancel: Mammo Digital Screening Bilat; Future; Expected date:  05/03/2019    Vitamin D deficiency  -     Vitamin D; Future    Asymptomatic postmenopausal state  -     DXA Bone Density Spine And Hip; Future; Expected date: 05/03/2019    Burping, Change in consistency of stool  -     Ambulatory referral to Gastroenterology

## 2019-05-03 ENCOUNTER — LAB VISIT (OUTPATIENT)
Dept: LAB | Facility: HOSPITAL | Age: 69
End: 2019-05-03
Attending: INTERNAL MEDICINE
Payer: MEDICARE

## 2019-05-03 ENCOUNTER — OFFICE VISIT (OUTPATIENT)
Dept: FAMILY MEDICINE | Facility: CLINIC | Age: 69
End: 2019-05-03
Payer: MEDICARE

## 2019-05-03 VITALS
WEIGHT: 214.75 LBS | HEIGHT: 67 IN | OXYGEN SATURATION: 98 % | HEART RATE: 70 BPM | BODY MASS INDEX: 33.71 KG/M2 | TEMPERATURE: 98 F | SYSTOLIC BLOOD PRESSURE: 114 MMHG | DIASTOLIC BLOOD PRESSURE: 76 MMHG

## 2019-05-03 DIAGNOSIS — Z78.0 ASYMPTOMATIC POSTMENOPAUSAL STATE: ICD-10-CM

## 2019-05-03 DIAGNOSIS — Z78.9 STATIN INTOLERANCE: ICD-10-CM

## 2019-05-03 DIAGNOSIS — R19.5 CHANGE IN CONSISTENCY OF STOOL: ICD-10-CM

## 2019-05-03 DIAGNOSIS — E11.9 TYPE 2 DIABETES MELLITUS WITHOUT COMPLICATION, WITHOUT LONG-TERM CURRENT USE OF INSULIN: ICD-10-CM

## 2019-05-03 DIAGNOSIS — Z29.9 PREVENTIVE MEASURE: ICD-10-CM

## 2019-05-03 DIAGNOSIS — E11.59 HYPERTENSION ASSOCIATED WITH DIABETES: Chronic | ICD-10-CM

## 2019-05-03 DIAGNOSIS — R14.2 BURPING: ICD-10-CM

## 2019-05-03 DIAGNOSIS — E11.69 HYPERLIPIDEMIA ASSOCIATED WITH TYPE 2 DIABETES MELLITUS: ICD-10-CM

## 2019-05-03 DIAGNOSIS — E78.5 HYPERLIPIDEMIA ASSOCIATED WITH TYPE 2 DIABETES MELLITUS: ICD-10-CM

## 2019-05-03 DIAGNOSIS — E11.9 TYPE 2 DIABETES MELLITUS WITHOUT COMPLICATION, WITHOUT LONG-TERM CURRENT USE OF INSULIN: Primary | ICD-10-CM

## 2019-05-03 DIAGNOSIS — I15.2 HYPERTENSION ASSOCIATED WITH DIABETES: Chronic | ICD-10-CM

## 2019-05-03 DIAGNOSIS — E55.9 VITAMIN D DEFICIENCY: ICD-10-CM

## 2019-05-03 LAB
ALBUMIN SERPL BCP-MCNC: 4.4 G/DL (ref 3.5–5.2)
ALP SERPL-CCNC: 73 U/L (ref 55–135)
ALT SERPL W/O P-5'-P-CCNC: 20 U/L (ref 10–44)
ANION GAP SERPL CALC-SCNC: 10 MMOL/L (ref 8–16)
AST SERPL-CCNC: 28 U/L (ref 10–40)
BASOPHILS # BLD AUTO: 0.03 K/UL (ref 0–0.2)
BASOPHILS NFR BLD: 0.4 % (ref 0–1.9)
BILIRUB SERPL-MCNC: 1.6 MG/DL (ref 0.1–1)
BUN SERPL-MCNC: 21 MG/DL (ref 8–23)
CALCIUM SERPL-MCNC: 11 MG/DL (ref 8.7–10.5)
CHLORIDE SERPL-SCNC: 99 MMOL/L (ref 95–110)
CO2 SERPL-SCNC: 29 MMOL/L (ref 23–29)
CREAT SERPL-MCNC: 1.1 MG/DL (ref 0.5–1.4)
DIFFERENTIAL METHOD: ABNORMAL
EOSINOPHIL # BLD AUTO: 0.1 K/UL (ref 0–0.5)
EOSINOPHIL NFR BLD: 0.6 % (ref 0–8)
ERYTHROCYTE [DISTWIDTH] IN BLOOD BY AUTOMATED COUNT: 13.5 % (ref 11.5–14.5)
EST. GFR  (AFRICAN AMERICAN): 58.8 ML/MIN/1.73 M^2
EST. GFR  (NON AFRICAN AMERICAN): 51 ML/MIN/1.73 M^2
GLUCOSE SERPL-MCNC: 98 MG/DL (ref 70–110)
HCT VFR BLD AUTO: 44.6 % (ref 37–48.5)
HGB BLD-MCNC: 14.2 G/DL (ref 12–16)
IMM GRANULOCYTES # BLD AUTO: 0.03 K/UL (ref 0–0.04)
IMM GRANULOCYTES NFR BLD AUTO: 0.4 % (ref 0–0.5)
LYMPHOCYTES # BLD AUTO: 2.4 K/UL (ref 1–4.8)
LYMPHOCYTES NFR BLD: 29.3 % (ref 18–48)
MCH RBC QN AUTO: 28.3 PG (ref 27–31)
MCHC RBC AUTO-ENTMCNC: 31.8 G/DL (ref 32–36)
MCV RBC AUTO: 89 FL (ref 82–98)
MONOCYTES # BLD AUTO: 0.6 K/UL (ref 0.3–1)
MONOCYTES NFR BLD: 7.1 % (ref 4–15)
NEUTROPHILS # BLD AUTO: 5 K/UL (ref 1.8–7.7)
NEUTROPHILS NFR BLD: 62.2 % (ref 38–73)
NRBC BLD-RTO: 0 /100 WBC
PLATELET # BLD AUTO: 199 K/UL (ref 150–350)
PMV BLD AUTO: 11.3 FL (ref 9.2–12.9)
POTASSIUM SERPL-SCNC: 5.1 MMOL/L (ref 3.5–5.1)
PROT SERPL-MCNC: 7.7 G/DL (ref 6–8.4)
RBC # BLD AUTO: 5.02 M/UL (ref 4–5.4)
SODIUM SERPL-SCNC: 138 MMOL/L (ref 136–145)
TSH SERPL DL<=0.005 MIU/L-ACNC: 1.09 UIU/ML (ref 0.4–4)
WBC # BLD AUTO: 8.03 K/UL (ref 3.9–12.7)

## 2019-05-03 PROCEDURE — 84443 ASSAY THYROID STIM HORMONE: CPT

## 2019-05-03 PROCEDURE — 99214 OFFICE O/P EST MOD 30 MIN: CPT | Mod: S$PBB,,, | Performed by: INTERNAL MEDICINE

## 2019-05-03 PROCEDURE — 99999 PR PBB SHADOW E&M-EST. PATIENT-LVL V: ICD-10-PCS | Mod: PBBFAC,,, | Performed by: INTERNAL MEDICINE

## 2019-05-03 PROCEDURE — 99214 PR OFFICE/OUTPT VISIT, EST, LEVL IV, 30-39 MIN: ICD-10-PCS | Mod: S$PBB,,, | Performed by: INTERNAL MEDICINE

## 2019-05-03 PROCEDURE — 36415 COLL VENOUS BLD VENIPUNCTURE: CPT | Mod: PO

## 2019-05-03 PROCEDURE — 85025 COMPLETE CBC W/AUTO DIFF WBC: CPT

## 2019-05-03 PROCEDURE — 80053 COMPREHEN METABOLIC PANEL: CPT

## 2019-05-03 PROCEDURE — 99215 OFFICE O/P EST HI 40 MIN: CPT | Mod: PBBFAC,PO | Performed by: INTERNAL MEDICINE

## 2019-05-03 PROCEDURE — 99999 PR PBB SHADOW E&M-EST. PATIENT-LVL V: CPT | Mod: PBBFAC,,, | Performed by: INTERNAL MEDICINE

## 2019-05-06 ENCOUNTER — PATIENT MESSAGE (OUTPATIENT)
Dept: FAMILY MEDICINE | Facility: CLINIC | Age: 69
End: 2019-05-06

## 2019-05-06 DIAGNOSIS — E83.52 HYPERCALCEMIA: Primary | ICD-10-CM

## 2019-05-09 ENCOUNTER — LAB VISIT (OUTPATIENT)
Dept: LAB | Facility: HOSPITAL | Age: 69
End: 2019-05-09
Attending: INTERNAL MEDICINE
Payer: MEDICARE

## 2019-05-09 ENCOUNTER — OFFICE VISIT (OUTPATIENT)
Dept: GASTROENTEROLOGY | Facility: CLINIC | Age: 69
End: 2019-05-09
Payer: MEDICARE

## 2019-05-09 VITALS
SYSTOLIC BLOOD PRESSURE: 144 MMHG | HEART RATE: 72 BPM | BODY MASS INDEX: 33.84 KG/M2 | HEIGHT: 67 IN | WEIGHT: 215.63 LBS | DIASTOLIC BLOOD PRESSURE: 86 MMHG

## 2019-05-09 DIAGNOSIS — R19.5 CHANGE IN STOOL: ICD-10-CM

## 2019-05-09 DIAGNOSIS — E83.52 HYPERCALCEMIA: ICD-10-CM

## 2019-05-09 DIAGNOSIS — R10.13 DYSPEPSIA: Primary | ICD-10-CM

## 2019-05-09 LAB
CA-I BLDV-SCNC: 1.24 MMOL/L (ref 1.06–1.42)
CALCIUM SERPL-MCNC: 10.5 MG/DL (ref 8.7–10.5)
PTH-INTACT SERPL-MCNC: 71 PG/ML (ref 9–77)

## 2019-05-09 PROCEDURE — 36415 COLL VENOUS BLD VENIPUNCTURE: CPT

## 2019-05-09 PROCEDURE — 99999 PR PBB SHADOW E&M-EST. PATIENT-LVL III: CPT | Mod: PBBFAC,,, | Performed by: NURSE PRACTITIONER

## 2019-05-09 PROCEDURE — 99214 OFFICE O/P EST MOD 30 MIN: CPT | Mod: S$PBB,,, | Performed by: NURSE PRACTITIONER

## 2019-05-09 PROCEDURE — 83970 ASSAY OF PARATHORMONE: CPT

## 2019-05-09 PROCEDURE — 99214 PR OFFICE/OUTPT VISIT, EST, LEVL IV, 30-39 MIN: ICD-10-PCS | Mod: S$PBB,,, | Performed by: NURSE PRACTITIONER

## 2019-05-09 PROCEDURE — 82310 ASSAY OF CALCIUM: CPT

## 2019-05-09 PROCEDURE — 82330 ASSAY OF CALCIUM: CPT

## 2019-05-09 PROCEDURE — 99999 PR PBB SHADOW E&M-EST. PATIENT-LVL III: ICD-10-PCS | Mod: PBBFAC,,, | Performed by: NURSE PRACTITIONER

## 2019-05-09 PROCEDURE — 99213 OFFICE O/P EST LOW 20 MIN: CPT | Mod: PBBFAC | Performed by: NURSE PRACTITIONER

## 2019-05-09 RX ORDER — ASENAPINE 5 MG/1
TABLET SUBLINGUAL
COMMUNITY
End: 2019-07-01

## 2019-05-09 RX ORDER — METHYLPREDNISOLONE 4 MG
1000 TABLET, DOSE PACK ORAL
COMMUNITY

## 2019-05-09 RX ORDER — ACETYLGLUCOSAMINE 700 MG
CAPSULE ORAL
COMMUNITY

## 2019-05-09 NOTE — LETTER
May 9, 2019      Beti Blackburn MD  [address] [place] [address]           [facility]  [address] [place] [address]  Phone: [phone]  Fax: [phone]          Patient: Deirdre Yanez   MR Number: [id number]   YOB: 1950   Date of Visit: 5/9/2019       Dear Dr. Beti Blackburn:    Thank you for referring Deirdre Yanez to me for evaluation. Attached you will find relevant portions of my assessment and plan of care.    If you have questions, please do not hesitate to call me. I look forward to following Deirdre Yanez along with you.    Sincerely,    Radha Shepard, [facility]    Enclosure  CC:  No Recipients    If you would like to receive this communication electronically, please contact [email or web address] or [phone] to request more information on [employer] Link access.    For providers and/or their staff who would like to refer a patient to [facility], please contact us through our one-stop-shop provider referral line, [facility], at [phone].    If you feel you have received this communication in error or would no longer like to receive these types of communications, please e-mail [email or web address]

## 2019-05-09 NOTE — PATIENT INSTRUCTIONS
OK to continue the Nexium    One Bottle of Magnesium Citrate with Dulcolax 10mg    Start fiber choice chewables.  With large glass of water.

## 2019-05-09 NOTE — PROGRESS NOTES
Clinic Follow Up:  [facility] Follow Up Note    Reason for Follow Up:  The primary encounter diagnosis was Dyspepsia. A diagnosis of Change in stool was also pertinent to this visit.    PCP: Beti Turner   [address] [facility] / [place] [id number]    HPI:  This is a 69 y.o. female here for follow up of the above  Pt states that over the last few months, she has had an increase in indigestion and heartburn.  She has a hx of GERD which required PPI, however, has not been on medication in over 2 years.  She denies any known exacerbating factors for the dyspepsia.  She has started taking Nexium every 3 days and has had an improvement in the symptoms.   She has also had a change in her stool consistency.  She states that she has a hx of diverticulosis and has been managed with high fiber diet.  She states that she has continued with the high fiber, however, she has had more frequent mushy stool with a feeling of incomplete evacuation. She denies any melena or hematochezia  Last colonoscopy January 2019 without any significant findings.        Review of Systems   Constitutional: Negative for chills, fever, malaise/fatigue and weight loss.   Respiratory: Negative for cough.    Cardiovascular: Negative for chest pain.   Gastrointestinal:        Per HPI   Musculoskeletal: Negative for myalgias.   Skin: Negative for itching and rash.   Neurological: Negative for headaches.   Psychiatric/Behavioral: The patient is not nervous/anxious.        Medical History:  Past Medical History:   Diagnosis Date    Abrasion     left eye    Abrasion and/or friction burn of abdominal wall with infection     left eye    Arthritis     gouty arthritis    Back pain     Diabetes mellitus     2011  11/17/2013    Diverticulosis     DM (diabetes mellitus) 2011     12/03/2014  A1C 6.0 x 2 weeks    DM (diabetes mellitus) 2011    BS 89 10/14/2017 A1C 5.7   Diet controlled    DM (diabetes mellitus) 2011     BS 90 am 10/23/2018     Hepatitis     Hx of B/C from cadaver in past    Hepatitis B     Hepatitis C     Hiatal hernia     Hip bursitis, left     HTN (hypertension)     Hypertensive CHF (congestive heart failure) 3/28/2014    Obesity     Pneumonia     Positive ALBIN (antinuclear antibody)     Pure hypercholesterolemia 9/30/2016    Rheumatoid arthritis     questionable diagnosis    Scoliosis     Urinary incontinence     intermittent- only when lifting heavy items > 20 lbs       Surgical History:   Past Surgical History:   Procedure Laterality Date    APPENDECTOMY      bone grafts      CERVICAL CONIZATION   W/ LASER      COLONOSCOPY N/A 1/10/2019    [facility]    COLONOSCOPY N/A 10/28/2013    [facility]    COSMETIC SURGERY Bilateral     ears    DILATION AND CURETTAGE OF UTERUS      [id number] N/A 1/2/2018    [facility]rell [facility] at [facility] OR    KNEE ARTHROSCOPY W/ MENISCAL REPAIR Right     LIVER BIOPSY      SHAVING-ENDOMETRIAL  1/2/2018    [facility] OR    sinus lift      2003    TONSILLECTOMY, ADENOIDECTOMY      TUBAL LIGATION         Family History:   Family History   Problem Relation Age of Onset    Colon cancer Maternal Grandfather     Breast cancer Maternal Grandfather     Diabetes Maternal Grandmother     Hypertension Maternal Grandmother     Cataracts Mother     Glaucoma Mother     Macular degeneration Mother     Hypertension Mother     Diabetes Paternal Grandmother     Cataracts Maternal Aunt     Glaucoma Maternal Aunt     Macular degeneration Maternal Aunt     Melanoma Maternal Aunt     Heart disease Unknown         pat side       Social History:   Social History     Tobacco Use    Smoking status: Never Smoker    Smokeless tobacco: Never Used   Substance Use Topics    Alcohol use: Yes     Alcohol/week: 1.2 oz     Types: 2  "Glasses of wine per week     Frequency: Monthly or less     Drinks per session: 1 or 2     Binge frequency: Never     Comment: occasion    Drug use: No       Allergies: Reviewed    Home Medications:  Current Outpatient Medications on File Prior to Visit   Medication Sig Dispense Refill    asenapine (SAPHRIS) 5 mg Subl Place under the tongue.      b complex vitamins tablet Take 1 tablet by mouth once daily.      coenzyme Q10 (CO Q-10) 100 mg capsule Take 100 mg by mouth once daily.      fish oil-omega-3 fatty acids 300-1,000 mg capsule Take 2 capsules by mouth once daily.      fluticasone (FLONASE) 50 mcg/actuation nasal spray SHAKE LIQUID AND USE 2 SPRAYS IN EACH NOSTRIL DAILY 1 Bottle 12    furosemide (LASIX) 20 MG tablet Take 1 tablet (20 mg total) by mouth once daily. 90 tablet 3    glucosamine sulfate (GLUCOSAMINE) 500 mg Tab Take 1,000 mg by mouth.      inositol 500 mg Tab Take 500 mg by mouth 2 (two) times daily.      methylsulfonylmethane (MSM) 1,000 mg Cap Take by mouth.      nebivolol (BYSTOLIC) 10 MG Tab Take 1 tablet (10 mg total) by mouth once daily. Take along with Bystolic 10mg for a total of 15mg daily. 90 tablet 3    nebivolol (BYSTOLIC) 5 MG Tab Take 1 tablet (5 mg total) by mouth once daily. Take with Bystolic 10mg for a total of 15mg by mouth daily. 90 tablet 3    spironolactone (ALDACTONE) 50 MG tablet Take 1 tablet (50 mg total) by mouth every evening. 90 tablet 3    tiZANidine (ZANAFLEX) 2 MG tablet TAKE 1 TABLET(2 MG) BY MOUTH EVERY NIGHT AS NEEDED 30 tablet 4    aspirin 81 MG Chew Take 1 tablet (81 mg total) by mouth once daily. (Patient taking differently: Take 81 mg by mouth daily as needed. )  0     No current facility-administered medications on file prior to visit.        Physical Exam:  Vital Signs:  BP (!) 144/86   Pulse 72   Ht 5' 7" (1.702 m)   Wt 97.8 kg (215 lb 9.8 oz)   BMI 33.77 kg/m²   Body mass index is 33.77 kg/m².  Physical Exam   Constitutional: She is " oriented to person, place, and time. She appears well-developed and well-nourished.   HENT:   Head: Normocephalic.   Eyes: No scleral icterus.   Neck: Normal range of motion.   Cardiovascular: Normal rate and regular rhythm.   Pulmonary/Chest: Effort normal and breath sounds normal.   Abdominal: Soft. Bowel sounds are normal. She exhibits no distension. There is no tenderness.   Musculoskeletal: Normal range of motion.   Neurological: She is alert and oriented to person, place, and time.   Skin: Skin is warm and dry.   Psychiatric: She has a normal mood and affect.   Vitals reviewed.      Labs: Pertinent labs reviewed.  Assessment:  1. Dyspepsia    2. Change in stool        Recommendations:  Dyspepsia  - stable  - OK to continue Nexium as taking.  If the need for medication increases, will plan for EGD for further investigation  - GERD diet and lifestyle discussed.     Change in stool  - I suspect this is related to some worsening constipation.   - She is to take one bottle of Mag Citrate with dulcolax 10 mg  - Will have her change her fiber to fiber choice chewables  - If symptoms do not improve, will plan for x-ray and additional bowel prep            Return to Clinic:    Follow up to be determined by results of above.

## 2019-05-20 ENCOUNTER — PATIENT MESSAGE (OUTPATIENT)
Dept: CARDIOLOGY | Facility: CLINIC | Age: 69
End: 2019-05-20

## 2019-05-21 DIAGNOSIS — I50.32 DIASTOLIC CHF, CHRONIC: Primary | Chronic | ICD-10-CM

## 2019-05-21 RX ORDER — CARVEDILOL 6.25 MG/1
6.25 TABLET ORAL 2 TIMES DAILY WITH MEALS
Qty: 60 TABLET | Refills: 11 | Status: SHIPPED | OUTPATIENT
Start: 2019-05-21 | End: 2020-03-11

## 2019-06-04 ENCOUNTER — CLINICAL SUPPORT (OUTPATIENT)
Dept: CARDIOLOGY | Facility: CLINIC | Age: 69
End: 2019-06-04
Payer: MEDICARE

## 2019-06-04 VITALS — HEART RATE: 73 BPM

## 2019-06-04 PROCEDURE — 99999 PR PBB SHADOW E&M-EST. PATIENT-LVL II: ICD-10-PCS | Mod: PBBFAC,,,

## 2019-06-04 PROCEDURE — 99212 OFFICE O/P EST SF 10 MIN: CPT | Mod: PBBFAC

## 2019-06-04 PROCEDURE — 99999 PR PBB SHADOW E&M-EST. PATIENT-LVL II: CPT | Mod: PBBFAC,,,

## 2019-06-04 NOTE — PROGRESS NOTES
Patient came in for Nurse visit for Blood pressure check.Patient brought in home blood pressure log.Patient states took medication around 6:30-7:00 and walked dog prior to visit.Patient states blood pressure is elevated due to being stuck in traffic.

## 2019-06-04 NOTE — PATIENT INSTRUCTIONS
Patient came in for Nurse visit for Blood pressure check.Patient brought in home blood pressure log.Patient states took medication around 6:30-7:00 and walked dog prior to visit.Patient states blood pressure is elevated due to being stuck in traffic.     Patient blood pressure log and blood pressure taken in office today has been reviewed by .     Orders:  1) No medication changes   2) Continue diet   3) Regular walking       All orders given to patient.Patient verbalizes understanding of all information.

## 2019-06-25 ENCOUNTER — HOSPITAL ENCOUNTER (OUTPATIENT)
Dept: RADIOLOGY | Facility: HOSPITAL | Age: 69
Discharge: HOME | End: 2019-06-25
Attending: ORTHOPAEDIC SURGERY
Payer: MEDICARE

## 2019-06-25 DIAGNOSIS — E04.1 THYROID NODULE: ICD-10-CM

## 2019-06-25 PROCEDURE — 76536 US EXAM OF HEAD AND NECK: CPT | Mod: 26,,, | Performed by: RADIOLOGY

## 2019-06-25 PROCEDURE — 76536 US SOFT TISSUE HEAD NECK THYROID: ICD-10-PCS | Mod: 26,,, | Performed by: RADIOLOGY

## 2019-06-25 PROCEDURE — 76536 US EXAM OF HEAD AND NECK: CPT | Mod: TC

## 2019-06-27 ENCOUNTER — TELEPHONE (OUTPATIENT)
Dept: INTERNAL MEDICINE | Facility: CLINIC | Age: 69
End: 2019-06-27

## 2019-06-27 NOTE — TELEPHONE ENCOUNTER
----- Message from Taqueria Hernadez sent at 6/27/2019  4:46 PM CDT -----  Contact: self  Type:  Sooner Apoointment Request    Caller is requesting a sooner appointment.  Caller declined first available appointment listed below.  Caller will not accept being placed on the waitlist and is requesting a message be sent to doctor.  Name of Caller:Deirdre MAGANA   When is the first available appointment? 07/09  Symptoms: nausea, diahrrea, abdominal discomfort  Would the patient rather a call back or a response via [facility]? Call back  Best Call Back Number:[phone]  Additional Information: none    Thanks,  Taqueria Hernadez

## 2019-06-28 ENCOUNTER — HOSPITAL ENCOUNTER (OUTPATIENT)
Dept: RADIOLOGY | Facility: HOSPITAL | Age: 69
Discharge: HOME | End: 2019-06-28
Attending: FAMILY MEDICINE
Payer: MEDICARE

## 2019-06-28 ENCOUNTER — OFFICE VISIT (OUTPATIENT)
Dept: INTERNAL MEDICINE | Facility: CLINIC | Age: 69
End: 2019-06-28
Payer: MEDICARE

## 2019-06-28 VITALS
TEMPERATURE: 97 F | OXYGEN SATURATION: 98 % | HEIGHT: 67 IN | SYSTOLIC BLOOD PRESSURE: 160 MMHG | DIASTOLIC BLOOD PRESSURE: 100 MMHG | BODY MASS INDEX: 33.98 KG/M2 | HEART RATE: 68 BPM | WEIGHT: 216.5 LBS

## 2019-06-28 DIAGNOSIS — M41.9 SCOLIOSIS OF THORACOLUMBAR SPINE, UNSPECIFIED SCOLIOSIS TYPE: Primary | ICD-10-CM

## 2019-06-28 DIAGNOSIS — M41.9 SCOLIOSIS OF THORACIC SPINE, UNSPECIFIED SCOLIOSIS TYPE: ICD-10-CM

## 2019-06-28 DIAGNOSIS — R10.11 RUQ ABDOMINAL PAIN: ICD-10-CM

## 2019-06-28 DIAGNOSIS — I50.32 HYPERTENSIVE HEART DISEASE WITH CHRONIC DIASTOLIC CONGESTIVE HEART FAILURE: Chronic | ICD-10-CM

## 2019-06-28 DIAGNOSIS — E11.9 TYPE 2 DIABETES MELLITUS WITHOUT COMPLICATION, WITHOUT LONG-TERM CURRENT USE OF INSULIN: ICD-10-CM

## 2019-06-28 DIAGNOSIS — I11.0 HYPERTENSIVE HEART DISEASE WITH CHRONIC DIASTOLIC CONGESTIVE HEART FAILURE: Chronic | ICD-10-CM

## 2019-06-28 PROCEDURE — 72040 XR CERVICAL SPINE AP LATERAL: ICD-10-PCS | Mod: 26,,, | Performed by: RADIOLOGY

## 2019-06-28 PROCEDURE — 72040 X-RAY EXAM NECK SPINE 2-3 VW: CPT | Mod: 26,,, | Performed by: RADIOLOGY

## 2019-06-28 PROCEDURE — 72040 X-RAY EXAM NECK SPINE 2-3 VW: CPT | Mod: TC

## 2019-06-28 PROCEDURE — 99214 OFFICE O/P EST MOD 30 MIN: CPT | Mod: S$PBB,,, | Performed by: FAMILY MEDICINE

## 2019-06-28 PROCEDURE — 99999 PR PBB SHADOW E&M-EST. PATIENT-LVL V: CPT | Mod: PBBFAC,,, | Performed by: FAMILY MEDICINE

## 2019-06-28 PROCEDURE — 72100 X-RAY EXAM L-S SPINE 2/3 VWS: CPT | Mod: TC

## 2019-06-28 PROCEDURE — 72070 XR THORACIC SPINE AP LATERAL: ICD-10-PCS | Mod: 26,,, | Performed by: RADIOLOGY

## 2019-06-28 PROCEDURE — 99214 PR OFFICE/OUTPT VISIT, EST, LEVL IV, 30-39 MIN: ICD-10-PCS | Mod: S$PBB,,, | Performed by: FAMILY MEDICINE

## 2019-06-28 PROCEDURE — 99215 OFFICE O/P EST HI 40 MIN: CPT | Mod: PBBFAC,25 | Performed by: FAMILY MEDICINE

## 2019-06-28 PROCEDURE — 72070 X-RAY EXAM THORAC SPINE 2VWS: CPT | Mod: 26,,, | Performed by: RADIOLOGY

## 2019-06-28 PROCEDURE — 72100 XR LUMBAR SPINE AP AND LATERAL: ICD-10-PCS | Mod: 26,,, | Performed by: RADIOLOGY

## 2019-06-28 PROCEDURE — 72070 X-RAY EXAM THORAC SPINE 2VWS: CPT | Mod: TC

## 2019-06-28 PROCEDURE — 99999 PR PBB SHADOW E&M-EST. PATIENT-LVL V: ICD-10-PCS | Mod: PBBFAC,,, | Performed by: FAMILY MEDICINE

## 2019-06-28 PROCEDURE — 72100 X-RAY EXAM L-S SPINE 2/3 VWS: CPT | Mod: 26,,, | Performed by: RADIOLOGY

## 2019-06-28 NOTE — PROGRESS NOTES
Deirdre MAGANA [facility]  06/28/2019  [id number]    Beti Blackburn MD  Patient Care Team:  Beti Blackburn MD as PCP - General (Internal Medicine)  Bet[facility] Blackburn MD [facility]CP - [facility]  Santino Chowdhury OD as Consulting Physician (Optometry)  Debra Delgado LPN as Care Coordinator (Internal Medicine)  Has the patient seen any provider outside of the [facility] network since the last visit? (no). If yes, HIPPA forms completed and records requested.      Chief Complaint:  Chief Complaint   Patient presents with    Abdominal Pain       History of Present Illness:  Patient is a 69-year-old female presents today complaining of right abdominal and side pain.      Abd:  -going for over 1 year  -has hx of spinal curvature (thinks it may be contributing); physical therapy helped her back but not her abdomen  -typically only notices it if she is in particular positions  -interferes with her sleep  -has noticed that her appetite is less and has early satiety  -her pain was previously well controlled with physical therapy and yoga; states those are no longer helping    Abdominal Pain   This is a chronic problem. The current episode started more than 1 year ago. The onset quality is gradual. The problem occurs constantly. The pain is located in the RUQ, generalized abdominal region and right flank. The pain is at a severity of 6/10. The pain is moderate. The quality of the pain is cramping and a sensation of fullness. Associated symptoms include anorexia, arthralgias, belching, diarrhea, dysuria, flatus, frequency, myalgias and nausea. Pertinent negatives include no fever, headaches or vomiting. The pain is aggravated by certain positions and eating. The pain is relieved by nothing. She has tried proton pump inhibitors for the symptoms. The treatment provided no relief. Prior diagnostic workup includes lower endoscopy. Her past medical history is significant for GERD and PUD. There is no history of abdominal surgery, colon  cancer, Crohn's disease, gallstones, irritable bowel syndrome, pancreatitis or ulcerative colitis. Patient's medical history includes UTI. Patient's medical history does not include kidney stones.           History:  Past Medical History:   Diagnosis Date    Abrasion     left eye    Abrasion and/or friction burn of abdominal wall with infection     left eye    Arthritis     gouty arthritis    Back pain     Diabetes mellitus     2011  11/17/2013    Diverticulosis     DM (diabetes mellitus) 2011     12/03/2014  A1C 6.0 x 2 weeks    DM (diabetes mellitus) 2011    BS 89 10/14/2017 A1C 5.7   Diet controlled    DM (diabetes mellitus) 2011    BS 90 am 10/23/2018     Hepatitis     Hx of B/C from cadaver in past    Hepatitis B     Hepatitis C     Hiatal hernia     Hip bursitis, left     HTN (hypertension)     Hypertensive CHF (congestive heart failure) 3/28/2014    Obesity     Pneumonia     Positive ALBIN (antinuclear antibody)     Pure hypercholesterolemia 9/30/2016    Rheumatoid arthritis     questionable diagnosis    Scoliosis     Urinary incontinence     intermittent- only when lifting heavy items > 20 lbs     Past Surgical History:   Procedure Laterality Date    APPENDECTOMY      bone grafts      CERVICAL CONIZATION   W/ LASER      COLONOSCOPY N/A 1/10/2019    [facility]    COLONOSCOPY N/A 10/28/2013    [facility] by Carlos Lee [facility] at [facility] ENDO    COSMETIC SURGERY Bilateral     ears    DILATION AND CURETTAGE OF UTERUS      [id number] N/A 1/2/2018    Per[facility]rell [facility] at [facility] OR    KNEE ARTHROSCOPY W/ MENISCAL REPAIR Right     LIVER BIOPSY      SHAVING-ENDOMETRIAL  1/2/2018    [facility] at [facility] OR    sinus lift      2003    TONSILLECTOMY, ADENOIDECTOMY      TUBAL LIGATION       Family History   Problem Relation Age of Onset    Colon cancer Maternal Grandfather      Breast cancer Maternal Grandfather     Diabetes Maternal Grandmother     Hypertension Maternal Grandmother     Cataracts Mother     Glaucoma Mother     Macular degeneration Mother     Hypertension Mother     Diabetes Paternal Grandmother     Cataracts Maternal Aunt     Glaucoma Maternal Aunt     Macular degeneration Maternal Aunt     Melanoma Maternal Aunt     Heart disease Unknown         pat side     Social History     Socioeconomic History    Marital status:      Spouse name: Not on file    Number of children: 0    Years of education: Not on file    Highest education level: Not on file   Occupational History    Occupation: Retired Teacher   Social Needs    Financial resource strain: Not hard at all    Food insecurity:     Worry: Never true     Inability: Never true    Transportation needs:     Medical: No     Non-medical: No   Tobacco Use    Smoking status: Never Smoker    Smokeless tobacco: Never Used   Substance and Sexual Activity    Alcohol use: Yes     Alcohol/week: 1.2 oz     Types: 2 Glasses of wine per week     Frequency: Monthly or less     Drinks per session: 1 or 2     Binge frequency: Never     Comment: occasion    Drug use: No    Sexual activity: Yes     Partners: Male   Lifestyle    Physical activity:     Days per week: 2 days     Minutes per session: 30 min    Stress: Not at all   Relationships    Social connections:     Talks on phone: Three times a week     Gets together: Once a week     Attends Caodaism service: Not on file     Active member of club or organization: No     Attends meetings of clubs or organizations: Never     Relationship status:    Other Topics Concern    Are you pregnant or think you may be? No    Breast-feeding No   Social History Narrative    Not on file     Patient Active Problem List   Diagnosis    Type 2 diabetes mellitus without complication, without long-term current use of insulin    Hypertensive CHF (congestive heart  "failure)    Diastolic CHF, chronic    CHF (NYHA class I, ACC/AHA stage B)    Allergic rhinitis    Hypertension associated with diabetes    Family history of glaucoma    Cataracts, bilateral    Family history of macular degeneration    Alopecia areata universalis    Hepatitis B core antibody positive    FH: melanoma    History of cervical dysplasia    Hyperlipidemia associated with type 2 diabetes mellitus    Vitamin D deficiency    Statin intolerance    Thyroid nodule     Review of patient's allergies indicates:   Allergen Reactions    Arb-angiotensin receptor antagonist Edema    Hydralazine analogues      "Lupus reaction"    Metoprolol Other (See Comments)     Alopecia    Sulfa (sulfonamide antibiotics)      Passed out and almost stopped breathing    Ace inhibitors Other (See Comments)     cough    Calcium channel blocking agents-dihydropyridines      Edema         The following were reviewed at this visit: active problem list, medication list, allergies, family history, social history, and health maintenance.    Medications:  Current Outpatient Medications on File Prior to Visit   Medication Sig Dispense Refill    b complex vitamins tablet Take 1 tablet by mouth once daily.      carvedilol (COREG) 6.25 MG tablet Take 1 tablet (6.25 mg total) by mouth 2 (two) times daily with meals. 60 tablet 11    fish oil-omega-3 fatty acids 300-1,000 mg capsule Take 2 capsules by mouth once daily.      furosemide (LASIX) 20 MG tablet Take 1 tablet (20 mg total) by mouth once daily. 90 tablet 3    glucosamine sulfate (GLUCOSAMINE) 500 mg Tab Take 1,000 mg by mouth.      inositol 500 mg Tab Take 500 mg by mouth 2 (two) times daily.      methylsulfonylmethane (MSM) 1,000 mg Cap Take by mouth.      spironolactone (ALDACTONE) 50 MG tablet Take 1 tablet (50 mg total) by mouth every evening. 90 tablet 3    tiZANidine (ZANAFLEX) 2 MG tablet TAKE 1 TABLET(2 MG) BY MOUTH EVERY NIGHT AS NEEDED 30 tablet 4    " asenapine (SAPHRIS) 5 mg Subl Place under the tongue.      aspirin 81 MG Chew Take 1 tablet (81 mg total) by mouth once daily. (Patient taking differently: Take 81 mg by mouth daily as needed. )  0    coenzyme Q10 (CO Q-10) 100 mg capsule Take 100 mg by mouth once daily.      fluticasone (FLONASE) 50 mcg/actuation nasal spray SHAKE LIQUID AND USE 2 SPRAYS IN EACH NOSTRIL DAILY 1 Bottle 12     No current facility-administered medications on file prior to visit.        Medications have been reviewed and reconciled with patient at this visit.  Barriers to medications present (no)    Adverse reactions to current medications (no)    Over the counter medications reviewed (Yes ), and if needed added to active Medication list at this visit.     Exam:  Wt Readings from Last 3 Encounters:   06/28/19 98.2 kg (216 lb 7.9 oz)   05/09/19 97.8 kg (215 lb 9.8 oz)   05/03/19 97.4 kg (214 lb 11.7 oz)     Temp Readings from Last 3 Encounters:   06/28/19 96.5 °F (35.8 °C)   05/03/19 97.7 °F (36.5 °C) (Oral)   02/15/19 97 °F (36.1 °C) (Tympanic)     BP Readings from Last 3 Encounters:   06/28/19 (!) 160/100   05/09/19 (!) 144/86   05/03/19 114/76     Pulse Readings from Last 3 Encounters:   06/28/19 68   06/04/19 73   05/09/19 72     Body mass index is 33.91 kg/m².      Review of Systems   Constitutional: Negative for chills, fever and malaise/fatigue.   HENT: Negative for hearing loss.    Eyes: Negative for redness.   Respiratory: Negative for cough and shortness of breath.    Cardiovascular: Negative for chest pain.   Gastrointestinal: Positive for abdominal pain, anorexia, diarrhea, flatus and nausea. Negative for vomiting.   Genitourinary: Positive for dysuria and frequency.   Musculoskeletal: Positive for arthralgias, back pain and myalgias. Negative for joint pain.   Skin: Negative for rash.   Neurological: Negative for focal weakness and headaches.     Physical Exam   Constitutional: She is oriented to person, place, and time.  She appears well-developed and well-nourished. No distress.   HENT:   Head: Normocephalic and atraumatic.   Eyes: Conjunctivae are normal. No scleral icterus.   Cardiovascular: Normal rate, regular rhythm and normal heart sounds. Exam reveals no gallop and no friction rub.   No murmur heard.  Pulmonary/Chest: Effort normal and breath sounds normal. No respiratory distress. She has no wheezes. She has no rales.   Abdominal: Soft. Bowel sounds are normal. She exhibits no distension. There is no tenderness. There is no rebound and no guarding.   Musculoskeletal:   Severe scoliosis curvature with skin crease; no rashes or erythema   Neurological: She is alert and oriented to person, place, and time.   Skin: Skin is warm and dry. She is not diaphoretic.   Psychiatric: She has a normal mood and affect.   Nursing note and vitals reviewed.      Laboratory Reviewed ({Yes)  Lab Results   Component Value Date    WBC 8.03 05/03/2019    HGB 14.2 05/03/2019    HCT 44.6 05/03/2019     05/03/2019    CHOL 204 (H) 10/17/2018    TRIG 74 10/17/2018    HDL 52 10/17/2018    ALT 20 05/03/2019    AST 28 05/03/2019     05/03/2019    K 5.1 05/03/2019    CL 99 05/03/2019    CREATININE 1.1 05/03/2019    BUN 21 05/03/2019    CO2 29 05/03/2019    TSH 1.086 05/03/2019    HGBA1C 6.0 (H) 10/17/2018     EXAMINATION:  XR THORACIC SPINE AP LATERAL    CLINICAL HISTORY:  Scoliosis, unspecified    TECHNIQUE:  AP and lateral views were obtained of the thoracic spine.    COMPARISON:  None.    FINDINGS:  There is S shaped thoracolumbar scoliosis with convexity of the thoracic spine to the right and convexity of the lumbar spine to the left.  Vertebral body heights appear well maintained.  No definite spondylolisthesis demonstrated.  There is multilevel spondylosis throughout the thoracic spine with mild-to-moderate disc height loss noted at multiple levels in the lower thoracic and upper lumbar spine.  There are no acute fractures or  subluxations.      Impression       As above.  No acute findings.      Electronically signed by: Darrius Parks MD  Date: 06/28/2019  Time: 09:58     EXAMINATION:  XR LUMBAR SPINE AP AND LATERAL    CLINICAL HISTORY:  Low back pain, >6wks conservative tx, persistent-progressive sx, surgical candidate;Scoliosis, unspecified    TECHNIQUE:  AP, lateral and spot images were performed of the lumbar spine.    COMPARISON:  08/03/2012    FINDINGS:  Severe levoscoliosis of the lumbar spine again noted.  Vertebral body heights appear within normal limits.  There is slight grade 1 anterolisthesis of L4 on L5 may be slightly worsened from prior.  Multilevel facet arthropathy noted.  Moderate to severe disc height loss noted throughout the lumbar spine which appear similar to prior.  Posterior elements appear grossly intact. No fractures demonstrated.  The remaining visualized osseous and soft tissue structures demonstrate no appreciable abnormality.      Impression       As above.      Electronically signed by: Darrius Parks MD  Date: 06/28/2019  Time: 10:00     EXAMINATION:  XR CERVICAL SPINE AP LATERAL    CLINICAL HISTORY:  Scoliosis, unspecified    TECHNIQUE:  AP, lateral, and open mouth views of the cervical spine were performed.    COMPARISON:  None    FINDINGS:  There is slight grade 1 anterolisthesis of C4 on C5.  Vertebral body heights are well maintained.  There is moderate to severe disc height loss at C5-6 through C7-T1.  Mild disc height loss at C4-5.  Multilevel facet arthropathy noted.  Posterior elements appear intact without acute fractures or subluxations demonstrated.  Odontoid process appears intact.  Atlantoaxial articulations appear normal.  Prevertebral soft tissues are within normal limits.      Impression       As above.      Electronically signed by: Darrius Parks MD  Date: 06/28/2019  Time: 10:01         Deirdre was seen today for abdominal pain.    Diagnoses and all orders for this  visit:    Scoliosis of thoracolumbar spine, unspecified scoliosis type  -     X-Ray Cervical Spine AP And Lateral; Future  -     X-Ray Thoracic Spine AP Lateral; Future  -     X-Ray Lumbar Spine AP And Lateral; Future  -     Ambulatory Referral to Neurosurgery    RUQ abdominal pain  -     US Abdomen Limited_Gallbladder; Future  -     H. PYLORI ANTIBODY, IGG; Future  -     Ambulatory Referral to Neurosurgery    Hypertensive heart disease with chronic diastolic congestive heart failure    Type 2 diabetes mellitus without complication, without long-term current use of insulin     Abdominal pain:  -due to the positional nature and severity of her scoliosis, I agree do agree with the patient that her scoliosis is likely significantly contributing to her pain and discomfort  -differential diagnosis includes cholelithiasis verses H pylori peptic ulcer disease  -lumbar, thoracic, and cervical x-rays were reviewed independently read by me showing severe lumbar scoliosis with a Ramirez's angle of 60°.  Moderate degenerative changes noted with no acute fractures or dislocations.  -patient is tried conservative measures of physical therapy and yoga which are no longer helping  -place referral to neurosurgery for further evaluation      -Chronic hypertensive heart disease with CHF.  Managed by patient's PCP and Cardiology.  -Chronic type 2 diabetes mellitus.  Managed by patient's PCP.    -Patient's lab results were reviewed and discussed with patient   -Treatment options and alternatives were discussed with the patient. Patient expressed understanding. Patient was given the opportunity to ask questions and be an active participant in their medical care. Patient had no further questions or concerns at this time.   -Patient is an overall moderate risk for health complications from their medical conditions.     Follow up: Follow up in about 3 weeks (around 7/19/2019), or if symptoms worsen or fail to improve.    After visit summary  was printed and given to patient upon discharge today.  Patient goals and care plan are included in After Visit Summary.

## 2019-07-01 ENCOUNTER — HOSPITAL ENCOUNTER (OUTPATIENT)
Dept: RADIOLOGY | Facility: HOSPITAL | Age: 69
Discharge: HOME | End: 2019-07-01
Attending: FAMILY MEDICINE
Payer: MEDICARE

## 2019-07-01 ENCOUNTER — OFFICE VISIT (OUTPATIENT)
Dept: OTOLARYNGOLOGY | Facility: CLINIC | Age: 69
End: 2019-07-01
Payer: MEDICARE

## 2019-07-01 VITALS
BODY MASS INDEX: 34.32 KG/M2 | DIASTOLIC BLOOD PRESSURE: 97 MMHG | WEIGHT: 219.13 LBS | TEMPERATURE: 97 F | HEART RATE: 78 BPM | SYSTOLIC BLOOD PRESSURE: 159 MMHG

## 2019-07-01 DIAGNOSIS — R10.11 RUQ ABDOMINAL PAIN: ICD-10-CM

## 2019-07-01 DIAGNOSIS — E04.2 MULTIPLE THYROID NODULES: Primary | ICD-10-CM

## 2019-07-01 PROCEDURE — 99999 PR PBB SHADOW E&M-EST. PATIENT-LVL III: CPT | Mod: PBBFAC,,, | Performed by: ORTHOPAEDIC SURGERY

## 2019-07-01 PROCEDURE — 99999 PR PBB SHADOW E&M-EST. PATIENT-LVL III: ICD-10-PCS | Mod: PBBFAC,,, | Performed by: ORTHOPAEDIC SURGERY

## 2019-07-01 PROCEDURE — 99214 PR OFFICE/OUTPT VISIT, EST, LEVL IV, 30-39 MIN: ICD-10-PCS | Mod: S$PBB,,, | Performed by: ORTHOPAEDIC SURGERY

## 2019-07-01 PROCEDURE — 76705 ECHO EXAM OF ABDOMEN: CPT | Mod: TC

## 2019-07-01 PROCEDURE — 99213 OFFICE O/P EST LOW 20 MIN: CPT | Mod: PBBFAC,25 | Performed by: ORTHOPAEDIC SURGERY

## 2019-07-01 PROCEDURE — 99214 OFFICE O/P EST MOD 30 MIN: CPT | Mod: S$PBB,,, | Performed by: ORTHOPAEDIC SURGERY

## 2019-07-01 NOTE — Clinical Note
Do you mind trying to be there for this lady's US FNA?  She tried to have one a few years ago, but they couldn't do it b/c of a vessel in the area, now nodule is growing and trying to make decision regarding surgery.  Thanks!

## 2019-07-01 NOTE — H&P (VIEW-ONLY)
"Subjective:       Patient ID: Deirdre Yanez is a 69 y.o. female.    Chief Complaint: No chief complaint on file.    Patient is a very pleasant 64 year old female here to see me today in followup for evaluation of her thyroid nodules.  Overall, since her last visit she has been doing well since her last visit from a thyroid standpoint.  Her most recent TSH was 5/2019 and that was normal (Dr. Blackburn has ordered as a part of her next set of labs).  She is continuing with alopecia.  We are following her for thyroid nodules.  She has seen a dentist since her last visit, and has had extraction of an implant and her infection and swelling in the neck has now resolved.  She has some difficulty swallowing soups and solids, and thinks it gets "stuck" at times.  She has gotten into the habit of eating smaller bites.  She has no difficulty breathing or hoarseness.  She has been using Flonase since her last visit, and is very pleased with her progress.   We have previously sent her for an US guided FNA of her thyroid nodule as it has slightly enlarged.  However, they were unable to complete the FNA due to blood vessels in the area.  Therefore, we elected to follow her with serial US, and had her annual followup US today.    Review of Systems   Constitutional: Negative for chills, fatigue, fever and unexpected weight change.   HENT: Negative for congestion, dental problem, ear discharge, ear pain, facial swelling, hearing loss, nosebleeds, postnasal drip, rhinorrhea, sinus pressure, sneezing, sore throat, tinnitus, trouble swallowing and voice change.    Eyes: Negative for redness, itching and visual disturbance.   Respiratory: Negative for cough, choking, shortness of breath and wheezing.    Cardiovascular: Negative for chest pain and palpitations.   Gastrointestinal: Negative for abdominal pain.        No reflux.   Musculoskeletal: Negative for gait problem.   Skin: Negative for rash.   Neurological: Negative for dizziness, " light-headedness and headaches.       Objective:      Physical Exam   Constitutional: She is oriented to person, place, and time. She appears well-developed and well-nourished. No distress.   HENT:   Head: Normocephalic and atraumatic.   Right Ear: Tympanic membrane, external ear and ear canal normal.   Left Ear: Tympanic membrane, external ear and ear canal normal.   Nose: Nose normal. No mucosal edema, rhinorrhea, nasal deformity or septal deviation. No epistaxis. Right sinus exhibits no maxillary sinus tenderness and no frontal sinus tenderness. Left sinus exhibits no maxillary sinus tenderness and no frontal sinus tenderness.   Mouth/Throat: Uvula is midline, oropharynx is clear and moist and mucous membranes are normal. Mucous membranes are not pale and not dry. No dental caries. No oropharyngeal exudate or posterior oropharyngeal erythema.   Eyes: Pupils are equal, round, and reactive to light. Conjunctivae, EOM and lids are normal. Right eye exhibits no chemosis. Left eye exhibits no chemosis. Right conjunctiva is not injected. Left conjunctiva is not injected. No scleral icterus. Right eye exhibits normal extraocular motion and no nystagmus. Left eye exhibits normal extraocular motion and no nystagmus.   Neck: Trachea normal and phonation normal. No tracheal tenderness and no muscular tenderness present. No tracheal deviation present. Thyroid mass (small nodule palpated in the right lower lobe, elevates with swallow) present. No thyromegaly present.   Cardiovascular: Intact distal pulses.   Pulmonary/Chest: Effort normal. No stridor. No respiratory distress.   Abdominal: She exhibits no distension.   Lymphadenopathy:        Head (right side): No submental, no submandibular, no preauricular, no posterior auricular and no occipital adenopathy present.        Head (left side): No submental, no submandibular, no preauricular, no posterior auricular and no occipital adenopathy present.     She has no cervical  adenopathy.   Neurological: She is alert and oriented to person, place, and time. No cranial nerve deficit. Gait normal.   Skin: Skin is warm and dry. No rash noted. No erythema.   Wearing a wig today   Psychiatric: She has a normal mood and affect. Her behavior is normal.           THYROID US:  FINDINGS:  The right lobe measures 4.0 x 2.5 x 2.2cm and the left lobe measures 4.1 x 1.7 x 2.3cm.  The overall thyroid volume is 19 cc.    There is redemonstration of multiple bilateral solid and complex cystic thyroid nodules.  The largest lesion on the right in the lower pole is a heterogeneous hypoechoic solid nodule and measures 2.2 x 1.5 x 1.7 cm, mildly increased in size compared to priors.  On the left the largest lesion is a complex cyst with thin septations and eccentric mural thickening or nodularity measuring 1.8 x 1.6 x 1.4 cm, mildly increased in size compared to priors.    No cervical lymphadenopathy noted on either side.    Assessment:       1. Multiple thyroid nodules        Plan:       1.  Multiple thyroid nodules:  I have reviewed her previous ultrasounds extensively, and she has had consistent year to year growth of her bilateral thyroid nodules. We had previously tried to obtain ultrasound-guided biopsy would they were unable to do so secondary to a vessel in the area.  At this point, we will attempt to repeat that ultrasound-guided biopsy with an interventional radiologist to see if they are able to obtain some tissue.  If they are unable to obtain any tissue, may need to discuss thyroidectomy due to persistent growth in the nodules.  I will schedule ultrasound-guided biopsy and have the patient follow with me 1 week after to hopefully be able to review results.

## 2019-07-02 ENCOUNTER — TELEPHONE (OUTPATIENT)
Dept: INTERNAL MEDICINE | Facility: CLINIC | Age: 69
End: 2019-07-02

## 2019-07-02 NOTE — TELEPHONE ENCOUNTER
Patient notified of test results    MD [facility] Staff     No evidence gallstones.  Follow-up as planned.  Results were relayed through the Patient Portal.     Negative for H. Pylori. Follow up as planned. Results were relayed through the Patient Portal.

## 2019-07-07 ENCOUNTER — PATIENT MESSAGE (OUTPATIENT)
Dept: INTERNAL MEDICINE | Facility: CLINIC | Age: 69
End: 2019-07-07

## 2019-07-10 ENCOUNTER — HOSPITAL ENCOUNTER (OUTPATIENT)
Dept: RADIOLOGY | Facility: HOSPITAL | Age: 69
Discharge: HOME | End: 2019-07-10
Attending: ORTHOPAEDIC SURGERY
Payer: MEDICARE

## 2019-07-10 DIAGNOSIS — E04.2 MULTIPLE THYROID NODULES: ICD-10-CM

## 2019-07-10 PROCEDURE — 88173 CYTOPATH EVAL FNA REPORT: CPT | Performed by: PATHOLOGY

## 2019-07-10 PROCEDURE — 88173 CYTOLOGY SPECIMEN-FNA-RADIOLOGY ASSISTED WITH PATH ADEQUACY-CORE BX: ICD-10-PCS | Mod: 26,,, | Performed by: PATHOLOGY

## 2019-07-10 PROCEDURE — 10005 FNA BX W/US GDN 1ST LES: CPT

## 2019-07-10 PROCEDURE — 88173 CYTOPATH EVAL FNA REPORT: CPT | Mod: 26,,, | Performed by: PATHOLOGY

## 2019-07-15 RX ORDER — FLUTICASONE PROPIONATE 50 MCG
SPRAY, SUSPENSION (ML) NASAL
Qty: 16 ML | Refills: 0 | Status: SHIPPED | OUTPATIENT
Start: 2019-07-15 | End: 2020-07-20 | Stop reason: SDUPTHER

## 2019-07-15 RX ORDER — TIZANIDINE 2 MG/1
TABLET ORAL
Qty: 30 TABLET | Refills: 5 | Status: SHIPPED | OUTPATIENT
Start: 2019-07-15 | End: 2019-10-16 | Stop reason: SDUPTHER

## 2019-07-19 ENCOUNTER — OFFICE VISIT (OUTPATIENT)
Dept: NEUROSURGERY | Facility: CLINIC | Age: 69
End: 2019-07-19
Payer: MEDICARE

## 2019-07-19 ENCOUNTER — HOSPITAL ENCOUNTER (OUTPATIENT)
Dept: RADIOLOGY | Facility: HOSPITAL | Age: 69
Discharge: HOME | End: 2019-07-19
Attending: PHYSICIAN ASSISTANT
Payer: MEDICARE

## 2019-07-19 ENCOUNTER — OFFICE VISIT (OUTPATIENT)
Dept: INTERNAL MEDICINE | Facility: CLINIC | Age: 69
End: 2019-07-19
Payer: MEDICARE

## 2019-07-19 VITALS
HEART RATE: 70 BPM | OXYGEN SATURATION: 98 % | TEMPERATURE: 98 F | DIASTOLIC BLOOD PRESSURE: 92 MMHG | SYSTOLIC BLOOD PRESSURE: 154 MMHG | WEIGHT: 216.25 LBS | HEIGHT: 67 IN | BODY MASS INDEX: 33.94 KG/M2

## 2019-07-19 VITALS — BODY MASS INDEX: 34.07 KG/M2 | WEIGHT: 217.06 LBS | HEIGHT: 67 IN

## 2019-07-19 DIAGNOSIS — R93.7 ABNORMAL X-RAY OF THORACIC SPINE: ICD-10-CM

## 2019-07-19 DIAGNOSIS — G89.29 CHRONIC BACK PAIN, UNSPECIFIED BACK LOCATION, UNSPECIFIED BACK PAIN LATERALITY: ICD-10-CM

## 2019-07-19 DIAGNOSIS — M41.9 SCOLIOSIS, UNSPECIFIED SCOLIOSIS TYPE, UNSPECIFIED SPINAL REGION: ICD-10-CM

## 2019-07-19 DIAGNOSIS — G89.29 CHRONIC BACK PAIN, UNSPECIFIED BACK LOCATION, UNSPECIFIED BACK PAIN LATERALITY: Primary | ICD-10-CM

## 2019-07-19 DIAGNOSIS — M54.9 CHRONIC BACK PAIN, UNSPECIFIED BACK LOCATION, UNSPECIFIED BACK PAIN LATERALITY: Primary | ICD-10-CM

## 2019-07-19 DIAGNOSIS — M54.9 CHRONIC BACK PAIN, UNSPECIFIED BACK LOCATION, UNSPECIFIED BACK PAIN LATERALITY: ICD-10-CM

## 2019-07-19 DIAGNOSIS — I50.32 HYPERTENSIVE HEART DISEASE WITH CHRONIC DIASTOLIC CONGESTIVE HEART FAILURE: ICD-10-CM

## 2019-07-19 DIAGNOSIS — M41.9 SCOLIOSIS OF THORACOLUMBAR SPINE, UNSPECIFIED SCOLIOSIS TYPE: Primary | ICD-10-CM

## 2019-07-19 DIAGNOSIS — I11.0 HYPERTENSIVE HEART DISEASE WITH CHRONIC DIASTOLIC CONGESTIVE HEART FAILURE: ICD-10-CM

## 2019-07-19 DIAGNOSIS — E11.9 TYPE 2 DIABETES MELLITUS WITHOUT COMPLICATION, WITHOUT LONG-TERM CURRENT USE OF INSULIN: ICD-10-CM

## 2019-07-19 DIAGNOSIS — M89.9 DISORDER OF BONE: ICD-10-CM

## 2019-07-19 PROCEDURE — 99214 OFFICE O/P EST MOD 30 MIN: CPT | Mod: PBBFAC,25,27,PO | Performed by: PHYSICIAN ASSISTANT

## 2019-07-19 PROCEDURE — 99214 OFFICE O/P EST MOD 30 MIN: CPT | Mod: S$PBB,,, | Performed by: FAMILY MEDICINE

## 2019-07-19 PROCEDURE — 99214 PR OFFICE/OUTPT VISIT, EST, LEVL IV, 30-39 MIN: ICD-10-PCS | Mod: S$PBB,,, | Performed by: FAMILY MEDICINE

## 2019-07-19 PROCEDURE — 72082 X-RAY EXAM ENTIRE SPI 2/3 VW: CPT | Mod: TC

## 2019-07-19 PROCEDURE — 99204 OFFICE O/P NEW MOD 45 MIN: CPT | Mod: S$PBB,,, | Performed by: PHYSICIAN ASSISTANT

## 2019-07-19 PROCEDURE — 99999 PR PBB SHADOW E&M-EST. PATIENT-LVL III: ICD-10-PCS | Mod: PBBFAC,,, | Performed by: FAMILY MEDICINE

## 2019-07-19 PROCEDURE — 99213 OFFICE O/P EST LOW 20 MIN: CPT | Mod: PBBFAC,25 | Performed by: FAMILY MEDICINE

## 2019-07-19 PROCEDURE — 99999 PR PBB SHADOW E&M-EST. PATIENT-LVL IV: CPT | Mod: PBBFAC,,, | Performed by: PHYSICIAN ASSISTANT

## 2019-07-19 PROCEDURE — 99999 PR PBB SHADOW E&M-EST. PATIENT-LVL IV: ICD-10-PCS | Mod: PBBFAC,,, | Performed by: PHYSICIAN ASSISTANT

## 2019-07-19 PROCEDURE — 99999 PR PBB SHADOW E&M-EST. PATIENT-LVL III: CPT | Mod: PBBFAC,,, | Performed by: FAMILY MEDICINE

## 2019-07-19 PROCEDURE — 72082 X-RAY EXAM ENTIRE SPI 2/3 VW: CPT | Mod: 26,,, | Performed by: RADIOLOGY

## 2019-07-19 PROCEDURE — 99204 PR OFFICE/OUTPT VISIT, NEW, LEVL IV, 45-59 MIN: ICD-10-PCS | Mod: S$PBB,,, | Performed by: PHYSICIAN ASSISTANT

## 2019-07-19 PROCEDURE — 72082 XR SCOLIOSIS COMPLETE: ICD-10-PCS | Mod: 26,,, | Performed by: RADIOLOGY

## 2019-07-19 NOTE — PROGRESS NOTES
Subjective:      Patient ID: Deirdre Yanez is a 69 y.o. female.    Chief Complaint: Mid-back Pain    HPI  Patient is seen today for evaluation of back pain. She is referred to the clinic by Dr. Rizo. She was diagnosed with scoliosis as a teenager and she has been doing yoga for years however recently this has become very difficult for her to do. From October 2018 through January 2019 patient was having difficulty doing certain activities and could barely sleep due to the pain. Now she is finally able to sleep and roll over on her sides. Certain positions do trigger the pain. She has pain of all extremities but reports that the R side of her body is more symptomatic.   She denies weakness. She reports numbness of LUE only if sitting or standing for prolonged periods. Patient denies any recent falls or other injuries.     Patient hasn't had any previous spine surgery or injections. She also hasn't had any formal physical/occupational therapy. She does yoga but this is getting more and more difficult to do. She denies bladder and bowel issues. She does not use any assistive devices.     Her current pain is 0/10.         Review of Systems   Constitution: Negative for fever.   HENT: Negative for congestion.    Respiratory: Negative for cough and wheezing.    Skin: Negative for poor wound healing.   Musculoskeletal: Positive for back pain and stiffness. Negative for falls, muscle weakness and myalgias.   Gastrointestinal: Negative for abdominal pain, bowel incontinence, diarrhea, nausea and vomiting.   Genitourinary: Negative for bladder incontinence.   Neurological: Positive for numbness (AT TIMES, LUE WHEN SITTING/STANDING TOO LONG). Negative for seizures.   Psychiatric/Behavioral: Negative for altered mental status.         Objective:            Ortho/SPM Exam      Nursing note and vitals reviewed  Gen:Oriented to person, place, and time.             Appears stated age   Head:Normocephalic and atraumatic.  Nose:  Nose normal.    Eyes: EOM are normal. Pupils are equal, round, and reactive to light.   Neck: Neck supple. No masses or lesions palpated  Cardiovascular: Intact distal pulses.    Abdominal: Soft.   Neurological: Alert and oriented to person, place, and time.  No cranial nerve deficit.  Coordination normal. Normal muscle tone  Psychiatric: Normal mood and affect. Behavior is normal.      Gait:  YES  Antalgic   Yes  Broad based   None  Assistive devices   NO  Able to walk on toes and heels without difficulty   Yes   180 degree turn with less than 3 steps     Neck:  None  Paraspinal tenderness   None  Paraspinal muscle spasms   NONE  Pain with flexion and extention   WNL  Range of motion shoulders   Neg  Spurling's sign     Motor:   Right Right Left Left  Level Group   5 5 5 5 C5 Deltoid   5 5 5 5 C6 Bicep   5 5 5 5  Wrist extension    5 5 5 5 C7 Triceps   5 5 5 5  Wrist flexion   5 5 5 5 C8    5 5 5 5 T1 Interossei      Sensation: INTACT TO LIGHT TOUCH  NL Decreased (R/L/BL) Level Sensation    X  C5 Lateral upper arm   X  C6 Thumb and index finger, lat forearm   X  C7 Middle finger   X  C8 Ring and little finger   X  T1 Medial arm      Reflex:  2+  Bicep tendon   2+  Brachioradialis   2+  Triceps tendon   Not present  Adamson's   none  Clonus   neg  Tinel's       Back:   TTP THORACIC (RIGHT SIDE UPPER/LOWER) AND LUMBAR SPINE (LEFT SIDE) Paraspinal tenderness   NONE  Paraspinal muscle spasms    PAIN W/ ROM Pain with flexion and extention    DECREASED AND LIMITED Range of motion    Neg  Straight leg raise     Motor:   Right Right Left Left  Level Group   5 5 5 5 L2 Hip flexor (Psoas)   5 5 5 5 L3 Leg extension (Quads)   5 5 5 5 L4 Dorsiflexion & foot inversion (Tibialis Anterior)   5 5 5 5 L5 Great toe extension ( EHL)   5 5 5 5 S1 Foot eversion (Gastroc, PL & PB)     Sensation:INTACT TO LIGHT TOUCH  NL Decreased (R/L/BL) Level Sensation    X  L2 Anterio-medial thigh   X  L3 Medial thigh around knee   X  L4 Medial  foot   X  L5 Dorsum foot   X  S1 Lateral foot     Reflex:  2+  Patellar tendon (L4)   2+  Achilles tendon (S1)           IMAGING:  Narrative     EXAMINATION:  XR THORACIC SPINE AP LATERAL    CLINICAL HISTORY:  Scoliosis, unspecified    TECHNIQUE:  AP and lateral views were obtained of the thoracic spine.    COMPARISON:  None.    FINDINGS:  There is S shaped thoracolumbar scoliosis with convexity of the thoracic spine to the right and convexity of the lumbar spine to the left.  Vertebral body heights appear well maintained.  No definite spondylolisthesis demonstrated.  There is multilevel spondylosis throughout the thoracic spine with mild-to-moderate disc height loss noted at multiple levels in the lower thoracic and upper lumbar spine.  There are no acute fractures or subluxations.        Narrative     EXAMINATION:  XR LUMBAR SPINE AP AND LATERAL    CLINICAL HISTORY:  Low back pain, >6wks conservative tx, persistent-progressive sx, surgical candidate;Scoliosis, unspecified    TECHNIQUE:  AP, lateral and spot images were performed of the lumbar spine.    COMPARISON:  08/03/2012    FINDINGS:  Severe levoscoliosis of the lumbar spine again noted.  Vertebral body heights appear within normal limits.  There is slight grade 1 anterolisthesis of L4 on L5 may be slightly worsened from prior.  Multilevel facet arthropathy noted.  Moderate to severe disc height loss noted throughout the lumbar spine which appear similar to prior.  Posterior elements appear grossly intact. No fractures demonstrated.  The remaining visualized osseous and soft tissue structures demonstrate no appreciable abnormality.      Impression       As above.     EXAMINATION:  XR CERVICAL SPINE AP LATERAL    CLINICAL HISTORY:  Scoliosis, unspecified    TECHNIQUE:  AP, lateral, and open mouth views of the cervical spine were performed.    COMPARISON:  None    FINDINGS:  There is slight grade 1 anterolisthesis of C4 on C5.  Vertebral body heights are well  maintained.  There is moderate to severe disc height loss at C5-6 through C7-T1.  Mild disc height loss at C4-5.  Multilevel facet arthropathy noted.  Posterior elements appear intact without acute fractures or subluxations demonstrated.  Odontoid process appears intact.  Atlantoaxial articulations appear normal.  Prevertebral soft tissues are within normal limits.      Impression       As above.           Assessment:       Encounter Diagnoses   Name Primary?    Chronic back pain, unspecified back location, unspecified back pain laterality Yes    Scoliosis, unspecified scoliosis type, unspecified spinal region     Abnormal x-ray of thoracic spine     Disorder of bone            Plan:       Deirdre was seen today for mid-back pain.    Diagnoses and all orders for this visit:    Chronic back pain, unspecified back location, unspecified back pain laterality  -     Cancel: X-Ray Spine Scoliosis 4 or 5 views; Future  -     MRI Lumbar Spine Without Contrast; Future  -     DXA Bone Density Spine And Hip; Future  -     X-Ray Scoliosis Complete; Future    Scoliosis, unspecified scoliosis type, unspecified spinal region  -     Cancel: X-Ray Spine Scoliosis 4 or 5 views; Future  -     MRI Lumbar Spine Without Contrast; Future  -     DXA Bone Density Spine And Hip; Future  -     X-Ray Scoliosis Complete; Future    Abnormal x-ray of thoracic spine  -     Cancel: X-Ray Spine Scoliosis 4 or 5 views; Future  -     MRI Lumbar Spine Without Contrast; Future  -     DXA Bone Density Spine And Hip; Future  -     MRI Thoracic Spine Without Contrast; Future  -     X-Ray Scoliosis Complete; Future    Disorder of bone   -     DXA Bone Density Spine And Hip; Future      Patient will have x-rays taken on the way out today- standing scoliosis films.  She will also be scheduled for MRI thoracic and lumbar spine, also Bone density scan.   Patient will follow-up after above imaging studies with Dr. Mendoza to review and surgical  evaluation                Emperatriz Lopes PA-C

## 2019-07-19 NOTE — PROGRESS NOTES
Deirdre MAGANA [facility]  07/19/2019  [id number]    Darrius Rizo MD  Patient Care Team:  Darrius Rizo MD as PCP - General (Family Medicine)  Beti Blackburn MD as PCP - [facility]  Santino Chowdhury OD as Consulting Physician (Optometry)  Debra Delgado LPN as Care Coordinator (Internal Medicine)  Has the patient seen any provider outside of the [facility] network since the last visit? (no). If yes, HIPPA forms completed and records requested.      Chief Complaint:  Chief Complaint   Patient presents with    Follow-up     3wk f/u       History of Present Illness:  Patient is a 69-year-old female presents today for follow-up of right abdominal and side pain. Patient states that she was seen by Josh Lundy-PA this morning.  States that she is scheduled to have an MRI as well as a scoliosis series of x-rays.  States that overall she is doing better.  States she has been doing stretching exercises at home which have her leave some of the pain and problems. Patient states that she would also like to establish care.      Abdominal Pain   This is a chronic problem. The current episode started more than 1 year ago. The onset quality is gradual. The problem occurs constantly. The pain is located in the RUQ, generalized abdominal region and right flank. The pain is at a severity of 6/10. The pain is moderate. The quality of the pain is cramping and a sensation of fullness. Associated symptoms include anorexia, arthralgias, belching, diarrhea, flatus, myalgias and nausea. Pertinent negatives include no dysuria, fever, frequency, headaches or vomiting. The pain is aggravated by certain positions and eating. The pain is relieved by nothing. She has tried proton pump inhibitors for the symptoms. The treatment provided no relief. Prior diagnostic workup includes lower endoscopy. Her past medical history is significant for GERD and PUD. There is no history of abdominal surgery, colon cancer, Crohn's  disease, gallstones, irritable bowel syndrome, pancreatitis or ulcerative colitis. Patient's medical history includes UTI. Patient's medical history does not include kidney stones.   Back Pain   This is a chronic problem. The current episode started more than 1 year ago. The problem occurs daily. The problem has been waxing and waning since onset. The pain is at a severity of 5/10. The pain is worse during the day. Associated symptoms include abdominal pain. Pertinent negatives include no chest pain, dysuria, fever or headaches.           History:  Past Medical History:   Diagnosis Date    Abrasion     left eye    Abrasion and/or friction burn of abdominal wall with infection     left eye    Arthritis     gouty arthritis    Back pain     Diabetes mellitus     2011  11/17/2013    Diverticulosis     DM (diabetes mellitus) 2011     12/03/2014  A1C 6.0 x 2 weeks    DM (diabetes mellitus) 2011    BS 89 10/14/2017 A1C 5.7   Diet controlled    DM (diabetes mellitus) 2011    BS 90 am 10/23/2018     Hepatitis     Hx of B/C from cadaver in past    Hepatitis B     Hepatitis C     Hiatal hernia     Hip bursitis, left     HTN (hypertension)     Hypertensive CHF (congestive heart failure) 3/28/2014    Obesity     Pneumonia     Positive ALBIN (antinuclear antibody)     Pure hypercholesterolemia 9/30/2016    Rheumatoid arthritis     questionable diagnosis    Scoliosis     Type 2 diabetes mellitus without complication, without long-term current use of insulin     Urinary incontinence     intermittent- only when lifting heavy items > 20 lbs     Past Surgical History:   Procedure Laterality Date    APPENDECTOMY      bone grafts      CERVICAL CONIZATION   W/ LASER      COLONOSCOPY N/A 1/10/2019    [facility]    COLONOSCOPY N/A 10/28/2013    [facility] at [facility] ENDO    COSMETIC SURGERY Bilateral     ears    DILATION AND CURETTAGE OF UTERUS       [id number] N/A 1/2/2018    [facility]    KNEE ARTHROSCOPY W/ MENISCAL REPAIR Right     LIVER BIOPSY      SHAVING-ENDOMETRIAL  1/2/2018    [facility]    sinus lift      2003    TONSILLECTOMY, ADENOIDECTOMY      TUBAL LIGATION       Family History   Problem Relation Age of Onset    Colon cancer Maternal Grandfather     Breast cancer Maternal Grandfather     Diabetes Maternal Grandmother     Hypertension Maternal Grandmother     Cataracts Mother     Glaucoma Mother     Macular degeneration Mother     Hypertension Mother     Diabetes Paternal Grandmother     Cataracts Maternal Aunt     Glaucoma Maternal Aunt     Macular degeneration Maternal Aunt     Melanoma Maternal Aunt     Heart disease Unknown         pat side     Social History     Socioeconomic History    Marital status:      Spouse name: Not on file    Number of children: 0    Years of education: Not on file    Highest education level: Not on file   Occupational History    Occupation: Retired Teacher   Social Needs    Financial resource strain: Not hard at all    Food insecurity:     Worry: Never true     Inability: Never true    Transportation needs:     Medical: No     Non-medical: No   Tobacco Use    Smoking status: Never Smoker    Smokeless tobacco: Never Used   Substance and Sexual Activity    Alcohol use: Yes     Alcohol/week: 1.2 oz     Types: 2 Glasses of wine per week     Frequency: Monthly or less     Drinks per session: 1 or 2     Binge frequency: Never     Comment: occasion    Drug use: No    Sexual activity: Yes     Partners: Male   Lifestyle    Physical activity:     Days per week: 2 days     Minutes per session: 30 min    Stress: Not at all   Relationships    Social connections:     Talks on phone: Three times a week     Gets together: Once a week     Attends Faith service: Not on file     Active  "member of club or organization: No     Attends meetings of clubs or organizations: Never     Relationship status:    Other Topics Concern    Are you pregnant or think you may be? No    Breast-feeding No   Social History Narrative    Not on file     Patient Active Problem List   Diagnosis    Type 2 diabetes mellitus without complication, without long-term current use of insulin    Hypertensive CHF (congestive heart failure)    Diastolic CHF, chronic    CHF (NYHA class I, ACC/AHA stage B)    Allergic rhinitis    Hypertension associated with diabetes    Family history of glaucoma    Cataracts, bilateral    Family history of macular degeneration    Alopecia areata universalis    Hepatitis B core antibody positive    FH: melanoma    History of cervical dysplasia    Hyperlipidemia associated with type 2 diabetes mellitus    Vitamin D deficiency    Statin intolerance    Thyroid nodule     Review of patient's allergies indicates:   Allergen Reactions    Arb-angiotensin receptor antagonist Edema    Hydralazine analogues      "Lupus reaction"    Metoprolol Other (See Comments)     Alopecia    Sulfa (sulfonamide antibiotics)      Passed out and almost stopped breathing    Ace inhibitors Other (See Comments)     cough    Calcium channel blocking agents-dihydropyridines      Edema         The following were reviewed at this visit: active problem list, medication list, allergies, family history, social history, and health maintenance.    Medications:  Current Outpatient Medications on File Prior to Visit   Medication Sig Dispense Refill    b complex vitamins tablet Take 1 tablet by mouth once daily.      carvedilol (COREG) 6.25 MG tablet Take 1 tablet (6.25 mg total) by mouth 2 (two) times daily with meals. 60 tablet 11    coenzyme Q10 (CO Q-10) 100 mg capsule Take 100 mg by mouth once daily.      fish oil-omega-3 fatty acids 300-1,000 mg capsule Take 2 capsules by mouth once daily.      " fluticasone propionate (FLONASE) 50 mcg/actuation nasal spray SHAKE LIQUID AND USE 2 SPRAYS IN EACH NOSTRIL DAILY 16 mL 0    furosemide (LASIX) 20 MG tablet Take 1 tablet (20 mg total) by mouth once daily. 90 tablet 3    glucosamine sulfate (GLUCOSAMINE) 500 mg Tab Take 1,000 mg by mouth.      inositol 500 mg Tab Take 500 mg by mouth 2 (two) times daily.      methylsulfonylmethane (MSM) 1,000 mg Cap Take by mouth.      spironolactone (ALDACTONE) 50 MG tablet Take 1 tablet (50 mg total) by mouth every evening. 90 tablet 3    tiZANidine (ZANAFLEX) 2 MG tablet TAKE 1 TABLET(2 MG) BY MOUTH EVERY NIGHT AS NEEDED 30 tablet 5    aspirin 81 MG Chew Take 1 tablet (81 mg total) by mouth once daily. (Patient taking differently: Take 81 mg by mouth daily as needed. )  0     No current facility-administered medications on file prior to visit.        Medications have been reviewed and reconciled with patient at this visit.  Barriers to medications present (no)    Adverse reactions to current medications (no)    Over the counter medications reviewed (Yes ), and if needed added to active Medication list at this visit.     Exam:  Wt Readings from Last 3 Encounters:   07/19/19 98.1 kg (216 lb 4.3 oz)   07/19/19 98.5 kg (217 lb 0.7 oz)   07/01/19 99.4 kg (219 lb 2.2 oz)     Temp Readings from Last 3 Encounters:   07/19/19 97.5 °F (36.4 °C) (Tympanic)   07/01/19 97.3 °F (36.3 °C) (Tympanic)   06/28/19 96.5 °F (35.8 °C)     BP Readings from Last 3 Encounters:   07/19/19 (!) 154/92   07/01/19 (!) 159/97   06/28/19 (!) 160/100     Pulse Readings from Last 3 Encounters:   07/19/19 70   07/01/19 78   06/28/19 68     Body mass index is 33.87 kg/m².      Review of Systems   Constitutional: Negative for chills, fever and malaise/fatigue.   HENT: Negative for hearing loss.    Eyes: Negative for redness.   Respiratory: Negative for cough and shortness of breath.    Cardiovascular: Negative for chest pain.   Gastrointestinal: Positive for  abdominal pain, anorexia, diarrhea, flatus and nausea. Negative for vomiting.   Genitourinary: Negative for dysuria and frequency.   Musculoskeletal: Positive for arthralgias, back pain and myalgias. Negative for joint pain.   Skin: Negative for rash.   Neurological: Negative for focal weakness and headaches.     Physical Exam   Constitutional: She is oriented to person, place, and time. She appears well-developed and well-nourished. No distress.   HENT:   Head: Normocephalic and atraumatic.   Eyes: Conjunctivae are normal. No scleral icterus.   Cardiovascular: Normal rate, regular rhythm and normal heart sounds. Exam reveals no gallop and no friction rub.   No murmur heard.  Pulmonary/Chest: Effort normal and breath sounds normal. No respiratory distress. She has no wheezes. She has no rales.   Abdominal: Soft. Bowel sounds are normal. She exhibits no distension. There is no tenderness. There is no rebound and no guarding.   Musculoskeletal:   Severe scoliosis curvature with skin crease; no rashes or erythema   Neurological: She is alert and oriented to person, place, and time.   Skin: Skin is warm and dry. She is not diaphoretic.   Psychiatric: She has a normal mood and affect.   Nursing note and vitals reviewed.      Laboratory Reviewed ({Yes)  Lab Results   Component Value Date    WBC 8.03 05/03/2019    HGB 14.2 05/03/2019    HCT 44.6 05/03/2019     05/03/2019    CHOL 204 (H) 10/17/2018    TRIG 74 10/17/2018    HDL 52 10/17/2018    ALT 20 05/03/2019    AST 28 05/03/2019     05/03/2019    K 5.1 05/03/2019    CL 99 05/03/2019    CREATININE 1.1 05/03/2019    BUN 21 05/03/2019    CO2 29 05/03/2019    TSH 1.086 05/03/2019    HGBA1C 6.0 (H) 10/17/2018     EXAMINATION:  XR THORACIC SPINE AP LATERAL    CLINICAL HISTORY:  Scoliosis, unspecified    TECHNIQUE:  AP and lateral views were obtained of the thoracic spine.    COMPARISON:  None.    FINDINGS:  There is S shaped thoracolumbar scoliosis with convexity  of the thoracic spine to the right and convexity of the lumbar spine to the left.  Vertebral body heights appear well maintained.  No definite spondylolisthesis demonstrated.  There is multilevel spondylosis throughout the thoracic spine with mild-to-moderate disc height loss noted at multiple levels in the lower thoracic and upper lumbar spine.  There are no acute fractures or subluxations.      Impression       As above.  No acute findings.      Electronically signed by: Darrius Parks MD  Date: 06/28/2019  Time: 09:58     EXAMINATION:  XR LUMBAR SPINE AP AND LATERAL    CLINICAL HISTORY:  Low back pain, >6wks conservative tx, persistent-progressive sx, surgical candidate;Scoliosis, unspecified    TECHNIQUE:  AP, lateral and spot images were performed of the lumbar spine.    COMPARISON:  08/03/2012    FINDINGS:  Severe levoscoliosis of the lumbar spine again noted.  Vertebral body heights appear within normal limits.  There is slight grade 1 anterolisthesis of L4 on L5 may be slightly worsened from prior.  Multilevel facet arthropathy noted.  Moderate to severe disc height loss noted throughout the lumbar spine which appear similar to prior.  Posterior elements appear grossly intact. No fractures demonstrated.  The remaining visualized osseous and soft tissue structures demonstrate no appreciable abnormality.      Impression       As above.      Electronically signed by: Darrius Parks MD  Date: 06/28/2019  Time: 10:00     EXAMINATION:  XR CERVICAL SPINE AP LATERAL    CLINICAL HISTORY:  Scoliosis, unspecified    TECHNIQUE:  AP, lateral, and open mouth views of the cervical spine were performed.    COMPARISON:  None    FINDINGS:  There is slight grade 1 anterolisthesis of C4 on C5.  Vertebral body heights are well maintained.  There is moderate to severe disc height loss at C5-6 through C7-T1.  Mild disc height loss at C4-5.  Multilevel facet arthropathy noted.  Posterior elements appear intact without acute  fractures or subluxations demonstrated.  Odontoid process appears intact.  Atlantoaxial articulations appear normal.  Prevertebral soft tissues are within normal limits.      Impression       As above.      Electronically signed by: Darrius Parks MD  Date: 06/28/2019  Time: 10:01         Deirdre was seen today for follow-up.    Diagnoses and all orders for this visit:    Scoliosis of thoracolumbar spine, unspecified scoliosis type    Hypertensive heart disease with chronic diastolic congestive heart failure    Type 2 diabetes mellitus without complication, without long-term current use of insulin     Abdominal pain secondary to severe scoliosis:  -patient is being managed by neuro surgery  -will follow up their recommendations  -follow up as needed    Diabetes:  -will have the patient follow up in the next 3 months for overall wellness and diabetic  -continue all current medications at this time    -Chronic hypertensive heart disease with CHF.  Managed by patient's Cardiology.      -Patient's lab results were reviewed and discussed with patient   -Treatment options and alternatives were discussed with the patient. Patient expressed understanding. Patient was given the opportunity to ask questions and be an active participant in their medical care. Patient had no further questions or concerns at this time.   -Patient is an overall moderate risk for health complications from their medical conditions.     Follow up: Follow up in about 3 months (around 10/19/2019), or if symptoms worsen or fail to improve.    After visit summary was printed and given to patient upon discharge today.  Patient goals and care plan are included in After Visit Summary.

## 2019-07-19 NOTE — LETTER
July 30, 2019      Darrius Rizo MD  [address] [place] [address] [place] [id number]           [facility]  [address] [place] [address] [place] [address]  Phone: [phone]  Fax: [phone]          Patient: Deirdre Yanez   MR Number: [id number]   YOB: 1950   Date of Visit: 7/19/2019       Dear Dr. Darrius Rizo:    Thank you for referring Deirdre Yanez to me for evaluation. Attached you will find relevant portions of my assessment and plan of care.    If you have questions, please do not hesitate to call me. I look forward to following Deirdre Yanez along with you.    Sincerely,    Emperatriz Lopes PA-C    Enclosure  CC:  No Recipients    If you would like to receive this communication electronically, please contact [email or web address] or [phone] to request more information on [employer] Link access.    For providers and/or their staff who would like to refer a patient to [facility], please contact us through our one-stop-shop provider referral line, [facility], at [phone].    If you feel you have received this communication in error or would no longer like to receive these types of communications, please e-mail [email or web address]

## 2019-07-22 ENCOUNTER — OFFICE VISIT (OUTPATIENT)
Dept: OTOLARYNGOLOGY | Facility: CLINIC | Age: 69
End: 2019-07-22
Payer: MEDICARE

## 2019-07-22 VITALS
HEART RATE: 67 BPM | WEIGHT: 216.69 LBS | DIASTOLIC BLOOD PRESSURE: 100 MMHG | BODY MASS INDEX: 34.01 KG/M2 | TEMPERATURE: 98 F | SYSTOLIC BLOOD PRESSURE: 143 MMHG | HEIGHT: 67 IN

## 2019-07-22 DIAGNOSIS — E04.2 MULTIPLE THYROID NODULES: Primary | ICD-10-CM

## 2019-07-22 PROCEDURE — 99999 PR PBB SHADOW E&M-EST. PATIENT-LVL III: CPT | Mod: PBBFAC,,, | Performed by: ORTHOPAEDIC SURGERY

## 2019-07-22 PROCEDURE — 99214 PR OFFICE/OUTPT VISIT, EST, LEVL IV, 30-39 MIN: ICD-10-PCS | Mod: S$PBB,,, | Performed by: ORTHOPAEDIC SURGERY

## 2019-07-22 PROCEDURE — 99214 OFFICE O/P EST MOD 30 MIN: CPT | Mod: S$PBB,,, | Performed by: ORTHOPAEDIC SURGERY

## 2019-07-22 PROCEDURE — 99213 OFFICE O/P EST LOW 20 MIN: CPT | Mod: PBBFAC | Performed by: ORTHOPAEDIC SURGERY

## 2019-07-22 PROCEDURE — 99999 PR PBB SHADOW E&M-EST. PATIENT-LVL III: ICD-10-PCS | Mod: PBBFAC,,, | Performed by: ORTHOPAEDIC SURGERY

## 2019-07-22 NOTE — PROGRESS NOTES
"Subjective:       Patient ID: Deirdre Yanez is a 69 y.o. female.    Chief Complaint: FNA Results    Patient is a very pleasant 64 year old female here to see me today in followup for evaluation of her thyroid nodules.  Overall, since her last visit she has been doing well since her last visit from a thyroid standpoint.  Her most recent TSH was 5/2019 and that was normal (Dr. Blackburn has ordered as a part of her next set of labs).  She is continuing with alopecia.  We are following her for thyroid nodules.  She has seen a dentist since her last visit, and has had extraction of an implant and her infection and swelling in the neck has now resolved.  She has some difficulty swallowing soups and solids, and thinks it gets "stuck" at times.  She has gotten into the habit of eating smaller bites.  She has no difficulty breathing or hoarseness.  She has been using Flonase since her last visit, and is very pleased with her progress.   We have previously sent her for an US guided FNA of her thyroid nodule as it has slightly enlarged.  However, they were unable to complete the FNA due to blood vessels in the area.  Therefore, we elected to follow her with serial US, but she had some interval growth.  She has had an US guided FNA since her last visit, done with Dr. Dickens, and they were able to obtain a sample.    Review of Systems   Constitutional: Negative for chills, fatigue, fever and unexpected weight change.   HENT: Negative for congestion, dental problem, ear discharge, ear pain, facial swelling, hearing loss, nosebleeds, postnasal drip, rhinorrhea, sinus pressure, sneezing, sore throat, tinnitus, trouble swallowing and voice change.    Eyes: Negative for redness, itching and visual disturbance.   Respiratory: Negative for cough, choking, shortness of breath and wheezing.    Cardiovascular: Negative for chest pain and palpitations.   Gastrointestinal: Negative for abdominal pain.        No reflux.   Musculoskeletal: " Negative for gait problem.   Skin: Negative for rash.   Neurological: Negative for dizziness, light-headedness and headaches.       Objective:      Physical Exam   Constitutional: She is oriented to person, place, and time. She appears well-developed and well-nourished. No distress.   HENT:   Head: Normocephalic and atraumatic.   Right Ear: Tympanic membrane, external ear and ear canal normal.   Left Ear: Tympanic membrane, external ear and ear canal normal.   Nose: Nose normal. No mucosal edema, rhinorrhea, nasal deformity or septal deviation. No epistaxis. Right sinus exhibits no maxillary sinus tenderness and no frontal sinus tenderness. Left sinus exhibits no maxillary sinus tenderness and no frontal sinus tenderness.   Mouth/Throat: Uvula is midline, oropharynx is clear and moist and mucous membranes are normal. Mucous membranes are not pale and not dry. No dental caries. No oropharyngeal exudate or posterior oropharyngeal erythema.   Eyes: Pupils are equal, round, and reactive to light. Conjunctivae, EOM and lids are normal. Right eye exhibits no chemosis. Left eye exhibits no chemosis. Right conjunctiva is not injected. Left conjunctiva is not injected. No scleral icterus. Right eye exhibits normal extraocular motion and no nystagmus. Left eye exhibits normal extraocular motion and no nystagmus.   Neck: Trachea normal and phonation normal. No tracheal tenderness and no muscular tenderness present. No tracheal deviation present. Thyroid mass (small nodule palpated in the right lower lobe, elevates with swallow) present. No thyromegaly present.   Cardiovascular: Intact distal pulses.   Pulmonary/Chest: Effort normal. No stridor. No respiratory distress.   Abdominal: She exhibits no distension.   Lymphadenopathy:        Head (right side): No submental, no submandibular, no preauricular, no posterior auricular and no occipital adenopathy present.        Head (left side): No submental, no submandibular, no  preauricular, no posterior auricular and no occipital adenopathy present.     She has no cervical adenopathy.   Neurological: She is alert and oriented to person, place, and time. No cranial nerve deficit. Gait normal.   Skin: Skin is warm and dry. No rash noted. No erythema.   Wearing a wig today   Psychiatric: She has a normal mood and affect. Her behavior is normal.           THYROID US:  FINDINGS:  The right lobe measures 4.0 x 2.5 x 2.2cm and the left lobe measures 4.1 x 1.7 x 2.3cm.  The overall thyroid volume is 19 cc.    There is redemonstration of multiple bilateral solid and complex cystic thyroid nodules.  The largest lesion on the right in the lower pole is a heterogeneous hypoechoic solid nodule and measures 2.2 x 1.5 x 1.7 cm, mildly increased in size compared to priors.  On the left the largest lesion is a complex cyst with thin septations and eccentric mural thickening or nodularity measuring 1.8 x 1.6 x 1.4 cm, mildly increased in size compared to priors.    No cervical lymphadenopathy noted on either side.      THYROID FNA:  FINAL PATHOLOGIC DIAGNOSIS  Right thyroid, FNA:  [facility] Thyroid Cytology Category: Benign  [facility]  Diagnosed by: Aidan Clayton M.D.  (Electronically Signed: 2019-07-12 12:46:02)  Microscopic Examination  Benign follicular cells, macrophages and abundant colloid.  [facility]: [facility] [place] [address] [place] [address] [phone] CLIA:  Report      Assessment:       1. Multiple thyroid nodules        Plan:       1.  Multiple thyroid nodules:  Patient's recent US guided FNA was reviewed and discussed in detail.  The biopsy of her nodule revealed benign follicular cells.  We discussed that the overall accuracy of FNA for thyroid nodules exceeds 95%, though distinguishing between follicular adenoma and follicular carcinoma is more difficult.  Different management strategies were discussed, including surgical removal of all or part of  the thyroid gland as well as observation.  Observation would entail repeat US yearly.  If there is significant growth in the lesion, would then either repeat the FNA or proceed with surgery.  The patient expressed understanding of these options, and would like to proceed with observation.

## 2019-07-30 ENCOUNTER — PATIENT MESSAGE (OUTPATIENT)
Dept: NEUROSURGERY | Facility: CLINIC | Age: 69
End: 2019-07-30

## 2019-07-30 DIAGNOSIS — M54.9 CHRONIC BACK PAIN, UNSPECIFIED BACK LOCATION, UNSPECIFIED BACK PAIN LATERALITY: Primary | ICD-10-CM

## 2019-07-30 DIAGNOSIS — G89.29 CHRONIC BACK PAIN, UNSPECIFIED BACK LOCATION, UNSPECIFIED BACK PAIN LATERALITY: Primary | ICD-10-CM

## 2019-07-30 RX ORDER — DIAZEPAM 5 MG/1
5 TABLET ORAL EVERY 6 HOURS PRN
Qty: 1 TABLET | Refills: 0 | Status: SHIPPED | OUTPATIENT
Start: 2019-07-30 | End: 2019-09-11

## 2019-07-31 ENCOUNTER — TELEPHONE (OUTPATIENT)
Dept: RADIOLOGY | Facility: HOSPITAL | Age: 69
End: 2019-07-31

## 2019-08-01 ENCOUNTER — HOSPITAL ENCOUNTER (OUTPATIENT)
Dept: RADIOLOGY | Facility: HOSPITAL | Age: 69
Discharge: HOME | End: 2019-08-01
Attending: PHYSICIAN ASSISTANT
Payer: MEDICARE

## 2019-08-01 DIAGNOSIS — M41.9 SCOLIOSIS, UNSPECIFIED SCOLIOSIS TYPE, UNSPECIFIED SPINAL REGION: ICD-10-CM

## 2019-08-01 DIAGNOSIS — R93.7 ABNORMAL X-RAY OF THORACIC SPINE: ICD-10-CM

## 2019-08-01 DIAGNOSIS — G89.29 CHRONIC BACK PAIN, UNSPECIFIED BACK LOCATION, UNSPECIFIED BACK PAIN LATERALITY: ICD-10-CM

## 2019-08-01 DIAGNOSIS — M54.9 CHRONIC BACK PAIN, UNSPECIFIED BACK LOCATION, UNSPECIFIED BACK PAIN LATERALITY: ICD-10-CM

## 2019-08-01 PROCEDURE — 72146 MRI CHEST SPINE W/O DYE: CPT | Mod: TC

## 2019-08-01 PROCEDURE — 72146 MRI THORACIC SPINE WITHOUT CONTRAST: ICD-10-PCS | Mod: 26,,, | Performed by: RADIOLOGY

## 2019-08-01 PROCEDURE — 72148 MRI LUMBAR SPINE W/O DYE: CPT | Mod: 26,,, | Performed by: RADIOLOGY

## 2019-08-01 PROCEDURE — 72146 MRI CHEST SPINE W/O DYE: CPT | Mod: 26,,, | Performed by: RADIOLOGY

## 2019-08-01 PROCEDURE — 72148 MRI LUMBAR SPINE W/O DYE: CPT | Mod: TC

## 2019-08-01 PROCEDURE — 72148 MRI LUMBAR SPINE WITHOUT CONTRAST: ICD-10-PCS | Mod: 26,,, | Performed by: RADIOLOGY

## 2019-08-07 ENCOUNTER — OFFICE VISIT (OUTPATIENT)
Dept: NEUROSURGERY | Facility: CLINIC | Age: 69
End: 2019-08-07
Payer: MEDICARE

## 2019-08-07 VITALS
HEIGHT: 67 IN | RESPIRATION RATE: 18 BRPM | HEART RATE: 73 BPM | DIASTOLIC BLOOD PRESSURE: 101 MMHG | WEIGHT: 216.69 LBS | BODY MASS INDEX: 34.01 KG/M2 | SYSTOLIC BLOOD PRESSURE: 169 MMHG

## 2019-08-07 DIAGNOSIS — G89.29 CHRONIC BACK PAIN, UNSPECIFIED BACK LOCATION, UNSPECIFIED BACK PAIN LATERALITY: Primary | ICD-10-CM

## 2019-08-07 DIAGNOSIS — M54.9 CHRONIC BACK PAIN, UNSPECIFIED BACK LOCATION, UNSPECIFIED BACK PAIN LATERALITY: Primary | ICD-10-CM

## 2019-08-07 DIAGNOSIS — M41.50 SCOLIOSIS DUE TO DEGENERATIVE DISEASE OF SPINE IN ADULT PATIENT: ICD-10-CM

## 2019-08-07 DIAGNOSIS — M51.34 DEGENERATIVE DISC DISEASE, THORACIC: ICD-10-CM

## 2019-08-07 DIAGNOSIS — M51.36 DEGENERATIVE DISC DISEASE, LUMBAR: ICD-10-CM

## 2019-08-07 PROCEDURE — 99214 PR OFFICE/OUTPT VISIT, EST, LEVL IV, 30-39 MIN: ICD-10-PCS | Mod: S$PBB,,, | Performed by: NEUROLOGICAL SURGERY

## 2019-08-07 PROCEDURE — 99999 PR PBB SHADOW E&M-EST. PATIENT-LVL III: CPT | Mod: PBBFAC,,, | Performed by: NEUROLOGICAL SURGERY

## 2019-08-07 PROCEDURE — 99213 OFFICE O/P EST LOW 20 MIN: CPT | Mod: PBBFAC | Performed by: NEUROLOGICAL SURGERY

## 2019-08-07 PROCEDURE — 99214 OFFICE O/P EST MOD 30 MIN: CPT | Mod: S$PBB,,, | Performed by: NEUROLOGICAL SURGERY

## 2019-08-07 PROCEDURE — 99999 PR PBB SHADOW E&M-EST. PATIENT-LVL III: ICD-10-PCS | Mod: PBBFAC,,, | Performed by: NEUROLOGICAL SURGERY

## 2019-08-07 NOTE — PROGRESS NOTES
Subjective:      Patient ID: Deirdre Yanez is a 69 y.o. female.    Chief Complaint: Follow-up (Scoliosis, MRI,CT, X-ray )    Patient is here today for follow-up with CT scan MRI, and x-rays for review    Currently states her pain is 0/10  Since the last visit her symptoms are essentially unchanged  Previously she was doing physical therapy with traction however she stop because she was worried she may have injured something  Since last visit she has been doing well  Her symptoms have stabilized  Her back pain is controlled  She does complain of intermittent knee pain  No other new complaints today  Ambulates unassisted  Denies any bowel bladder symptoms        Review of Systems   Constitution: Negative for fever.   HENT: Negative for congestion.    Respiratory: Negative for cough and wheezing.    Skin: Negative for poor wound healing.   Musculoskeletal: Positive for back pain and stiffness. Negative for falls, muscle weakness and myalgias.   Gastrointestinal: Negative for abdominal pain, bowel incontinence, diarrhea, nausea and vomiting.   Genitourinary: Negative for bladder incontinence.   Neurological: Positive for numbness (AT TIMES, LUE WHEN SITTING/STANDING TOO LONG). Negative for seizures.   Psychiatric/Behavioral: Negative for altered mental status.         Objective:            Ortho/SPM Exam      Nursing note and vitals reviewed  Gen:Oriented to person, place, and time.             Appears stated age   Head:Normocephalic and atraumatic.  Nose: Nose normal.    Eyes: EOM are normal. Pupils are equal, round, and reactive to light.   Neck: Neck supple. No masses or lesions palpated  Cardiovascular: Intact distal pulses.    Abdominal: Soft.   Neurological: Alert and oriented to person, place, and time.  No cranial nerve deficit.  Coordination normal. Normal muscle tone  Psychiatric: Normal mood and affect. Behavior is normal.       Neck:  None  Paraspinal tenderness   None  Paraspinal muscle spasms   NONE  Pain  with flexion and extention   WNL  Range of motion shoulders   Neg  Spurling's sign     Motor:   Right Right Left Left  Level Group   5 5 5 5 C5 Deltoid   5 5 5 5 C6 Bicep   5 5 5 5  Wrist extension    5 5 5 5 C7 Triceps   5 5 5 5  Wrist flexion   5 5 5 5 C8    5 5 5 5 T1 Interossei      Sensation: INTACT TO LIGHT TOUCH  NL Decreased (R/L/BL) Level Sensation    X  C5 Lateral upper arm   X  C6 Thumb and index finger, lat forearm   X  C7 Middle finger   X  C8 Ring and little finger   X  T1 Medial arm      Reflex:  2+  Bicep tendon   2+  Brachioradialis   2+  Triceps tendon   Not present  Adamson's   none  Clonus   neg  Tinel's       Back:   TTP THORACIC (RIGHT SIDE UPPER/LOWER) AND LUMBAR SPINE (LEFT SIDE) Paraspinal tenderness   NONE  Paraspinal muscle spasms    PAIN W/ ROM Pain with flexion and extention    DECREASED AND LIMITED Range of motion    Neg  Straight leg raise     Motor:   Right Right Left Left  Level Group   5 5 5 5 L2 Hip flexor (Psoas)   5 5 5 5 L3 Leg extension (Quads)   5 5 5 5 L4 Dorsiflexion & foot inversion (Tibialis Anterior)   5 5 5 5 L5 Great toe extension ( EHL)   5 5 5 5 S1 Foot eversion (Gastroc, PL & PB)     Sensation:INTACT TO LIGHT TOUCH  NL Decreased (R/L/BL) Level Sensation    X  L2 Anterio-medial thigh   X  L3 Medial thigh around knee   X  L4 Medial foot   X  L5 Dorsum foot   X  S1 Lateral foot     Reflex:  2+  Patellar tendon (L4)   2+  Achilles tendon (S1)       MRI lumbar spine            1. Prominent scoliosis of the lumbar spine.  2. Multilevel degenerative changes with the greatest degree of central canal narrowing noted at the L3-4 level.  No high-grade neural foraminal canal narrowing.     MRI thoracic lumbar spine  1. Prominent scoliosis of the lumbar spine.  2. Multilevel degenerative changes with the greatest degree of central canal narrowing noted at the L3-4 level.  No high-grade neural foraminal canal narrowing.  3. Remaining findings as discussed above.    X-ray  scoliosis films  There is prominent levoscoliosis of the thoracolumbar spine, apex L1, Ramirez angle approximately 42°.  Bones are osteopenic.  No definite acute osseous abnormality appreciated with multilevel cervical, thoracic and lumbar spine degenerative changes.  Soft tissues unremarkable.  Assessment:       Encounter Diagnoses   Name Primary?    Chronic back pain, unspecified back location, unspecified back pain laterality Yes    Scoliosis due to degenerative disease of spine in adult patient     Degenerative disc disease, lumbar     Degenerative disc disease, thoracic           Plan:       Deirdre was seen today for follow-up.    Diagnoses and all orders for this visit:    Chronic back pain, unspecified back location, unspecified back pain laterality  -     Ambulatory Referral to Physical/Occupational Therapy    Scoliosis due to degenerative disease of spine in adult patient    Degenerative disc disease, lumbar    Degenerative disc disease, thoracic     At this point patient has degenerative scoliosis which is compensated in the coronal plane.  She has a slight positive sagittal balance however her pain is controlled and she would not wish to explore any aggressive surgical intervention at this time.  Her disease remained stable I would like to send her to physical therapy as she states this has helped her in the past.  We will also refill tizanidine as she says this helped her for muscle spasms.  We will see her back p.r.n. at this point she does not wish for any surgical intervention    Thank you for the referral   Please call with any questions    Jeison Mendoza MD  Neurosurgery

## 2019-08-12 RX ORDER — FLUTICASONE PROPIONATE 50 MCG
SPRAY, SUSPENSION (ML) NASAL
Qty: 16 ML | Refills: 0 | Status: SHIPPED | OUTPATIENT
Start: 2019-08-12 | End: 2019-09-11

## 2019-08-19 ENCOUNTER — PATIENT MESSAGE (OUTPATIENT)
Dept: NEUROSURGERY | Facility: CLINIC | Age: 69
End: 2019-08-19

## 2019-09-11 ENCOUNTER — OFFICE VISIT (OUTPATIENT)
Dept: CARDIOLOGY | Facility: CLINIC | Age: 69
End: 2019-09-11
Payer: MEDICARE

## 2019-09-11 VITALS
BODY MASS INDEX: 33.64 KG/M2 | DIASTOLIC BLOOD PRESSURE: 86 MMHG | WEIGHT: 214.31 LBS | SYSTOLIC BLOOD PRESSURE: 154 MMHG | HEIGHT: 67 IN | HEART RATE: 62 BPM

## 2019-09-11 DIAGNOSIS — E11.69 HYPERLIPIDEMIA ASSOCIATED WITH TYPE 2 DIABETES MELLITUS: ICD-10-CM

## 2019-09-11 DIAGNOSIS — I87.2 VENOUS INSUFFICIENCY (CHRONIC) (PERIPHERAL): Chronic | ICD-10-CM

## 2019-09-11 DIAGNOSIS — I11.0 HYPERTENSIVE HEART DISEASE WITH CHRONIC DIASTOLIC CONGESTIVE HEART FAILURE: Chronic | ICD-10-CM

## 2019-09-11 DIAGNOSIS — I50.9 CHF (NYHA CLASS I, ACC/AHA STAGE B): Primary | Chronic | ICD-10-CM

## 2019-09-11 DIAGNOSIS — I50.32 HYPERTENSIVE HEART DISEASE WITH CHRONIC DIASTOLIC CONGESTIVE HEART FAILURE: Chronic | ICD-10-CM

## 2019-09-11 DIAGNOSIS — E78.5 HYPERLIPIDEMIA ASSOCIATED WITH TYPE 2 DIABETES MELLITUS: ICD-10-CM

## 2019-09-11 PROCEDURE — 99213 OFFICE O/P EST LOW 20 MIN: CPT | Mod: PBBFAC | Performed by: NUCLEAR MEDICINE

## 2019-09-11 PROCEDURE — 99214 OFFICE O/P EST MOD 30 MIN: CPT | Mod: S$PBB,,, | Performed by: NUCLEAR MEDICINE

## 2019-09-11 PROCEDURE — 99999 PR PBB SHADOW E&M-EST. PATIENT-LVL III: CPT | Mod: PBBFAC,,, | Performed by: NUCLEAR MEDICINE

## 2019-09-11 PROCEDURE — 99999 PR PBB SHADOW E&M-EST. PATIENT-LVL III: ICD-10-PCS | Mod: PBBFAC,,, | Performed by: NUCLEAR MEDICINE

## 2019-09-11 PROCEDURE — 99214 PR OFFICE/OUTPT VISIT, EST, LEVL IV, 30-39 MIN: ICD-10-PCS | Mod: S$PBB,,, | Performed by: NUCLEAR MEDICINE

## 2019-10-07 RX ORDER — FLUTICASONE PROPIONATE 50 MCG
SPRAY, SUSPENSION (ML) NASAL
Qty: 16 ML | Refills: 0 | Status: SHIPPED | OUTPATIENT
Start: 2019-10-07 | End: 2019-11-03 | Stop reason: SDUPTHER

## 2019-10-16 ENCOUNTER — OFFICE VISIT (OUTPATIENT)
Dept: INTERNAL MEDICINE | Facility: CLINIC | Age: 69
End: 2019-10-16
Payer: MEDICARE

## 2019-10-16 VITALS
WEIGHT: 215.81 LBS | TEMPERATURE: 97 F | DIASTOLIC BLOOD PRESSURE: 88 MMHG | HEIGHT: 67 IN | HEART RATE: 85 BPM | OXYGEN SATURATION: 95 % | SYSTOLIC BLOOD PRESSURE: 138 MMHG | BODY MASS INDEX: 33.87 KG/M2

## 2019-10-16 DIAGNOSIS — E11.9 TYPE 2 DIABETES MELLITUS WITHOUT COMPLICATION, WITHOUT LONG-TERM CURRENT USE OF INSULIN: ICD-10-CM

## 2019-10-16 DIAGNOSIS — E78.5 HYPERLIPIDEMIA ASSOCIATED WITH TYPE 2 DIABETES MELLITUS: ICD-10-CM

## 2019-10-16 DIAGNOSIS — E11.59 HYPERTENSION ASSOCIATED WITH DIABETES: ICD-10-CM

## 2019-10-16 DIAGNOSIS — E11.69 HYPERLIPIDEMIA ASSOCIATED WITH TYPE 2 DIABETES MELLITUS: ICD-10-CM

## 2019-10-16 DIAGNOSIS — I50.9 CHF (NYHA CLASS I, ACC/AHA STAGE B): ICD-10-CM

## 2019-10-16 DIAGNOSIS — M41.9 SCOLIOSIS OF THORACOLUMBAR SPINE, UNSPECIFIED SCOLIOSIS TYPE: Primary | ICD-10-CM

## 2019-10-16 DIAGNOSIS — I15.2 HYPERTENSION ASSOCIATED WITH DIABETES: ICD-10-CM

## 2019-10-16 PROCEDURE — 99999 PR PBB SHADOW E&M-EST. PATIENT-LVL III: ICD-10-PCS | Mod: PBBFAC,,, | Performed by: FAMILY MEDICINE

## 2019-10-16 PROCEDURE — 99999 PR PBB SHADOW E&M-EST. PATIENT-LVL III: CPT | Mod: PBBFAC,,, | Performed by: FAMILY MEDICINE

## 2019-10-16 PROCEDURE — 99213 OFFICE O/P EST LOW 20 MIN: CPT | Mod: PBBFAC | Performed by: FAMILY MEDICINE

## 2019-10-16 PROCEDURE — 99214 OFFICE O/P EST MOD 30 MIN: CPT | Mod: S$PBB,,, | Performed by: FAMILY MEDICINE

## 2019-10-16 PROCEDURE — 99214 PR OFFICE/OUTPT VISIT, EST, LEVL IV, 30-39 MIN: ICD-10-PCS | Mod: S$PBB,,, | Performed by: FAMILY MEDICINE

## 2019-10-16 RX ORDER — TIZANIDINE 2 MG/1
2 TABLET ORAL EVERY 8 HOURS PRN
Qty: 30 TABLET | Refills: 5 | Status: SHIPPED | OUTPATIENT
Start: 2019-10-16 | End: 2020-04-02 | Stop reason: SDUPTHER

## 2019-10-16 NOTE — PROGRESS NOTES
Deirdre MAGANA [facility]  10/16/2019  [id number]    Darrius Rizo MD  Patient Care Team:  Darrius Rizo MD as PCP - General (Family Medicine)  Beti Blackburn [facility]  Santino Chowdhury OD as Consulting Physician (Optometry)  Debra Delgado LPN as Care Coordinator (Internal Medicine)  Has the patient seen any provider outside of the [facility] network since the last visit? (no). If yes, HIPPA forms completed and records requested.      Chief Complaint:  Chief Complaint   Patient presents with    Follow-up       History of Present Illness:  Patient is a 69-year-old female presents today for follow-up of her scoliosis, hypertension, and diabetes.    Scoliosis:  -doing well with physical therapy and yoga  -being followed by Dr. Mendoza, neurosurgery  -denies any new or worsening symptoms    HTN:  -states it usually well controlled at home  -states she was stressed by people driving erratically this morning  -denies any chest pain, sob, or vision changes    DM:  -doing well   -controlling with diet and exercise  -will be due for blood work in 1 month    Answers for HPI/ROS submitted by the patient on 10/15/2019   activity change: No  unexpected weight change: No  rhinorrhea: No  trouble swallowing: No  visual disturbance: No  chest tightness: No  polyuria: No  difficulty urinating: No  menstrual problem: No  joint swelling: No  arthralgias: No  confusion: No  dysphoric mood: No        History:  Past Medical History:   Diagnosis Date    Abrasion     left eye    Abrasion and/or friction burn of abdominal wall with infection     left eye    Arthritis     gouty arthritis    Back pain     Diabetes mellitus     2011  11/17/2013    Diverticulosis     DM (diabetes mellitus) 2011     12/03/2014  A1C 6.0 x 2 weeks    DM (diabetes mellitus) 2011    BS 89 10/14/2017 A1C 5.7   Diet controlled    DM (diabetes mellitus) 2011    BS 90 am 10/23/2018     Hepatitis     Hx of B/C from cadaver in  past    Hepatitis B     Hepatitis C     Hiatal hernia     Hip bursitis, left     HTN (hypertension)     Hypertensive CHF (congestive heart failure) 3/28/2014    Obesity     Pneumonia     Positive ALBIN (antinuclear antibody)     Pure hypercholesterolemia 9/30/2016    Rheumatoid arthritis     questionable diagnosis    Scoliosis     Type 2 diabetes mellitus without complication, without long-term current use of insulin     Urinary incontinence     intermittent- only when lifting heavy items > 20 lbs     Past Surgical History:   Procedure Laterality Date    APPENDECTOMY      bone grafts      CERVICAL CONIZATION   W/ LASER      COLONOSCOPY N/A 1/10/2019    Procedure: COLONOSCOPY;  Surgeon: Nixon Thornton MD;  Location: [facility];  Service: Endoscopy;  Laterality: N/A;    COSMETIC SURGERY Bilateral     ears    DILATION AND CURETTAGE OF UTERUS      KNEE ARTHROSCOPY W/ MENISCAL REPAIR Right     LIVER BIOPSY      sinus lift      2003    TONSILLECTOMY, ADENOIDECTOMY      TUBAL LIGATION       Family History   Problem Relation Age of Onset    Colon cancer Maternal Grandfather     Breast cancer Maternal Grandfather     Diabetes Maternal Grandmother     Hypertension Maternal Grandmother     Cataracts Mother     Glaucoma Mother     Macular degeneration Mother     Hypertension Mother     Diabetes Paternal Grandmother     Cataracts Maternal Aunt     Glaucoma Maternal Aunt     Macular degeneration Maternal Aunt     Melanoma Maternal Aunt     Heart disease Unknown         pat side     Social History     Socioeconomic History    Marital status:      Spouse name: Not on file    Number of children: 0    Years of education: Not on file    Highest education level: Not on file   Occupational History    Occupation: Retired Teacher   Social Needs    Financial resource strain: Not hard at all    Food insecurity:     Worry: Never true     Inability: Never true    Transportation needs:      "Medical: No     Non-medical: No   Tobacco Use    Smoking status: Never Smoker    Smokeless tobacco: Never Used   Substance and Sexual Activity    Alcohol use: Yes     Alcohol/week: 2.0 standard drinks     Types: 2 Glasses of wine per week     Frequency: Monthly or less     Drinks per session: 1 or 2     Binge frequency: Never     Comment: occasion    Drug use: No    Sexual activity: Yes     Partners: Male   Lifestyle    Physical activity:     Days per week: 2 days     Minutes per session: 30 min    Stress: Not at all   Relationships    Social connections:     Talks on phone: Three times a week     Gets together: Once a week     Attends Sikhism service: Not on file     Active member of club or organization: No     Attends meetings of clubs or organizations: Never     Relationship status:    Other Topics Concern    Are you pregnant or think you may be? No    Breast-feeding No   Social History Narrative    Not on file     Patient Active Problem List   Diagnosis    Type 2 diabetes mellitus without complication, without long-term current use of insulin    Hypertensive CHF (congestive heart failure)    Diastolic CHF, chronic    CHF (NYHA class I, ACC/AHA stage B)    Allergic rhinitis    Hypertension associated with diabetes    Family history of glaucoma    Cataracts, bilateral    Family history of macular degeneration    Alopecia areata universalis    Hepatitis B core antibody positive    FH: melanoma    History of cervical dysplasia    Hyperlipidemia associated with type 2 diabetes mellitus    Vitamin D deficiency    Statin intolerance    Thyroid nodule    Venous insufficiency (chronic) (peripheral)     Review of patient's allergies indicates:   Allergen Reactions    Arb-angiotensin receptor antagonist Edema    Hydralazine analogues      "Lupus reaction"    Metoprolol Other (See Comments)     Alopecia    Sulfa (sulfonamide antibiotics)      Passed out and almost stopped breathing "    Ace inhibitors Other (See Comments)     cough    Calcium channel blocking agents-dihydropyridines      Edema         The following were reviewed at this visit: active problem list, medication list, allergies, family history, social history, and health maintenance.    Medications:  Current Outpatient Medications on File Prior to Visit   Medication Sig Dispense Refill    b complex vitamins tablet Take 1 tablet by mouth once daily.      carvedilol (COREG) 6.25 MG tablet Take 1 tablet (6.25 mg total) by mouth 2 (two) times daily with meals. 60 tablet 11    fish oil-omega-3 fatty acids 300-1,000 mg capsule Take 2 capsules by mouth once daily.      fluticasone propionate (FLONASE) 50 mcg/actuation nasal spray SHAKE LIQUID AND USE 2 SPRAYS IN EACH NOSTRIL DAILY 16 mL 0    fluticasone propionate (FLONASE) 50 mcg/actuation nasal spray SHAKE LIQUID AND USE 2 SPRAYS IN EACH NOSTRIL DAILY 16 mL 0    furosemide (LASIX) 20 MG tablet Take 1 tablet (20 mg total) by mouth once daily. 90 tablet 3    glucosamine sulfate (GLUCOSAMINE) 500 mg Tab Take 1,000 mg by mouth.      inositol 500 mg Tab Take 500 mg by mouth 2 (two) times daily.      methylsulfonylmethane (MSM) 1,000 mg Cap Take by mouth.      spironolactone (ALDACTONE) 50 MG tablet Take 1 tablet (50 mg total) by mouth every evening. 90 tablet 3    [DISCONTINUED] tiZANidine (ZANAFLEX) 2 MG tablet TAKE 1 TABLET(2 MG) BY MOUTH EVERY NIGHT AS NEEDED 30 tablet 5     No current facility-administered medications on file prior to visit.        Medications have been reviewed and reconciled with patient at this visit.  Barriers to medications present (no)    Adverse reactions to current medications (no)    Over the counter medications reviewed (Yes ), and if needed added to active Medication list at this visit.     Exam:  Wt Readings from Last 3 Encounters:   10/16/19 97.9 kg (215 lb 13.3 oz)   09/11/19 97.2 kg (214 lb 4.6 oz)   08/07/19 98.3 kg (216 lb 11.4 oz)     Temp  Readings from Last 3 Encounters:   10/16/19 97.3 °F (36.3 °C) (Tympanic)   07/22/19 97.7 °F (36.5 °C) (Tympanic)   07/19/19 97.5 °F (36.4 °C) (Tympanic)     BP Readings from Last 3 Encounters:   10/16/19 138/88   09/11/19 (!) 154/86   08/07/19 (!) 169/101     Pulse Readings from Last 3 Encounters:   10/16/19 85   09/11/19 62   08/07/19 73     Body mass index is 33.8 kg/m².      Review of Systems   Constitutional: Negative for chills, fever and malaise/fatigue.   HENT: Negative for hearing loss.    Eyes: Negative for discharge and redness.   Respiratory: Negative for cough, shortness of breath and wheezing.    Cardiovascular: Negative for chest pain and palpitations.   Gastrointestinal: Negative for blood in stool, nausea and vomiting.   Musculoskeletal: Positive for back pain and myalgias. Negative for joint pain.   Skin: Negative for rash.   Neurological: Negative for focal weakness and headaches.   Endo/Heme/Allergies: Negative for polydipsia.     Physical Exam   Constitutional: She is oriented to person, place, and time. She appears well-developed and well-nourished. No distress.   HENT:   Head: Normocephalic and atraumatic.   Eyes: Conjunctivae are normal. No scleral icterus.   Cardiovascular: Normal rate, regular rhythm and normal heart sounds. Exam reveals no gallop and no friction rub.   No murmur heard.  Pulmonary/Chest: Effort normal and breath sounds normal. No respiratory distress. She has no wheezes. She has no rales.   Abdominal: Soft. Bowel sounds are normal. She exhibits no distension. There is no tenderness. There is no rebound and no guarding.   Musculoskeletal:   Severe scoliosis curvature with skin crease; no rashes or erythema   Neurological: She is alert and oriented to person, place, and time.   Skin: Skin is warm and dry. She is not diaphoretic.   Psychiatric: She has a normal mood and affect.   Nursing note and vitals reviewed.      Laboratory Reviewed ({Yes)  Lab Results   Component Value  Date    WBC 8.03 05/03/2019    HGB 14.2 05/03/2019    HCT 44.6 05/03/2019     05/03/2019    CHOL 204 (H) 10/17/2018    TRIG 74 10/17/2018    HDL 52 10/17/2018    ALT 20 05/03/2019    AST 28 05/03/2019     05/03/2019    K 5.1 05/03/2019    CL 99 05/03/2019    CREATININE 1.1 05/03/2019    BUN 21 05/03/2019    CO2 29 05/03/2019    TSH 1.086 05/03/2019    HGBA1C 6.0 (H) 10/17/2018     EXAMINATION:  XR THORACIC SPINE AP LATERAL    CLINICAL HISTORY:  Scoliosis, unspecified    TECHNIQUE:  AP and lateral views were obtained of the thoracic spine.    COMPARISON:  None.    FINDINGS:  There is S shaped thoracolumbar scoliosis with convexity of the thoracic spine to the right and convexity of the lumbar spine to the left.  Vertebral body heights appear well maintained.  No definite spondylolisthesis demonstrated.  There is multilevel spondylosis throughout the thoracic spine with mild-to-moderate disc height loss noted at multiple levels in the lower thoracic and upper lumbar spine.  There are no acute fractures or subluxations.      Impression       As above.  No acute findings.      Electronically signed by: Darrius Parks MD  Date: 06/28/2019  Time: 09:58     EXAMINATION:  XR LUMBAR SPINE AP AND LATERAL    CLINICAL HISTORY:  Low back pain, >6wks conservative tx, persistent-progressive sx, surgical candidate;Scoliosis, unspecified    TECHNIQUE:  AP, lateral and spot images were performed of the lumbar spine.    COMPARISON:  08/03/2012    FINDINGS:  Severe levoscoliosis of the lumbar spine again noted.  Vertebral body heights appear within normal limits.  There is slight grade 1 anterolisthesis of L4 on L5 may be slightly worsened from prior.  Multilevel facet arthropathy noted.  Moderate to severe disc height loss noted throughout the lumbar spine which appear similar to prior.  Posterior elements appear grossly intact. No fractures demonstrated.  The remaining visualized osseous and soft tissue structures  demonstrate no appreciable abnormality.      Impression       As above.      Electronically signed by: Darrius Parks MD  Date: 06/28/2019  Time: 10:00     EXAMINATION:  XR CERVICAL SPINE AP LATERAL    CLINICAL HISTORY:  Scoliosis, unspecified    TECHNIQUE:  AP, lateral, and open mouth views of the cervical spine were performed.    COMPARISON:  None    FINDINGS:  There is slight grade 1 anterolisthesis of C4 on C5.  Vertebral body heights are well maintained.  There is moderate to severe disc height loss at C5-6 through C7-T1.  Mild disc height loss at C4-5.  Multilevel facet arthropathy noted.  Posterior elements appear intact without acute fractures or subluxations demonstrated.  Odontoid process appears intact.  Atlantoaxial articulations appear normal.  Prevertebral soft tissues are within normal limits.      Impression       As above.      Electronically signed by: Darrius Parks MD  Date: 06/28/2019  Time: 10:01         Deirdre was seen today for follow-up.    Diagnoses and all orders for this visit:    Scoliosis of thoracolumbar spine, unspecified scoliosis type  -     tiZANidine (ZANAFLEX) 2 MG tablet; Take 1 tablet (2 mg total) by mouth every 8 (eight) hours as needed.    Type 2 diabetes mellitus without complication, without long-term current use of insulin  -     Hemoglobin A1c; Future  -     Lipid panel; Future  -     Microalbumin/creatinine urine ratio; Future    CHF (NYHA class I, ACC/AHA stage B)  -     Hemoglobin A1c; Future  -     Lipid panel; Future  -     Microalbumin/creatinine urine ratio; Future    Hypertension associated with diabetes  -     Hemoglobin A1c; Future  -     Lipid panel; Future  -     Microalbumin/creatinine urine ratio; Future    Hyperlipidemia associated with type 2 diabetes mellitus  -     Hemoglobin A1c; Future  -     Lipid panel; Future  -     Microalbumin/creatinine urine ratio; Future    Severe scoliosis:  -patient is being managed by neuro surgery  -will follow up their  recommendations  -follow up as needed    Diabetes:  -will place orders for lab work next month  -follow up after labs    HTN:  -well controlled today  -continue current medications    HLD:  -statin intolerance  -continue diet, exercise, and fish oil    Chronic hypertensive heart disease with CHF:    -Managed by patient's Cardiology.  -no chest pain, sob, or evidence of volume overload      -Patient's lab results were reviewed and discussed with patient   -Treatment options and alternatives were discussed with the patient. Patient expressed understanding. Patient was given the opportunity to ask questions and be an active participant in their medical care. Patient had no further questions or concerns at this time.   -Patient is an overall moderate risk for health complications from their medical conditions.     Follow up: Follow up if symptoms worsen or fail to improve.    After visit summary was printed and given to patient upon discharge today.  Patient goals and care plan are included in After Visit Summary.

## 2019-10-23 ENCOUNTER — OFFICE VISIT (OUTPATIENT)
Dept: OPHTHALMOLOGY | Facility: CLINIC | Age: 69
End: 2019-10-23
Payer: MEDICARE

## 2019-10-23 DIAGNOSIS — H52.4 BILATERAL PRESBYOPIA: ICD-10-CM

## 2019-10-23 DIAGNOSIS — Z83.518 FAMILY HISTORY OF MACULAR DEGENERATION: ICD-10-CM

## 2019-10-23 DIAGNOSIS — E11.9 DIABETES MELLITUS TYPE 2 WITHOUT RETINOPATHY: Primary | ICD-10-CM

## 2019-10-23 PROCEDURE — 99999 PR PBB SHADOW E&M-EST. PATIENT-LVL II: CPT | Mod: PBBFAC,,, | Performed by: OPTOMETRIST

## 2019-10-23 PROCEDURE — 99999 PR PBB SHADOW E&M-EST. PATIENT-LVL II: ICD-10-PCS | Mod: PBBFAC,,, | Performed by: OPTOMETRIST

## 2019-10-23 PROCEDURE — 92014 COMPRE OPH EXAM EST PT 1/>: CPT | Mod: S$PBB,,, | Performed by: OPTOMETRIST

## 2019-10-23 PROCEDURE — 99212 OFFICE O/P EST SF 10 MIN: CPT | Mod: PBBFAC | Performed by: OPTOMETRIST

## 2019-10-23 PROCEDURE — 92014 PR EYE EXAM, EST PATIENT,COMPREHESV: ICD-10-PCS | Mod: S$PBB,,, | Performed by: OPTOMETRIST

## 2019-10-23 PROCEDURE — 92015 DETERMINE REFRACTIVE STATE: CPT | Mod: ,,, | Performed by: OPTOMETRIST

## 2019-10-23 PROCEDURE — 92015 PR REFRACTION: ICD-10-PCS | Mod: ,,, | Performed by: OPTOMETRIST

## 2019-10-23 NOTE — PROGRESS NOTES
HPI     NIDDM exam.  Dry eyes x couples of months.  Patient using B&L eye drops.  Update glasses RX.   HX of bilateral NS.  Last eye exam 10/23/2018 TRF.   Last  A1c 6.0 10/17/2018    Last edited by Santino Chowdhury, OD on 10/23/2019  9:30 AM. (History)            Assessment /Plan     For exam results, see Encounter Report.    Diabetes mellitus type 2 without retinopathy    Family history of macular degeneration    Bilateral presbyopia      No Background Diabetic Retinopathy    Mom and sisters had mac degen, pt takes multivits    Dispense Final Rx for glasses.  RTC 1 year  Discussed above and answered questions.

## 2019-11-04 RX ORDER — FLUTICASONE PROPIONATE 50 MCG
SPRAY, SUSPENSION (ML) NASAL
Qty: 16 ML | Refills: 0 | Status: SHIPPED | OUTPATIENT
Start: 2019-11-04 | End: 2019-12-03 | Stop reason: SDUPTHER

## 2019-11-12 ENCOUNTER — LAB VISIT (OUTPATIENT)
Dept: LAB | Facility: HOSPITAL | Age: 69
End: 2019-11-12
Payer: MEDICARE

## 2019-11-12 DIAGNOSIS — E11.59 HYPERTENSION ASSOCIATED WITH DIABETES: ICD-10-CM

## 2019-11-12 DIAGNOSIS — E11.9 TYPE 2 DIABETES MELLITUS WITHOUT COMPLICATION, WITHOUT LONG-TERM CURRENT USE OF INSULIN: ICD-10-CM

## 2019-11-12 DIAGNOSIS — E78.5 HYPERLIPIDEMIA ASSOCIATED WITH TYPE 2 DIABETES MELLITUS: ICD-10-CM

## 2019-11-12 DIAGNOSIS — I15.2 HYPERTENSION ASSOCIATED WITH DIABETES: ICD-10-CM

## 2019-11-12 DIAGNOSIS — E11.69 HYPERLIPIDEMIA ASSOCIATED WITH TYPE 2 DIABETES MELLITUS: ICD-10-CM

## 2019-11-12 DIAGNOSIS — I50.9 CHF (NYHA CLASS I, ACC/AHA STAGE B): ICD-10-CM

## 2019-11-12 LAB
CHOLEST SERPL-MCNC: 222 MG/DL (ref 120–199)
CHOLEST/HDLC SERPL: 3.6 {RATIO} (ref 2–5)
ESTIMATED AVG GLUCOSE: 126 MG/DL (ref 68–131)
HBA1C MFR BLD HPLC: 6 % (ref 4–5.6)
HDLC SERPL-MCNC: 62 MG/DL (ref 40–75)
HDLC SERPL: 27.9 % (ref 20–50)
LDLC SERPL CALC-MCNC: 144.6 MG/DL (ref 63–159)
NONHDLC SERPL-MCNC: 160 MG/DL
TRIGL SERPL-MCNC: 77 MG/DL (ref 30–150)

## 2019-11-12 PROCEDURE — 80061 LIPID PANEL: CPT

## 2019-11-12 PROCEDURE — 36415 COLL VENOUS BLD VENIPUNCTURE: CPT

## 2019-11-12 PROCEDURE — 83036 HEMOGLOBIN GLYCOSYLATED A1C: CPT

## 2019-11-29 ENCOUNTER — OFFICE VISIT (OUTPATIENT)
Dept: INTERNAL MEDICINE | Facility: CLINIC | Age: 69
End: 2019-11-29
Payer: MEDICARE

## 2019-11-29 VITALS
BODY MASS INDEX: 34.19 KG/M2 | HEIGHT: 67 IN | WEIGHT: 217.81 LBS | SYSTOLIC BLOOD PRESSURE: 144 MMHG | OXYGEN SATURATION: 98 % | HEART RATE: 60 BPM | TEMPERATURE: 97 F | DIASTOLIC BLOOD PRESSURE: 92 MMHG

## 2019-11-29 DIAGNOSIS — I50.9 CHF (NYHA CLASS I, ACC/AHA STAGE B): ICD-10-CM

## 2019-11-29 DIAGNOSIS — I15.2 HYPERTENSION ASSOCIATED WITH DIABETES: ICD-10-CM

## 2019-11-29 DIAGNOSIS — E11.69 HYPERLIPIDEMIA ASSOCIATED WITH TYPE 2 DIABETES MELLITUS: ICD-10-CM

## 2019-11-29 DIAGNOSIS — M41.9 SCOLIOSIS OF THORACOLUMBAR SPINE, UNSPECIFIED SCOLIOSIS TYPE: ICD-10-CM

## 2019-11-29 DIAGNOSIS — E11.9 TYPE 2 DIABETES MELLITUS WITHOUT COMPLICATION, WITHOUT LONG-TERM CURRENT USE OF INSULIN: ICD-10-CM

## 2019-11-29 DIAGNOSIS — E78.5 HYPERLIPIDEMIA ASSOCIATED WITH TYPE 2 DIABETES MELLITUS: ICD-10-CM

## 2019-11-29 DIAGNOSIS — E11.59 HYPERTENSION ASSOCIATED WITH DIABETES: ICD-10-CM

## 2019-11-29 DIAGNOSIS — D17.1 LIPOMA OF BACK: Primary | ICD-10-CM

## 2019-11-29 PROCEDURE — 1159F MED LIST DOCD IN RCRD: CPT | Mod: ,,, | Performed by: FAMILY MEDICINE

## 2019-11-29 PROCEDURE — 99999 PR PBB SHADOW E&M-EST. PATIENT-LVL III: ICD-10-PCS | Mod: PBBFAC,,, | Performed by: FAMILY MEDICINE

## 2019-11-29 PROCEDURE — 99214 PR OFFICE/OUTPT VISIT, EST, LEVL IV, 30-39 MIN: ICD-10-PCS | Mod: S$PBB,,, | Performed by: FAMILY MEDICINE

## 2019-11-29 PROCEDURE — 1126F AMNT PAIN NOTED NONE PRSNT: CPT | Mod: ,,, | Performed by: FAMILY MEDICINE

## 2019-11-29 PROCEDURE — 99214 OFFICE O/P EST MOD 30 MIN: CPT | Mod: S$PBB,,, | Performed by: FAMILY MEDICINE

## 2019-11-29 PROCEDURE — 1159F PR MEDICATION LIST DOCUMENTED IN MEDICAL RECORD: ICD-10-PCS | Mod: ,,, | Performed by: FAMILY MEDICINE

## 2019-11-29 PROCEDURE — 99999 PR PBB SHADOW E&M-EST. PATIENT-LVL III: CPT | Mod: PBBFAC,,, | Performed by: FAMILY MEDICINE

## 2019-11-29 PROCEDURE — 99213 OFFICE O/P EST LOW 20 MIN: CPT | Mod: PBBFAC | Performed by: FAMILY MEDICINE

## 2019-11-29 PROCEDURE — 1126F PR PAIN SEVERITY QUANTIFIED, NO PAIN PRESENT: ICD-10-PCS | Mod: ,,, | Performed by: FAMILY MEDICINE

## 2019-11-29 NOTE — PROGRESS NOTES
[facility] MAGANA [facility]  11/29/2019  [id number]    Darrius Rizo MD  Patient Care Team:  Darrius Rizo MD as PCP - General (Family Medicine)  Beti Blackburn MD as PCP - [facility]  Santino Chowdhury OD as Consulting Physician (Optometry)  Debra Delgado LPN as Care Coordinator (Internal Medicine)  Has the patient seen any provider outside of the [facility] network since the last visit? (no). If yes, HIPPA forms completed and records requested.      Chief Complaint:  Chief Complaint   Patient presents with    Follow-up       History of Present Illness:  Patient is a 69-year-old female presents today for follow-up of her scoliosis, hypertension, and diabetes, and complaining of lump on her right mid back.    Lump:  -noticed a few months ago  -thinks its a lipoma, but wanted it checked  -denies any pain, redness, or increasing size    Scoliosis:  -doing well with physical therapy and yoga  -being followed by Dr. Mendoza, neurosurgery  -denies any new or worsening symptoms    HTN:  -states it usually well controlled at home  -states she was stressed by people driving erratically this morning  -denies any chest pain, sob, or vision changes    DM:  -doing well   -controlling with diet and exercise  -will be due for blood work in 1 month  Answers for HPI/ROS submitted by the patient on 11/28/2019   activity change: No  difficulty urinating: No  dysphoric mood: No          History:  Past Medical History:   Diagnosis Date    Abrasion     left eye    Abrasion and/or friction burn of abdominal wall with infection     left eye    Arthritis     gouty arthritis    Back pain     Diabetes mellitus     2011  11/17/2013    Diverticulosis     DM (diabetes mellitus) 2011     12/03/2014  A1C 6.0 x 2 weeks    DM (diabetes mellitus) 2011    BS 89 10/14/2017 A1C 5.7   Diet controlled    DM (diabetes mellitus) 2011    BS 90 am 10/23/2018     DM (diabetes mellitus) 2011    BS didn't check 10/23/2019     Hepatitis     Hx of B/C from cadaver in past    Hepatitis B     Hepatitis C     Hiatal hernia     Hip bursitis, left     HTN (hypertension)     Hypertensive CHF (congestive heart failure) 3/28/2014    Obesity     Pneumonia     Positive ALBIN (antinuclear antibody)     Pure hypercholesterolemia 9/30/2016    Rheumatoid arthritis     questionable diagnosis    Scoliosis     Type 2 diabetes mellitus without complication, without long-term current use of insulin     Urinary incontinence     intermittent- only when lifting heavy items > 20 lbs     Past Surgical History:   Procedure Laterality Date    APPENDECTOMY      bone grafts      CERVICAL CONIZATION   W/ LASER      COLONOSCOPY N/A 1/10/2019    Procedure: COLONOSCOPY;  Surgeon: Nixon Thornton MD;  [facility] END[facility];  Service: Endoscopy;  Laterality: N/A;    COSMETIC SURGERY Bilateral     ears    DILATION AND CURETTAGE OF UTERUS      KNEE ARTHROSCOPY W/ MENISCAL REPAIR Right     LIVER BIOPSY      sinus lift      2003    TONSILLECTOMY, ADENOIDECTOMY      TUBAL LIGATION       Family History   Problem Relation Age of Onset    Colon cancer Maternal Grandfather     Breast cancer Maternal Grandfather     Diabetes Maternal Grandmother     Hypertension Maternal Grandmother     Cataracts Mother     Glaucoma Mother     Macular degeneration Mother     Hypertension Mother     Diabetes Paternal Grandmother     Cataracts Maternal Aunt     Glaucoma Maternal Aunt     Macular degeneration Maternal Aunt     Melanoma Maternal Aunt     Heart disease Unknown         pat side     Social History     Socioeconomic History    Marital status:      Spouse name: Not on file    Number of children: 0    Years of education: Not on file    Highest education level: Not on file   Occupational History    Occupation: Retired Teacher   Social Needs    Financial resource strain: Not hard at all    Food insecurity:     Worry: Never true     Inability: Never  "true    Transportation needs:     Medical: No     Non-medical: No   Tobacco Use    Smoking status: Never Smoker    Smokeless tobacco: Never Used   Substance and Sexual Activity    Alcohol use: Yes     Alcohol/week: 2.0 standard drinks     Types: 2 Glasses of wine per week     Frequency: Monthly or less     Drinks per session: 1 or 2     Binge frequency: Never     Comment: occasion    Drug use: No    Sexual activity: Yes     Partners: Male   Lifestyle    Physical activity:     Days per week: 2 days     Minutes per session: 30 min    Stress: Not at all   Relationships    Social connections:     Talks on phone: Three times a week     Gets together: Once a week     Attends Buddhism service: Not on file     Active member of club or organization: No     Attends meetings of clubs or organizations: Never     Relationship status:    Other Topics Concern    Are you pregnant or think you may be? No    Breast-feeding No   Social History Narrative    Not on file     Patient Active Problem List   Diagnosis    Type 2 diabetes mellitus without complication, without long-term current use of insulin    Hypertensive CHF (congestive heart failure)    Diastolic CHF, chronic    CHF (NYHA class I, ACC/AHA stage B)    Allergic rhinitis    Hypertension associated with diabetes    Family history of glaucoma    Cataracts, bilateral    Family history of macular degeneration    Alopecia areata universalis    Hepatitis B core antibody positive    FH: melanoma    History of cervical dysplasia    Hyperlipidemia associated with type 2 diabetes mellitus    Vitamin D deficiency    Statin intolerance    Thyroid nodule    Venous insufficiency (chronic) (peripheral)     Review of patient's allergies indicates:   Allergen Reactions    Arb-angiotensin receptor antagonist Edema    Hydralazine analogues      "Lupus reaction"    Metoprolol Other (See Comments)     Alopecia    Sulfa (sulfonamide antibiotics)      " Passed out and almost stopped breathing    Ace inhibitors Other (See Comments)     cough    Calcium channel blocking agents-dihydropyridines      Edema         The following were reviewed at this visit: active problem list, medication list, allergies, family history, social history, and health maintenance.    Medications:  Current Outpatient Medications on File Prior to Visit   Medication Sig Dispense Refill    b complex vitamins tablet Take 1 tablet by mouth once daily.      carvedilol (COREG) 6.25 MG tablet Take 1 tablet (6.25 mg total) by mouth 2 (two) times daily with meals. 60 tablet 11    fish oil-omega-3 fatty acids 300-1,000 mg capsule Take 2 capsules by mouth once daily.      fluticasone propionate (FLONASE) 50 mcg/actuation nasal spray SHAKE LIQUID AND USE 2 SPRAYS IN EACH NOSTRIL DAILY 16 mL 0    fluticasone propionate (FLONASE) 50 mcg/actuation nasal spray SHAKE LIQUID AND USE 2 SPRAYS IN EACH NOSTRIL DAILY 16 mL 0    furosemide (LASIX) 20 MG tablet Take 1 tablet (20 mg total) by mouth once daily. 90 tablet 3    glucosamine sulfate (GLUCOSAMINE) 500 mg Tab Take 1,000 mg by mouth.      inositol 500 mg Tab Take 500 mg by mouth 2 (two) times daily.      methylsulfonylmethane (MSM) 1,000 mg Cap Take by mouth.      spironolactone (ALDACTONE) 50 MG tablet Take 1 tablet (50 mg total) by mouth every evening. 90 tablet 3    tiZANidine (ZANAFLEX) 2 MG tablet Take 1 tablet (2 mg total) by mouth every 8 (eight) hours as needed. 30 tablet 5     No current facility-administered medications on file prior to visit.        Medications have been reviewed and reconciled with patient at this visit.  Barriers to medications present (no)    Adverse reactions to current medications (no)    Over the counter medications reviewed (Yes ), and if needed added to active Medication list at this visit.     Exam:  Wt Readings from Last 3 Encounters:   11/29/19 98.8 kg (217 lb 13 oz)   10/16/19 97.9 kg (215 lb 13.3 oz)    09/11/19 97.2 kg (214 lb 4.6 oz)     Temp Readings from Last 3 Encounters:   11/29/19 96.9 °F (36.1 °C) (Tympanic)   10/16/19 97.3 °F (36.3 °C) (Tympanic)   07/22/19 97.7 °F (36.5 °C) (Tympanic)     BP Readings from Last 3 Encounters:   11/29/19 (!) 144/92   10/16/19 138/88   09/11/19 (!) 154/86     Pulse Readings from Last 3 Encounters:   11/29/19 60   10/16/19 85   09/11/19 62     Body mass index is 34.11 kg/m².      Review of Systems   Constitutional: Negative for chills, fever and malaise/fatigue.   HENT: Negative for hearing loss.    Eyes: Negative for discharge and redness.   Respiratory: Negative for cough, shortness of breath and wheezing.    Cardiovascular: Negative for chest pain and palpitations.   Gastrointestinal: Negative for blood in stool, constipation, diarrhea, nausea and vomiting.   Genitourinary: Negative for hematuria.   Musculoskeletal: Positive for back pain and myalgias. Negative for joint pain.   Skin: Negative for rash.   Neurological: Negative for focal weakness and headaches.   Endo/Heme/Allergies: Negative for polydipsia.     Physical Exam   Constitutional: She is oriented to person, place, and time. She appears well-developed and well-nourished. No distress.   HENT:   Head: Normocephalic and atraumatic.   Eyes: Conjunctivae are normal. No scleral icterus.   Cardiovascular: Normal rate, regular rhythm and normal heart sounds. Exam reveals no gallop and no friction rub.   No murmur heard.  Pulmonary/Chest: Effort normal and breath sounds normal. No respiratory distress. She has no wheezes. She has no rales.   Abdominal: Soft. Bowel sounds are normal. She exhibits no distension. There is no tenderness. There is no rebound and no guarding.   Musculoskeletal:   Severe scoliosis curvature with skin crease; no rashes or erythema   Neurological: She is alert and oriented to person, place, and time.   Skin: Skin is warm and dry. She is not diaphoretic.        3 cm x 3 cm soft, freely movable,  well-circumscribed mass, nontender to palpation, no erythema or fluctuance   Psychiatric: She has a normal mood and affect.   Nursing note and vitals reviewed.      Laboratory Reviewed ({Yes)  Lab Results   Component Value Date    WBC 8.03 05/03/2019    HGB 14.2 05/03/2019    HCT 44.6 05/03/2019     05/03/2019    CHOL 222 (H) 11/12/2019    TRIG 77 11/12/2019    HDL 62 11/12/2019    ALT 20 05/03/2019    AST 28 05/03/2019     05/03/2019    K 5.1 05/03/2019    CL 99 05/03/2019    CREATININE 1.1 05/03/2019    BUN 21 05/03/2019    CO2 29 05/03/2019    TSH 1.086 05/03/2019    HGBA1C 6.0 (H) 11/12/2019     EXAMINATION:  XR THORACIC SPINE AP LATERAL    CLINICAL HISTORY:  Scoliosis, unspecified    TECHNIQUE:  AP and lateral views were obtained of the thoracic spine.    COMPARISON:  None.    FINDINGS:  There is S shaped thoracolumbar scoliosis with convexity of the thoracic spine to the right and convexity of the lumbar spine to the left.  Vertebral body heights appear well maintained.  No definite spondylolisthesis demonstrated.  There is multilevel spondylosis throughout the thoracic spine with mild-to-moderate disc height loss noted at multiple levels in the lower thoracic and upper lumbar spine.  There are no acute fractures or subluxations.      Impression       As above.  No acute findings.      Electronically signed by: Darrius Parks MD  Date: 06/28/2019  Time: 09:58     EXAMINATION:  XR LUMBAR SPINE AP AND LATERAL    CLINICAL HISTORY:  Low back pain, >6wks conservative tx, persistent-progressive sx, surgical candidate;Scoliosis, unspecified    TECHNIQUE:  AP, lateral and spot images were performed of the lumbar spine.    COMPARISON:  08/03/2012    FINDINGS:  Severe levoscoliosis of the lumbar spine again noted.  Vertebral body heights appear within normal limits.  There is slight grade 1 anterolisthesis of L4 on L5 may be slightly worsened from prior.  Multilevel facet arthropathy noted.  Moderate to  severe disc height loss noted throughout the lumbar spine which appear similar to prior.  Posterior elements appear grossly intact. No fractures demonstrated.  The remaining visualized osseous and soft tissue structures demonstrate no appreciable abnormality.      Impression       As above.      Electronically signed by: Darrius Parks MD  Date: 06/28/2019  Time: 10:00     EXAMINATION:  XR CERVICAL SPINE AP LATERAL    CLINICAL HISTORY:  Scoliosis, unspecified    TECHNIQUE:  AP, lateral, and open mouth views of the cervical spine were performed.    COMPARISON:  None    FINDINGS:  There is slight grade 1 anterolisthesis of C4 on C5.  Vertebral body heights are well maintained.  There is moderate to severe disc height loss at C5-6 through C7-T1.  Mild disc height loss at C4-5.  Multilevel facet arthropathy noted.  Posterior elements appear intact without acute fractures or subluxations demonstrated.  Odontoid process appears intact.  Atlantoaxial articulations appear normal.  Prevertebral soft tissues are within normal limits.      Impression       As above.      Electronically signed by: Darrius Parks MD  Date: 06/28/2019  Time: 10:01         Deirdre was seen today for follow-up.    Diagnoses and all orders for this visit:    Lipoma of back    Type 2 diabetes mellitus without complication, without long-term current use of insulin    Hyperlipidemia associated with type 2 diabetes mellitus    Hypertension associated with diabetes    CHF (NYHA class I, ACC/AHA stage B)    Scoliosis of thoracolumbar spine, unspecified scoliosis type     Lipoma:  -advised patient that her mass feels and appears to be a lipoma  -offered patient ultrasound for diagnosis, patient deferred at this time  -patient states she will follow up if it persist, increases in size, or causes her problems    Severe scoliosis:  -patient is being managed by neuro surgery  -will follow up their recommendations  -follow up as needed    Diabetes:  -labs  are within normal limits  -continue all current management    HTN:  -elevated today, but patient states his due to her stress  -will repeat in 4 weeks  -continue current medications    HLD:  -statin intolerance  -continue diet, exercise, and fish oil    Chronic hypertensive heart disease with CHF:    -Managed by patient's Cardiology.  -no chest pain, sob, or evidence of volume overload    -Patient's lab results were reviewed and discussed with patient   -Treatment options and alternatives were discussed with the patient. Patient expressed understanding. Patient was given the opportunity to ask questions and be an active participant in their medical care. Patient had no further questions or concerns at this time.   -Patient is an overall moderate risk for health complications from their medical conditions.     Follow up: Follow up if symptoms worsen or fail to improve.    After visit summary was printed and given to patient upon discharge today.  Patient goals and care plan are included in After Visit Summary.

## 2019-12-04 RX ORDER — FLUTICASONE PROPIONATE 50 MCG
SPRAY, SUSPENSION (ML) NASAL
Qty: 16 ML | Refills: 0 | Status: SHIPPED | OUTPATIENT
Start: 2019-12-04 | End: 2019-12-04 | Stop reason: SDUPTHER

## 2019-12-04 RX ORDER — FLUTICASONE PROPIONATE 50 MCG
SPRAY, SUSPENSION (ML) NASAL
Qty: 16 ML | Refills: 0 | Status: SHIPPED | OUTPATIENT
Start: 2019-12-04 | End: 2022-01-25

## 2019-12-06 ENCOUNTER — OFFICE VISIT (OUTPATIENT)
Dept: OBSTETRICS AND GYNECOLOGY | Facility: CLINIC | Age: 69
End: 2019-12-06
Payer: MEDICARE

## 2019-12-06 VITALS — BODY MASS INDEX: 34.87 KG/M2 | WEIGHT: 222.69 LBS

## 2019-12-06 DIAGNOSIS — R14.0 ABDOMINAL BLOATING: ICD-10-CM

## 2019-12-06 DIAGNOSIS — Z01.419 WELL WOMAN EXAM WITH ROUTINE GYNECOLOGICAL EXAM: Primary | ICD-10-CM

## 2019-12-06 PROCEDURE — 99212 OFFICE O/P EST SF 10 MIN: CPT | Mod: PBBFAC | Performed by: OBSTETRICS & GYNECOLOGY

## 2019-12-06 PROCEDURE — G0101 CA SCREEN;PELVIC/BREAST EXAM: HCPCS | Mod: S$PBB,,, | Performed by: OBSTETRICS & GYNECOLOGY

## 2019-12-06 PROCEDURE — 99999 PR PBB SHADOW E&M-EST. PATIENT-LVL II: ICD-10-PCS | Mod: PBBFAC,,, | Performed by: OBSTETRICS & GYNECOLOGY

## 2019-12-06 PROCEDURE — 99999 PR PBB SHADOW E&M-EST. PATIENT-LVL II: CPT | Mod: PBBFAC,,, | Performed by: OBSTETRICS & GYNECOLOGY

## 2019-12-06 PROCEDURE — G0101 PR CA SCREEN;PELVIC/BREAST EXAM: ICD-10-PCS | Mod: S$PBB,,, | Performed by: OBSTETRICS & GYNECOLOGY

## 2019-12-06 RX ORDER — FLUOCINONIDE GEL 0.5 MG/G
GEL TOPICAL
Refills: 1 | Status: ON HOLD | COMMUNITY
Start: 2019-11-06 | End: 2020-09-30

## 2019-12-06 NOTE — PROGRESS NOTES
HPI:  69 y.o. female patient  presents today for annual exam and abd bloating.  She is  and is not on HRT.  She is c/o abdominal bloating and low back pain recently.  No other concerns.  Urinary incontinence is stable.  Prior [facility] in  for dysplasia.  Normal paps since then.      Past Medical History:   Diagnosis Date    Abrasion     left eye    Abrasion and/or friction burn of abdominal wall with infection     left eye    Arthritis     gouty arthritis    Back pain     Diabetes mellitus       2013    Diverticulosis     DM (diabetes mellitus)      2014  A1C 6.0 x 2 weeks    DM (diabetes mellitus)     BS 89 10/14/2017 A1C 5.7   Diet controlled    DM (diabetes mellitus)     BS 90 am 10/23/2018     DM (diabetes mellitus)     BS didn't check 10/23/2019    Hepatitis     Hx of B/C from cadaver in past    Hepatitis B     Hepatitis C     Hiatal hernia     Hip bursitis, left     HTN (hypertension)     Hypertensive CHF (congestive heart failure) 3/28/2014    Obesity     Pneumonia     Positive ALBIN (antinuclear antibody)     Pure hypercholesterolemia 2016    Rheumatoid arthritis     questionable diagnosis    Scoliosis     Type 2 diabetes mellitus without complication, without long-term current use of insulin     Urinary incontinence     intermittent- only when lifting heavy items > 20 lbs       Past Surgical History:   Procedure Laterality Date    APPENDECTOMY      bone grafts      CERVICAL CONIZATION   W/ LASER      COLONOSCOPY N/A 1/10/2019    Procedure: COLONOSCOPY;  Surgeon: Nixon Thornton MD;  Location: [facility];  Service: Endoscopy;  Laterality: N/A;    COSMETIC SURGERY Bilateral     ears    DILATION AND CURETTAGE OF UTERUS      KNEE ARTHROSCOPY W/ MENISCAL REPAIR Right     LIVER BIOPSY      sinus lift      2003    TONSILLECTOMY, ADENOIDECTOMY      TUBAL LIGATION         REVIEW OF SYSTEMS:  GENERAL:  No fever, chills,  fatigue, or weight loss  ABDOMEN:  Normal appetite, no weight loss or abdominal pain  URINARY:  No flank pain, dysuria, or hematuria  REPRODUCTIVE:  No abnormal vaginal bleeding  BREASTS:  No tenderness, masses, or nipple discharge noted of breasts    PHYSICAL EXAM:    APPEARANCE:  Well nourished, well developed, in no acute distress  NECK:  Symmetric without masses or thyromegaly  BREASTS:  Symmetrical, no skin changes or visible lesions.  No palpable masses, nipple discharge, or adenopathy bilaterally.  ABDOMEN:  Soft, no tenderness or masses, no distension noted  PELVIC:  VULVA:  No lesions.  Normal female genitalia  URETHRAL MEATUS:  Normal size and location.  No lesions.  No prolapse  URETHRA:  No masses, tenderness, prolapse, or scarring  VAGINA:  No lesions or discharge.  No significant cystocele or rectocele  CERVIX:  No lesions.  Normal diameter, no cervical motion tenderness.  UTERUS:  4-6 week size, regular shape, mobile, non-tender, normal position, good support  ADNEXA:  No masses or tenderness  ANUS AND PERINEUM:  No lesions.  No external hemorrhoids    1. Abdominal bloating  US Pelvis Comp with Transvag NON-OB (xpd   2. Well woman exam with routine gynecological exam           PLAN:  Patient requests MMG every other year.  Patient counseled regarding recommended routine health maintenance for her age.

## 2019-12-12 RX ORDER — FUROSEMIDE 20 MG/1
TABLET ORAL
Qty: 90 TABLET | Refills: 3 | Status: SHIPPED | OUTPATIENT
Start: 2019-12-12 | End: 2021-01-12

## 2019-12-20 ENCOUNTER — OFFICE VISIT (OUTPATIENT)
Dept: INTERNAL MEDICINE | Facility: CLINIC | Age: 69
End: 2019-12-20
Payer: MEDICARE

## 2019-12-20 VITALS
SYSTOLIC BLOOD PRESSURE: 124 MMHG | HEART RATE: 69 BPM | TEMPERATURE: 96 F | DIASTOLIC BLOOD PRESSURE: 86 MMHG | HEIGHT: 67 IN | BODY MASS INDEX: 34.33 KG/M2 | WEIGHT: 218.69 LBS | OXYGEN SATURATION: 99 %

## 2019-12-20 DIAGNOSIS — E11.9 TYPE 2 DIABETES MELLITUS WITHOUT COMPLICATION, WITHOUT LONG-TERM CURRENT USE OF INSULIN: Primary | ICD-10-CM

## 2019-12-20 DIAGNOSIS — E11.59 HYPERTENSION ASSOCIATED WITH DIABETES: ICD-10-CM

## 2019-12-20 DIAGNOSIS — M41.9 SCOLIOSIS OF THORACOLUMBAR SPINE, UNSPECIFIED SCOLIOSIS TYPE: ICD-10-CM

## 2019-12-20 DIAGNOSIS — I15.2 HYPERTENSION ASSOCIATED WITH DIABETES: ICD-10-CM

## 2019-12-20 DIAGNOSIS — I50.9 CHF (NYHA CLASS I, ACC/AHA STAGE B): ICD-10-CM

## 2019-12-20 DIAGNOSIS — E11.69 HYPERLIPIDEMIA ASSOCIATED WITH TYPE 2 DIABETES MELLITUS: ICD-10-CM

## 2019-12-20 DIAGNOSIS — E78.5 HYPERLIPIDEMIA ASSOCIATED WITH TYPE 2 DIABETES MELLITUS: ICD-10-CM

## 2019-12-20 PROCEDURE — 99999 PR PBB SHADOW E&M-EST. PATIENT-LVL III: CPT | Mod: PBBFAC,,, | Performed by: FAMILY MEDICINE

## 2019-12-20 PROCEDURE — 99999 PR PBB SHADOW E&M-EST. PATIENT-LVL III: ICD-10-PCS | Mod: PBBFAC,,, | Performed by: FAMILY MEDICINE

## 2019-12-20 PROCEDURE — 1126F AMNT PAIN NOTED NONE PRSNT: CPT | Mod: ,,, | Performed by: FAMILY MEDICINE

## 2019-12-20 PROCEDURE — 99213 OFFICE O/P EST LOW 20 MIN: CPT | Mod: PBBFAC | Performed by: FAMILY MEDICINE

## 2019-12-20 PROCEDURE — 1159F PR MEDICATION LIST DOCUMENTED IN MEDICAL RECORD: ICD-10-PCS | Mod: ,,, | Performed by: FAMILY MEDICINE

## 2019-12-20 PROCEDURE — 1126F PR PAIN SEVERITY QUANTIFIED, NO PAIN PRESENT: ICD-10-PCS | Mod: ,,, | Performed by: FAMILY MEDICINE

## 2019-12-20 PROCEDURE — 99214 PR OFFICE/OUTPT VISIT, EST, LEVL IV, 30-39 MIN: ICD-10-PCS | Mod: S$PBB,,, | Performed by: FAMILY MEDICINE

## 2019-12-20 PROCEDURE — 1159F MED LIST DOCD IN RCRD: CPT | Mod: ,,, | Performed by: FAMILY MEDICINE

## 2019-12-20 PROCEDURE — 99214 OFFICE O/P EST MOD 30 MIN: CPT | Mod: S$PBB,,, | Performed by: FAMILY MEDICINE

## 2019-12-20 NOTE — PROGRESS NOTES
Deirdre MAGANA Counts include 234 beds at the [facility]  12/20/2019  [id number]    Darrius Rizo MD  Patient Care Team:  Darrius Rizo MD as PCP - General (Family Medicine)  Beti Blackburn MD as PCP - [facility]  Santino Chowdhury OD as Consulting Physician (Optometry)  Debra Delgado LPN as Care Coordinator (Internal Medicine)  Has the patient seen any provider outside of the [facility] network since the last visit? (no). If yes, HIPPA forms completed and records requested.      Chief Complaint:  Chief Complaint   Patient presents with    Follow-up     4wk f/u       History of Present Illness:  Patient is a 69-year-old female presents today for follow-up of her scoliosis, hypertension, and diabetes.    HTN:  -taking all her medications as prescribed  -has been well controlled at home  -does not need refills today  -denies any chest pain, sob, or vision changes    DM:  -doing well   -controlling with diet and exercise  -patient is due for diabetic foot exam today    Scoliosis:  -doing well with physical therapy and yoga  -being followed by Dr. Mendoza, neurosurgery  -denies any new or worsening symptoms  Answers for HPI/ROS submitted by the patient on 12/18/2019   activity change: No  difficulty urinating: No  dysphoric mood: No            History:  Past Medical History:   Diagnosis Date    Abrasion     left eye    Abrasion and/or friction burn of abdominal wall with infection     left eye    Arthritis     gouty arthritis    Back pain     Diabetes mellitus     2011  11/17/2013    Diverticulosis     DM (diabetes mellitus) 2011     12/03/2014  A1C 6.0 x 2 weeks    DM (diabetes mellitus) 2011    BS 89 10/14/2017 A1C 5.7   Diet controlled    DM (diabetes mellitus) 2011    BS 90 am 10/23/2018     DM (diabetes mellitus) 2011    BS didn't check 10/23/2019    Hepatitis     Hx of B/C from cadaver in past    Hepatitis B     Hepatitis C     Hiatal hernia     Hip bursitis, left     HTN (hypertension)     Hypertensive CHF  (congestive heart failure) 3/28/2014    Obesity     Pneumonia     Positive ALBIN (antinuclear antibody)     Pure hypercholesterolemia 9/30/2016    Rheumatoid arthritis     questionable diagnosis    Scoliosis     Type 2 diabetes mellitus without complication, without long-term current use of insulin     Urinary incontinence     intermittent- only when lifting heavy items > 20 lbs     Past Surgical History:   Procedure Laterality Date    APPENDECTOMY      bone grafts      CERVICAL CONIZATION   W/ LASER      COLONOSCOPY N/A 1/10/2019    Procedure: COLONOSCOPY;  Surgeon: Nixon Thornton MD;  Location: [facility];  Service: Endoscopy;  Laterality: N/A;    COSMETIC SURGERY Bilateral     ears    DILATION AND CURETTAGE OF UTERUS      KNEE ARTHROSCOPY W/ MENISCAL REPAIR Right     LIVER BIOPSY      sinus lift      2003    TONSILLECTOMY, ADENOIDECTOMY      TUBAL LIGATION       Family History   Problem Relation Age of Onset    Colon cancer Maternal Grandfather     Breast cancer Maternal Grandfather     Diabetes Maternal Grandmother     Hypertension Maternal Grandmother     Cataracts Mother     Glaucoma Mother     Macular degeneration Mother     Hypertension Mother     Diabetes Paternal Grandmother     Cataracts Maternal Aunt     Glaucoma Maternal Aunt     Macular degeneration Maternal Aunt     Melanoma Maternal Aunt     Heart disease Unknown         pat side     Social History     Socioeconomic History    Marital status:      Spouse name: Not on file    Number of children: 0    Years of education: Not on file    Highest education level: Not on file   Occupational History    Occupation: Retired Teacher   Social Needs    Financial resource strain: Not hard at all    Food insecurity:     Worry: Never true     Inability: Never true    Transportation needs:     Medical: No     Non-medical: No   Tobacco Use    Smoking status: Never Smoker    Smokeless tobacco: Never Used   Substance and  "Sexual Activity    Alcohol use: Yes     Alcohol/week: 2.0 standard drinks     Types: 2 Glasses of wine per week     Frequency: Monthly or less     Drinks per session: 1 or 2     Binge frequency: Never     Comment: occasion    Drug use: No    Sexual activity: Yes     Partners: Male   Lifestyle    Physical activity:     Days per week: 2 days     Minutes per session: 30 min    Stress: Not at all   Relationships    Social connections:     Talks on phone: Three times a week     Gets together: Once a week     Attends Restorationist service: Not on file     Active member of club or organization: No     Attends meetings of clubs or organizations: Never     Relationship status:    Other Topics Concern    Are you pregnant or think you may be? No    Breast-feeding No   Social History Narrative    Not on file     Patient Active Problem List   Diagnosis    Type 2 diabetes mellitus without complication, without long-term current use of insulin    Hypertensive CHF (congestive heart failure)    Diastolic CHF, chronic    CHF (NYHA class I, ACC/AHA stage B)    Allergic rhinitis    Hypertension associated with diabetes    Family history of glaucoma    Cataracts, bilateral    Family history of macular degeneration    Alopecia areata universalis    Hepatitis B core antibody positive    FH: melanoma    History of cervical dysplasia    Hyperlipidemia associated with type 2 diabetes mellitus    Vitamin D deficiency    Statin intolerance    Thyroid nodule    Venous insufficiency (chronic) (peripheral)     Review of patient's allergies indicates:   Allergen Reactions    Arb-angiotensin receptor antagonist Edema    Hydralazine analogues      "Lupus reaction"    Metoprolol Other (See Comments)     Alopecia    Sulfa (sulfonamide antibiotics)      Passed out and almost stopped breathing    Ace inhibitors Other (See Comments)     cough    Calcium channel blocking agents-dihydropyridines      Edema         The " following were reviewed at this visit: active problem list, medication list, allergies, family history, social history, and health maintenance.    Medications:  Current Outpatient Medications on File Prior to Visit   Medication Sig Dispense Refill    b complex vitamins tablet Take 1 tablet by mouth once daily.      carvedilol (COREG) 6.25 MG tablet Take 1 tablet (6.25 mg total) by mouth 2 (two) times daily with meals. 60 tablet 11    fish oil-omega-3 fatty acids 300-1,000 mg capsule Take 2 capsules by mouth once daily.      fluticasone propionate (FLONASE) 50 mcg/actuation nasal spray SHAKE LIQUID AND USE 2 SPRAYS IN EACH NOSTRIL DAILY 16 mL 0    furosemide (LASIX) 20 MG tablet TAKE 1 TABLET BY MOUTH DAILY 90 tablet 3    glucosamine sulfate (GLUCOSAMINE) 500 mg Tab Take 1,000 mg by mouth.      inositol 500 mg Tab Take 500 mg by mouth 2 (two) times daily.      methylsulfonylmethane (MSM) 1,000 mg Cap Take by mouth.      spironolactone (ALDACTONE) 50 MG tablet Take 1 tablet (50 mg total) by mouth every evening. 90 tablet 3    tiZANidine (ZANAFLEX) 2 MG tablet Take 1 tablet (2 mg total) by mouth every 8 (eight) hours as needed. 30 tablet 5    fluocinonide (LIDEX) 0.05 % gel APPLY TO AFFECTED AREA AS DIRECTED QID  1    fluticasone propionate (FLONASE) 50 mcg/actuation nasal spray 2 sprays in each nostril daily (Patient not taking: Reported on 12/20/2019) 16 mL 0     No current facility-administered medications on file prior to visit.        Medications have been reviewed and reconciled with patient at this visit.  Barriers to medications present (no)    Adverse reactions to current medications (no)    Over the counter medications reviewed (Yes ), and if needed added to active Medication list at this visit.     Exam:  Wt Readings from Last 3 Encounters:   12/20/19 99.2 kg (218 lb 11.1 oz)   12/06/19 101 kg (222 lb 10.6 oz)   11/29/19 98.8 kg (217 lb 13 oz)     Temp Readings from Last 3 Encounters:   12/20/19  96.4 °F (35.8 °C) (Tympanic)   11/29/19 96.9 °F (36.1 °C) (Tympanic)   10/16/19 97.3 °F (36.3 °C) (Tympanic)     BP Readings from Last 3 Encounters:   12/20/19 124/86   11/29/19 (!) 144/92   10/16/19 138/88     Pulse Readings from Last 3 Encounters:   12/20/19 69   11/29/19 60   10/16/19 85     Body mass index is 34.25 kg/m².      Review of Systems   Constitutional: Negative for chills, fever and malaise/fatigue.   HENT: Negative for hearing loss.    Eyes: Negative for discharge and redness.   Respiratory: Negative for cough, shortness of breath and wheezing.    Cardiovascular: Negative for chest pain and palpitations.   Gastrointestinal: Negative for blood in stool, constipation, diarrhea, nausea and vomiting.   Genitourinary: Negative for hematuria.   Musculoskeletal: Positive for back pain and myalgias. Negative for joint pain.   Skin: Negative for rash.   Neurological: Negative for focal weakness and headaches.   Endo/Heme/Allergies: Negative for polydipsia.     Physical Exam   Constitutional: She is oriented to person, place, and time. She appears well-developed and well-nourished. No distress.   HENT:   Head: Normocephalic and atraumatic.   Eyes: Conjunctivae are normal. No scleral icterus.   Cardiovascular: Normal rate, regular rhythm and normal heart sounds. Exam reveals no gallop and no friction rub.   No murmur heard.  Pulses:       Dorsalis pedis pulses are 2+ on the right side, and 2+ on the left side.        Posterior tibial pulses are 2+ on the right side, and 2+ on the left side.   Pulmonary/Chest: Effort normal and breath sounds normal. No respiratory distress. She has no wheezes. She has no rales.   Abdominal: Soft. Bowel sounds are normal. She exhibits no distension. There is no tenderness. There is no rebound and no guarding.   Musculoskeletal:        Right foot: There is normal range of motion and no deformity.        Left foot: There is deformity (bone prominence consistent with osteoarthritis).  There is normal range of motion.   Severe scoliosis curvature with skin crease; no rashes or erythema   Feet:   Right Foot:   Skin Integrity: Negative for ulcer, blister, skin breakdown or erythema.   Left Foot:   Skin Integrity: Negative for ulcer, blister, skin breakdown or erythema.   Neurological: She is alert and oriented to person, place, and time.   Skin: Skin is warm and dry. She is not diaphoretic.   Psychiatric: She has a normal mood and affect.   Nursing note and vitals reviewed.      Laboratory Reviewed ({Yes)  Lab Results   Component Value Date    WBC 8.03 05/03/2019    HGB 14.2 05/03/2019    HCT 44.6 05/03/2019     05/03/2019    CHOL 222 (H) 11/12/2019    TRIG 77 11/12/2019    HDL 62 11/12/2019    ALT 20 05/03/2019    AST 28 05/03/2019     05/03/2019    K 5.1 05/03/2019    CL 99 05/03/2019    CREATININE 1.1 05/03/2019    BUN 21 05/03/2019    CO2 29 05/03/2019    TSH 1.086 05/03/2019    HGBA1C 6.0 (H) 11/12/2019     EXAMINATION:  XR THORACIC SPINE AP LATERAL    CLINICAL HISTORY:  Scoliosis, unspecified    TECHNIQUE:  AP and lateral views were obtained of the thoracic spine.    COMPARISON:  None.    FINDINGS:  There is S shaped thoracolumbar scoliosis with convexity of the thoracic spine to the right and convexity of the lumbar spine to the left.  Vertebral body heights appear well maintained.  No definite spondylolisthesis demonstrated.  There is multilevel spondylosis throughout the thoracic spine with mild-to-moderate disc height loss noted at multiple levels in the lower thoracic and upper lumbar spine.  There are no acute fractures or subluxations.      Impression       As above.  No acute findings.      Electronically signed by: Darrius Parks MD  Date: 06/28/2019  Time: 09:58     EXAMINATION:  XR LUMBAR SPINE AP AND LATERAL    CLINICAL HISTORY:  Low back pain, >6wks conservative tx, persistent-progressive sx, surgical candidate;Scoliosis, unspecified    TECHNIQUE:  AP, lateral and  spot images were performed of the lumbar spine.    COMPARISON:  08/03/2012    FINDINGS:  Severe levoscoliosis of the lumbar spine again noted.  Vertebral body heights appear within normal limits.  There is slight grade 1 anterolisthesis of L4 on L5 may be slightly worsened from prior.  Multilevel facet arthropathy noted.  Moderate to severe disc height loss noted throughout the lumbar spine which appear similar to prior.  Posterior elements appear grossly intact. No fractures demonstrated.  The remaining visualized osseous and soft tissue structures demonstrate no appreciable abnormality.      Impression       As above.      Electronically signed by: Darrius Parks MD  Date: 06/28/2019  Time: 10:00     EXAMINATION:  XR CERVICAL SPINE AP LATERAL    CLINICAL HISTORY:  Scoliosis, unspecified    TECHNIQUE:  AP, lateral, and open mouth views of the cervical spine were performed.    COMPARISON:  None    FINDINGS:  There is slight grade 1 anterolisthesis of C4 on C5.  Vertebral body heights are well maintained.  There is moderate to severe disc height loss at C5-6 through C7-T1.  Mild disc height loss at C4-5.  Multilevel facet arthropathy noted.  Posterior elements appear intact without acute fractures or subluxations demonstrated.  Odontoid process appears intact.  Atlantoaxial articulations appear normal.  Prevertebral soft tissues are within normal limits.      Impression       As above.      Electronically signed by: Darrius Parks MD  Date: 06/28/2019  Time: 10:01         Deirdre was seen today for follow-up.    Diagnoses and all orders for this visit:    Type 2 diabetes mellitus without complication, without long-term current use of insulin    Hyperlipidemia associated with type 2 diabetes mellitus    Hypertension associated with diabetes    CHF (NYHA class I, ACC/AHA stage B)    Scoliosis of thoracolumbar spine, unspecified scoliosis type       Severe scoliosis:  -patient is being managed by neuro surgery  -will  follow up their recommendations  -follow up as needed    Diabetes:  -labs are within normal limits  -continue all current management  -diabetic foot exam done today    HTN:  -well controlled today  -will repeat in 3 months  -continue current medications    HLD:  -statin intolerance  -continue diet, exercise, and fish oil    Chronic hypertensive heart disease with CHF:    -Managed by patient's Cardiology.  -no chest pain, sob, or evidence of volume overload    -Patient's lab results were reviewed and discussed with patient   -Treatment options and alternatives were discussed with the patient. Patient expressed understanding. Patient was given the opportunity to ask questions and be an active participant in their medical care. Patient had no further questions or concerns at this time.   -Patient is an overall moderate risk for health complications from their medical conditions.     Follow up: Follow up if symptoms worsen or fail to improve.    After visit summary was printed and given to patient upon discharge today.  Patient goals and care plan are included in After Visit Summary.

## 2020-01-08 ENCOUNTER — OFFICE VISIT (OUTPATIENT)
Dept: INTERNAL MEDICINE | Facility: CLINIC | Age: 70
End: 2020-01-08
Payer: MEDICARE

## 2020-01-08 VITALS
TEMPERATURE: 97 F | OXYGEN SATURATION: 99 % | BODY MASS INDEX: 34.78 KG/M2 | HEIGHT: 67 IN | DIASTOLIC BLOOD PRESSURE: 96 MMHG | RESPIRATION RATE: 18 BRPM | SYSTOLIC BLOOD PRESSURE: 148 MMHG | WEIGHT: 221.56 LBS | HEART RATE: 80 BPM

## 2020-01-08 DIAGNOSIS — E78.5 HYPERLIPIDEMIA ASSOCIATED WITH TYPE 2 DIABETES MELLITUS: ICD-10-CM

## 2020-01-08 DIAGNOSIS — Z76.89 ESTABLISHING CARE WITH NEW DOCTOR, ENCOUNTER FOR: Primary | ICD-10-CM

## 2020-01-08 DIAGNOSIS — I50.32 CHRONIC DIASTOLIC HEART FAILURE: ICD-10-CM

## 2020-01-08 DIAGNOSIS — Z78.9 STATIN INTOLERANCE: ICD-10-CM

## 2020-01-08 DIAGNOSIS — E11.69 HYPERLIPIDEMIA ASSOCIATED WITH TYPE 2 DIABETES MELLITUS: ICD-10-CM

## 2020-01-08 DIAGNOSIS — M54.6 MIDLINE THORACIC BACK PAIN, UNSPECIFIED CHRONICITY: ICD-10-CM

## 2020-01-08 DIAGNOSIS — Z00.00 ROUTINE PHYSICAL EXAMINATION: ICD-10-CM

## 2020-01-08 DIAGNOSIS — E11.9 DIABETES MELLITUS WITHOUT COMPLICATION: ICD-10-CM

## 2020-01-08 PROCEDURE — 99397 PER PM REEVAL EST PAT 65+ YR: CPT | Mod: S$PBB,,, | Performed by: FAMILY MEDICINE

## 2020-01-08 PROCEDURE — 99397 PR PREVENTIVE VISIT,EST,65 & OVER: ICD-10-PCS | Mod: S$PBB,,, | Performed by: FAMILY MEDICINE

## 2020-01-08 PROCEDURE — 99214 OFFICE O/P EST MOD 30 MIN: CPT | Mod: PBBFAC | Performed by: FAMILY MEDICINE

## 2020-01-08 PROCEDURE — 99999 PR PBB SHADOW E&M-EST. PATIENT-LVL IV: ICD-10-PCS | Mod: PBBFAC,,, | Performed by: FAMILY MEDICINE

## 2020-01-08 PROCEDURE — 99999 PR PBB SHADOW E&M-EST. PATIENT-LVL IV: CPT | Mod: PBBFAC,,, | Performed by: FAMILY MEDICINE

## 2020-01-08 NOTE — PROGRESS NOTES
Subjective:       Patient ID: Deirdre Yanez is a 69 y.o. female.    Chief Complaint: Annual Exam    HPI Ms. Yanez presents today to establish care and for her annual exam.     Scoliosis -back pain  She takes zanaflex when needed.     Other cataract of both eyes-Stable. Followed by      Alopecia areata universalis  Stable/chronic. Increased with metoprolol     Hypertensive heart disease with chronic diastolic congestive heart failure/ Diastolic CHF, chronic/ CHF (NYHA class I, ACC/AHA stage B)  Stable. Continue present mgt.     Hyperlipidemia associated with type 2 diabetes mellitus  Diet and exercise. Pt has statin intolerance. Follow up with PCP/cards    Hypertension associated with diabetes  Continue current regime -bystolic/aldactone. F/u with PCP/cards     History of cervical dysplasia-Followed yearly by Dr. Cantrell      Vitamin D deficiency-will follow up with blood work     Type 2 diabetes mellitus with complication, without long-term current use of insulin-beta blockers influenced this  Cut sugars and white flour.   Usually exercise keeps her in 5.7-6.0 range    Thyroid nodule-stable    Screening Questionnaires:    In the last two weeks how often have you felt down, depressed, or hopeless ( no )    In the last two weeks how often have you had little interest or pleasure in doing  (no )    In the last two weeks how often have you been bothered by the following problems:  1. Feeling nervous, anxious, or on edge ( no )    2. Not being able to stop or control worrying ( no)    3. Worrying too much about different things ( no)    4. Trouble relaxing ( no )    5. Being so restless that it is hard to sit still  (no )    6. Becoming easily annoyed or irritable (no)    7. Feeling afraid as if something awful might happen (no )    How often do you have a drink containing Alcohol? occasional, social use     Do you exercise  (no ) moderately active    Do you take a baby Aspirin daily ( no)    Do you have an  advance directive ( no ) The patient was given information regarding Living Will/Durable Power-of- if requested.     The following were reviewed: Active problem list, medication list, allergies, family history, social history, and Health Maintenance    She is up to date on Health maintenance    Review of Systems   Constitutional: Negative for activity change.   Eyes: Negative for discharge.   Respiratory: Negative for wheezing.    Cardiovascular: Negative for chest pain and palpitations.   Gastrointestinal: Negative for constipation, diarrhea and vomiting.   Genitourinary: Negative for difficulty urinating and hematuria.   Musculoskeletal: Positive for arthralgias.   Neurological: Negative for headaches.   Psychiatric/Behavioral: Negative for dysphoric mood.         Past Medical History:   Diagnosis Date    Abrasion     left eye    Abrasion and/or friction burn of abdominal wall with infection     left eye    Arthritis     gouty arthritis    Back pain     Diabetes mellitus     2011  11/17/2013    Diverticulosis     DM (diabetes mellitus) 2011     12/03/2014  A1C 6.0 x 2 weeks    DM (diabetes mellitus) 2011    BS 89 10/14/2017 A1C 5.7   Diet controlled    DM (diabetes mellitus) 2011    BS 90 am 10/23/2018     DM (diabetes mellitus) 2011    BS didn't check 10/23/2019    Hepatitis     Hx of B/C from cadaver in past    Hepatitis B     Hepatitis C     Hiatal hernia     Hip bursitis, left     HTN (hypertension)     Hypertensive CHF (congestive heart failure) 3/28/2014    Obesity     Pneumonia     Positive ALBIN (antinuclear antibody)     Pure hypercholesterolemia 9/30/2016    Rheumatoid arthritis     questionable diagnosis    Scoliosis     Type 2 diabetes mellitus without complication, without long-term current use of insulin     Urinary incontinence     intermittent- only when lifting heavy items > 20 lbs     Past Surgical History:   Procedure Laterality Date    APPENDECTOMY       bone grafts      CERVICAL CONIZATION   W/ LASER      COLONOSCOPY N/A 1/10/2019    Procedure: COLONOSCOPY;  Surgeon: Nixon Thornton MD;  Location: [facility];  Service: Endoscopy;  Laterality: N/A;    COSMETIC SURGERY Bilateral     ears    DILATION AND CURETTAGE OF UTERUS      KNEE ARTHROSCOPY W/ MENISCAL REPAIR Right     LIVER BIOPSY      sinus lift      2003    TONSILLECTOMY, ADENOIDECTOMY      TUBAL LIGATION       Family History   Problem Relation Age of Onset    Colon cancer Maternal Grandfather     Breast cancer Maternal Grandfather     Diabetes Maternal Grandmother     Hypertension Maternal Grandmother     Cataracts Mother     Glaucoma Mother     Macular degeneration Mother     Hypertension Mother     Diabetes Paternal Grandmother     Cataracts Maternal Aunt     Glaucoma Maternal Aunt     Macular degeneration Maternal Aunt     Melanoma Maternal Aunt     Heart disease Unknown         pat side     Social History     Socioeconomic History    Marital status:      Spouse name: Not on file    Number of children: 0    Years of education: Not on file    Highest education level: Not on file   Occupational History    Occupation: Retired Teacher   Social Needs    Financial resource strain: Not hard at all    Food insecurity:     Worry: Never true     Inability: Never true    Transportation needs:     Medical: No     Non-medical: No   Tobacco Use    Smoking status: Never Smoker    Smokeless tobacco: Never Used   Substance and Sexual Activity    Alcohol use: Yes     Alcohol/week: 2.0 standard drinks     Types: 2 Glasses of wine per week     Frequency: Monthly or less     Drinks per session: 1 or 2     Binge frequency: Never     Comment: occasion    Drug use: No    Sexual activity: Yes     Partners: Male   Lifestyle    Physical activity:     Days per week: 2 days     Minutes per session: 30 min    Stress: Not at all   Relationships    Social connections:     Talks on phone:  Three times a week     Gets together: Once a week     Attends Pent[employer]al service: Not on file     Active member of club or organization: No     Attends meetings of clubs or organizations: Never     Relationship status:    Other Topics Concern    Are you pregnant or think you may be? No    Breast-feeding No   Social History Narrative    Not on file     Medications have been reviewed and reconciled with patient at visit today.    Barriers to medications present (no )    Adverse reactions to current medications (no)    Over the counter medications reviewed (Yes) and if needed added to active Medication list.      Objective:        Physical Exam   Constitutional: She is oriented to person, place, and time. She appears well-nourished. No distress.   obese   HENT:   Head: Normocephalic and atraumatic.   Right Ear: External ear normal.   Left Ear: External ear normal.   Nose: Nose normal.   Mouth/Throat: Oropharynx is clear and moist.   Eyes: Pupils are equal, round, and reactive to light. EOM are normal. Right eye exhibits no discharge. Left eye exhibits no discharge.   Neck: Normal range of motion. Neck supple. No thyromegaly present.   Cardiovascular: Normal rate and regular rhythm.   Murmur heard.  Pulmonary/Chest: Effort normal and breath sounds normal. No respiratory distress. She has no wheezes.   Abdominal: Soft. Bowel sounds are normal. She exhibits no distension. There is no tenderness.   Musculoskeletal: Normal range of motion. She exhibits no edema.   Neurological: She is alert and oriented to person, place, and time. Coordination normal.   Skin: Skin is warm and dry. No rash noted.   Psychiatric: She has a normal mood and affect. Her behavior is normal.   Nursing note and vitals reviewed.        Results for orders placed or performed in visit on 11/12/19   Microalbumin/creatinine urine ratio   Result Value Ref Range    Microalbum.,U,Random <2.5 ug/mL    Creatinine, Random Ur 44.0 15.0 - 325.0 mg/dL     Microalb Creat Ratio Unable to calculate 0.0 - 30.0 ug/mg       Assessment/Plan:     Establishing care with new doctor, encounter for  Routine physical examination  Reviewed medical, social, surgical and family history. Reviewed health maintenance   Addressed concerns today  HM up to date today    Chronic diastolic heart failure  Pt has no complaints.   NO medication refills needed at this time.   Follows with cardiolgoy    Hyperlipidemia associated with type 2 diabetes mellitus  Take Niacin   Can not take statins due to intolerance   Statin intolerance    Diabetes mellitus without complication  -     Hemoglobin A1c; Future; Expected date: 01/08/2020  a1c 6.0      Midline thoracic back pain, unspecified chronicity  Followed by pain management and physiatry  She is moving more and exercising more to help with her symptoms.     -Patient's lab results were reviewed and discussed with patient  -Treatment options and alternatives were discussed with the patient. Patient expressed understanding. Patient was given the opportunity to ask questions and be an active participant in their medical care. Patient had no further questions or concerns at this time.   -Documentation of patient's health and condition was obtained from family member who was present during visit.  -Patient is an overall moderate risk for health complications from their medical conditions.      Follow up in about 6 months (around 7/8/2020), or if symptoms worsen or fail to improve.    Jazmine Montalvo MD  [facility]   Family Medicine

## 2020-02-17 ENCOUNTER — TELEPHONE (OUTPATIENT)
Dept: OBSTETRICS AND GYNECOLOGY | Facility: CLINIC | Age: 70
End: 2020-02-17

## 2020-02-17 DIAGNOSIS — Z12.31 VISIT FOR SCREENING MAMMOGRAM: Primary | ICD-10-CM

## 2020-02-17 NOTE — TELEPHONE ENCOUNTER
----- Message from Lizzy Harper sent at 2/17/2020 11:57 AM CST -----  Contact: Pt  Pt is calling the staff regarding mammogram orders  Pt call back [phone]    Thanks

## 2020-02-18 DIAGNOSIS — I50.9 CHF (NYHA CLASS I, ACC/AHA STAGE B): Primary | Chronic | ICD-10-CM

## 2020-02-27 ENCOUNTER — TELEPHONE (OUTPATIENT)
Dept: CARDIOLOGY | Facility: CLINIC | Age: 70
End: 2020-02-27

## 2020-02-27 DIAGNOSIS — I50.32 HYPERTENSIVE HEART DISEASE WITH CHRONIC DIASTOLIC CONGESTIVE HEART FAILURE: Primary | Chronic | ICD-10-CM

## 2020-02-27 DIAGNOSIS — I11.0 HYPERTENSIVE HEART DISEASE WITH CHRONIC DIASTOLIC CONGESTIVE HEART FAILURE: Primary | Chronic | ICD-10-CM

## 2020-02-28 ENCOUNTER — HOSPITAL ENCOUNTER (OUTPATIENT)
Dept: RADIOLOGY | Facility: HOSPITAL | Age: 70
Discharge: HOME | End: 2020-02-28
Attending: OBSTETRICS & GYNECOLOGY
Payer: MEDICARE

## 2020-02-28 VITALS — WEIGHT: 221 LBS | HEIGHT: 67 IN | BODY MASS INDEX: 34.69 KG/M2

## 2020-02-28 DIAGNOSIS — Z12.31 VISIT FOR SCREENING MAMMOGRAM: ICD-10-CM

## 2020-02-28 PROCEDURE — 77067 MAMMO DIGITAL SCREENING BILAT WITH TOMOSYNTHESIS_CAD: ICD-10-PCS | Mod: 26,,, | Performed by: RADIOLOGY

## 2020-02-28 PROCEDURE — 77067 SCR MAMMO BI INCL CAD: CPT | Mod: TC

## 2020-02-28 PROCEDURE — 77063 MAMMO DIGITAL SCREENING BILAT WITH TOMOSYNTHESIS_CAD: ICD-10-PCS | Mod: 26,,, | Performed by: RADIOLOGY

## 2020-02-28 PROCEDURE — 77063 BREAST TOMOSYNTHESIS BI: CPT | Mod: 26,,, | Performed by: RADIOLOGY

## 2020-02-28 PROCEDURE — 77067 SCR MAMMO BI INCL CAD: CPT | Mod: 26,,, | Performed by: RADIOLOGY

## 2020-03-10 ENCOUNTER — LAB VISIT (OUTPATIENT)
Dept: LAB | Facility: HOSPITAL | Age: 70
End: 2020-03-10
Attending: NUCLEAR MEDICINE
Payer: MEDICARE

## 2020-03-10 DIAGNOSIS — I11.0 HYPERTENSIVE HEART DISEASE WITH CHRONIC DIASTOLIC CONGESTIVE HEART FAILURE: Chronic | ICD-10-CM

## 2020-03-10 DIAGNOSIS — I50.32 HYPERTENSIVE HEART DISEASE WITH CHRONIC DIASTOLIC CONGESTIVE HEART FAILURE: Chronic | ICD-10-CM

## 2020-03-10 LAB
ANION GAP SERPL CALC-SCNC: 10 MMOL/L (ref 8–16)
BUN SERPL-MCNC: 16 MG/DL (ref 8–23)
CALCIUM SERPL-MCNC: 9.8 MG/DL (ref 8.7–10.5)
CHLORIDE SERPL-SCNC: 100 MMOL/L (ref 95–110)
CO2 SERPL-SCNC: 28 MMOL/L (ref 23–29)
CREAT SERPL-MCNC: 0.9 MG/DL (ref 0.5–1.4)
EST. GFR  (AFRICAN AMERICAN): >60 ML/MIN/1.73 M^2
EST. GFR  (NON AFRICAN AMERICAN): >60 ML/MIN/1.73 M^2
GLUCOSE SERPL-MCNC: 107 MG/DL (ref 70–110)
POTASSIUM SERPL-SCNC: 4.9 MMOL/L (ref 3.5–5.1)
SODIUM SERPL-SCNC: 138 MMOL/L (ref 136–145)

## 2020-03-10 PROCEDURE — 80048 BASIC METABOLIC PNL TOTAL CA: CPT

## 2020-03-10 PROCEDURE — 36415 COLL VENOUS BLD VENIPUNCTURE: CPT

## 2020-03-11 ENCOUNTER — HOSPITAL ENCOUNTER (OUTPATIENT)
Dept: CARDIOLOGY | Facility: HOSPITAL | Age: 70
Discharge: HOME | End: 2020-03-11
Attending: NUCLEAR MEDICINE
Payer: MEDICARE

## 2020-03-11 ENCOUNTER — OFFICE VISIT (OUTPATIENT)
Dept: CARDIOLOGY | Facility: CLINIC | Age: 70
End: 2020-03-11
Payer: MEDICARE

## 2020-03-11 VITALS
SYSTOLIC BLOOD PRESSURE: 140 MMHG | BODY MASS INDEX: 34.71 KG/M2 | WEIGHT: 221.13 LBS | DIASTOLIC BLOOD PRESSURE: 96 MMHG | HEIGHT: 67 IN | HEART RATE: 62 BPM

## 2020-03-11 DIAGNOSIS — I11.0 HYPERTENSIVE HEART DISEASE WITH CHRONIC DIASTOLIC CONGESTIVE HEART FAILURE: Primary | Chronic | ICD-10-CM

## 2020-03-11 DIAGNOSIS — I50.9 CHF (NYHA CLASS I, ACC/AHA STAGE B): Chronic | ICD-10-CM

## 2020-03-11 DIAGNOSIS — I50.32 DIASTOLIC CHF, CHRONIC: Chronic | ICD-10-CM

## 2020-03-11 DIAGNOSIS — I50.32 HYPERTENSIVE HEART DISEASE WITH CHRONIC DIASTOLIC CONGESTIVE HEART FAILURE: Primary | Chronic | ICD-10-CM

## 2020-03-11 PROCEDURE — 99999 PR PBB SHADOW E&M-EST. PATIENT-LVL III: ICD-10-PCS | Mod: PBBFAC,,, | Performed by: NUCLEAR MEDICINE

## 2020-03-11 PROCEDURE — 99213 OFFICE O/P EST LOW 20 MIN: CPT | Mod: PBBFAC,25 | Performed by: NUCLEAR MEDICINE

## 2020-03-11 PROCEDURE — 99999 PR PBB SHADOW E&M-EST. PATIENT-LVL III: CPT | Mod: PBBFAC,,, | Performed by: NUCLEAR MEDICINE

## 2020-03-11 PROCEDURE — 93005 ELECTROCARDIOGRAM TRACING: CPT

## 2020-03-11 PROCEDURE — 93010 EKG 12-LEAD: ICD-10-PCS | Mod: ,,, | Performed by: INTERNAL MEDICINE

## 2020-03-11 PROCEDURE — 93010 ELECTROCARDIOGRAM REPORT: CPT | Mod: ,,, | Performed by: INTERNAL MEDICINE

## 2020-03-11 PROCEDURE — 99214 OFFICE O/P EST MOD 30 MIN: CPT | Mod: S$PBB,,, | Performed by: NUCLEAR MEDICINE

## 2020-03-11 PROCEDURE — 99214 PR OFFICE/OUTPT VISIT, EST, LEVL IV, 30-39 MIN: ICD-10-PCS | Mod: S$PBB,,, | Performed by: NUCLEAR MEDICINE

## 2020-03-11 RX ORDER — CARVEDILOL 12.5 MG/1
12.5 TABLET ORAL 2 TIMES DAILY WITH MEALS
Qty: 180 TABLET | Refills: 3 | Status: SHIPPED | OUTPATIENT
Start: 2020-03-11 | End: 2021-01-25 | Stop reason: SDUPTHER

## 2020-03-11 RX ORDER — CARVEDILOL 6.25 MG/1
6.25 TABLET ORAL 2 TIMES DAILY WITH MEALS
Qty: 180 TABLET | Refills: 3 | Status: CANCELLED | OUTPATIENT
Start: 2020-03-11 | End: 2021-03-11

## 2020-03-11 RX ORDER — SPIRONOLACTONE 50 MG/1
50 TABLET, FILM COATED ORAL NIGHTLY
Qty: 90 TABLET | Refills: 3 | Status: SHIPPED | OUTPATIENT
Start: 2020-03-11 | End: 2021-01-25 | Stop reason: SDUPTHER

## 2020-03-11 NOTE — PROGRESS NOTES
Subjective:   Patient ID:  Deirdre Yanez is a 69 y.o. female who presents for follow-up of Hypertension (ESSENTIAL); HEART FAILURE STAGE B (HFPEF, B, DD); and Hyperlipidemia (WITH T2 DM)      HPI HAVING PROBLEMS WITH EXACERBATION OF HIP PAIN AND LOW BACK PAIN.  HER HOME DBP HAS BEEN ELEVATED 90-95  NO HEADACHES. NO FOCAL CNS SYMPTOMS OR SIGNS TO SUGGEST TIA OR STROKE  NO ANGINA OR EQUIVALENT  NO UNUSUAL DESAI. NO ORTHOPNEA OR PND  NO PALPITATIONS  NO NEAR SYNCOPE OR SYNCOPE  NO EDEMA. NO CALVE TENDERNESS  ECG TODAY- SR. 62, ISOLATED APCS- OTHERWISE NORMAL  CARD MED GOOD COMPLIANCE, NO SIDE EFFECTS    Review of Systems   Constitution: Negative for chills, fever, night sweats, weight gain and weight loss.   HENT: Negative for nosebleeds.    Eyes: Negative for blurred vision, double vision and visual disturbance.   Cardiovascular: Negative for chest pain, dyspnea on exertion, irregular heartbeat, leg swelling, orthopnea, palpitations, paroxysmal nocturnal dyspnea and syncope.   Respiratory: Negative for cough, hemoptysis and wheezing.    Endocrine: Negative for polydipsia and polyuria.   Hematologic/Lymphatic: Does not bruise/bleed easily.   Skin: Negative for rash.   Musculoskeletal: Positive for back pain and joint pain. Negative for joint swelling, muscle weakness and myalgias.   Gastrointestinal: Negative for abdominal pain, hematemesis, jaundice and melena.   Genitourinary: Negative for dysuria, hematuria and nocturia.   Neurological: Negative for dizziness, focal weakness, headaches, sensory change and weakness.   Psychiatric/Behavioral: Negative for depression. The patient does not have insomnia and is not nervous/anxious.      Family History   Problem Relation Age of Onset    Colon cancer Maternal Grandfather     Diabetes Maternal Grandmother     Hypertension Maternal Grandmother     Breast cancer Maternal Grandmother     Cataracts Mother     Glaucoma Mother     Macular degeneration Mother      Hypertension Mother     Diabetes Paternal Grandmother     Cataracts Maternal Aunt     Glaucoma Maternal Aunt     Macular degeneration Maternal Aunt     Melanoma Maternal Aunt     Heart disease Unknown         pat side     Past Medical History:   Diagnosis Date    Abrasion     left eye    Abrasion and/or friction burn of abdominal wall with infection     left eye    Arthritis     gouty arthritis    Back pain     Diabetes mellitus     2011  11/17/2013    Diverticulosis     DM (diabetes mellitus) 2011     12/03/2014  A1C 6.0 x 2 weeks    DM (diabetes mellitus) 2011    BS 89 10/14/2017 A1C 5.7   Diet controlled    DM (diabetes mellitus) 2011    BS 90 am 10/23/2018     DM (diabetes mellitus) 2011    BS didn't check 10/23/2019    Hepatitis     Hx of B/C from cadaver in past    Hepatitis B     Hepatitis C     Hiatal hernia     Hip bursitis, left     HTN (hypertension)     Hypertensive CHF (congestive heart failure) 3/28/2014    Obesity     Pneumonia     Positive ALBIN (antinuclear antibody)     Pure hypercholesterolemia 9/30/2016    Rheumatoid arthritis     questionable diagnosis    Scoliosis     Type 2 diabetes mellitus without complication, without long-term current use of insulin     Urinary incontinence     intermittent- only when lifting heavy items > 20 lbs     Social History     Socioeconomic History    Marital status:      Spouse name: Not on file    Number of children: 0    Years of education: Not on file    Highest education level: Not on file   Occupational History    Occupation: Retired Teacher   Social Needs    Financial resource strain: Not hard at all    Food insecurity:     Worry: Never true     Inability: Never true    Transportation needs:     Medical: No     Non-medical: No   Tobacco Use    Smoking status: Never Smoker    Smokeless tobacco: Never Used   Substance and Sexual Activity    Alcohol use: Yes     Alcohol/week: 2.0 standard drinks      Types: 2 Glasses of wine per week     Frequency: Monthly or less     Drinks per session: 1 or 2     Binge frequency: Never     Comment: occasion    Drug use: No    Sexual activity: Yes     Partners: Male   Lifestyle    Physical activity:     Days per week: 2 days     Minutes per session: 30 min    Stress: Not at all   Relationships    Social connections:     Talks on phone: Three times a week     Gets together: Once a week     Attends Jain service: Not on file     Active member of club or organization: No     Attends meetings of clubs or organizations: Never     Relationship status:    Other Topics Concern    Are you pregnant or think you may be? No    Breast-feeding No   Social History Narrative    Not on file     Current Outpatient Medications on File Prior to Visit   Medication Sig Dispense Refill    b complex vitamins tablet Take 1 tablet by mouth once daily.      fish oil-omega-3 fatty acids 300-1,000 mg capsule Take 2 capsules by mouth once daily.      fluocinonide (LIDEX) 0.05 % gel APPLY TO AFFECTED AREA AS DIRECTED QID  1    fluticasone propionate (FLONASE) 50 mcg/actuation nasal spray SHAKE LIQUID AND USE 2 SPRAYS IN EACH NOSTRIL DAILY 16 mL 0    fluticasone propionate (FLONASE) 50 mcg/actuation nasal spray 2 sprays in each nostril daily 16 mL 0    furosemide (LASIX) 20 MG tablet TAKE 1 TABLET BY MOUTH DAILY 90 tablet 3    glucosamine sulfate (GLUCOSAMINE) 500 mg Tab Take 1,000 mg by mouth.      inositol 500 mg Tab Take 500 mg by mouth 2 (two) times daily.      methylsulfonylmethane (MSM) 1,000 mg Cap Take by mouth.      tiZANidine (ZANAFLEX) 2 MG tablet Take 1 tablet (2 mg total) by mouth every 8 (eight) hours as needed. 30 tablet 5    [DISCONTINUED] carvedilol (COREG) 6.25 MG tablet Take 1 tablet (6.25 mg total) by mouth 2 (two) times daily with meals. 60 tablet 11    [DISCONTINUED] spironolactone (ALDACTONE) 50 MG tablet Take 1 tablet (50 mg total) by mouth every evening.  "90 tablet 3     No current facility-administered medications on file prior to visit.      Review of patient's allergies indicates:   Allergen Reactions    Arb-angiotensin receptor antagonist Edema    Hydralazine analogues      "Lupus reaction"    Metoprolol Other (See Comments)     Alopecia    Sulfa (sulfonamide antibiotics)      Passed out and almost stopped breathing    Ace inhibitors Other (See Comments)     cough    Calcium channel blocking agents-dihydropyridines      Edema         Objective:     Physical Exam   Constitutional: She is oriented to person, place, and time. She appears well-developed. No distress.   HENT:   Head: Normocephalic.   Eyes: Pupils are equal, round, and reactive to light. Conjunctivae are normal. No scleral icterus.   Neck: Normal range of motion. Neck supple. Normal carotid pulses, no hepatojugular reflux and no JVD present. Carotid bruit is not present. No edema present. No thyroid mass and no thyromegaly present.   Cardiovascular: Normal rate, regular rhythm, S1 normal, S2 normal, normal heart sounds and intact distal pulses. PMI is not displaced. Exam reveals no gallop and no friction rub.   No murmur heard.  Pulses:       Carotid pulses are 2+ on the right side, and 2+ on the left side.       Radial pulses are 2+ on the right side, and 2+ on the left side.        Femoral pulses are 2+ on the right side, and 2+ on the left side.       Popliteal pulses are 2+ on the right side, and 2+ on the left side.        Dorsalis pedis pulses are 2+ on the right side, and 2+ on the left side.        Posterior tibial pulses are 2+ on the right side, and 2+ on the left side.   Pulmonary/Chest: Effort normal and breath sounds normal. She has no wheezes. She has no rales. She exhibits no tenderness.   Abdominal: Soft. Bowel sounds are normal. She exhibits no pulsatile midline mass and no mass. There is no hepatosplenomegaly. There is no tenderness.   Musculoskeletal: Normal range of motion. " She exhibits no edema or tenderness.        Cervical back: Normal.        Thoracic back: Normal.        Lumbar back: Normal.   Lymphadenopathy:     She has no cervical adenopathy.     She has no axillary adenopathy.        Right: No supraclavicular adenopathy present.        Left: No supraclavicular adenopathy present.   Neurological: She is alert and oriented to person, place, and time. She has normal strength and normal reflexes. No sensory deficit. Gait normal.   Skin: Skin is warm. No rash noted. No cyanosis. No pallor. Nails show no clubbing.   Psychiatric: She has a normal mood and affect. Her speech is normal and behavior is normal. Cognition and memory are normal.       Assessment:     1. Hypertensive heart disease with chronic diastolic congestive heart failure    2. CHF (NYHA class I, ACC/AHA stage B)    3. Diastolic CHF, chronic        Plan:     Hypertensive heart disease with chronic diastolic congestive heart failure  UNCONTROLLED DBP  CNS STATUS STABLE  NO ACTIVE MYOCARDIAL ISCHEMIA  PLAN - OMT  BBS- INCREASE CARVEDILOL TO 12.5 MG BID    CHF (NYHA class I, ACC/AHA stage B)  NO CLINICAL EVIDENCE OF ADHF    Diastolic CHF, chronic  CLINICALLY COMPENSATED  NO ARRHYTHMIAS  -     spironolactone (ALDACTONE) 50 MG tablet; Take 1 tablet (50 mg total) by mouth every evening.  Dispense: 90 tablet; Refill: 3  -     carvediloL (COREG) 12.5 MG tablet; Take 1 tablet (12.5 mg total) by mouth 2 (two) times daily with meals.  Dispense: 180 tablet; Refill: 3    RETURN IN 3 MONTHS

## 2020-04-02 ENCOUNTER — PATIENT MESSAGE (OUTPATIENT)
Dept: INTERNAL MEDICINE | Facility: CLINIC | Age: 70
End: 2020-04-02

## 2020-04-02 DIAGNOSIS — M41.9 SCOLIOSIS OF THORACOLUMBAR SPINE, UNSPECIFIED SCOLIOSIS TYPE: ICD-10-CM

## 2020-04-02 RX ORDER — TIZANIDINE 2 MG/1
2 TABLET ORAL EVERY 8 HOURS PRN
Qty: 30 TABLET | Refills: 5 | Status: SHIPPED | OUTPATIENT
Start: 2020-04-02 | End: 2021-04-10

## 2020-04-05 ENCOUNTER — PATIENT MESSAGE (OUTPATIENT)
Dept: INTERNAL MEDICINE | Facility: CLINIC | Age: 70
End: 2020-04-05

## 2020-05-28 ENCOUNTER — PATIENT MESSAGE (OUTPATIENT)
Dept: INTERNAL MEDICINE | Facility: CLINIC | Age: 70
End: 2020-05-28

## 2020-06-01 DIAGNOSIS — M25.551 PAIN OF BOTH HIP JOINTS: Primary | ICD-10-CM

## 2020-06-01 DIAGNOSIS — M25.552 PAIN OF BOTH HIP JOINTS: Primary | ICD-10-CM

## 2020-06-04 ENCOUNTER — OFFICE VISIT (OUTPATIENT)
Dept: ORTHOPEDICS | Facility: CLINIC | Age: 70
End: 2020-06-04
Payer: MEDICARE

## 2020-06-04 ENCOUNTER — HOSPITAL ENCOUNTER (OUTPATIENT)
Dept: RADIOLOGY | Facility: HOSPITAL | Age: 70
Discharge: HOME | End: 2020-06-04
Attending: PHYSICIAN ASSISTANT
Payer: MEDICARE

## 2020-06-04 VITALS
HEIGHT: 67 IN | HEART RATE: 64 BPM | DIASTOLIC BLOOD PRESSURE: 84 MMHG | BODY MASS INDEX: 34.69 KG/M2 | SYSTOLIC BLOOD PRESSURE: 182 MMHG | WEIGHT: 221 LBS

## 2020-06-04 DIAGNOSIS — M25.551 PAIN OF BOTH HIP JOINTS: ICD-10-CM

## 2020-06-04 DIAGNOSIS — M25.552 PAIN OF BOTH HIP JOINTS: ICD-10-CM

## 2020-06-04 DIAGNOSIS — M41.9 SCOLIOSIS, UNSPECIFIED SCOLIOSIS TYPE, UNSPECIFIED SPINAL REGION: ICD-10-CM

## 2020-06-04 DIAGNOSIS — G89.29 CHRONIC SI JOINT PAIN: Primary | ICD-10-CM

## 2020-06-04 DIAGNOSIS — M53.3 CHRONIC SI JOINT PAIN: Primary | ICD-10-CM

## 2020-06-04 PROCEDURE — 73521 X-RAY EXAM HIPS BI 2 VIEWS: CPT | Mod: 26,,, | Performed by: RADIOLOGY

## 2020-06-04 PROCEDURE — 99214 OFFICE O/P EST MOD 30 MIN: CPT | Mod: PBBFAC,25 | Performed by: PHYSICIAN ASSISTANT

## 2020-06-04 PROCEDURE — 73521 X-RAY EXAM HIPS BI 2 VIEWS: CPT | Mod: TC

## 2020-06-04 PROCEDURE — 99999 PR PBB SHADOW E&M-EST. PATIENT-LVL IV: CPT | Mod: PBBFAC,,, | Performed by: PHYSICIAN ASSISTANT

## 2020-06-04 PROCEDURE — 99214 OFFICE O/P EST MOD 30 MIN: CPT | Mod: S$PBB,,, | Performed by: PHYSICIAN ASSISTANT

## 2020-06-04 PROCEDURE — 99214 PR OFFICE/OUTPT VISIT, EST, LEVL IV, 30-39 MIN: ICD-10-PCS | Mod: S$PBB,,, | Performed by: PHYSICIAN ASSISTANT

## 2020-06-04 PROCEDURE — 73521 XR HIPS BILATERAL 2 VIEW INCL AP PELVIS: ICD-10-PCS | Mod: 26,,, | Performed by: RADIOLOGY

## 2020-06-04 PROCEDURE — 99999 PR PBB SHADOW E&M-EST. PATIENT-LVL IV: ICD-10-PCS | Mod: PBBFAC,,, | Performed by: PHYSICIAN ASSISTANT

## 2020-06-04 NOTE — PROGRESS NOTES
Patient ID: Deirdre Yanez is a 70 y.o. female.    Chief Complaint: Pain of the Right Hip and Pain of the Left Hip      HPI: Deirdre Yanez  is a 70 y.o. female who c/o Pain of the Right Hip and Pain of the Left Hip   for duration of about a year.  She has a long history of problems with her hips.  Back in 1990 she slipped off a ledge while try out fishing and fell approximately 20-30 feet landing on her feet.  She states that she did not break anything in a stiff the next day.  Later on she was seen by Dr. [facility].  He told her at that time she had likely torn tendons in the feet as well as damage both her knees and both SI joints.  Ten years ago she re-injured the hips while working at her house.  She saw Dr. Chris starr did trochanteric bursa injections as well as physical therapy for dry needling.    More recently, she has had pain over the last year.  She describes it as aching, sharp, shooting in quality.  It is located in the posterior hips bilaterally.  Pain is 5/10 in severity.  Alleviating factors include prior history of physical therapy and injections.  Aggravating factors include prolonged sitting, standing for long periods of time as well as prolonged weight-bearing.  She complains of associated numbness and tingling in the left lateral thigh.  She has a history of severe scoliosis and has been evaluated in the Neurosurgery Department.    She has a history of gastric ulcer related to NSAID use.  She is not a candidate for or oral NSAIDs.    Past Medical History:   Diagnosis Date    Abrasion     left eye    Abrasion and/or friction burn of abdominal wall with infection     left eye    Arthritis     gouty arthritis    Back pain     Diabetes mellitus     2011  11/17/2013    Diverticulosis     DM (diabetes mellitus) 2011     12/03/2014  A1C 6.0 x 2 weeks    DM (diabetes mellitus) 2011    BS 89 10/14/2017 A1C 5.7   Diet controlled    DM (diabetes mellitus) 2011    BS 90 am 10/23/2018      DM (diabetes mellitus) 2011    BS didn't check 10/23/2019    Hepatitis     Hx of B/C from cadaver in past    Hepatitis B     Hepatitis C     Hiatal hernia     Hip bursitis, left     HTN (hypertension)     Hypertensive CHF (congestive heart failure) 3/28/2014    Obesity     Pneumonia     Positive ALBIN (antinuclear antibody)     Pure hypercholesterolemia 9/30/2016    Rheumatoid arthritis     questionable diagnosis    Scoliosis     Type 2 diabetes mellitus without complication, without long-term current use of insulin     Urinary incontinence     intermittent- only when lifting heavy items > 20 lbs     Past Surgical History:   Procedure Laterality Date    APPENDECTOMY      bone grafts      CERVICAL CONIZATION   W/ LASER      COLONOSCOPY N/A 1/10/2019    Procedure: COLONOSCOPY;  Surgeon: Nixon Thornton MD;  Location: [facility];  Service: Endoscopy;  Laterality: N/A;    COSMETIC SURGERY Bilateral     ears    DILATION AND CURETTAGE OF UTERUS      KNEE ARTHROSCOPY W/ MENISCAL REPAIR Right     LIVER BIOPSY      sinus lift      2003    TONSILLECTOMY, ADENOIDECTOMY      TUBAL LIGATION       Family History   Problem Relation Age of Onset    Colon cancer Maternal Grandfather     Diabetes Maternal Grandmother     Hypertension Maternal Grandmother     Breast cancer Maternal Grandmother     Cataracts Mother     Glaucoma Mother     Macular degeneration Mother     Hypertension Mother     Diabetes Paternal Grandmother     Cataracts Maternal Aunt     Glaucoma Maternal Aunt     Macular degeneration Maternal Aunt     Melanoma Maternal Aunt     Heart disease Unknown         pat side     Social History     Socioeconomic History    Marital status:      Spouse name: Not on file    Number of children: 0    Years of education: Not on file    Highest education level: Not on file   Occupational History    Occupation: Retired Teacher   Social Needs    Financial resource strain: Not hard at  all    Food insecurity:     Worry: Never true     Inability: Never true    Transportation needs:     Medical: No     Non-medical: No   Tobacco Use    Smoking status: Never Smoker    Smokeless tobacco: Never Used   Substance and Sexual Activity    Alcohol use: Yes     Alcohol/week: 2.0 standard drinks     Types: 2 Glasses of wine per week     Frequency: Monthly or less     Drinks per session: 1 or 2     Binge frequency: Never     Comment: occasion    Drug use: No    Sexual activity: Yes     Partners: Male   Lifestyle    Physical activity:     Days per week: 2 days     Minutes per session: 30 min    Stress: Not at all   Relationships    Social connections:     Talks on phone: Three times a week     Gets together: Once a week     Attends Gnosticist service: Not on file     Active member of club or organization: No     Attends meetings of clubs or organizations: Never     Relationship status:    Other Topics Concern    Are you pregnant or think you may be? No    Breast-feeding No   Social History Narrative    Not on file     Medication List with Changes/Refills   Current Medications    B COMPLEX VITAMINS TABLET    Take 1 tablet by mouth once daily.    CARVEDILOL (COREG) 12.5 MG TABLET    Take 1 tablet (12.5 mg total) by mouth 2 (two) times daily with meals.    FISH OIL-OMEGA-3 FATTY ACIDS 300-1,000 MG CAPSULE    Take 2 capsules by mouth once daily.    FLUOCINONIDE (LIDEX) 0.05 % GEL    APPLY TO AFFECTED AREA AS DIRECTED QID    FLUTICASONE PROPIONATE (FLONASE) 50 MCG/ACTUATION NASAL SPRAY    SHAKE LIQUID AND USE 2 SPRAYS IN EACH NOSTRIL DAILY    FLUTICASONE PROPIONATE (FLONASE) 50 MCG/ACTUATION NASAL SPRAY    2 sprays in each nostril daily    FUROSEMIDE (LASIX) 20 MG TABLET    TAKE 1 TABLET BY MOUTH DAILY    GLUCOSAMINE SULFATE (GLUCOSAMINE) 500 MG TAB    Take 1,000 mg by mouth.    INOSITOL 500 MG TAB    Take 500 mg by mouth 2 (two) times daily.    METHYLSULFONYLMETHANE (MSM) 1,000 MG CAP    Take by  "mouth.    SPIRONOLACTONE (ALDACTONE) 50 MG TABLET    Take 1 tablet (50 mg total) by mouth every evening.    TIZANIDINE (ZANAFLEX) 2 MG TABLET    Take 1 tablet (2 mg total) by mouth every 8 (eight) hours as needed.     Review of patient's allergies indicates:   Allergen Reactions    Arb-angiotensin receptor antagonist Edema    Hydralazine analogues      "Lupus reaction"    Metoprolol Other (See Comments)     Alopecia    Sulfa (sulfonamide antibiotics)      Passed out and almost stopped breathing    Calcium channel blocking agents-dihydropyridines      Edema      Ace inhibitors Other (See Comments)     cough       Objective:        General    Nursing note and vitals reviewed.  Constitutional: She is oriented to person, place, and time. She appears well-developed and well-nourished.   HENT:   Head: Normocephalic and atraumatic.   Eyes: EOM are normal.   Cardiovascular: Normal rate and regular rhythm.    Pulmonary/Chest: Effort normal.   Abdominal: Soft.   Neurological: She is alert and oriented to person, place, and time.   Psychiatric: She has a normal mood and affect. Her behavior is normal.           Right Knee Exam     Inspection   Effusion: absent    Left Knee Exam     Inspection   Effusion: absent    Right Hip Exam     Inspection   Scars: absent  Swelling: absent  Bruising: absent  No deformity of hip.  Quadriceps Atrophy:  Negative  Erythema: absent    Tenderness   The patient tender to palpation of the SI joint.    Range of Motion   Abduction: normal   Adduction: normal   Extension: normal   Flexion: normal   External rotation: normal   Internal rotation: normal     Tests   Pain w/ forced internal rotation (LIZZY): present  Log Roll: negative    Other   Sensation: normal    Comments:  No TTP troch bursa, piriformis, nor SI joint.    Motor intact to EHL/FHL/GS/TA  Sensation intact to the S/S/SPN/DPN/Tib   (-)SLR test  Left Hip Exam     Inspection   Scars: absent  Swelling: absent  No deformity of " hip.  Quadriceps Atrophy:  negative  Erythema: absent  Bruising: absent    Tenderness   The patient tender to palpation of the SI joint.    Range of Motion   Abduction: normal   Adduction: normal   Extension: normal   Flexion: normal   External rotation: normal   Internal rotation: normal     Tests   Pain w/ forced internal rotation (LIZZY): present  Log Roll: negative    Other   Sensation: normal    Comments:  No TTP troch bursa, piriformis, nor SI joint.   Motor intact to EHL/FHL/GS/TA  Sensation intact to the S/S/SPN/DPN/Tib   (-)SLR test          Muscle Strength   Right Lower Extremity   Hip Abduction: 5/5   Hip Adduction: 5/5   Hip Flexion: 5/5   Ankle Dorsiflexion:  5/5   Left Lower Extremity   Hip Abduction: 5/5   Hip Adduction: 5/5   Hip Flexion: 5/5   Ankle Dorsiflexion:  5/5     Reflexes     Left Side  Quadriceps:  2+  Achilles:  2+    Right Side   Quadriceps:  2+  Achilles:  2+    Vascular Exam       Capillary Refill  Right Hand: normal capillary refill  Left Hand: normal capillary refill    Edema  Right Upper Leg: absent  Left Upper Leg: absent      Xray Images and report were reviewed today.  I agree with the radiologist's interpretation.    X-Ray Hips Bilateral 2 View Inc AP Pelvis  Narrative: EXAMINATION:  XR HIPS BILATERAL 2 VIEW INCL AP PELVIS    CLINICAL HISTORY:  Pain in right hip    TECHNIQUE:  AP view of the pelvis and frogleg lateral views of both hips were performed.    COMPARISON:  None.    FINDINGS:  No acute fracture.  Hip joint spaces maintained with minimal acetabular degenerative findings.  More prevalent lower lumbar spine degenerative findings noted.  Soft tissues unremarkable.  Impression: As above    Electronically signed by: Jonathan Rodriguez MD  Date:    06/04/2020  Time:    07:50        Assessment:       Encounter Diagnoses   Name Primary?    Chronic SI joint pain Yes    Scoliosis, unspecified scoliosis type, unspecified spinal region           Plan:       Deirdre was seen today  for pain and pain.    Diagnoses and all orders for this visit:    Chronic SI joint pain  -     Ambulatory referral/consult to Pain Clinic; Future    Scoliosis, unspecified scoliosis type, unspecified spinal region        Deirdre Yanez is an established pt who comes in today for a new problem bilateral hips.  She actually has chronic SI joint pain and dysfunction in presence of significant scoliosis.  She is already a patient of the Neurosurgery Department.  Apparently, she does not need surgical correction for the scoliosis.  However, she also has significant SI joint discomfort.  I would recommend referral to interventional pain management for SI joint injections.  I would also like to get her established with the department since this tends to be an ongoing problem for her.  She may follow up with me p.r.n..  I would certainly defer any further workup or management of the back to neuro surgery.  She verbalizes understanding and agrees.    Follow up if symptoms worsen or fail to improve.    The patient understands, chooses and consents to this plan and accepts all   the risks which include but are not limited to the risks mentioned above.     Disclaimer: This note was prepared using a voice recognition system and is likely to have sound alike errors within the text.

## 2020-06-09 DIAGNOSIS — I50.9 CHF (NYHA CLASS I, ACC/AHA STAGE B): Primary | Chronic | ICD-10-CM

## 2020-06-14 ENCOUNTER — PATIENT OUTREACH (OUTPATIENT)
Dept: ADMINISTRATIVE | Facility: OTHER | Age: 70
End: 2020-06-14

## 2020-06-15 ENCOUNTER — LAB VISIT (OUTPATIENT)
Dept: LAB | Facility: HOSPITAL | Age: 70
End: 2020-06-15
Attending: FAMILY MEDICINE
Payer: MEDICARE

## 2020-06-15 DIAGNOSIS — I50.9 CHF (NYHA CLASS I, ACC/AHA STAGE B): Chronic | ICD-10-CM

## 2020-06-15 DIAGNOSIS — E11.9 DIABETES MELLITUS WITHOUT COMPLICATION: ICD-10-CM

## 2020-06-15 LAB
ANION GAP SERPL CALC-SCNC: 11 MMOL/L (ref 8–16)
BUN SERPL-MCNC: 20 MG/DL (ref 8–23)
CALCIUM SERPL-MCNC: 9.8 MG/DL (ref 8.7–10.5)
CHLORIDE SERPL-SCNC: 101 MMOL/L (ref 95–110)
CO2 SERPL-SCNC: 26 MMOL/L (ref 23–29)
CREAT SERPL-MCNC: 0.9 MG/DL (ref 0.5–1.4)
EST. GFR  (AFRICAN AMERICAN): >60 ML/MIN/1.73 M^2
EST. GFR  (NON AFRICAN AMERICAN): >60 ML/MIN/1.73 M^2
ESTIMATED AVG GLUCOSE: 123 MG/DL (ref 68–131)
GLUCOSE SERPL-MCNC: 106 MG/DL (ref 70–110)
HBA1C MFR BLD HPLC: 5.9 % (ref 4–5.6)
POTASSIUM SERPL-SCNC: 4.5 MMOL/L (ref 3.5–5.1)
SODIUM SERPL-SCNC: 138 MMOL/L (ref 136–145)

## 2020-06-15 PROCEDURE — 83036 HEMOGLOBIN GLYCOSYLATED A1C: CPT

## 2020-06-15 PROCEDURE — 80048 BASIC METABOLIC PNL TOTAL CA: CPT

## 2020-06-15 PROCEDURE — 36415 COLL VENOUS BLD VENIPUNCTURE: CPT

## 2020-06-16 ENCOUNTER — OFFICE VISIT (OUTPATIENT)
Dept: CARDIOLOGY | Facility: CLINIC | Age: 70
End: 2020-06-16
Payer: MEDICARE

## 2020-06-16 VITALS
BODY MASS INDEX: 34.53 KG/M2 | DIASTOLIC BLOOD PRESSURE: 82 MMHG | SYSTOLIC BLOOD PRESSURE: 152 MMHG | OXYGEN SATURATION: 98 % | HEIGHT: 67 IN | HEART RATE: 75 BPM | WEIGHT: 220 LBS

## 2020-06-16 DIAGNOSIS — E11.69 HYPERLIPIDEMIA ASSOCIATED WITH TYPE 2 DIABETES MELLITUS: ICD-10-CM

## 2020-06-16 DIAGNOSIS — I50.9 CHF (NYHA CLASS I, ACC/AHA STAGE B): Chronic | ICD-10-CM

## 2020-06-16 DIAGNOSIS — E11.59 HYPERTENSION ASSOCIATED WITH DIABETES: Primary | Chronic | ICD-10-CM

## 2020-06-16 DIAGNOSIS — E78.5 HYPERLIPIDEMIA ASSOCIATED WITH TYPE 2 DIABETES MELLITUS: ICD-10-CM

## 2020-06-16 DIAGNOSIS — I15.2 HYPERTENSION ASSOCIATED WITH DIABETES: Primary | Chronic | ICD-10-CM

## 2020-06-16 PROCEDURE — 99999 PR PBB SHADOW E&M-EST. PATIENT-LVL IV: CPT | Mod: PBBFAC,,, | Performed by: NUCLEAR MEDICINE

## 2020-06-16 PROCEDURE — 99214 OFFICE O/P EST MOD 30 MIN: CPT | Mod: S$PBB,,, | Performed by: NUCLEAR MEDICINE

## 2020-06-16 PROCEDURE — 99214 PR OFFICE/OUTPT VISIT, EST, LEVL IV, 30-39 MIN: ICD-10-PCS | Mod: S$PBB,,, | Performed by: NUCLEAR MEDICINE

## 2020-06-16 PROCEDURE — 99999 PR PBB SHADOW E&M-EST. PATIENT-LVL IV: ICD-10-PCS | Mod: PBBFAC,,, | Performed by: NUCLEAR MEDICINE

## 2020-06-16 PROCEDURE — 99214 OFFICE O/P EST MOD 30 MIN: CPT | Mod: PBBFAC | Performed by: NUCLEAR MEDICINE

## 2020-06-16 NOTE — PROGRESS NOTES
Subjective:   Patient ID:  Deirdre Yanez is a 70 y.o. female who presents for follow-up of Hypertension, Congestive Heart Failure (HF STAGE B , LVH), and Hyperlipidemia      HPI  NO RECENT HOSPITALIZATIONS OR ED VISITS FOR ADHF, ACS ,CVA  OR ARRHYTHMIAS  NO ANGINA OR EQUIVALENT  NO ABDOMINAL OR BACK DISCOMFORT  NO UNUSUAL DESAI. NO ORTHOPNEA OR PND  NO EDEMA. NO CALVE TENDERNESS  NO PALPITATIONS  NO NEAR SYNCOPE OR SYNCOPE  NO FOCAL CNS SYMPTOMS OR SIGNS TO SUGGEST TIA OR STROKE  CARD MED GOOD COMPLIANCE- NO SIDE EFFECTS    Review of Systems   Constitution: Negative for chills, fever, night sweats, weight gain and weight loss.   HENT: Negative for nosebleeds.    Eyes: Negative for blurred vision, double vision and visual disturbance.   Cardiovascular: Negative for chest pain, dyspnea on exertion, irregular heartbeat, leg swelling, orthopnea, palpitations, paroxysmal nocturnal dyspnea and syncope.   Respiratory: Negative for cough, hemoptysis and wheezing.    Endocrine: Negative for polydipsia and polyuria.   Hematologic/Lymphatic: Does not bruise/bleed easily.   Skin: Negative for rash.   Musculoskeletal: Negative for joint pain, joint swelling, muscle weakness and myalgias.   Gastrointestinal: Negative for abdominal pain, hematemesis, jaundice and melena.   Genitourinary: Negative for dysuria, hematuria and nocturia.   Neurological: Negative for dizziness, focal weakness, headaches, sensory change and weakness.   Psychiatric/Behavioral: Negative for depression. The patient does not have insomnia and is not nervous/anxious.      Family History   Problem Relation Age of Onset    Colon cancer Maternal Grandfather     Diabetes Maternal Grandmother     Hypertension Maternal Grandmother     Breast cancer Maternal Grandmother     Cataracts Mother     Glaucoma Mother     Macular degeneration Mother     Hypertension Mother     Diabetes Paternal Grandmother     Cataracts Maternal Aunt     Glaucoma Maternal Aunt      Macular degeneration Maternal Aunt     Melanoma Maternal Aunt     Heart disease Unknown         pat side     Past Medical History:   Diagnosis Date    Abrasion     left eye    Abrasion and/or friction burn of abdominal wall with infection     left eye    Arthritis     gouty arthritis    Back pain     Diabetes mellitus     2011  11/17/2013    Diverticulosis     DM (diabetes mellitus) 2011     12/03/2014  A1C 6.0 x 2 weeks    DM (diabetes mellitus) 2011    BS 89 10/14/2017 A1C 5.7   Diet controlled    DM (diabetes mellitus) 2011    BS 90 am 10/23/2018     DM (diabetes mellitus) 2011    BS didn't check 10/23/2019    Hepatitis     Hx of B/C from cadaver in past    Hepatitis B     Hepatitis C     Hiatal hernia     Hip bursitis, left     HTN (hypertension)     Hypertensive CHF (congestive heart failure) 3/28/2014    Obesity     Pneumonia     Positive ALBIN (antinuclear antibody)     Pure hypercholesterolemia 9/30/2016    Rheumatoid arthritis     questionable diagnosis    Scoliosis     Type 2 diabetes mellitus without complication, without long-term current use of insulin     Urinary incontinence     intermittent- only when lifting heavy items > 20 lbs     Social History     Socioeconomic History    Marital status:      Spouse name: Not on file    Number of children: 0    Years of education: Not on file    Highest education level: Not on file   Occupational History    Occupation: Retired Teacher   Social Needs    Financial resource strain: Not hard at all    Food insecurity     Worry: Never true     Inability: Never true    Transportation needs     Medical: No     Non-medical: No   Tobacco Use    Smoking status: Never Smoker    Smokeless tobacco: Never Used   Substance and Sexual Activity    Alcohol use: Yes     Alcohol/week: 2.0 standard drinks     Types: 2 Glasses of wine per week     Frequency: Monthly or less     Drinks per session: 1 or 2     Binge frequency:  Never     Comment: occasion    Drug use: No    Sexual activity: Yes     Partners: Male   Lifestyle    Physical activity     Days per week: 2 days     Minutes per session: 30 min    Stress: Not at all   Relationships    Social connections     Talks on phone: Three times a week     Gets together: Once a week     Attends Mandaeism service: Not on file     Active member of club or organization: No     Attends meetings of clubs or organizations: Never     Relationship status:    Other Topics Concern    Are you pregnant or think you may be? No    Breast-feeding No   Social History Narrative    Not on file     Current Outpatient Medications on File Prior to Visit   Medication Sig Dispense Refill    b complex vitamins tablet Take 1 tablet by mouth once daily.      carvediloL (COREG) 12.5 MG tablet Take 1 tablet (12.5 mg total) by mouth 2 (two) times daily with meals. 180 tablet 3    fish oil-omega-3 fatty acids 300-1,000 mg capsule Take 2 capsules by mouth once daily.      fluocinonide (LIDEX) 0.05 % gel APPLY TO AFFECTED AREA AS DIRECTED QID  1    fluticasone propionate (FLONASE) 50 mcg/actuation nasal spray SHAKE LIQUID AND USE 2 SPRAYS IN EACH NOSTRIL DAILY 16 mL 0    fluticasone propionate (FLONASE) 50 mcg/actuation nasal spray 2 sprays in each nostril daily 16 mL 0    furosemide (LASIX) 20 MG tablet TAKE 1 TABLET BY MOUTH DAILY 90 tablet 3    glucosamine sulfate (GLUCOSAMINE) 500 mg Tab Take 1,000 mg by mouth.      inositol 500 mg Tab Take 500 mg by mouth 2 (two) times daily.      methylsulfonylmethane (MSM) 1,000 mg Cap Take by mouth.      spironolactone (ALDACTONE) 50 MG tablet Take 1 tablet (50 mg total) by mouth every evening. 90 tablet 3    tiZANidine (ZANAFLEX) 2 MG tablet Take 1 tablet (2 mg total) by mouth every 8 (eight) hours as needed. 30 tablet 5     No current facility-administered medications on file prior to visit.      Review of patient's allergies indicates:   Allergen  "Reactions    Arb-angiotensin receptor antagonist Edema    Hydralazine analogues      "Lupus reaction"    Metoprolol Other (See Comments)     Alopecia    Sulfa (sulfonamide antibiotics)      Passed out and almost stopped breathing    Calcium channel blocking agents-dihydropyridines      Edema      Ace inhibitors Other (See Comments)     cough       Objective:     Physical Exam   Constitutional: She is oriented to person, place, and time. She appears well-developed. No distress.   HENT:   Head: Normocephalic.   Eyes: Pupils are equal, round, and reactive to light. Conjunctivae are normal. No scleral icterus.   Neck: Normal range of motion. Neck supple. Normal carotid pulses, no hepatojugular reflux and no JVD present. Carotid bruit is not present. No edema present. No thyroid mass and no thyromegaly present.   Cardiovascular: Normal rate, regular rhythm, S1 normal, S2 normal and intact distal pulses. PMI is not displaced. Exam reveals no gallop and no friction rub.   Murmur heard.   Harsh crescendo-decrescendo mid to late systolic murmur is present with a grade of 2/6 at the upper right sternal border. The intensity increases with respiration.  Pulses:       Carotid pulses are 2+ on the right side and 2+ on the left side.       Radial pulses are 2+ on the right side and 2+ on the left side.        Femoral pulses are 2+ on the right side and 2+ on the left side.       Popliteal pulses are 2+ on the right side and 2+ on the left side.        Dorsalis pedis pulses are 2+ on the right side and 2+ on the left side.        Posterior tibial pulses are 2+ on the right side and 2+ on the left side.   Pulmonary/Chest: Effort normal and breath sounds normal. She has no wheezes. She has no rales. She exhibits no tenderness.   Abdominal: Soft. Bowel sounds are normal. She exhibits no pulsatile midline mass and no mass. There is no hepatosplenomegaly. There is no abdominal tenderness.   Musculoskeletal: Normal range of " motion.         General: No tenderness or edema.      Cervical back: Normal.      Thoracic back: Normal.      Lumbar back: Normal.   Lymphadenopathy:     She has no cervical adenopathy.     She has no axillary adenopathy.        Right: No supraclavicular adenopathy present.        Left: No supraclavicular adenopathy present.   Neurological: She is alert and oriented to person, place, and time. She has normal strength and normal reflexes. No sensory deficit. Gait normal.   Skin: Skin is warm. No rash noted. No cyanosis. No pallor. Nails show no clubbing.   Psychiatric: She has a normal mood and affect. Her speech is normal and behavior is normal. Cognition and memory are normal.       Assessment:     1. Hypertension associated with diabetes    2. CHF (NYHA class I, ACC/AHA stage B)    3. Hyperlipidemia associated with type 2 diabetes mellitus        Plan:     Hypertension associated with diabete  WELL CONTROLLED BP  NO ACTIVE MYOCARDIAL ISCHEMIA  NO ARRHYTHMIAS  CNS STATUS STABLE  CARD MED WELL TOLERATED    CHF (NYHA class I, ACC/AHA stage B)  NO CLINICAL EVIDENCE OF ADHF    Hyperlipidemia associated with type 2 diabetes mellitus    -     Hepatic function panel; Future; Expected date: 06/16/2020  -     Lipid Panel; Future; Expected date: 06/16/2020      CONTINUE PRESENT CARD MANAGEMENT  RETURN IN 6 MONTHS

## 2020-06-19 ENCOUNTER — LAB VISIT (OUTPATIENT)
Dept: LAB | Facility: HOSPITAL | Age: 70
End: 2020-06-19
Attending: NUCLEAR MEDICINE
Payer: MEDICARE

## 2020-06-19 DIAGNOSIS — E78.5 HYPERLIPIDEMIA ASSOCIATED WITH TYPE 2 DIABETES MELLITUS: ICD-10-CM

## 2020-06-19 DIAGNOSIS — E11.69 HYPERLIPIDEMIA ASSOCIATED WITH TYPE 2 DIABETES MELLITUS: ICD-10-CM

## 2020-06-19 LAB
ALBUMIN SERPL BCP-MCNC: 4.2 G/DL (ref 3.5–5.2)
ALP SERPL-CCNC: 74 U/L (ref 55–135)
ALT SERPL W/O P-5'-P-CCNC: 24 U/L (ref 10–44)
AST SERPL-CCNC: 29 U/L (ref 10–40)
BILIRUB DIRECT SERPL-MCNC: 0.5 MG/DL (ref 0.1–0.3)
BILIRUB SERPL-MCNC: 1.3 MG/DL (ref 0.1–1)
CHOLEST SERPL-MCNC: 207 MG/DL (ref 120–199)
CHOLEST/HDLC SERPL: 3.6 {RATIO} (ref 2–5)
HDLC SERPL-MCNC: 58 MG/DL (ref 40–75)
HDLC SERPL: 28 % (ref 20–50)
LDLC SERPL CALC-MCNC: 134.2 MG/DL (ref 63–159)
NONHDLC SERPL-MCNC: 149 MG/DL
PROT SERPL-MCNC: 7.4 G/DL (ref 6–8.4)
TRIGL SERPL-MCNC: 74 MG/DL (ref 30–150)

## 2020-06-19 PROCEDURE — 36415 COLL VENOUS BLD VENIPUNCTURE: CPT

## 2020-06-19 PROCEDURE — 80061 LIPID PANEL: CPT

## 2020-06-19 PROCEDURE — 80076 HEPATIC FUNCTION PANEL: CPT

## 2020-07-14 ENCOUNTER — TELEPHONE (OUTPATIENT)
Dept: RADIOLOGY | Facility: HOSPITAL | Age: 70
End: 2020-07-14

## 2020-07-15 ENCOUNTER — HOSPITAL ENCOUNTER (OUTPATIENT)
Dept: RADIOLOGY | Facility: HOSPITAL | Age: 70
Discharge: HOME | End: 2020-07-15
Attending: ORTHOPAEDIC SURGERY
Payer: MEDICARE

## 2020-07-15 ENCOUNTER — TELEPHONE (OUTPATIENT)
Dept: OTOLARYNGOLOGY | Facility: CLINIC | Age: 70
End: 2020-07-15

## 2020-07-15 DIAGNOSIS — E04.2 MULTIPLE THYROID NODULES: ICD-10-CM

## 2020-07-15 PROCEDURE — 76536 US EXAM OF HEAD AND NECK: CPT | Mod: TC

## 2020-07-15 PROCEDURE — 76536 US SOFT TISSUE HEAD NECK THYROID: ICD-10-PCS | Mod: 26,,, | Performed by: RADIOLOGY

## 2020-07-15 PROCEDURE — 76536 US EXAM OF HEAD AND NECK: CPT | Mod: 26,,, | Performed by: RADIOLOGY

## 2020-07-15 NOTE — TELEPHONE ENCOUNTER
----- Message from Lashae Norwood MD sent at 7/15/2020  2:13 PM CDT -----  US is stable- if she would like to make her appointment virtual that is fine with me- thanks.

## 2020-07-16 ENCOUNTER — TELEPHONE (OUTPATIENT)
Dept: PAIN MEDICINE | Facility: CLINIC | Age: 70
End: 2020-07-16

## 2020-07-17 ENCOUNTER — OFFICE VISIT (OUTPATIENT)
Dept: PAIN MEDICINE | Facility: CLINIC | Age: 70
End: 2020-07-17
Payer: MEDICARE

## 2020-07-17 VITALS
HEART RATE: 71 BPM | HEIGHT: 67 IN | SYSTOLIC BLOOD PRESSURE: 177 MMHG | BODY MASS INDEX: 34.39 KG/M2 | WEIGHT: 219.13 LBS | DIASTOLIC BLOOD PRESSURE: 86 MMHG

## 2020-07-17 DIAGNOSIS — M53.3 CHRONIC SI JOINT PAIN: ICD-10-CM

## 2020-07-17 DIAGNOSIS — G89.29 CHRONIC SI JOINT PAIN: ICD-10-CM

## 2020-07-17 DIAGNOSIS — M53.3 SACROILIAC JOINT PAIN: ICD-10-CM

## 2020-07-17 DIAGNOSIS — Z03.818 ENCOUNTER FOR OBSERVATION FOR SUSPECTED EXPOSURE TO OTHER BIOLOGICAL AGENTS RULED OUT: ICD-10-CM

## 2020-07-17 DIAGNOSIS — M47.816 LUMBAR SPONDYLOSIS: Primary | ICD-10-CM

## 2020-07-17 DIAGNOSIS — M70.60 GREATER TROCHANTERIC BURSITIS, UNSPECIFIED LATERALITY: ICD-10-CM

## 2020-07-17 PROCEDURE — 99999 PR PBB SHADOW E&M-EST. PATIENT-LVL III: ICD-10-PCS | Mod: PBBFAC,,, | Performed by: ANESTHESIOLOGY

## 2020-07-17 PROCEDURE — 99999 PR PBB SHADOW E&M-EST. PATIENT-LVL III: CPT | Mod: PBBFAC,,, | Performed by: ANESTHESIOLOGY

## 2020-07-17 PROCEDURE — 99213 OFFICE O/P EST LOW 20 MIN: CPT | Mod: PBBFAC | Performed by: ANESTHESIOLOGY

## 2020-07-17 PROCEDURE — 99204 PR OFFICE/OUTPT VISIT, NEW, LEVL IV, 45-59 MIN: ICD-10-PCS | Mod: S$PBB,,, | Performed by: ANESTHESIOLOGY

## 2020-07-17 PROCEDURE — 99204 OFFICE O/P NEW MOD 45 MIN: CPT | Mod: S$PBB,,, | Performed by: ANESTHESIOLOGY

## 2020-07-17 NOTE — H&P (VIEW-ONLY)
Chief Pain Complaint:  Back Pain        History of Present Illness:   Deirdre Yanez is a 70 y.o. female  who is presenting with a chief complaint of Back Pain  . The patient began experiencing this problem insidiously, and the pain has been gradually worsening over the past 3 year(s). The pain is described as throbbing, cramping, aching and heavy and is located in the bilateral buttocks. Pain is intermittent and lasts hours. The  pain is nonradiating. The patient rates her pain a 8 out of ten and interferes with activities of daily living a 8 out of ten. Pain is exacerbated by getting up from a seated position, and is improved by rest. Patient reports prior trauma fall from 30 foot landed on her feet >10 years ago, no prior spine surgery.      - pertinent negatives: No fever, No chills, No weight loss, No bladder dysfunction, No bowel dysfunction, No saddle anesthesia  - pertinent positives: none    - medications, other therapies tried (physical therapy, injections):     >> Tylenol and flexeril    >> Has previously undergone Physical Therapy    >> Has NOT previously undergone spinal injection/s      Imaging / Labs / Studies (reviewed on 7/17/2020):    Results for orders placed during the hospital encounter of 08/01/19   MRI Lumbar Spine Without Contrast    Narrative EXAMINATION:  MRI LUMBAR SPINE WITHOUT CONTRAST    CLINICAL HISTORY:  Low back pain, >6wks conservative tx, persistent-progressive sx, surgical candidate; Dorsalgia, unspecified    TECHNIQUE:  Multiplanar, multisequence MR images were acquired from the thoracolumbar junction to the sacrum without the administration of contrast.    COMPARISON:  None.    FINDINGS:  There is severe levoscoliosis of the lumbar spine with the apex located at approximately L1.  there is also a couple mm of anterolisthesis of L4 on L5.  The vertebral body heights are well maintained, with no fracture.  No marrow signal abnormality suspicious for an infiltrative process.    The  conus medullaris terminates at approximately the inferior endplate of L2.  There appear to be a couple of tiny probable subcentimeter cysts within the left kidney and at least a single incompletely visualized probable cyst within the interpolar to lower pole region of the right kidney measuring approximately 11 mm.  There is disc desiccation noted throughout the lumbar spine.  Prominent multilevel facet arthropathy noted beginning at approximately the L2-3 through the L5-S1 levels.    L1-L2: No significant central canal or neural foraminal narrowing.    L2-L3: No significant central canal or neural foraminal narrowing.    L3-L4: Ligamentum flavum hypertrophy prominent bilateral facet arthropathy mild diffuse disc bulge and scoliosis resulting in mild-to-moderate narrowing of the central canal.  No significant neural foraminal canal narrowing.    L4-L5:  Severe bilateral facet arthropathy left greater than the right resulting in no significant central or neural foraminal canal narrowing.    L5-S1:  Mild-to-moderate bilateral facet arthropathy left greater than the right resulting in no significant central or neural foraminal canal narrowing.      Impression 1. Prominent scoliosis of the lumbar spine.  2. Multilevel degenerative changes with the greatest degree of central canal narrowing noted at the L3-4 level.  No high-grade neural foraminal canal narrowing.  3. Remaining findings as discussed above.      Electronically signed by: Raúl Mark DO  Date:    08/01/2019  Time:    15:04       Results for orders placed during the hospital encounter of 06/28/19   X-Ray Lumbar Spine AP And Lateral    Narrative EXAMINATION:  XR LUMBAR SPINE AP AND LATERAL    CLINICAL HISTORY:  Low back pain, >6wks conservative tx, persistent-progressive sx, surgical candidate;Scoliosis, unspecified    TECHNIQUE:  AP, lateral and spot images were performed of the lumbar spine.    COMPARISON:  08/03/2012    FINDINGS:  Severe levoscoliosis  of the lumbar spine again noted.  Vertebral body heights appear within normal limits.  There is slight grade 1 anterolisthesis of L4 on L5 may be slightly worsened from prior.  Multilevel facet arthropathy noted.  Moderate to severe disc height loss noted throughout the lumbar spine which appear similar to prior.  Posterior elements appear grossly intact. No fractures demonstrated.  The remaining visualized osseous and soft tissue structures demonstrate no appreciable abnormality.      Impression As above.      Electronically signed by: Darrius Parks MD  Date:    06/28/2019  Time:    10:00       Results for orders placed during the hospital encounter of 06/28/19   X-Ray Cervical Spine AP And Lateral    Narrative EXAMINATION:  XR CERVICAL SPINE AP LATERAL    CLINICAL HISTORY:  Scoliosis, unspecified    TECHNIQUE:  AP, lateral, and open mouth views of the cervical spine were performed.    COMPARISON:  None    FINDINGS:  There is slight grade 1 anterolisthesis of C4 on C5.  Vertebral body heights are well maintained.  There is moderate to severe disc height loss at C5-6 through C7-T1.  Mild disc height loss at C4-5.  Multilevel facet arthropathy noted.  Posterior elements appear intact without acute fractures or subluxations demonstrated.  Odontoid process appears intact.  Atlantoaxial articulations appear normal.  Prevertebral soft tissues are within normal limits.      Impression As above.      Electronically signed by: Darrius Parks MD  Date:    06/28/2019  Time:    10:01       Review of Systems:  CONSTITUTIONAL: patient denies any fever, chills, or weight loss  SKIN: patient denies any rash or itching  RESPIRATORY: patient denies having any shortness of breath  GASTROINTESTINAL: patient denies having any diarrhea, constipation, or bowel incontinence  GENITOURINARY: patient denies having any abnormal bladder function    MUSCULOSKELETAL:  - patient complains of the above noted pain/s (see chief pain  "complaint)    NEUROLOGICAL:   - pain as above  - strength in Lower extremities is intact, BILATERALLY  - sensation in Lower extremities is intact, BILATERALLY  - patient denies any loss of bowel or bladder control      PSYCHIATRIC: patient denies any change in mood    Other:  All other systems reviewed and are negative      Physical Exam:  BP (!) 177/86 (BP Location: Left arm, Patient Position: Sitting)   Pulse 71   Ht 5' 7" (1.702 m)   Wt 99.4 kg (219 lb 2.2 oz)   BMI 34.32 kg/m²  (reviewed on 7/17/2020)  General: Alert and oriented, in no apparent distress.  Gait: normal gait.  Skin: No rashes, No discoloration, No obvious lesions  HEENT: Normocephalic, atraumatic. Pupils equal and round.  Cardiovascular: Regular rate and rhythm , no significant peripheral edema present  Respiratory: Without audible wheezing, without use of accessory muscles of respiration.    Musculoskeletal:    Cervical Spine    - Pain on flexion of cervical spine Absent  - Spurling's Test:  Absent    - Pain on extension of cervical spine Absent  - TTP over the cervical facet joints Absent  - Cervical facet loading Absent      Lumbar Spine    - Pain on flexion of lumbar spine Absent  - Straight Leg Raise:  Absent    - Pain on extension of lumbar spine Absent  - TTP over the lumbar facet joints Present  - Lumbar facet loading Present    -Pain on palpation over the SI joint  Present L.R  - LIZZY: Present  -TTP over GTB      Neuro:    Strength:  UE R/L: D: 5/5; B: 5/5; T: 5/5; WF: 5/5; WE: 5/5; IO: 5/5;  LE R/L: HF: 5/5, HE: 5/5, KF: 5/5; KE: 5/5; FE: 5/5; FF: 5/5    Extremity Reflexes: Brisk and symmetric throughout.      Extremity Sensory: Sensation to pinprick and temperature symmetric. Proprioception intact.      Psych:  Mood and affect is appropriate      Assessment:    Deirdre Yanez is a 70 y.o. year old female who is presenting with     Encounter Diagnoses   Name Primary?    Chronic SI joint pain     Lumbar spondylosis Yes    " Sacroiliac joint pain     Greater trochanteric bursitis, unspecified laterality        Plan:    1. Interventional: Schedule patient for bilateral SI and GTB Injectione with RN I V sedation.Consider Lumbar MBB.    2. Pharmacologic: Tylenol PRN.     3. Rehabilitative: Patient already tried PT. Does home exercises.    4. Diagnostic: Lumbar MRI reviewed.    5.  Follow up: Post Injection.      20 minutes were spent in this encounter with more than 50% of the time used for counseling and review of the plan.  Imaging / studies reviewed, detailed above.  I discussed in detail the risks, benefits, and alternatives to any and all potential treatment options.  All questions and concerns were fully addressed today in clinic. Medical decision making moderate.    Thank you for the opportunity to assist in the care of this patient.    Best wishes,    Signed:    Vin Shukla MD          Disclaimer:  This note may have been prepared using voice recognition software, it may have not been extensively proofed, as such there could be errors within the text such as sound alike errors.

## 2020-07-17 NOTE — PRE-PROCEDURE INSTRUCTIONS
Spoke with patient regarding procedure scheduled on   Arrival time 0840  Has patient been sick with fever or on antibiotics within the last 7 days? No  Has patient received a vaccination within the last 7 days? no  Has the patient stopped all medications as directed? na  Does patient have a pacemaker and or defibrillator? no  Does the patient have a ride to and from procedure and someone reliable to remain with patient?  Alex  Is the patient diabetic?  yes  Does the patient have sleep apnea? Or use O2 at home? No and no   Is the patient receiving sedation? unsure  Is the patient instructed to remain NPO beginning at midnight the night before their procedure? yes  Procedure location confirmed with patient? yes  Covid testin/20 0850 [place]

## 2020-07-17 NOTE — LETTER
July 17, 2020      Caroline Diallo PA-C  [address]  [place] [address]           [facility]  [id number] [place] [id number]  Phone: [phone]  Fax: [phone]          Patient: Deirdre Yanez   MR Number: [id number]   YOB: 1950   Date of Visit: 7/17/2020       Dear Caroline Diallo:    Thank you for referring Deirdre Yanez to me for evaluation. Attached you will find relevant portions of my assessment and plan of care.    If you have questions, please do not hesitate to call me. I look forward to following Deirdre Yanez along with you.    Sincerely,    Vin Shukla MD    Enclosure  CC:  No Recipients    If you would like to receive this communication electronically, please contact [email or web address] or [phone] to request more information on [facility] access.    For providers and/or their staff who would like to refer a patient to [facility], please contact us through our one-stop-shop provider referral line, [facility], at [phone].    If you feel you have received this communication in error or would no longer like to receive these types of communications, please e-mail [email or web address]

## 2020-07-17 NOTE — PROGRESS NOTES
Chief Pain Complaint:  Back Pain        History of Present Illness:   Deirdre Yanez is a 70 y.o. female  who is presenting with a chief complaint of Back Pain  . The patient began experiencing this problem insidiously, and the pain has been gradually worsening over the past 3 year(s). The pain is described as throbbing, cramping, aching and heavy and is located in the bilateral buttocks. Pain is intermittent and lasts hours. The  pain is nonradiating. The patient rates her pain a 8 out of ten and interferes with activities of daily living a 8 out of ten. Pain is exacerbated by getting up from a seated position, and is improved by rest. Patient reports prior trauma fall from 30 foot landed on her feet >10 years ago, no prior spine surgery.      - pertinent negatives: No fever, No chills, No weight loss, No bladder dysfunction, No bowel dysfunction, No saddle anesthesia  - pertinent positives: none    - medications, other therapies tried (physical therapy, injections):     >> Tylenol and flexeril    >> Has previously undergone Physical Therapy    >> Has NOT previously undergone spinal injection/s      Imaging / Labs / Studies (reviewed on 7/17/2020):    Results for orders placed during the hospital encounter of 08/01/19   MRI Lumbar Spine Without Contrast    Narrative EXAMINATION:  MRI LUMBAR SPINE WITHOUT CONTRAST    CLINICAL HISTORY:  Low back pain, >6wks conservative tx, persistent-progressive sx, surgical candidate; Dorsalgia, unspecified    TECHNIQUE:  Multiplanar, multisequence MR images were acquired from the thoracolumbar junction to the sacrum without the administration of contrast.    COMPARISON:  None.    FINDINGS:  There is severe levoscoliosis of the lumbar spine with the apex located at approximately L1.  there is also a couple mm of anterolisthesis of L4 on L5.  The vertebral body heights are well maintained, with no fracture.  No marrow signal abnormality suspicious for an infiltrative process.    The  conus medullaris terminates at approximately the inferior endplate of L2.  There appear to be a couple of tiny probable subcentimeter cysts within the left kidney and at least a single incompletely visualized probable cyst within the interpolar to lower pole region of the right kidney measuring approximately 11 mm.  There is disc desiccation noted throughout the lumbar spine.  Prominent multilevel facet arthropathy noted beginning at approximately the L2-3 through the L5-S1 levels.    L1-L2: No significant central canal or neural foraminal narrowing.    L2-L3: No significant central canal or neural foraminal narrowing.    L3-L4: Ligamentum flavum hypertrophy prominent bilateral facet arthropathy mild diffuse disc bulge and scoliosis resulting in mild-to-moderate narrowing of the central canal.  No significant neural foraminal canal narrowing.    L4-L5:  Severe bilateral facet arthropathy left greater than the right resulting in no significant central or neural foraminal canal narrowing.    L5-S1:  Mild-to-moderate bilateral facet arthropathy left greater than the right resulting in no significant central or neural foraminal canal narrowing.      Impression 1. Prominent scoliosis of the lumbar spine.  2. Multilevel degenerative changes with the greatest degree of central canal narrowing noted at the L3-4 level.  No high-grade neural foraminal canal narrowing.  3. Remaining findings as discussed above.      Electronically signed by: Raúl Mark DO  Date:    08/01/2019  Time:    15:04       Results for orders placed during the hospital encounter of 06/28/19   X-Ray Lumbar Spine AP And Lateral    Narrative EXAMINATION:  XR LUMBAR SPINE AP AND LATERAL    CLINICAL HISTORY:  Low back pain, >6wks conservative tx, persistent-progressive sx, surgical candidate;Scoliosis, unspecified    TECHNIQUE:  AP, lateral and spot images were performed of the lumbar spine.    COMPARISON:  08/03/2012    FINDINGS:  Severe levoscoliosis  of the lumbar spine again noted.  Vertebral body heights appear within normal limits.  There is slight grade 1 anterolisthesis of L4 on L5 may be slightly worsened from prior.  Multilevel facet arthropathy noted.  Moderate to severe disc height loss noted throughout the lumbar spine which appear similar to prior.  Posterior elements appear grossly intact. No fractures demonstrated.  The remaining visualized osseous and soft tissue structures demonstrate no appreciable abnormality.      Impression As above.      Electronically signed by: Darrius Parks MD  Date:    06/28/2019  Time:    10:00       Results for orders placed during the hospital encounter of 06/28/19   X-Ray Cervical Spine AP And Lateral    Narrative EXAMINATION:  XR CERVICAL SPINE AP LATERAL    CLINICAL HISTORY:  Scoliosis, unspecified    TECHNIQUE:  AP, lateral, and open mouth views of the cervical spine were performed.    COMPARISON:  None    FINDINGS:  There is slight grade 1 anterolisthesis of C4 on C5.  Vertebral body heights are well maintained.  There is moderate to severe disc height loss at C5-6 through C7-T1.  Mild disc height loss at C4-5.  Multilevel facet arthropathy noted.  Posterior elements appear intact without acute fractures or subluxations demonstrated.  Odontoid process appears intact.  Atlantoaxial articulations appear normal.  Prevertebral soft tissues are within normal limits.      Impression As above.      Electronically signed by: Darrius Parks MD  Date:    06/28/2019  Time:    10:01       Review of Systems:  CONSTITUTIONAL: patient denies any fever, chills, or weight loss  SKIN: patient denies any rash or itching  RESPIRATORY: patient denies having any shortness of breath  GASTROINTESTINAL: patient denies having any diarrhea, constipation, or bowel incontinence  GENITOURINARY: patient denies having any abnormal bladder function    MUSCULOSKELETAL:  - patient complains of the above noted pain/s (see chief pain  "complaint)    NEUROLOGICAL:   - pain as above  - strength in Lower extremities is intact, BILATERALLY  - sensation in Lower extremities is intact, BILATERALLY  - patient denies any loss of bowel or bladder control      PSYCHIATRIC: patient denies any change in mood    Other:  All other systems reviewed and are negative      Physical Exam:  BP (!) 177/86 (BP Location: Left arm, Patient Position: Sitting)   Pulse 71   Ht 5' 7" (1.702 m)   Wt 99.4 kg (219 lb 2.2 oz)   BMI 34.32 kg/m²  (reviewed on 7/17/2020)  General: Alert and oriented, in no apparent distress.  Gait: normal gait.  Skin: No rashes, No discoloration, No obvious lesions  HEENT: Normocephalic, atraumatic. Pupils equal and round.  Cardiovascular: Regular rate and rhythm , no significant peripheral edema present  Respiratory: Without audible wheezing, without use of accessory muscles of respiration.    Musculoskeletal:    Cervical Spine    - Pain on flexion of cervical spine Absent  - Spurling's Test:  Absent    - Pain on extension of cervical spine Absent  - TTP over the cervical facet joints Absent  - Cervical facet loading Absent      Lumbar Spine    - Pain on flexion of lumbar spine Absent  - Straight Leg Raise:  Absent    - Pain on extension of lumbar spine Absent  - TTP over the lumbar facet joints Present  - Lumbar facet loading Present    -Pain on palpation over the SI joint  Present L.R  - LIZYZ: Present  -TTP over GTB      Neuro:    Strength:  UE R/L: D: 5/5; B: 5/5; T: 5/5; WF: 5/5; WE: 5/5; IO: 5/5;  LE R/L: HF: 5/5, HE: 5/5, KF: 5/5; KE: 5/5; FE: 5/5; FF: 5/5    Extremity Reflexes: Brisk and symmetric throughout.      Extremity Sensory: Sensation to pinprick and temperature symmetric. Proprioception intact.      Psych:  Mood and affect is appropriate      Assessment:    Deirdre Yanez is a 70 y.o. year old female who is presenting with     Encounter Diagnoses   Name Primary?    Chronic SI joint pain     Lumbar spondylosis Yes    " Sacroiliac joint pain     Greater trochanteric bursitis, unspecified laterality        Plan:    1. Interventional: Schedule patient for bilateral SI and GTB Injectione with RN I V sedation.Consider Lumbar MBB.    2. Pharmacologic: Tylenol PRN.     3. Rehabilitative: Patient already tried PT. Does home exercises.    4. Diagnostic: Lumbar MRI reviewed.    5.  Follow up: Post Injection.      20 minutes were spent in this encounter with more than 50% of the time used for counseling and review of the plan.  Imaging / studies reviewed, detailed above.  I discussed in detail the risks, benefits, and alternatives to any and all potential treatment options.  All questions and concerns were fully addressed today in clinic. Medical decision making moderate.    Thank you for the opportunity to assist in the care of this patient.    Best wishes,    Signed:    Vin Shukla MD          Disclaimer:  This note may have been prepared using voice recognition software, it may have not been extensively proofed, as such there could be errors within the text such as sound alike errors.

## 2020-07-20 ENCOUNTER — OFFICE VISIT (OUTPATIENT)
Dept: OTOLARYNGOLOGY | Facility: CLINIC | Age: 70
End: 2020-07-20
Payer: MEDICARE

## 2020-07-20 VITALS
TEMPERATURE: 99 F | DIASTOLIC BLOOD PRESSURE: 93 MMHG | BODY MASS INDEX: 34.01 KG/M2 | HEART RATE: 72 BPM | WEIGHT: 217.13 LBS | SYSTOLIC BLOOD PRESSURE: 153 MMHG

## 2020-07-20 DIAGNOSIS — E04.2 MULTIPLE THYROID NODULES: Primary | ICD-10-CM

## 2020-07-20 DIAGNOSIS — J30.89 NON-SEASONAL ALLERGIC RHINITIS, UNSPECIFIED TRIGGER: ICD-10-CM

## 2020-07-20 PROCEDURE — 99214 PR OFFICE/OUTPT VISIT, EST, LEVL IV, 30-39 MIN: ICD-10-PCS | Mod: S$PBB,,, | Performed by: ORTHOPAEDIC SURGERY

## 2020-07-20 PROCEDURE — 99999 PR PBB SHADOW E&M-EST. PATIENT-LVL III: CPT | Mod: PBBFAC,,, | Performed by: ORTHOPAEDIC SURGERY

## 2020-07-20 PROCEDURE — 99213 OFFICE O/P EST LOW 20 MIN: CPT | Mod: PBBFAC | Performed by: ORTHOPAEDIC SURGERY

## 2020-07-20 PROCEDURE — 99214 OFFICE O/P EST MOD 30 MIN: CPT | Mod: S$PBB,,, | Performed by: ORTHOPAEDIC SURGERY

## 2020-07-20 PROCEDURE — 99999 PR PBB SHADOW E&M-EST. PATIENT-LVL III: ICD-10-PCS | Mod: PBBFAC,,, | Performed by: ORTHOPAEDIC SURGERY

## 2020-07-20 RX ORDER — FLUTICASONE PROPIONATE 50 MCG
SPRAY, SUSPENSION (ML) NASAL
Qty: 16 ML | Refills: 0 | Status: SHIPPED | OUTPATIENT
Start: 2020-07-20 | End: 2020-10-14 | Stop reason: SDUPTHER

## 2020-07-20 NOTE — PROGRESS NOTES
"Subjective:      Patient ID: Deirdre Yanez is a 70 y.o. female.    Chief Complaint: Follow-up (Refill on Flonase)    Patient is a very pleasant 64 year old female here to see me today in followup for evaluation of her thyroid nodules.  Overall, since her last visit she has been doing well since her last visit from a thyroid standpoint.  Her most recent TSH was 5/2019 and that was normal (Dr. Blackburn has ordered as a part of her next set of labs).  She is continuing with alopecia.  We are following her for thyroid nodules.    She has some difficulty swallowing soups and solids, and thinks it gets "stuck" at times.  She has gotten into the habit of eating smaller bites.  She has no difficulty breathing or hoarseness.  She has been using Flonase since her last visit, and is very pleased with her progress.   We have previously sent her for an US guided FNA of her thyroid nodule as it has slightly enlarged.  However, they were unable to complete the FNA due to blood vessels in the area.  Therefore, we elected to follow her with serial US, but she had some interval growth.  She has had an US guided FNA 7/2019, done with Dr. Dickens, and they were able to obtain a sample showing benign follicular cells.        Review of Systems   Constitutional: Negative for chills, fatigue, fever and unexpected weight change.   HENT: Positive for congestion. Negative for dental problem, ear discharge, ear pain, facial swelling, hearing loss, nosebleeds, postnasal drip, rhinorrhea, sinus pressure, sneezing, sore throat, tinnitus, trouble swallowing and voice change.    Eyes: Negative for redness, itching and visual disturbance.   Respiratory: Positive for choking. Negative for cough, shortness of breath and wheezing.    Cardiovascular: Negative for chest pain and palpitations.   Gastrointestinal: Negative for abdominal pain.        No reflux.   Musculoskeletal: Negative for gait problem.   Skin: Negative for rash.   Allergic/Immunologic: " Positive for environmental allergies.   Neurological: Negative for dizziness, light-headedness and headaches.       Objective:       Physical Exam  Constitutional:       General: She is not in acute distress.     Appearance: She is well-developed.   HENT:      Head: Normocephalic and atraumatic.      Right Ear: Tympanic membrane, ear canal and external ear normal.      Left Ear: Tympanic membrane, ear canal and external ear normal.      Nose: Nose normal. No nasal deformity, septal deviation, mucosal edema or rhinorrhea.      Right Sinus: No maxillary sinus tenderness or frontal sinus tenderness.      Left Sinus: No maxillary sinus tenderness or frontal sinus tenderness.      Mouth/Throat:      Mouth: Mucous membranes are not pale and not dry.      Dentition: No dental caries.      Pharynx: Uvula midline. No oropharyngeal exudate or posterior oropharyngeal erythema.   Eyes:      General: Lids are normal. No scleral icterus.     Extraocular Movements:      Right eye: Normal extraocular motion and no nystagmus.      Left eye: Normal extraocular motion and no nystagmus.      Conjunctiva/sclera: Conjunctivae normal.      Right eye: Right conjunctiva is not injected. No chemosis.     Left eye: Left conjunctiva is not injected. No chemosis.     Pupils: Pupils are equal, round, and reactive to light.   Neck:      Musculoskeletal: No muscular tenderness.      Thyroid: Thyroid mass (small nodule palpated in the right lower lobe, elevates with swallow) present. No thyromegaly.      Trachea: Trachea and phonation normal. No tracheal tenderness or tracheal deviation.   Pulmonary:      Effort: Pulmonary effort is normal. No respiratory distress.      Breath sounds: No stridor.   Abdominal:      General: There is no distension.   Lymphadenopathy:      Head:      Right side of head: No submental, submandibular, preauricular, posterior auricular or occipital adenopathy.      Left side of head: No submental, submandibular,  preauricular, posterior auricular or occipital adenopathy.      Cervical: No cervical adenopathy.   Skin:     General: Skin is warm and dry.      Findings: No erythema or rash.      Comments: Wearing a wig today   Neurological:      Mental Status: She is alert and oriented to person, place, and time.      Cranial Nerves: No cranial nerve deficit.      Gait: Gait normal.   Psychiatric:         Behavior: Behavior normal.         THYROID US:  FINDINGS:  The thyroid gland demonstrates a homogenous echotexture.  The isthmus measures 3.7 mm in thickness.  The right lobe of the thyroid gland measures 4.9 cm in length.  The left lobe of the thyroid gland measures 4.6 cm in length.     Small 5 mm upper pole cystic lesion stable.  There is a 2 cm lesion within the lower pole of the right lobe of the thyroid gland that demonstrates some cystic spaces.  9 mm lesion with small cystic spaces seen adjacent to this nodule as well and also stable.     There is a tiny cystic nodule noted within the lower pole of the left lobe that measures up to 5 mm.  There is also a near completely cystic nodule also present within the lower pole of the left lobe of the thyroid gland measuring up to 1.6 cm.  Hyperechoic solid nodule seen within the lower pole of the left lobe measuring up to 5 mm.     Impression:     1. Multinodular thyroid gland as described in detail above.  No nodules requiring further follow-up or FNA per TI RADS criteria.        Assessment:       1. Multiple thyroid nodules    2. Non-seasonal allergic rhinitis, unspecified trigger        Plan:     Multiple thyroid nodules:  Now stable.  Due to previous growth RTC one year with repeat US prior.  Call for sooner appointment if needed.  -     US Soft Tissue Head Neck Thyroid; Future; Expected date: 07/20/2020    Non-seasonal allergic rhinitis, unspecified trigger  -     fluticasone propionate (FLONASE) 50 mcg/actuation nasal spray; 2 spray each nostril daily  Dispense: 16 mL;  Refill: 0

## 2020-07-23 ENCOUNTER — HOSPITAL ENCOUNTER (OUTPATIENT)
Facility: HOSPITAL | Age: 70
Discharge: HOME | End: 2020-07-23
Attending: ANESTHESIOLOGY | Admitting: ANESTHESIOLOGY
Payer: MEDICARE

## 2020-07-23 VITALS
HEIGHT: 68 IN | DIASTOLIC BLOOD PRESSURE: 72 MMHG | HEART RATE: 61 BPM | TEMPERATURE: 99 F | OXYGEN SATURATION: 98 % | SYSTOLIC BLOOD PRESSURE: 163 MMHG | RESPIRATION RATE: 17 BRPM | WEIGHT: 216.94 LBS | BODY MASS INDEX: 32.88 KG/M2

## 2020-07-23 DIAGNOSIS — M53.3 SACROILIAC JOINT DYSFUNCTION: Primary | ICD-10-CM

## 2020-07-23 LAB — POCT GLUCOSE: 118 MG/DL (ref 70–110)

## 2020-07-23 PROCEDURE — 20610 PR DRAIN/INJECT LARGE JOINT/BURSA: ICD-10-PCS | Mod: 50,59,, | Performed by: ANESTHESIOLOGY

## 2020-07-23 PROCEDURE — 27096 INJECT SACROILIAC JOINT: CPT | Mod: 50 | Performed by: ANESTHESIOLOGY

## 2020-07-23 PROCEDURE — 25500020 PHARM REV CODE 255: Performed by: ANESTHESIOLOGY

## 2020-07-23 PROCEDURE — 25000003 PHARM REV CODE 250: Performed by: ANESTHESIOLOGY

## 2020-07-23 PROCEDURE — 20610 DRAIN/INJ JOINT/BURSA W/O US: CPT | Mod: 50,59,, | Performed by: ANESTHESIOLOGY

## 2020-07-23 PROCEDURE — 20610 DRAIN/INJ JOINT/BURSA W/O US: CPT | Mod: 50 | Performed by: ANESTHESIOLOGY

## 2020-07-23 PROCEDURE — 63600175 PHARM REV CODE 636 W HCPCS: Performed by: ANESTHESIOLOGY

## 2020-07-23 PROCEDURE — 27096 PR INJECTION,SACROILIAC JOINT: ICD-10-PCS | Mod: 50,,, | Performed by: ANESTHESIOLOGY

## 2020-07-23 PROCEDURE — 99152 MOD SED SAME PHYS/QHP 5/>YRS: CPT | Performed by: ANESTHESIOLOGY

## 2020-07-23 PROCEDURE — 27096 INJECT SACROILIAC JOINT: CPT | Mod: 50,,, | Performed by: ANESTHESIOLOGY

## 2020-07-23 RX ORDER — LIDOCAINE HYDROCHLORIDE 20 MG/ML
INJECTION, SOLUTION EPIDURAL; INFILTRATION; INTRACAUDAL; PERINEURAL
Status: DISCONTINUED | OUTPATIENT
Start: 2020-07-23 | End: 2020-07-23 | Stop reason: HOSPADM

## 2020-07-23 RX ORDER — METHYLPREDNISOLONE ACETATE 40 MG/ML
INJECTION, SUSPENSION INTRA-ARTICULAR; INTRALESIONAL; INTRAMUSCULAR; SOFT TISSUE
Status: DISCONTINUED | OUTPATIENT
Start: 2020-07-23 | End: 2020-07-23 | Stop reason: HOSPADM

## 2020-07-23 RX ORDER — MIDAZOLAM HYDROCHLORIDE 1 MG/ML
INJECTION, SOLUTION INTRAMUSCULAR; INTRAVENOUS
Status: DISCONTINUED | OUTPATIENT
Start: 2020-07-23 | End: 2020-07-23 | Stop reason: HOSPADM

## 2020-07-23 RX ORDER — FENTANYL CITRATE 50 UG/ML
INJECTION, SOLUTION INTRAMUSCULAR; INTRAVENOUS
Status: DISCONTINUED | OUTPATIENT
Start: 2020-07-23 | End: 2020-07-23 | Stop reason: HOSPADM

## 2020-07-23 NOTE — OP NOTE
PROCEDURE: Sacroiliac joint and Greater trochanteric bursa injection under fluoroscopic guidance       SIDE: bilateral Sacroiliac injection and bilateral greater trochanteric bursa injection      PROCEDURE DATE: 7/23/2020    PREOPERATIVE DIAGNOSIS: Sacroiliitis, greater trochanteric bursitis  POSTOPERATIVE DIAGNOSIS: Sacroiliitis, greater trochanteric bursitis    PROVIDER: Vin Shukla MD  Assistant(s): none    Anesthesia: Local, IV Sedation     >> 1 mg of VERSED    >> 100 mcg of FENTANYL     INDICATION: The patient has a history of pain due to sacroiliitis unresponsive to conservative treatments. Fluoroscopy was used to optimize visualization of needle placement and to maximize safety.       PROCEDURE DESCRIPTION: The patient was seen and identified in the preoperative area. Risks, benefits, complications, and alternatives were discussed with the patient. The patient agreed to proceed with the procedure and signed the consent. The site and side of the procedure was identified and marked.     The patient was taken to the procedural suite. The patient was positioned in prone orientation on procedure table. A time out was performed prior to any intervention. The procedure, site, side, and allergies were stated and agreed to by all present. The lumbosacral area extending to the skin overlying the femoral greater trochanter was widely prepped with ChloraPrep. The procedural site was draped in usual sterile fashion. Vital signs were closely monitored throughout this procedure.     The fluoroscopic camera was placed in contralateral oblique view and was adjusted until the anterior and posterior joint margins of the targeted sacroiliac joint aligned in linear array. The lower pole of the joint was identified, marked, and localized with 1% Lidocaine. A 22 gauge 5 inch spinal needle was introduced and advanced to the joint under fluoroscopic guidance. The joint space was entered and after negative aspiration, 2 mL of  injectate was posited into the joint space. The needle was then withdrawn outside of the joint space and after negative aspiration 1 mL of solution was injected outside of the joint space. The injectant solution used was comprised of 7 mL of 1% PF Lidocaine and 3 mL of Methylprednisolone (40 mg/mL). This techniques was performed for the above noted joint/s.    Following Sacroiliac Joint injection, the stylet was replaced and the needle was withdrawn intact. The RIGHT and LEFT greater trochanter was then identified with fluoroscopy. The targeted site of entry was localized with 1% PF Lidocaine. The above noted spinal needle was advanced to the lateral border of the greater trochanter until the osseus interface was met.  After negative aspiration, 2 mL of the above noted solution was injected. No pain or paresthesia was noted on injection. Following injection, the stylet was replaced and the needle was withdrawn intact. This technique was used for the above noted greater trochanteric bursa / bursae. The skin was cleaned and bandages were applied.    Description of Findings: Not applicable    Prosthetic devices, grafts, tissues, or devices implanted: None    Specimen Removed: No    Estimated Blood Loss: minimal    COMPLICATIONS: None    DISPOSITION / PLANS: The patient was transferred to the recovery area in a stable condition for observation. The patient was reexamined prior to discharge. There was no evidence of acute neurologic injury following the procedure.  Patient was discharged from the recovery room after meeting discharge criteria. Home discharge instructions were given to the patient by the staff.

## 2020-07-23 NOTE — PLAN OF CARE
Patient d/c home in stable condition via wheelchair with ride. Verbalized understanding of d/c instructions. Band aids clean, dry, and intact.Patient voiced no complaints. Patient stood at side of bed, walked steps with no new motor deficits. Neuro intact.

## 2020-07-23 NOTE — DISCHARGE SUMMARY
[facility]  Discharge Note       Description of Procedure: Bilateral Sacroiliac Joint and Greater Trochanteric Bursa Injection under Fluoroscopic Guidance    Procedure Date: 7/23/2020    Admit Date: 7/23/2020  Discharge Date: 7/23/2020     Attending Physician: Gayla Banuelos   Discharge Provider: Gayla Banuelos    Preoperative Diagnosis: Sacroiliitis and Greater Trochanteric Bursitis     Postoperative Diagnosis: as above, same as preoperative diagnosis    Discharged Condition: Stable    Hospital Course: Patient was admitted for an outpatient procedure. The procedure was tolerated well with no complications.    Final Diagnoses: Same as principal problem.    Disposition: Home, self-care.    Follow up/Patient Instructions:  Follow-up in clinic in 2-3 weeks.    Medications: No medications were prescribed today. The patient was advised to resume normal medication regimen without change.  Specific information was provided regarding restarting any anticoagulant/s.    Discharge Procedure Orders (must include Diet, Follow-up, Activity):  Light activity for the remainder of the day, resume normal activity tomorrow. Resume normal diet. Follow-up in clinic in 2-3 weeks.

## 2020-07-23 NOTE — DISCHARGE INSTRUCTIONS

## 2020-07-30 DIAGNOSIS — I11.0 HYPERTENSIVE HEART DISEASE WITH CHRONIC DIASTOLIC CONGESTIVE HEART FAILURE: Primary | Chronic | ICD-10-CM

## 2020-07-30 DIAGNOSIS — I50.32 HYPERTENSIVE HEART DISEASE WITH CHRONIC DIASTOLIC CONGESTIVE HEART FAILURE: Primary | Chronic | ICD-10-CM

## 2020-07-30 DIAGNOSIS — E11.59 HYPERTENSION ASSOCIATED WITH DIABETES: Chronic | ICD-10-CM

## 2020-07-30 DIAGNOSIS — I50.9 CHF (NYHA CLASS I, ACC/AHA STAGE B): Chronic | ICD-10-CM

## 2020-07-30 DIAGNOSIS — I15.2 HYPERTENSION ASSOCIATED WITH DIABETES: Chronic | ICD-10-CM

## 2020-07-30 DIAGNOSIS — I50.32 DIASTOLIC CHF, CHRONIC: Chronic | ICD-10-CM

## 2020-08-24 ENCOUNTER — OFFICE VISIT (OUTPATIENT)
Dept: INTERNAL MEDICINE | Facility: CLINIC | Age: 70
End: 2020-08-24
Payer: MEDICARE

## 2020-08-24 VITALS
HEART RATE: 73 BPM | OXYGEN SATURATION: 97 % | WEIGHT: 215.19 LBS | SYSTOLIC BLOOD PRESSURE: 134 MMHG | HEIGHT: 69 IN | BODY MASS INDEX: 31.87 KG/M2 | DIASTOLIC BLOOD PRESSURE: 86 MMHG

## 2020-08-24 DIAGNOSIS — E04.2 MULTIPLE THYROID NODULES: ICD-10-CM

## 2020-08-24 DIAGNOSIS — Z00.00 ENCOUNTER FOR PREVENTIVE HEALTH EXAMINATION: Primary | ICD-10-CM

## 2020-08-24 DIAGNOSIS — E66.9 OBESITY (BMI 30.0-34.9): ICD-10-CM

## 2020-08-24 DIAGNOSIS — I87.2 VENOUS INSUFFICIENCY: ICD-10-CM

## 2020-08-24 DIAGNOSIS — E78.5 HYPERLIPIDEMIA ASSOCIATED WITH TYPE 2 DIABETES MELLITUS: ICD-10-CM

## 2020-08-24 DIAGNOSIS — E11.69 HYPERLIPIDEMIA ASSOCIATED WITH TYPE 2 DIABETES MELLITUS: ICD-10-CM

## 2020-08-24 DIAGNOSIS — I10 HYPERTENSION, UNSPECIFIED TYPE: ICD-10-CM

## 2020-08-24 DIAGNOSIS — I50.9 CHF (NYHA CLASS I, ACC/AHA STAGE B): Chronic | ICD-10-CM

## 2020-08-24 DIAGNOSIS — Z82.49 FAMILY HISTORY OF AORTIC ANEURYSM: ICD-10-CM

## 2020-08-24 PROBLEM — E66.811 OBESITY (BMI 30.0-34.9): Status: ACTIVE | Noted: 2020-08-24

## 2020-08-24 PROCEDURE — 99999 PR PBB SHADOW E&M-EST. PATIENT-LVL V: CPT | Mod: PBBFAC,,, | Performed by: NURSE PRACTITIONER

## 2020-08-24 PROCEDURE — G0439 PR MEDICARE ANNUAL WELLNESS SUBSEQUENT VISIT: ICD-10-PCS | Mod: S$GLB,,, | Performed by: NURSE PRACTITIONER

## 2020-08-24 PROCEDURE — 99215 OFFICE O/P EST HI 40 MIN: CPT | Mod: PBBFAC | Performed by: NURSE PRACTITIONER

## 2020-08-24 PROCEDURE — 99999 PR PBB SHADOW E&M-EST. PATIENT-LVL V: ICD-10-PCS | Mod: PBBFAC,,, | Performed by: NURSE PRACTITIONER

## 2020-08-24 PROCEDURE — G0439 PPPS, SUBSEQ VISIT: HCPCS | Mod: S$GLB,,, | Performed by: NURSE PRACTITIONER

## 2020-08-24 RX ORDER — IBUPROFEN 100 MG/5ML
1000 SUSPENSION, ORAL (FINAL DOSE FORM) ORAL 2 TIMES DAILY
COMMUNITY

## 2020-08-24 RX ORDER — AA/PROT/LYSINE/METHIO/VIT C/B6 50-12.5 MG
60 TABLET ORAL DAILY
COMMUNITY

## 2020-08-24 RX ORDER — MULTIVITAMIN
1 TABLET ORAL DAILY
COMMUNITY

## 2020-08-24 NOTE — PROGRESS NOTES
"Deirdre Yanez presented for a  Medicare AWV and comprehensive Health Risk Assessment today. The following components were reviewed and updated:    · Medical history  · Family History  · Social history  · Allergies and Current Medications  · Health Risk Assessment  · Health Maintenance  · Care Team     ** See Completed Assessments for Annual Wellness Visit within the encounter summary.**         The following assessments were completed:  · Living Situation  · CAGE  · Depression Screening  · Timed Get Up and Go  · Whisper Test  · Cognitive Function Screening  · Nutrition Screening  · ADL Screening  · PAQ Screening        Vitals:    08/24/20 0941   BP: 134/86   BP Location: Right arm   Patient Position: Sitting   BP Method: Medium (Manual)   Pulse: 73   SpO2: 97%   Weight: 97.6 kg (215 lb 2.7 oz)   Height: 5' 9" (1.753 m)     Body mass index is 31.77 kg/m².  Physical Exam  Vitals signs and nursing note reviewed.   Constitutional:       Appearance: She is well-developed.   HENT:      Head: Normocephalic.   Cardiovascular:      Rate and Rhythm: Normal rate and regular rhythm.      Heart sounds: Normal heart sounds. No murmur.   Pulmonary:      Effort: Pulmonary effort is normal. No respiratory distress.      Breath sounds: Normal breath sounds.   Abdominal:      Palpations: Abdomen is soft. There is no mass.      Tenderness: There is no abdominal tenderness.   Musculoskeletal: Normal range of motion.   Skin:     General: Skin is warm and dry.   Neurological:      Mental Status: She is alert and oriented to person, place, and time.      Motor: No abnormal muscle tone.   Psychiatric:         Speech: Speech normal.         Behavior: Behavior normal.               Diagnoses and health risks identified today and associated recommendations/orders:    1. Encounter for preventive health examination  Reports has an advanced directive/living will and will bring into next OV, so that can be placed in chart.      2. CHF (NYHA class " I, ACC/AHA stage B)  Stable. Continue current treatment plan as previously prescribed with your cardiologist.     3. Hypertension, unspecified type  Stable. Continue current treatment plan as previously prescribed with your cardiologist.     4. Hyperlipidemia associated with type 2 diabetes mellitus  A1C 5.9  Lipid-not at goal  Continue current treatment plan as previously prescribed with your PCP and cardiologist.     5. Multiple thyroid nodules  US 7/2020  Continue current treatment plan as previously prescribed with your ENT.     6. Venous insufficiency  Continue current treatment plan as previously prescribed with your cardiologist.     7. Obesity (BMI 30.0-34.9)  Continue current treatment plan as previously prescribed with your PCP.     8. Family history of aortic aneurysm  Mother.   Declines screening at this time. Would like to discuss with PCP.   Advised to follow up with PCP for further evaluation and recommendations. She expressed understanding.        Provided Deirdre with a 5-10 year written screening schedule and personal prevention plan. Recommendations were developed using the USPSTF age appropriate recommendations. Education, counseling, and referrals were provided as needed. After Visit Summary printed and given to patient which includes a list of additional screenings\tests needed.    Follow up in about 1 year (around 8/24/2021) for awv.    Mulu Jovel NP

## 2020-08-24 NOTE — PATIENT INSTRUCTIONS
Counseling and Referral of Other Preventative  (Italic type indicates deductible and co-insurance are waived)    Patient Name: Deirdre Yanez  Today's Date: 8/24/2020    Health Maintenance       Date Due Completion Date    Influenza Vaccine (1) 09/01/2020 10/2/2009 (Done)    Override on 10/2/2009: Done    Eye Exam 10/23/2020 10/23/2019    Urine Microalbumin 11/12/2020 11/12/2019    Foot Exam 12/20/2020 12/20/2019 (Done)    Override on 12/20/2019: Done    Override on 9/16/2014: Done    Hemoglobin A1c 06/15/2021 6/15/2020    Lipid Panel 06/19/2021 6/19/2020    Mammogram 02/28/2022 2/28/2020    TETANUS VACCINE 05/08/2023 5/8/2013    Colorectal Cancer Screening 01/10/2024 1/10/2019    DEXA SCAN 07/19/2024 7/19/2019        No orders of the defined types were placed in this encounter.    The following information is provided to all patients.  This information is to help you find resources for any of the problems found today that may be affecting your health:                Living healthy guide: www.[facility].[place].gov      Understanding Diabetes: www.diabetes.org      Eating healthy: www.cdc.gov/healthyweight      CDC home safety checklist: www.cdc.gov/steadi/patient.html      Agency on Aging: www.goea.[place].[facility]      Alcoholics anonymous (AA): www.aa.org      Physical Activity: www.minerva.nih.gov/oc5iqjj      Tobacco use: www.quitwithusla.org

## 2020-08-24 NOTE — Clinical Note
Your patient was seen today for AWV. Abnormalities bolded. Please review.   Thank you,   THADDEUS Arias

## 2020-09-15 ENCOUNTER — TELEPHONE (OUTPATIENT)
Dept: CARDIOLOGY | Facility: CLINIC | Age: 70
End: 2020-09-15

## 2020-09-15 ENCOUNTER — PATIENT OUTREACH (OUTPATIENT)
Dept: ADMINISTRATIVE | Facility: OTHER | Age: 70
End: 2020-09-15

## 2020-09-15 DIAGNOSIS — I50.9 CHF (NYHA CLASS I, ACC/AHA STAGE B): Primary | Chronic | ICD-10-CM

## 2020-09-15 NOTE — PROGRESS NOTES
Health Maintenance Due   Topic Date Due    Influenza Vaccine (1) 08/01/2020    Urine Microalbumin  11/12/2020     Updates were requested from care everywhere.  Chart was reviewed for overdue Proactive [facility] Encounters ([facility]) topics (CRS, Breast Cancer Screening, Eye exam)  Health Maintenance has been updated.  LINKS immunization registry triggered.  Immunizations were reconciled.

## 2020-09-16 ENCOUNTER — OFFICE VISIT (OUTPATIENT)
Dept: PAIN MEDICINE | Facility: CLINIC | Age: 70
End: 2020-09-16
Payer: MEDICARE

## 2020-09-16 ENCOUNTER — OFFICE VISIT (OUTPATIENT)
Dept: INTERNAL MEDICINE | Facility: CLINIC | Age: 70
End: 2020-09-16
Payer: MEDICARE

## 2020-09-16 VITALS
SYSTOLIC BLOOD PRESSURE: 146 MMHG | WEIGHT: 216 LBS | HEIGHT: 69 IN | HEART RATE: 69 BPM | RESPIRATION RATE: 18 BRPM | BODY MASS INDEX: 31.99 KG/M2 | DIASTOLIC BLOOD PRESSURE: 86 MMHG

## 2020-09-16 VITALS
HEART RATE: 68 BPM | SYSTOLIC BLOOD PRESSURE: 126 MMHG | BODY MASS INDEX: 32.26 KG/M2 | HEIGHT: 69 IN | OXYGEN SATURATION: 97 % | RESPIRATION RATE: 18 BRPM | WEIGHT: 217.81 LBS | DIASTOLIC BLOOD PRESSURE: 84 MMHG | TEMPERATURE: 99 F

## 2020-09-16 DIAGNOSIS — M70.62 GREATER TROCHANTERIC BURSITIS OF BOTH HIPS: ICD-10-CM

## 2020-09-16 DIAGNOSIS — Z78.9 STATIN INTOLERANCE: ICD-10-CM

## 2020-09-16 DIAGNOSIS — M70.61 GREATER TROCHANTERIC BURSITIS OF BOTH HIPS: ICD-10-CM

## 2020-09-16 DIAGNOSIS — I10 HYPERTENSION, UNSPECIFIED TYPE: Primary | ICD-10-CM

## 2020-09-16 DIAGNOSIS — E04.1 THYROID NODULE: ICD-10-CM

## 2020-09-16 DIAGNOSIS — G89.29 CHRONIC SI JOINT PAIN: Primary | ICD-10-CM

## 2020-09-16 DIAGNOSIS — M47.816 LUMBAR SPONDYLOSIS: ICD-10-CM

## 2020-09-16 DIAGNOSIS — M53.3 SACROILIAC JOINT PAIN: ICD-10-CM

## 2020-09-16 DIAGNOSIS — M53.3 CHRONIC SI JOINT PAIN: Primary | ICD-10-CM

## 2020-09-16 PROCEDURE — 99214 PR OFFICE/OUTPT VISIT, EST, LEVL IV, 30-39 MIN: ICD-10-PCS | Mod: S$PBB,,, | Performed by: PHYSICIAN ASSISTANT

## 2020-09-16 PROCEDURE — 99999 PR PBB SHADOW E&M-EST. PATIENT-LVL V: CPT | Mod: PBBFAC,,, | Performed by: FAMILY MEDICINE

## 2020-09-16 PROCEDURE — 99999 PR PBB SHADOW E&M-EST. PATIENT-LVL V: ICD-10-PCS | Mod: PBBFAC,,, | Performed by: FAMILY MEDICINE

## 2020-09-16 PROCEDURE — 99215 OFFICE O/P EST HI 40 MIN: CPT | Mod: PBBFAC,27 | Performed by: FAMILY MEDICINE

## 2020-09-16 PROCEDURE — 99214 OFFICE O/P EST MOD 30 MIN: CPT | Mod: S$PBB,,, | Performed by: PHYSICIAN ASSISTANT

## 2020-09-16 PROCEDURE — 99213 PR OFFICE/OUTPT VISIT, EST, LEVL III, 20-29 MIN: ICD-10-PCS | Mod: S$PBB,,, | Performed by: FAMILY MEDICINE

## 2020-09-16 PROCEDURE — 99213 OFFICE O/P EST LOW 20 MIN: CPT | Mod: S$PBB,,, | Performed by: FAMILY MEDICINE

## 2020-09-16 PROCEDURE — 99999 PR PBB SHADOW E&M-EST. PATIENT-LVL V: ICD-10-PCS | Mod: PBBFAC,,, | Performed by: PHYSICIAN ASSISTANT

## 2020-09-16 PROCEDURE — 99215 OFFICE O/P EST HI 40 MIN: CPT | Mod: PBBFAC | Performed by: PHYSICIAN ASSISTANT

## 2020-09-16 PROCEDURE — 99999 PR PBB SHADOW E&M-EST. PATIENT-LVL V: CPT | Mod: PBBFAC,,, | Performed by: PHYSICIAN ASSISTANT

## 2020-09-16 NOTE — PROGRESS NOTES
Subjective:       Patient ID: Deirdre Yanez is a 70 y.o. female.    Chief Complaint: Follow-up    HPI Ms. Yanez presents today for 6 month follow up.     Had injection with Dr. Shukla in pain management which patient tolerated well and states it helped. She reports being able to stand up straight for 2 weeks after the injection.     Declines flu shot today     Followed by Dr. Guerrero   Reviewed labs today   Calcium and kidney function back in normal range     History of thyroid nodules   They go up and down in size when they get large it makes it difficult to swallow.     Review of Systems   Constitutional: Negative for activity change and unexpected weight change.   HENT: Negative for hearing loss, rhinorrhea and trouble swallowing.    Eyes: Negative for discharge and visual disturbance.   Respiratory: Negative for chest tightness and wheezing.    Cardiovascular: Negative for chest pain and palpitations.   Gastrointestinal: Negative for blood in stool, constipation, diarrhea and vomiting.   Endocrine: Negative for polydipsia and polyuria.   Genitourinary: Negative for difficulty urinating, dysuria, hematuria and menstrual problem.   Musculoskeletal: Negative for arthralgias, joint swelling and neck pain.   Neurological: Negative for weakness and headaches.   Psychiatric/Behavioral: Negative for confusion and dysphoric mood.           Past Medical History:   Diagnosis Date    Abrasion     left eye    Abrasion and/or friction burn of abdominal wall with infection     left eye    Arthritis     gouty arthritis    Back pain     Diabetes mellitus     2011  11/17/2013    Diverticulosis     DM (diabetes mellitus) 2011     12/03/2014  A1C 6.0 x 2 weeks    DM (diabetes mellitus) 2011    BS 89 10/14/2017 A1C 5.7   Diet controlled    DM (diabetes mellitus) 2011    BS 90 am 10/23/2018     DM (diabetes mellitus) 2011    BS didn't check 10/23/2019    Hepatitis     Hx of B/C from cadaver in past     Hepatitis B     Hepatitis C     Hiatal hernia     Hip bursitis, left     HTN (hypertension)     Hypertensive CHF (congestive heart failure) 3/28/2014    Obesity     Pneumonia     Positive ALBIN (antinuclear antibody)     Pure hypercholesterolemia 9/30/2016    Rheumatoid arthritis     questionable diagnosis    Scoliosis     Type 2 diabetes mellitus without complication, without long-term current use of insulin     Urinary incontinence     intermittent- only when lifting heavy items > 20 lbs     Past Surgical History:   Procedure Laterality Date    APPENDECTOMY      bone grafts      CERVICAL CONIZATION   W/ LASER      COLONOSCOPY N/A 1/10/2019    Procedure: COLONOSCOPY;  Surgeon: Nixon Thornton MD;  Location: [facility] ENDO;  Service: Endoscopy;  Laterality: N/A;    COSMETIC SURGERY Bilateral     ears    DILATION AND CURETTAGE OF UTERUS      INJECTION OF ANESTHETIC AGENT INTO SACROILIAC JOINT Bilateral 7/23/2020    Procedure: Bilateral BLOCK, SACROILIAC JOINT and bilatearl GTB with RN IV sedation;  Surgeon: Vin Shukla MD;  Location: [facility] PAIN MGT;  Service: Pain Management;  Laterality: Bilateral;    KNEE ARTHROSCOPY W/ MENISCAL REPAIR Right     LIVER BIOPSY      sinus lift      2003    TONSILLECTOMY, ADENOIDECTOMY      TUBAL LIGATION         Objective:        Physical Exam  Vitals signs reviewed.   Constitutional:       Appearance: Normal appearance. She is obese.   HENT:      Head: Normocephalic and atraumatic.   Eyes:      Extraocular Movements: Extraocular movements intact.   Neck:      Musculoskeletal: Normal range of motion.   Cardiovascular:      Rate and Rhythm: Normal rate.   Pulmonary:      Effort: Pulmonary effort is normal.   Skin:     General: Skin is warm.   Neurological:      Mental Status: She is alert and oriented to person, place, and time.   Psychiatric:         Mood and Affect: Mood normal.         Behavior: Behavior normal.           Results for orders placed or performed during  the hospital encounter of 07/23/20   POCT glucose   Result Value Ref Range    POCT Glucose 118 (H) 70 - 110 mg/dL       Assessment/Plan:     Hypertension, unspecified type  -     Cancel: MICROALBUMIN / CREATININE RATIO URINE  -     MICROALBUMIN / CREATININE RATIO URINE; Future; Expected date: 09/16/2020    Statin intolerance-reviewed previous lipid panel  LDL and HDL WNL     Thyroid nodule-will monitor        Follow up as needed (6 month for annual exam)    Jazmine Montalvo MD  ON   Family Medicine

## 2020-09-16 NOTE — LETTER
September 17, 2020      Caroline Diallo PA-C  [address]  [place] [address]           [facility]  [address] [place] [address]  Phone: [phone]  Fax: [phone]          Patient: Deirdre Yanez   MR Number: [id number]   YOB: 1950   Date of Visit: 9/16/2020       Dear Caroline Diallo:    Thank you for referring Deirdre Yanez to me for evaluation. Attached you will find relevant portions of my assessment and plan of care.    If you have questions, please do not hesitate to call me. I look forward to following Deirdre Yanez along with you.    Sincerely,    Eufemia Gomez PA-C    Enclosure  CC:  No Recipients    If you would like to receive this communication electronically, please contact [email or web address] or [phone] to request more information on [facility] Link access.    For providers and/or their staff who would like to refer a patient to [facility], please contact us through our one-stop-shop provider referral line, [facility] , at [phone].    If you feel you have received this communication in error or would no longer like to receive these types of communications, please e-mail [email or web address]

## 2020-09-17 NOTE — PROGRESS NOTES
Chief Pain Complaint:  Low-back Pain    Interval History (9/17/2020): Patient was seen on 7/23/20. At that time she underwent bilateral SIJ + GT bursa injection.  The patient reports that she is/was better following the procedure.  she reports 100% pain relief.  The changes lasted >4 weeks so far.  The changes have NOT continued through this visit.  Patient reports pain as 4/10 today.    History of Present Illness:   Deirdre Yanez is a 70 y.o. female  who is presenting with a chief complaint of Low-back Pain  . The patient began experiencing this problem insidiously, and the pain has been gradually worsening over the past 3 year(s). The pain is described as throbbing, cramping, aching and heavy and is located in the bilateral buttocks. Pain is intermittent and lasts hours. The  pain is nonradiating. The patient rates her pain a 8 out of ten and interferes with activities of daily living a 8 out of ten. Pain is exacerbated by getting up from a seated position, and is improved by rest. Patient reports prior trauma fall from 30 foot landed on her feet >10 years ago, no prior spine surgery.      - pertinent negatives: No fever, No chills, No weight loss, No bladder dysfunction, No bowel dysfunction, No saddle anesthesia  - pertinent positives: none    - medications, other therapies tried (physical therapy, injections):     >> Tylenol and flexeril    >> Has previously undergone Physical Therapy    >> Has previously undergone spinal injection/s    - in clinic GT bursa injections with relief for about a year   - bilateral SIJ + GT bursa injection on 7/23/20 with 100% relief for several weeks until she helped her   a fan, 60% at this time 8 weeks after      Imaging / Labs / Studies (reviewed on 9/17/2020):    Results for orders placed during the hospital encounter of 08/01/19   MRI Lumbar Spine Without Contrast    Narrative FINDINGS:  There is severe levoscoliosis of the lumbar spine with the apex located at  approximately L1.  there is also a couple mm of anterolisthesis of L4 on L5.  The vertebral body heights are well maintained, with no fracture.  No marrow signal abnormality suspicious for an infiltrative process.  The conus medullaris terminates at approximately the inferior endplate of L2.  There appear to be a couple of tiny probable subcentimeter cysts within the left kidney and at least a single incompletely visualized probable cyst within the interpolar to lower pole region of the right kidney measuring approximately 11 mm.  There is disc desiccation noted throughout the lumbar spine.  Prominent multilevel facet arthropathy noted beginning at approximately the L2-3 through the L5-S1 levels.  L1-L2: No significant central canal or neural foraminal narrowing.  L2-L3: No significant central canal or neural foraminal narrowing.  L3-L4: Ligamentum flavum hypertrophy prominent bilateral facet arthropathy mild diffuse disc bulge and scoliosis resulting in mild-to-moderate narrowing of the central canal.  No significant neural foraminal canal narrowing.  L4-L5:  Severe bilateral facet arthropathy left greater than the right resulting in no significant central or neural foraminal canal narrowing.  L5-S1:  Mild-to-moderate bilateral facet arthropathy left greater than the right resulting in no significant central or neural foraminal canal narrowing.    Impression 1. Prominent scoliosis of the lumbar spine.  2. Multilevel degenerative changes with the greatest degree of central canal narrowing noted at the L3-4 level.  No high-grade neural foraminal canal narrowing.  3. Remaining findings as discussed above.       Results for orders placed during the hospital encounter of 06/28/19   X-Ray Lumbar Spine AP And Lateral    Narrative COMPARISON:  08/03/2012  FINDINGS:  Severe levoscoliosis of the lumbar spine again noted.  Vertebral body heights appear within normal limits.  There is slight grade 1 anterolisthesis of L4 on L5  "may be slightly worsened from prior.  Multilevel facet arthropathy noted.  Moderate to severe disc height loss noted throughout the lumbar spine which appear similar to prior.  Posterior elements appear grossly intact. No fractures demonstrated.  The remaining visualized osseous and soft tissue structures demonstrate no appreciable abnormality.     Results for orders placed during the hospital encounter of 06/28/19   X-Ray Cervical Spine AP And Lateral    Narrative FINDINGS:  There is slight grade 1 anterolisthesis of C4 on C5.  Vertebral body heights are well maintained.  There is moderate to severe disc height loss at C5-6 through C7-T1.  Mild disc height loss at C4-5.  Multilevel facet arthropathy noted.  Posterior elements appear intact without acute fractures or subluxations demonstrated.  Odontoid process appears intact.  Atlantoaxial articulations appear normal.  Prevertebral soft tissues are within normal limits.         Review of Systems:  CONSTITUTIONAL: patient denies any fever, chills, or weight loss  SKIN: patient denies any rash or itching  RESPIRATORY: patient denies having any shortness of breath  GASTROINTESTINAL: patient denies having any diarrhea, constipation, or bowel incontinence  GENITOURINARY: patient denies having any abnormal bladder function    MUSCULOSKELETAL:  - patient complains of the above noted pain/s (see chief pain complaint)    NEUROLOGICAL:   - pain as above  - strength in Lower extremities is intact, BILATERALLY  - sensation in Lower extremities is intact, BILATERALLY  - patient denies any loss of bowel or bladder control      PSYCHIATRIC: patient denies any change in mood    Other:  All other systems reviewed and are negative      Physical Exam:  Vitals:  Vitals:    09/16/20 0900   BP: (!) 146/86   Pulse: 69   Resp: 18   Weight: 98 kg (216 lb)   Height: 5' 9" (1.753 m)   PainSc:   4   PainLoc: Back    (reviewed on 9/17/2020)    General: alert and oriented, in no apparent " distress.  Gait: normal gait.  Skin: no rashes, no discoloration, no obvious lesions  HEENT: normocephalic, atraumatic. Pupils equal and round.  Cardiovascular: no significant peripheral edema present.  Respiratory: without use of accessory muscles of respiration.    Musculoskeletal - Lumbar Spine:  - Pain on flexion of lumbar spine: Absent   - Pain on extension of lumbar spine: Present  - Lumbar facet loading: Present   - TTP over the lumbar facet joints: Present   - TTP over the lumbar paraspinals: Present   - TTP over the SI joints:  Present, L>R  - TTP over GT bursa: Present, less so than over SIJ    - Straight Leg Raise: Negative  - LIZZY: Present     Musculoskeletal - Cervical Spine:  - Pain on flexion of cervical spine: Absent   - Pain on extension of cervical spine: Absent   - Cervical facet loading: Absent   - TTP over the cervical facet joints: Absent  - TTP over the cervical paraspinals: Absent  - Spurling's: Negative    Neuro - Upper Extremities:  - BUE Strength:R/L: D: 5/5; B: 5/5; T: 5/5; WF: 5/5; WE: 5/5; IO: 5/5  - Extremity Reflexes: Brisk and symmetric throughout  - Sensory: Sensation to light touch intact bilaterally    Neuro - Lower Extremities:  - RLE Strength:     >> 5/5 strength with right hip flexion/ extension    >> 5/5 strength with right knee flexion/ extension    >> 5/5 strength in right ankle with dorsiflexion    >> 5/5 strength in right ankle with plantar flexion  - LLE Strength:     >> 5/5 strength with left hip flexion/ extension    >> 5/5 strength with knee flexion extension on the left     >> 5/5 strength in left ankle with dorsiflexion    >> 5/5 strength in left ankle with plantar flexion  - Extremity Reflexes: Brisk and symmetric throughout  - Sensory: Sensation to light touch intact bilaterally      Psych:  Mood and affect is appropriate              Assessment:  Deirdre Yanez is a 70 y.o. year old female who is presenting with     Encounter Diagnoses   Name Primary?    Chronic  SI joint pain Yes    Sacroiliac joint pain     Greater trochanteric bursitis of both hips     Lumbar spondylosis        Plan:  1. Interventional:   - S/p bilateral SIJ + GT bursa injection on 7/23/20 with 100% relief for several weeks until she helped her   a fan, 60% at this time 8 weeks after.   - Schedule patient for bilateral SIJ + GT bursa injection.   - Consider SIJ RFA, which we discussed today.  Consider Lumbar MBB.    2. Pharmacologic: Take Tylenol 500mg PRN, up to 3000mg per day.     3. Rehabilitative: Patient already tried PT.  Continue at-home exercises.    4. Diagnostic:  None.    5.  Follow up: 4 weeks post-injection - will send online ticket scheduling on [employer]     - This condition does not require this patient to take time off of work, and the primary goal of our Pain Management services is to improve the patient's functional capacity.   - I discussed the risks, benefits, and alternatives to potential treatment options. All questions and concerns were fully addressed today in clinic.           Disclaimer:  This note was prepared using voice recognition system and is likely to have sound alike errors that may have been overlooked even after proof reading.  Please call me with any questions.

## 2020-09-17 NOTE — H&P (VIEW-ONLY)
Chief Pain Complaint:  Low-back Pain    Interval History (9/17/2020): Patient was seen on 7/23/20. At that time she underwent bilateral SIJ + GT bursa injection.  The patient reports that she is/was better following the procedure.  she reports 100% pain relief.  The changes lasted >4 weeks so far.  The changes have NOT continued through this visit.  Patient reports pain as 4/10 today.    History of Present Illness:   Deirdre Yanez is a 70 y.o. female  who is presenting with a chief complaint of Low-back Pain  . The patient began experiencing this problem insidiously, and the pain has been gradually worsening over the past 3 year(s). The pain is described as throbbing, cramping, aching and heavy and is located in the bilateral buttocks. Pain is intermittent and lasts hours. The  pain is nonradiating. The patient rates her pain a 8 out of ten and interferes with activities of daily living a 8 out of ten. Pain is exacerbated by getting up from a seated position, and is improved by rest. Patient reports prior trauma fall from 30 foot landed on her feet >10 years ago, no prior spine surgery.      - pertinent negatives: No fever, No chills, No weight loss, No bladder dysfunction, No bowel dysfunction, No saddle anesthesia  - pertinent positives: none    - medications, other therapies tried (physical therapy, injections):     >> Tylenol and flexeril    >> Has previously undergone Physical Therapy    >> Has previously undergone spinal injection/s    - in clinic GT bursa injections with relief for about a year   - bilateral SIJ + GT bursa injection on 7/23/20 with 100% relief for several weeks until she helped her   a fan, 60% at this time 8 weeks after      Imaging / Labs / Studies (reviewed on 9/17/2020):    Results for orders placed during the hospital encounter of 08/01/19   MRI Lumbar Spine Without Contrast    Narrative FINDINGS:  There is severe levoscoliosis of the lumbar spine with the apex located at  approximately L1.  there is also a couple mm of anterolisthesis of L4 on L5.  The vertebral body heights are well maintained, with no fracture.  No marrow signal abnormality suspicious for an infiltrative process.  The conus medullaris terminates at approximately the inferior endplate of L2.  There appear to be a couple of tiny probable subcentimeter cysts within the left kidney and at least a single incompletely visualized probable cyst within the interpolar to lower pole region of the right kidney measuring approximately 11 mm.  There is disc desiccation noted throughout the lumbar spine.  Prominent multilevel facet arthropathy noted beginning at approximately the L2-3 through the L5-S1 levels.  L1-L2: No significant central canal or neural foraminal narrowing.  L2-L3: No significant central canal or neural foraminal narrowing.  L3-L4: Ligamentum flavum hypertrophy prominent bilateral facet arthropathy mild diffuse disc bulge and scoliosis resulting in mild-to-moderate narrowing of the central canal.  No significant neural foraminal canal narrowing.  L4-L5:  Severe bilateral facet arthropathy left greater than the right resulting in no significant central or neural foraminal canal narrowing.  L5-S1:  Mild-to-moderate bilateral facet arthropathy left greater than the right resulting in no significant central or neural foraminal canal narrowing.    Impression 1. Prominent scoliosis of the lumbar spine.  2. Multilevel degenerative changes with the greatest degree of central canal narrowing noted at the L3-4 level.  No high-grade neural foraminal canal narrowing.  3. Remaining findings as discussed above.       Results for orders placed during the hospital encounter of 06/28/19   X-Ray Lumbar Spine AP And Lateral    Narrative COMPARISON:  08/03/2012  FINDINGS:  Severe levoscoliosis of the lumbar spine again noted.  Vertebral body heights appear within normal limits.  There is slight grade 1 anterolisthesis of L4 on L5  "may be slightly worsened from prior.  Multilevel facet arthropathy noted.  Moderate to severe disc height loss noted throughout the lumbar spine which appear similar to prior.  Posterior elements appear grossly intact. No fractures demonstrated.  The remaining visualized osseous and soft tissue structures demonstrate no appreciable abnormality.     Results for orders placed during the hospital encounter of 06/28/19   X-Ray Cervical Spine AP And Lateral    Narrative FINDINGS:  There is slight grade 1 anterolisthesis of C4 on C5.  Vertebral body heights are well maintained.  There is moderate to severe disc height loss at C5-6 through C7-T1.  Mild disc height loss at C4-5.  Multilevel facet arthropathy noted.  Posterior elements appear intact without acute fractures or subluxations demonstrated.  Odontoid process appears intact.  Atlantoaxial articulations appear normal.  Prevertebral soft tissues are within normal limits.         Review of Systems:  CONSTITUTIONAL: patient denies any fever, chills, or weight loss  SKIN: patient denies any rash or itching  RESPIRATORY: patient denies having any shortness of breath  GASTROINTESTINAL: patient denies having any diarrhea, constipation, or bowel incontinence  GENITOURINARY: patient denies having any abnormal bladder function    MUSCULOSKELETAL:  - patient complains of the above noted pain/s (see chief pain complaint)    NEUROLOGICAL:   - pain as above  - strength in Lower extremities is intact, BILATERALLY  - sensation in Lower extremities is intact, BILATERALLY  - patient denies any loss of bowel or bladder control      PSYCHIATRIC: patient denies any change in mood    Other:  All other systems reviewed and are negative      Physical Exam:  Vitals:  Vitals:    09/16/20 0900   BP: (!) 146/86   Pulse: 69   Resp: 18   Weight: 98 kg (216 lb)   Height: 5' 9" (1.753 m)   PainSc:   4   PainLoc: Back    (reviewed on 9/17/2020)    General: alert and oriented, in no apparent " distress.  Gait: normal gait.  Skin: no rashes, no discoloration, no obvious lesions  HEENT: normocephalic, atraumatic. Pupils equal and round.  Cardiovascular: no significant peripheral edema present.  Respiratory: without use of accessory muscles of respiration.    Musculoskeletal - Lumbar Spine:  - Pain on flexion of lumbar spine: Absent   - Pain on extension of lumbar spine: Present  - Lumbar facet loading: Present   - TTP over the lumbar facet joints: Present   - TTP over the lumbar paraspinals: Present   - TTP over the SI joints:  Present, L>R  - TTP over GT bursa: Present, less so than over SIJ    - Straight Leg Raise: Negative  - LIZZY: Present     Musculoskeletal - Cervical Spine:  - Pain on flexion of cervical spine: Absent   - Pain on extension of cervical spine: Absent   - Cervical facet loading: Absent   - TTP over the cervical facet joints: Absent  - TTP over the cervical paraspinals: Absent  - Spurling's: Negative    Neuro - Upper Extremities:  - BUE Strength:R/L: D: 5/5; B: 5/5; T: 5/5; WF: 5/5; WE: 5/5; IO: 5/5  - Extremity Reflexes: Brisk and symmetric throughout  - Sensory: Sensation to light touch intact bilaterally    Neuro - Lower Extremities:  - RLE Strength:     >> 5/5 strength with right hip flexion/ extension    >> 5/5 strength with right knee flexion/ extension    >> 5/5 strength in right ankle with dorsiflexion    >> 5/5 strength in right ankle with plantar flexion  - LLE Strength:     >> 5/5 strength with left hip flexion/ extension    >> 5/5 strength with knee flexion extension on the left     >> 5/5 strength in left ankle with dorsiflexion    >> 5/5 strength in left ankle with plantar flexion  - Extremity Reflexes: Brisk and symmetric throughout  - Sensory: Sensation to light touch intact bilaterally      Psych:  Mood and affect is appropriate              Assessment:  Deirdre Yanez is a 70 y.o. year old female who is presenting with     Encounter Diagnoses   Name Primary?    Chronic  SI joint pain Yes    Sacroiliac joint pain     Greater trochanteric bursitis of both hips     Lumbar spondylosis        Plan:  1. Interventional:   - S/p bilateral SIJ + GT bursa injection on 7/23/20 with 100% relief for several weeks until she helped her   a fan, 60% at this time 8 weeks after.   - Schedule patient for bilateral SIJ + GT bursa injection.   - Consider SIJ RFA, which we discussed today.  Consider Lumbar MBB.    2. Pharmacologic: Take Tylenol 500mg PRN, up to 3000mg per day.     3. Rehabilitative: Patient already tried PT.  Continue at-home exercises.    4. Diagnostic:  None.    5.  Follow up: 4 weeks post-injection - will send online ticket scheduling on [employer]     - This condition does not require this patient to take time off of work, and the primary goal of our Pain Management services is to improve the patient's functional capacity.   - I discussed the risks, benefits, and alternatives to potential treatment options. All questions and concerns were fully addressed today in clinic.           Disclaimer:  This note was prepared using voice recognition system and is likely to have sound alike errors that may have been overlooked even after proof reading.  Please call me with any questions.

## 2020-09-23 ENCOUNTER — LAB VISIT (OUTPATIENT)
Dept: LAB | Facility: HOSPITAL | Age: 70
End: 2020-09-23
Attending: NUCLEAR MEDICINE
Payer: MEDICARE

## 2020-09-23 ENCOUNTER — OFFICE VISIT (OUTPATIENT)
Dept: CARDIOLOGY | Facility: CLINIC | Age: 70
End: 2020-09-23
Payer: MEDICARE

## 2020-09-23 VITALS
SYSTOLIC BLOOD PRESSURE: 158 MMHG | WEIGHT: 219.38 LBS | HEART RATE: 75 BPM | DIASTOLIC BLOOD PRESSURE: 98 MMHG | OXYGEN SATURATION: 98 % | HEIGHT: 69 IN | BODY MASS INDEX: 32.49 KG/M2

## 2020-09-23 DIAGNOSIS — I50.9 CHF (NYHA CLASS I, ACC/AHA STAGE B): Chronic | ICD-10-CM

## 2020-09-23 DIAGNOSIS — I11.0 HYPERTENSIVE HEART DISEASE WITH CHRONIC DIASTOLIC CONGESTIVE HEART FAILURE: Chronic | ICD-10-CM

## 2020-09-23 DIAGNOSIS — E78.5 HYPERLIPIDEMIA ASSOCIATED WITH TYPE 2 DIABETES MELLITUS: ICD-10-CM

## 2020-09-23 DIAGNOSIS — I15.2 HYPERTENSION ASSOCIATED WITH DIABETES: Chronic | ICD-10-CM

## 2020-09-23 DIAGNOSIS — I50.32 DIASTOLIC CHF, CHRONIC: Chronic | ICD-10-CM

## 2020-09-23 DIAGNOSIS — I11.0 HYPERTENSIVE HEART DISEASE WITH CHRONIC DIASTOLIC CONGESTIVE HEART FAILURE: Primary | Chronic | ICD-10-CM

## 2020-09-23 DIAGNOSIS — E11.69 HYPERLIPIDEMIA ASSOCIATED WITH TYPE 2 DIABETES MELLITUS: ICD-10-CM

## 2020-09-23 DIAGNOSIS — I50.32 HYPERTENSIVE HEART DISEASE WITH CHRONIC DIASTOLIC CONGESTIVE HEART FAILURE: Chronic | ICD-10-CM

## 2020-09-23 DIAGNOSIS — E11.59 HYPERTENSION ASSOCIATED WITH DIABETES: Chronic | ICD-10-CM

## 2020-09-23 DIAGNOSIS — I50.32 HYPERTENSIVE HEART DISEASE WITH CHRONIC DIASTOLIC CONGESTIVE HEART FAILURE: Primary | Chronic | ICD-10-CM

## 2020-09-23 LAB
ANION GAP SERPL CALC-SCNC: 9 MMOL/L (ref 8–16)
BUN SERPL-MCNC: 26 MG/DL (ref 8–23)
CALCIUM SERPL-MCNC: 10 MG/DL (ref 8.7–10.5)
CHLORIDE SERPL-SCNC: 101 MMOL/L (ref 95–110)
CO2 SERPL-SCNC: 30 MMOL/L (ref 23–29)
CREAT SERPL-MCNC: 0.9 MG/DL (ref 0.5–1.4)
EST. GFR  (AFRICAN AMERICAN): >60 ML/MIN/1.73 M^2
EST. GFR  (NON AFRICAN AMERICAN): >60 ML/MIN/1.73 M^2
GLUCOSE SERPL-MCNC: 110 MG/DL (ref 70–110)
POTASSIUM SERPL-SCNC: 4.5 MMOL/L (ref 3.5–5.1)
SODIUM SERPL-SCNC: 140 MMOL/L (ref 136–145)

## 2020-09-23 PROCEDURE — 36415 COLL VENOUS BLD VENIPUNCTURE: CPT

## 2020-09-23 PROCEDURE — 99215 OFFICE O/P EST HI 40 MIN: CPT | Mod: PBBFAC | Performed by: NUCLEAR MEDICINE

## 2020-09-23 PROCEDURE — 99214 PR OFFICE/OUTPT VISIT, EST, LEVL IV, 30-39 MIN: ICD-10-PCS | Mod: S$PBB,,, | Performed by: NUCLEAR MEDICINE

## 2020-09-23 PROCEDURE — 99214 OFFICE O/P EST MOD 30 MIN: CPT | Mod: S$PBB,,, | Performed by: NUCLEAR MEDICINE

## 2020-09-23 PROCEDURE — 80048 BASIC METABOLIC PNL TOTAL CA: CPT

## 2020-09-23 PROCEDURE — 99999 PR PBB SHADOW E&M-EST. PATIENT-LVL V: CPT | Mod: PBBFAC,,, | Performed by: NUCLEAR MEDICINE

## 2020-09-23 PROCEDURE — 99999 PR PBB SHADOW E&M-EST. PATIENT-LVL V: ICD-10-PCS | Mod: PBBFAC,,, | Performed by: NUCLEAR MEDICINE

## 2020-09-23 NOTE — PRE-PROCEDURE INSTRUCTIONS
Spoke with patient regarding procedure scheduled on 9/30    Arrival time 0740    Has patient been sick with fever or on antibiotics within the last 7 days? No    Has patient received a vaccination within the last 7 days? no    Has the patient stopped all medications as directed? Na    Does patient have a pacemaker and or defibrillator? no    Does the patient have a ride to and from procedure and someone reliable to remain with patient?  Alex    Is the patient diabetic? yes    Does the patient have sleep apnea? Or use O2 at home? No and no     Is the patient receiving sedation? yes    Is the patient instructed to remain NPO beginning at midnight the night before their procedure? yes    Procedure location confirmed with patient? Yes    Covid- Denies signs/symptoms. Instructed to notify PAT/MD if any changes.

## 2020-09-23 NOTE — PROGRESS NOTES
Subjective:   Patient ID:  Deirdre Yanez is a 70 y.o. female who presents for follow-up of Hypertension (ESSENTIAL) and DIASTOLIC HF,STAGE B      HPI DOING WELL. NO UNUSUAL DESAI. NO ORTHOPNEA OR PND  NO CHEST DISCOMFORT- ANGINAL OR PLEURITIC  NO PALPITATIONS  NO NEAR SYNCOPE OR SYNCOPE  NO ABDOMINAL DISCOMFORT  NO EDEMA. NO CALVE TENDERNESS  NO INTERMITTENT CLAUDICATION  NO FOCAL CNS SYMPTOMS OR SIGNS TO SUGGEST TIA OR STROKE  CARD MED GOOD COMPLIANCE, NO SIDE EFFECTS    Review of Systems   Constitution: Negative for chills, fever, night sweats, weight gain and weight loss.   HENT: Negative for nosebleeds.    Eyes: Negative for blurred vision, double vision and visual disturbance.   Cardiovascular: Negative for chest pain, dyspnea on exertion, irregular heartbeat, leg swelling, orthopnea, palpitations, paroxysmal nocturnal dyspnea and syncope.   Respiratory: Negative for cough, hemoptysis and wheezing.    Endocrine: Negative for polydipsia and polyuria.   Hematologic/Lymphatic: Does not bruise/bleed easily.   Skin: Negative for rash.   Musculoskeletal: Negative for joint pain, joint swelling, muscle weakness and myalgias.   Gastrointestinal: Negative for abdominal pain, hematemesis, jaundice and melena.   Genitourinary: Negative for dysuria, hematuria and nocturia.   Neurological: Negative for dizziness, focal weakness, headaches, sensory change and weakness.   Psychiatric/Behavioral: Negative for depression. The patient does not have insomnia and is not nervous/anxious.      Family History   Problem Relation Age of Onset    Colon cancer Maternal Grandfather     Diabetes Maternal Grandmother     Hypertension Maternal Grandmother     Breast cancer Maternal Grandmother     Cataracts Mother     Glaucoma Mother     Macular degeneration Mother     Hypertension Mother     Aortic aneurysm Mother     Diabetes Paternal Grandmother     Cataracts Maternal Aunt     Glaucoma Maternal Aunt     Macular degeneration  Maternal Aunt     Melanoma Maternal Aunt     Heart disease Unknown         pat side    Stroke Neg Hx     Kidney disease Neg Hx     Hyperlipidemia Neg Hx      Past Medical History:   Diagnosis Date    Abrasion     left eye    Abrasion and/or friction burn of abdominal wall with infection     left eye    Arthritis     gouty arthritis    Back pain     Diabetes mellitus     2011  11/17/2013    Diverticulosis     DM (diabetes mellitus) 2011     12/03/2014  A1C 6.0 x 2 weeks    DM (diabetes mellitus) 2011    BS 89 10/14/2017 A1C 5.7   Diet controlled    DM (diabetes mellitus) 2011    BS 90 am 10/23/2018     DM (diabetes mellitus) 2011    BS didn't check 10/23/2019    Hepatitis     Hx of B/C from cadaver in past    Hepatitis B     Hepatitis C     Hiatal hernia     Hip bursitis, left     HTN (hypertension)     Hypertensive CHF (congestive heart failure) 3/28/2014    Obesity     Pneumonia     Positive ALBIN (antinuclear antibody)     Pure hypercholesterolemia 9/30/2016    Rheumatoid arthritis     questionable diagnosis    Scoliosis     Type 2 diabetes mellitus without complication, without long-term current use of insulin     Urinary incontinence     intermittent- only when lifting heavy items > 20 lbs     Social History     Socioeconomic History    Marital status:      Spouse name: Not on file    Number of children: 0    Years of education: Not on file    Highest education level: Master's degree (e.g., MA, MS, Sully, MEd, MSW, CARLOZ)   Occupational History    Occupation: Retired Teacher   Social Needs    Financial resource strain: Not hard at all    Food insecurity     Worry: Never true     Inability: Never true    Transportation needs     Medical: No     Non-medical: No   Tobacco Use    Smoking status: Never Smoker    Smokeless tobacco: Never Used   Substance and Sexual Activity    Alcohol use: Yes     Alcohol/week: 2.0 standard drinks     Types: 2 Glasses of wine per  week     Frequency: 2-3 times a week     Drinks per session: 1 or 2     Binge frequency: Never     Comment: occasion    Drug use: No    Sexual activity: Yes     Partners: Male   Lifestyle    Physical activity     Days per week: 7 days     Minutes per session: 10 min    Stress: Not at all   Relationships    Social connections     Talks on phone: More than three times a week     Gets together: Never     Attends Mosque service: 1 to 4 times per year     Active member of club or organization: No     Attends meetings of clubs or organizations: Never     Relationship status:    Other Topics Concern    Are you pregnant or think you may be? No    Breast-feeding No   Social History Narrative    Not on file     Current Outpatient Medications on File Prior to Visit   Medication Sig Dispense Refill    ascorbic acid, vitamin C, (VITAMIN C) 1000 MG tablet Take 1,000 mg by mouth 2 (two) times daily.      asenapine maleate (SAPHRIS, BLACK SILVA, SL) Take by mouth as needed.      b complex vitamins tablet Take 1 tablet by mouth once daily.      CALCIUM CITRATE ORAL Take 600 mg by mouth.      carvediloL (COREG) 12.5 MG tablet Take 1 tablet (12.5 mg total) by mouth 2 (two) times daily with meals. 180 tablet 3    CHROMIUM ORAL Take by mouth.      coenzyme Q10 10 mg capsule Take 10 mg by mouth as needed.      ergocalciferol, vitamin D2, (VITAMIN D ORAL) Take 1,000 mg by mouth.      fish oil-omega-3 fatty acids 300-1,000 mg capsule Take 2 capsules by mouth once daily.      furosemide (LASIX) 20 MG tablet TAKE 1 TABLET BY MOUTH DAILY 90 tablet 3    glucosamine sulfate (GLUCOSAMINE) 500 mg Tab Take 1,000 mg by mouth as needed.       inositol 500 mg Tab Take 500 mg by mouth 2 (two) times daily.      MAGNESIUM ORAL Take 100 mg by mouth.      multivitamin (ONE DAILY MULTIVITAMIN) per tablet Take 1 tablet by mouth once daily. Per pt C Women's 50 plus      spironolactone (ALDACTONE) 50 MG tablet Take 1 tablet  "(50 mg total) by mouth every evening. 90 tablet 3    tiZANidine (ZANAFLEX) 2 MG tablet Take 1 tablet (2 mg total) by mouth every 8 (eight) hours as needed. 30 tablet 5    TURMERIC ORAL Take 500 mg by mouth as needed.      UNABLE TO FIND Liver Refresh as needed      lilliam primrose/linoleic/gamoleni (PRIMROSE OIL ORAL) Take by mouth.      fluocinonide (LIDEX) 0.05 % gel APPLY TO AFFECTED AREA AS DIRECTED QID  1    fluticasone propionate (FLONASE) 50 mcg/actuation nasal spray 2 sprays in each nostril daily 16 mL 0    fluticasone propionate (FLONASE) 50 mcg/actuation nasal spray 2 spray each nostril daily 16 mL 0    methylsulfonylmethane (MSM) 1,000 mg Cap Take by mouth as needed.        No current facility-administered medications on file prior to visit.      Review of patient's allergies indicates:   Allergen Reactions    Arb-angiotensin receptor antagonist Edema    Hydralazine analogues      "Lupus reaction"    Metoprolol Other (See Comments)     Alopecia    Sulfa (sulfonamide antibiotics)      Passed out and almost stopped breathing    Calcium channel blocking agents-dihydropyridines      Edema      Ace inhibitors Other (See Comments)     cough       Objective:     Physical Exam   Constitutional: She is oriented to person, place, and time. She appears well-developed. No distress.   HENT:   Head: Normocephalic.   Eyes: Pupils are equal, round, and reactive to light. Conjunctivae are normal. No scleral icterus.   Neck: Normal range of motion. Neck supple. Normal carotid pulses, no hepatojugular reflux and no JVD present. Carotid bruit is not present. No edema present. No thyroid mass and no thyromegaly present.   Cardiovascular: Normal rate, regular rhythm, S1 normal, S2 normal, normal heart sounds and intact distal pulses. PMI is not displaced. Exam reveals no gallop and no friction rub.   No murmur heard.  Pulses:       Carotid pulses are 2+ on the right side and 2+ on the left side.       Radial pulses " are 2+ on the right side and 2+ on the left side.        Femoral pulses are 2+ on the right side and 2+ on the left side.       Popliteal pulses are 2+ on the right side and 2+ on the left side.        Dorsalis pedis pulses are 2+ on the right side and 2+ on the left side.        Posterior tibial pulses are 2+ on the right side and 2+ on the left side.   Pulmonary/Chest: Effort normal and breath sounds normal. She has no wheezes. She has no rales. She exhibits no tenderness.   Abdominal: Soft. Bowel sounds are normal. She exhibits no pulsatile midline mass and no mass. There is no hepatosplenomegaly. There is no abdominal tenderness.   Musculoskeletal: Normal range of motion.         General: No tenderness or edema.      Cervical back: Normal.      Thoracic back: Normal.      Lumbar back: Normal.   Lymphadenopathy:     She has no cervical adenopathy.     She has no axillary adenopathy.        Right: No supraclavicular adenopathy present.        Left: No supraclavicular adenopathy present.   Neurological: She is alert and oriented to person, place, and time. She has normal strength and normal reflexes. No sensory deficit. Gait normal.   Skin: Skin is warm. No rash noted. No cyanosis. No pallor. Nails show no clubbing.   Psychiatric: She has a normal mood and affect. Her speech is normal and behavior is normal. Cognition and memory are normal.       Assessment:     1. Hypertensive heart disease with chronic diastolic congestive heart failure    2. CHF (NYHA class I, ACC/AHA stage B)    3. Diastolic CHF, chronic    4. Hyperlipidemia associated with type 2 diabetes mellitus        Plan:     Hypertensive heart disease with chronic diastolic congestive heart failure  WELL CONTROLLED BP  CNS STATUS STABLE    CHF (NYHA class I, ACC/AHA stage B)  STABLE    Diastolic CHF, chronic  NO CLINICAL EVIDENCE OF ADHF    Hyperlipidemia associated with type 2 diabetes mellitus  STABLE    CONTINUE PRESENT CARD MANAGEMENT    RETURN IN 4  MONTHS.

## 2020-09-29 ENCOUNTER — PATIENT MESSAGE (OUTPATIENT)
Dept: OTHER | Facility: OTHER | Age: 70
End: 2020-09-29

## 2020-09-30 ENCOUNTER — HOSPITAL ENCOUNTER (OUTPATIENT)
Facility: HOSPITAL | Age: 70
Discharge: HOME | End: 2020-09-30
Attending: ANESTHESIOLOGY | Admitting: ANESTHESIOLOGY
Payer: MEDICARE

## 2020-09-30 VITALS
OXYGEN SATURATION: 99 % | HEIGHT: 69 IN | DIASTOLIC BLOOD PRESSURE: 65 MMHG | RESPIRATION RATE: 16 BRPM | SYSTOLIC BLOOD PRESSURE: 136 MMHG | TEMPERATURE: 98 F | HEART RATE: 60 BPM | WEIGHT: 221.13 LBS | BODY MASS INDEX: 32.75 KG/M2

## 2020-09-30 DIAGNOSIS — M70.61 GREATER TROCHANTERIC BURSITIS OF BOTH HIPS: ICD-10-CM

## 2020-09-30 DIAGNOSIS — M70.62 GREATER TROCHANTERIC BURSITIS OF BOTH HIPS: ICD-10-CM

## 2020-09-30 DIAGNOSIS — M53.3 SACROILIAC JOINT PAIN: ICD-10-CM

## 2020-09-30 LAB
POCT GLUCOSE: 134 MG/DL (ref 70–110)
POCT GLUCOSE: 153 MG/DL (ref 70–110)

## 2020-09-30 PROCEDURE — 20610 PR DRAIN/INJECT LARGE JOINT/BURSA: ICD-10-PCS | Mod: 50,59,, | Performed by: ANESTHESIOLOGY

## 2020-09-30 PROCEDURE — 27096 INJECT SACROILIAC JOINT: CPT | Mod: 50,,, | Performed by: ANESTHESIOLOGY

## 2020-09-30 PROCEDURE — 82962 GLUCOSE BLOOD TEST: CPT | Performed by: ANESTHESIOLOGY

## 2020-09-30 PROCEDURE — 27096 PR INJECTION,SACROILIAC JOINT: ICD-10-PCS | Mod: 50,,, | Performed by: ANESTHESIOLOGY

## 2020-09-30 PROCEDURE — 63600175 PHARM REV CODE 636 W HCPCS: Performed by: ANESTHESIOLOGY

## 2020-09-30 PROCEDURE — 25500020 PHARM REV CODE 255: Performed by: ANESTHESIOLOGY

## 2020-09-30 PROCEDURE — 99152 MOD SED SAME PHYS/QHP 5/>YRS: CPT | Performed by: ANESTHESIOLOGY

## 2020-09-30 PROCEDURE — 20610 DRAIN/INJ JOINT/BURSA W/O US: CPT | Mod: 50,59,, | Performed by: ANESTHESIOLOGY

## 2020-09-30 PROCEDURE — 25000003 PHARM REV CODE 250: Performed by: ANESTHESIOLOGY

## 2020-09-30 PROCEDURE — 27096 INJECT SACROILIAC JOINT: CPT | Performed by: ANESTHESIOLOGY

## 2020-09-30 PROCEDURE — 20610 DRAIN/INJ JOINT/BURSA W/O US: CPT | Mod: 50 | Performed by: ANESTHESIOLOGY

## 2020-09-30 RX ORDER — LIDOCAINE HYDROCHLORIDE 20 MG/ML
INJECTION, SOLUTION EPIDURAL; INFILTRATION; INTRACAUDAL; PERINEURAL
Status: DISCONTINUED | OUTPATIENT
Start: 2020-09-30 | End: 2020-09-30 | Stop reason: HOSPADM

## 2020-09-30 RX ORDER — MIDAZOLAM HYDROCHLORIDE 1 MG/ML
INJECTION, SOLUTION INTRAMUSCULAR; INTRAVENOUS
Status: DISCONTINUED | OUTPATIENT
Start: 2020-09-30 | End: 2020-09-30 | Stop reason: HOSPADM

## 2020-09-30 RX ORDER — METHYLPREDNISOLONE ACETATE 40 MG/ML
INJECTION, SUSPENSION INTRA-ARTICULAR; INTRALESIONAL; INTRAMUSCULAR; SOFT TISSUE
Status: DISCONTINUED | OUTPATIENT
Start: 2020-09-30 | End: 2020-09-30 | Stop reason: HOSPADM

## 2020-09-30 RX ORDER — FENTANYL CITRATE 50 UG/ML
INJECTION, SOLUTION INTRAMUSCULAR; INTRAVENOUS
Status: DISCONTINUED | OUTPATIENT
Start: 2020-09-30 | End: 2020-09-30 | Stop reason: HOSPADM

## 2020-09-30 NOTE — PLAN OF CARE
Pt and spouse verbalized understanding of discharge instructions. Bandaids x 4 to lower back/ hips c/d/i. Patient voiced no complaints, with no further questions at this time. Patient stood at side of bed, walked steps with no new motor deficits. VSS on RA. Recovery care complete.

## 2020-09-30 NOTE — OP NOTE
PROCEDURE: Sacroiliac joint and Greater trochanteric bursa injection under fluoroscopic guidance       SIDE: bilateral Sacroiliac injection and bilateral greater trochanteric bursa injection      PROCEDURE DATE: 9/30/2020    PREOPERATIVE DIAGNOSIS: Sacroiliitis, greater trochanteric bursitis  POSTOPERATIVE DIAGNOSIS: Sacroiliitis, greater trochanteric bursitis    PROVIDER: Vin Shukla MD  Assistant(s): none    Anesthesia: Local, IV Sedation     >> 1 mg of VERSED    >> 100 mcg of FENTANYL     INDICATION: The patient has a history of pain due to sacroiliitis unresponsive to conservative treatments. Fluoroscopy was used to optimize visualization of needle placement and to maximize safety.       PROCEDURE DESCRIPTION: The patient was seen and identified in the preoperative area. Risks, benefits, complications, and alternatives were discussed with the patient. The patient agreed to proceed with the procedure and signed the consent. The site and side of the procedure was identified and marked.     The patient was taken to the procedural suite. The patient was positioned in prone orientation on procedure table. A time out was performed prior to any intervention. The procedure, site, side, and allergies were stated and agreed to by all present. The lumbosacral area extending to the skin overlying the femoral greater trochanter was widely prepped with ChloraPrep. The procedural site was draped in usual sterile fashion. Vital signs were closely monitored throughout this procedure.     The fluoroscopic camera was placed in contralateral oblique view and was adjusted until the anterior and posterior joint margins of the targeted sacroiliac joint aligned in linear array. The lower pole of the joint was identified, marked, and localized with 1% Lidocaine. A 25 gauge 3.5 inch spinal needle was introduced and advanced to the joint under fluoroscopic guidance. The joint space was entered and after negative aspiration, 2 mL of  injectate was posited into the joint space. The needle was then withdrawn outside of the joint space and after negative aspiration 1 mL of solution was injected outside of the joint space. The injectant solution used was comprised of 7 mL of 1% PF Lidocaine and 3 mL of Methylprednisolone (40 mg/mL). This techniques was performed for the above noted joint/s.    Following Sacroiliac Joint injection, the stylet was replaced and the needle was withdrawn intact. The RIGHT and LEFT greater trochanter was then identified with fluoroscopy. The targeted site of entry was localized with 1% PF Lidocaine. The above noted spinal needle was advanced to the lateral border of the greater trochanter until the osseus interface was met.  After negative aspiration, 2 mL of the above noted solution was injected. No pain or paresthesia was noted on injection. Following injection, the stylet was replaced and the needle was withdrawn intact. This technique was used for the above noted greater trochanteric bursa / bursae. The skin was cleaned and bandages were applied.    Description of Findings: Not applicable    Prosthetic devices, grafts, tissues, or devices implanted: None    Specimen Removed: No    Estimated Blood Loss: minimal    COMPLICATIONS: None    DISPOSITION / PLANS: The patient was transferred to the recovery area in a stable condition for observation. The patient was reexamined prior to discharge. There was no evidence of acute neurologic injury following the procedure.  Patient was discharged from the recovery room after meeting discharge criteria. Home discharge instructions were given to the patient by the staff.

## 2020-09-30 NOTE — DISCHARGE INSTRUCTIONS
1. Side effects may include: headache, restlessness at night, weakness to upper or lower extremities (arms or legs), flushing of the face and/ or neck. None are life threatening and will subside within 2-3 days.   2. If you have SEVERE headache with nausea and extreme pain, please call office ([phone]). Unless you usually have this type of migraine headache.   3. If you develop fever (greater than 101 F) or have any redness, swelling, or drainage at the injection site(s), please call off ([phone]). This may be a sign of infection.   4. If a rash or whelps (like hives) occur, please call the office ([phone]).  5. If you are a heart patient, please watch for symptoms such as swelling in your hands and feet and shortness of breath. If any of these symptoms occur, please notify your primary care/ heart doctor and go to the nearest emergency room.  6. If you have high blood pressure, you may experience an increase in your blood pressure over the next two weeks. Please continue to monitor your blood pressure and contact your primary care provider if your blood pressure does not return to baseline.    7. If you are a diabetic or you monitor your blood sugar, you may have an increase in your blood sugar over the next two weeks. If your blood sugar does not return to your baseline, please contact your primary care provider.  8. NO HEAT TO THE INJECTION SITE for the next 24 hours including: bath or shower, heating pad, moist heat, and / or hot tubs.   9. May use ice pack to injection site for any pain or discomfort. Apply ice pack for 20 minutes then remove for 20 minutes before re- applying to site. (recipe for homemade frozen gel pack below)  10. DO NOT DRIVE OR OPERATE HEAVY MACHINERY UNTIL TOMORROW MORNING.   11. OK to resume all medications unless otherwise directed by your doctor.  12. Injection(s) may take up to two weeks until full effects are noted.  13. You may return to normal activity/ work the  following day.  14. Please do not receive a flu or pneumonia vaccine until one week after your procedure.  .  Homemade Frozen Gel Ice Pack:  1 bottle of rubbing alcohol  3 bottles of water (use rubbing alcohol bottle to measure)  2 large zip lock bags    Instructions:  1. Pour alcohol and water into zip lock bag. Make sure bag is large enough to allow for expansion.  2. Try to get as much air out of the freezer bag before sealing it shut.   3. Place the bag and its contents inside a second freezer bag to contain any leakage.   4. Leave the bag in the freezer for at least one hour.  5. When it's ready, place a towel between the gel pack and bare skin to avoid burning the skin.

## 2020-09-30 NOTE — DISCHARGE SUMMARY
[facility]  Discharge Note       Description of Procedure: Bilateral Sacroiliac Joint and Greater Trochanteric Bursa Injection under Fluoroscopic Guidance    Procedure Date: 9/30/2020    Admit Date: 9/30/2020  Discharge Date: 9/30/2020     Attending Physician: Vin Shukla   Discharge Provider: Vin Shukla    Preoperative Diagnosis: Sacroiliitis and Greater Trochanteric Bursitis     Postoperative Diagnosis: as above, same as preoperative diagnosis    Discharged Condition: Stable    Hospital Course: Patient was admitted for an outpatient procedure. The procedure was tolerated well with no complications.    Final Diagnoses: Same as principal problem.    Disposition: Home, self-care.    Follow up/Patient Instructions:  Follow-up in clinic in 2-3 weeks.    Medications: No medications were prescribed today. The patient was advised to resume normal medication regimen without change.  Specific information was provided regarding restarting any anticoagulant/s.    Discharge Procedure Orders (must include Diet, Follow-up, Activity):  Light activity for the remainder of the day, resume normal activity tomorrow. Resume normal diet. Follow-up in clinic in 2-3 weeks.

## 2020-10-14 RX ORDER — FLUTICASONE PROPIONATE 50 MCG
SPRAY, SUSPENSION (ML) NASAL
Qty: 16 ML | Refills: 0 | Status: SHIPPED | OUTPATIENT
Start: 2020-10-14 | End: 2020-11-09

## 2020-10-28 ENCOUNTER — OFFICE VISIT (OUTPATIENT)
Dept: OPHTHALMOLOGY | Facility: CLINIC | Age: 70
End: 2020-10-28
Payer: MEDICARE

## 2020-10-28 ENCOUNTER — PATIENT OUTREACH (OUTPATIENT)
Dept: ADMINISTRATIVE | Facility: OTHER | Age: 70
End: 2020-10-28

## 2020-10-28 ENCOUNTER — OFFICE VISIT (OUTPATIENT)
Dept: PAIN MEDICINE | Facility: CLINIC | Age: 70
End: 2020-10-28
Payer: MEDICARE

## 2020-10-28 VITALS
SYSTOLIC BLOOD PRESSURE: 152 MMHG | HEART RATE: 67 BPM | DIASTOLIC BLOOD PRESSURE: 96 MMHG | WEIGHT: 221 LBS | BODY MASS INDEX: 32.73 KG/M2 | RESPIRATION RATE: 18 BRPM | HEIGHT: 69 IN

## 2020-10-28 DIAGNOSIS — M70.61 GREATER TROCHANTERIC BURSITIS OF BOTH HIPS: ICD-10-CM

## 2020-10-28 DIAGNOSIS — H52.4 BILATERAL PRESBYOPIA: ICD-10-CM

## 2020-10-28 DIAGNOSIS — M53.3 CHRONIC SI JOINT PAIN: Primary | ICD-10-CM

## 2020-10-28 DIAGNOSIS — Z83.518 FAMILY HISTORY OF MACULAR DEGENERATION: ICD-10-CM

## 2020-10-28 DIAGNOSIS — E11.36 DIABETIC CATARACT: ICD-10-CM

## 2020-10-28 DIAGNOSIS — M47.816 LUMBAR SPONDYLOSIS: ICD-10-CM

## 2020-10-28 DIAGNOSIS — E11.9 DIABETES MELLITUS TYPE 2 WITHOUT RETINOPATHY: Primary | ICD-10-CM

## 2020-10-28 DIAGNOSIS — M70.62 GREATER TROCHANTERIC BURSITIS OF BOTH HIPS: ICD-10-CM

## 2020-10-28 DIAGNOSIS — G89.29 CHRONIC SI JOINT PAIN: Primary | ICD-10-CM

## 2020-10-28 PROCEDURE — 99999 PR PBB SHADOW E&M-EST. PATIENT-LVL IV: CPT | Mod: PBBFAC,,, | Performed by: PHYSICIAN ASSISTANT

## 2020-10-28 PROCEDURE — 99214 OFFICE O/P EST MOD 30 MIN: CPT | Mod: S$PBB,,, | Performed by: PHYSICIAN ASSISTANT

## 2020-10-28 PROCEDURE — 92015 DETERMINE REFRACTIVE STATE: CPT | Mod: ,,, | Performed by: OPTOMETRIST

## 2020-10-28 PROCEDURE — 99999 PR PBB SHADOW E&M-EST. PATIENT-LVL III: ICD-10-PCS | Mod: PBBFAC,,, | Performed by: OPTOMETRIST

## 2020-10-28 PROCEDURE — 92014 COMPRE OPH EXAM EST PT 1/>: CPT | Mod: S$PBB,,, | Performed by: OPTOMETRIST

## 2020-10-28 PROCEDURE — 99214 OFFICE O/P EST MOD 30 MIN: CPT | Mod: PBBFAC | Performed by: PHYSICIAN ASSISTANT

## 2020-10-28 PROCEDURE — 92015 PR REFRACTION: ICD-10-PCS | Mod: ,,, | Performed by: OPTOMETRIST

## 2020-10-28 PROCEDURE — 99213 OFFICE O/P EST LOW 20 MIN: CPT | Mod: PBBFAC,27 | Performed by: OPTOMETRIST

## 2020-10-28 PROCEDURE — 99999 PR PBB SHADOW E&M-EST. PATIENT-LVL IV: ICD-10-PCS | Mod: PBBFAC,,, | Performed by: PHYSICIAN ASSISTANT

## 2020-10-28 PROCEDURE — 92014 PR EYE EXAM, EST PATIENT,COMPREHESV: ICD-10-PCS | Mod: S$PBB,,, | Performed by: OPTOMETRIST

## 2020-10-28 PROCEDURE — 99999 PR PBB SHADOW E&M-EST. PATIENT-LVL III: CPT | Mod: PBBFAC,,, | Performed by: OPTOMETRIST

## 2020-10-28 PROCEDURE — 99214 PR OFFICE/OUTPT VISIT, EST, LEVL IV, 30-39 MIN: ICD-10-PCS | Mod: S$PBB,,, | Performed by: PHYSICIAN ASSISTANT

## 2020-10-28 NOTE — PROGRESS NOTES
Chief Pain Complaint:  Low-back Pain    Interval History (10/28/2020): Patient was seen on 9/30/20. At that time she underwent bilateral SIJ + GT bursa injection.  The patient reports that she is/was better following the procedure.  she reports 100% pain relief.  The changes lasted 4 weeks so far.  The changes have continued through this visit.  Patient reports pain as 0/10 today.    Interval History (9/17/2020): Patient was seen on 7/23/20. At that time she underwent bilateral SIJ + GT bursa injection.  The patient reports that she is/was better following the procedure.  she reports 100% pain relief.  The changes lasted >4 weeks so far.  The changes have NOT continued through this visit.  Patient reports pain as 4/10 today.    History of Present Illness:   Deirdre Yanez is a 70 y.o. female  who is presenting with a chief complaint of Low-back Pain  . The patient began experiencing this problem insidiously, and the pain has been gradually worsening over the past 3 year(s). The pain is described as throbbing, cramping, aching and heavy and is located in the bilateral buttocks. Pain is intermittent and lasts hours. The  pain is nonradiating. The patient rates her pain a 8 out of ten and interferes with activities of daily living a 8 out of ten. Pain is exacerbated by getting up from a seated position, and is improved by rest. Patient reports prior trauma fall from 30 foot landed on her feet >10 years ago, no prior spine surgery.      - pertinent negatives: No fever, No chills, No weight loss, No bladder dysfunction, No bowel dysfunction, No saddle anesthesia  - pertinent positives: none    - medications, other therapies tried (physical therapy, injections):     >> Tylenol and flexeril    >> Has previously undergone Physical Therapy    >> Has previously undergone spinal injection/s    - in clinic GT bursa injections with relief for about a year   - bilateral SIJ + GT bursa injection on 7/23/20 with 100% relief for  several weeks until she helped her   a fan, 60% at this time 8 weeks after   - bilateral SIJ + GT bursa injection on 9/30/20 with 100% pain relief     Imaging / Labs / Studies (reviewed on 10/28/2020):    Results for orders placed during the hospital encounter of 08/01/19   MRI Lumbar Spine Without Contrast    Narrative FINDINGS:  There is severe levoscoliosis of the lumbar spine with the apex located at approximately L1.  there is also a couple mm of anterolisthesis of L4 on L5.  The vertebral body heights are well maintained, with no fracture.  No marrow signal abnormality suspicious for an infiltrative process.  The conus medullaris terminates at approximately the inferior endplate of L2.  There appear to be a couple of tiny probable subcentimeter cysts within the left kidney and at least a single incompletely visualized probable cyst within the interpolar to lower pole region of the right kidney measuring approximately 11 mm.  There is disc desiccation noted throughout the lumbar spine.  Prominent multilevel facet arthropathy noted beginning at approximately the L2-3 through the L5-S1 levels.  L1-L2: No significant central canal or neural foraminal narrowing.  L2-L3: No significant central canal or neural foraminal narrowing.  L3-L4: Ligamentum flavum hypertrophy prominent bilateral facet arthropathy mild diffuse disc bulge and scoliosis resulting in mild-to-moderate narrowing of the central canal.  No significant neural foraminal canal narrowing.  L4-L5:  Severe bilateral facet arthropathy left greater than the right resulting in no significant central or neural foraminal canal narrowing.  L5-S1:  Mild-to-moderate bilateral facet arthropathy left greater than the right resulting in no significant central or neural foraminal canal narrowing.    Impression 1. Prominent scoliosis of the lumbar spine.  2. Multilevel degenerative changes with the greatest degree of central canal narrowing noted at the  L3-4 level.  No high-grade neural foraminal canal narrowing.  3. Remaining findings as discussed above.       Results for orders placed during the hospital encounter of 06/28/19   X-Ray Lumbar Spine AP And Lateral    Narrative COMPARISON:  08/03/2012  FINDINGS:  Severe levoscoliosis of the lumbar spine again noted.  Vertebral body heights appear within normal limits.  There is slight grade 1 anterolisthesis of L4 on L5 may be slightly worsened from prior.  Multilevel facet arthropathy noted.  Moderate to severe disc height loss noted throughout the lumbar spine which appear similar to prior.  Posterior elements appear grossly intact. No fractures demonstrated.  The remaining visualized osseous and soft tissue structures demonstrate no appreciable abnormality.     Results for orders placed during the hospital encounter of 06/28/19   X-Ray Cervical Spine AP And Lateral    Narrative FINDINGS:  There is slight grade 1 anterolisthesis of C4 on C5.  Vertebral body heights are well maintained.  There is moderate to severe disc height loss at C5-6 through C7-T1.  Mild disc height loss at C4-5.  Multilevel facet arthropathy noted.  Posterior elements appear intact without acute fractures or subluxations demonstrated.  Odontoid process appears intact.  Atlantoaxial articulations appear normal.  Prevertebral soft tissues are within normal limits.         Review of Systems:  CONSTITUTIONAL: patient denies any fever, chills, or weight loss  SKIN: patient denies any rash or itching  RESPIRATORY: patient denies having any shortness of breath  GASTROINTESTINAL: patient denies having any diarrhea, constipation, or bowel incontinence  GENITOURINARY: patient denies having any abnormal bladder function    MUSCULOSKELETAL:  - patient complains of the above noted pain/s (see chief pain complaint)    NEUROLOGICAL:   - pain as above  - strength in Lower extremities is intact, BILATERALLY  - sensation in Lower extremities is intact,  "BILATERALLY  - patient denies any loss of bowel or bladder control      PSYCHIATRIC: patient denies any change in mood    Other:  All other systems reviewed and are negative      Physical Exam:  Vitals:  Vitals:    10/28/20 0828   BP: (!) 152/96   Pulse: 67   Resp: 18   Weight: 100.2 kg (221 lb)   Height: 5' 9" (1.753 m)   PainSc: 0-No pain   PainLoc: Back    (reviewed on 10/28/2020)    General: alert and oriented, in no apparent distress.  Gait: normal gait.  Skin: no rashes, no discoloration, no obvious lesions  HEENT: normocephalic, atraumatic. Pupils equal and round.  Cardiovascular: no significant peripheral edema present.  Respiratory: without use of accessory muscles of respiration.    Musculoskeletal - Lumbar Spine:  - Pain on flexion of lumbar spine: Absent   - Pain on extension of lumbar spine: Absent, improved   - Lumbar facet loading: Present, mild, improved since procedure   - TTP over the lumbar facet joints: Present, mild, improved since procedure   - TTP over the lumbar paraspinals: Absent, improved   - TTP over the SI joints: Absent, improved since procedure  - TTP over GT bursa: Absent, improved since procedure  - Straight Leg Raise: Negative  - LIZZY: Present     Neuro - Upper Extremities:  - BUE Strength:R/L: D: 5/5; B: 5/5; T: 5/5; WF: 5/5; WE: 5/5; IO: 5/5  - Extremity Reflexes: Brisk and symmetric throughout  - Sensory: Sensation to light touch intact bilaterally    Neuro - Lower Extremities:  - RLE Strength:     >> 5/5 strength with right hip flexion/ extension    >> 5/5 strength with right knee flexion/ extension    >> 5/5 strength in right ankle with dorsiflexion    >> 5/5 strength in right ankle with plantar flexion  - LLE Strength:     >> 5/5 strength with left hip flexion/ extension    >> 5/5 strength with knee flexion extension on the left     >> 5/5 strength in left ankle with dorsiflexion    >> 5/5 strength in left ankle with plantar flexion  - Extremity Reflexes: Brisk and symmetric " throughout  - Sensory: Sensation to light touch intact bilaterally      Psych:  Mood and affect is appropriate              Assessment:  Deirdre Yanez is a 70 y.o. year old female who is presenting with     Encounter Diagnoses   Name Primary?    Chronic SI joint pain Yes    Greater trochanteric bursitis of both hips     Lumbar spondylosis        Plan:  1. Interventional:   - S/p bilateral SIJ + GT bursa injection on 9/30/20 with 100% pain relief.   - Consider SIJ RFA, which we discussed today.  Consider Lumbar MBB.    2. Pharmacologic: Tylenol 500mg PRN, up to 3000mg per day.     3. Rehabilitative: Patient already tried PT.  Continue at-home exercises.    4. Diagnostic:  None.    5.  Follow up: 4 weeks post-injection - will send online ticket scheduling on [employer]     - This condition does not require this patient to take time off of work, and the primary goal of our Pain Management services is to improve the patient's functional capacity.   - I discussed the risks, benefits, and alternatives to potential treatment options. All questions and concerns were fully addressed today in clinic.           Disclaimer:  This note was prepared using voice recognition system and is likely to have sound alike errors that may have been overlooked even after proof reading.  Please call me with any questions.

## 2020-10-28 NOTE — PROGRESS NOTES
HPI     Diabetic Eye Exam      Additional comments: yearly              Comments     NIDDM  Last Exam w/ TRF 10/23/2019  Slight visual changes in reading  Dry eyes due to BP medication but uses AT's to soothe them  No Pain  .Lab Results       Component                Value               Date                       HGBA1C                   5.9 (H)             06/15/2020                    Last edited by Santino Chowdhury, OD on 10/28/2020  9:12 AM. (History)            Assessment /Plan     For exam results, see Encounter Report.    Diabetes mellitus type 2 without retinopathy    Family history of macular degeneration    Diabetic cataract    Bilateral presbyopia      No Background Diabetic Retinopathy    Moderate cataracts OU, not surgical  Myopic shift    No macular pathology, takes multivits    Dispense Final Rx for glasses.  RTC 1 year  Discussed above and answered questions.

## 2020-12-11 ENCOUNTER — PATIENT MESSAGE (OUTPATIENT)
Dept: OTHER | Facility: OTHER | Age: 70
End: 2020-12-11

## 2021-01-21 ENCOUNTER — PATIENT MESSAGE (OUTPATIENT)
Dept: TRANSPLANT | Facility: CLINIC | Age: 71
End: 2021-01-21

## 2021-01-25 ENCOUNTER — HOSPITAL ENCOUNTER (OUTPATIENT)
Dept: CARDIOLOGY | Facility: HOSPITAL | Age: 71
Discharge: HOME | End: 2021-01-25
Attending: NUCLEAR MEDICINE
Payer: MEDICARE

## 2021-01-25 ENCOUNTER — OFFICE VISIT (OUTPATIENT)
Dept: TRANSPLANT | Facility: CLINIC | Age: 71
End: 2021-01-25
Payer: MEDICARE

## 2021-01-25 VITALS
DIASTOLIC BLOOD PRESSURE: 80 MMHG | BODY MASS INDEX: 32.24 KG/M2 | SYSTOLIC BLOOD PRESSURE: 140 MMHG | OXYGEN SATURATION: 96 % | HEART RATE: 70 BPM | WEIGHT: 217.63 LBS | HEIGHT: 69 IN

## 2021-01-25 DIAGNOSIS — E78.5 HYPERLIPIDEMIA ASSOCIATED WITH TYPE 2 DIABETES MELLITUS: ICD-10-CM

## 2021-01-25 DIAGNOSIS — E11.59 HYPERTENSION ASSOCIATED WITH DIABETES: Primary | Chronic | ICD-10-CM

## 2021-01-25 DIAGNOSIS — E11.69 HYPERLIPIDEMIA ASSOCIATED WITH TYPE 2 DIABETES MELLITUS: ICD-10-CM

## 2021-01-25 DIAGNOSIS — I15.2 HYPERTENSION ASSOCIATED WITH DIABETES: Primary | Chronic | ICD-10-CM

## 2021-01-25 DIAGNOSIS — Z00.00 ROUTINE CHECK-UP: ICD-10-CM

## 2021-01-25 DIAGNOSIS — I50.9 CHF (NYHA CLASS I, ACC/AHA STAGE B): Chronic | ICD-10-CM

## 2021-01-25 DIAGNOSIS — I50.32 DIASTOLIC CHF, CHRONIC: Chronic | ICD-10-CM

## 2021-01-25 PROCEDURE — 99215 OFFICE O/P EST HI 40 MIN: CPT | Mod: PBBFAC | Performed by: NUCLEAR MEDICINE

## 2021-01-25 PROCEDURE — 99214 PR OFFICE/OUTPT VISIT, EST, LEVL IV, 30-39 MIN: ICD-10-PCS | Mod: S$PBB,,, | Performed by: NUCLEAR MEDICINE

## 2021-01-25 PROCEDURE — 93010 ELECTROCARDIOGRAM REPORT: CPT | Mod: ,,, | Performed by: INTERNAL MEDICINE

## 2021-01-25 PROCEDURE — 99999 PR PBB SHADOW E&M-EST. PATIENT-LVL V: ICD-10-PCS | Mod: PBBFAC,,, | Performed by: NUCLEAR MEDICINE

## 2021-01-25 PROCEDURE — 93005 ELECTROCARDIOGRAM TRACING: CPT

## 2021-01-25 PROCEDURE — 93010 EKG 12-LEAD: ICD-10-PCS | Mod: ,,, | Performed by: INTERNAL MEDICINE

## 2021-01-25 PROCEDURE — 99214 OFFICE O/P EST MOD 30 MIN: CPT | Mod: S$PBB,,, | Performed by: NUCLEAR MEDICINE

## 2021-01-25 PROCEDURE — 99999 PR PBB SHADOW E&M-EST. PATIENT-LVL V: CPT | Mod: PBBFAC,,, | Performed by: NUCLEAR MEDICINE

## 2021-01-25 RX ORDER — SPIRONOLACTONE 50 MG/1
50 TABLET, FILM COATED ORAL NIGHTLY
Qty: 90 TABLET | Refills: 3 | Status: SHIPPED | OUTPATIENT
Start: 2021-01-25 | End: 2021-12-01 | Stop reason: SDUPTHER

## 2021-01-25 RX ORDER — CARVEDILOL 12.5 MG/1
12.5 TABLET ORAL 2 TIMES DAILY WITH MEALS
Qty: 180 TABLET | Refills: 3 | Status: SHIPPED | OUTPATIENT
Start: 2021-01-25 | End: 2021-03-11 | Stop reason: SDUPTHER

## 2021-03-11 DIAGNOSIS — I50.32 DIASTOLIC CHF, CHRONIC: Chronic | ICD-10-CM

## 2021-03-11 RX ORDER — CARVEDILOL 12.5 MG/1
12.5 TABLET ORAL 2 TIMES DAILY WITH MEALS
Qty: 180 TABLET | Refills: 3 | Status: SHIPPED | OUTPATIENT
Start: 2021-03-11 | End: 2021-12-01 | Stop reason: SDUPTHER

## 2021-04-09 ENCOUNTER — PATIENT OUTREACH (OUTPATIENT)
Dept: ADMINISTRATIVE | Facility: HOSPITAL | Age: 71
End: 2021-04-09

## 2021-04-10 DIAGNOSIS — M41.9 SCOLIOSIS OF THORACOLUMBAR SPINE, UNSPECIFIED SCOLIOSIS TYPE: ICD-10-CM

## 2021-04-10 RX ORDER — TIZANIDINE 2 MG/1
TABLET ORAL
Qty: 30 TABLET | Refills: 5 | Status: SHIPPED | OUTPATIENT
Start: 2021-04-10 | End: 2022-09-11 | Stop reason: SDUPTHER

## 2021-04-12 ENCOUNTER — PATIENT MESSAGE (OUTPATIENT)
Dept: INTERNAL MEDICINE | Facility: CLINIC | Age: 71
End: 2021-04-12

## 2021-05-07 NOTE — TELEPHONE ENCOUNTER
----- Message from Winter Roy sent at 10/6/2017  9:48 AM CDT -----  Contact: pt  Please give pt a call at ..[phone] (cpai) regarding orders for her mammogram.   no

## 2021-05-13 ENCOUNTER — OFFICE VISIT (OUTPATIENT)
Dept: INTERNAL MEDICINE | Facility: CLINIC | Age: 71
End: 2021-05-13
Payer: MEDICARE

## 2021-05-13 VITALS
HEIGHT: 69 IN | WEIGHT: 218.69 LBS | DIASTOLIC BLOOD PRESSURE: 72 MMHG | SYSTOLIC BLOOD PRESSURE: 134 MMHG | TEMPERATURE: 98 F | BODY MASS INDEX: 32.39 KG/M2 | HEART RATE: 71 BPM | RESPIRATION RATE: 18 BRPM | OXYGEN SATURATION: 99 %

## 2021-05-13 DIAGNOSIS — Z12.31 SCREENING MAMMOGRAM, ENCOUNTER FOR: ICD-10-CM

## 2021-05-13 DIAGNOSIS — R73.03 PREDIABETES: ICD-10-CM

## 2021-05-13 DIAGNOSIS — F41.9 ANXIETY: ICD-10-CM

## 2021-05-13 DIAGNOSIS — L65.9 ALOPECIA: ICD-10-CM

## 2021-05-13 DIAGNOSIS — G47.00 INSOMNIA, UNSPECIFIED TYPE: ICD-10-CM

## 2021-05-13 DIAGNOSIS — I10 HYPERTENSION, UNSPECIFIED TYPE: ICD-10-CM

## 2021-05-13 DIAGNOSIS — Z00.00 ROUTINE PHYSICAL EXAMINATION: Primary | ICD-10-CM

## 2021-05-13 PROCEDURE — 99999 PR PBB SHADOW E&M-EST. PATIENT-LVL IV: CPT | Mod: PBBFAC,,, | Performed by: FAMILY MEDICINE

## 2021-05-13 PROCEDURE — 99397 PER PM REEVAL EST PAT 65+ YR: CPT | Mod: S$PBB,,, | Performed by: FAMILY MEDICINE

## 2021-05-13 PROCEDURE — 99999 PR PBB SHADOW E&M-EST. PATIENT-LVL IV: ICD-10-PCS | Mod: PBBFAC,,, | Performed by: FAMILY MEDICINE

## 2021-05-13 PROCEDURE — 99397 PR PREVENTIVE VISIT,EST,65 & OVER: ICD-10-PCS | Mod: S$PBB,,, | Performed by: FAMILY MEDICINE

## 2021-05-13 PROCEDURE — 99214 OFFICE O/P EST MOD 30 MIN: CPT | Mod: PBBFAC | Performed by: FAMILY MEDICINE

## 2021-05-13 RX ORDER — ALPRAZOLAM 0.25 MG/1
0.25 TABLET ORAL 2 TIMES DAILY PRN
Qty: 40 TABLET | Refills: 0 | Status: SHIPPED | OUTPATIENT
Start: 2021-05-13 | End: 2022-06-09

## 2021-05-19 ENCOUNTER — LAB VISIT (OUTPATIENT)
Dept: LAB | Facility: HOSPITAL | Age: 71
End: 2021-05-19
Attending: FAMILY MEDICINE
Payer: MEDICARE

## 2021-05-19 DIAGNOSIS — L65.9 ALOPECIA: ICD-10-CM

## 2021-05-19 DIAGNOSIS — Z00.00 ROUTINE PHYSICAL EXAMINATION: ICD-10-CM

## 2021-05-19 DIAGNOSIS — I10 HYPERTENSION, UNSPECIFIED TYPE: ICD-10-CM

## 2021-05-19 DIAGNOSIS — R73.03 PREDIABETES: ICD-10-CM

## 2021-05-19 LAB
ALBUMIN SERPL BCP-MCNC: 3.9 G/DL (ref 3.5–5.2)
ALP SERPL-CCNC: 67 U/L (ref 55–135)
ALT SERPL W/O P-5'-P-CCNC: 22 U/L (ref 10–44)
ANION GAP SERPL CALC-SCNC: 15 MMOL/L (ref 8–16)
AST SERPL-CCNC: 29 U/L (ref 10–40)
BILIRUB SERPL-MCNC: 0.9 MG/DL (ref 0.1–1)
BUN SERPL-MCNC: 19 MG/DL (ref 8–23)
CALCIUM SERPL-MCNC: 10 MG/DL (ref 8.7–10.5)
CHLORIDE SERPL-SCNC: 104 MMOL/L (ref 95–110)
CHOLEST SERPL-MCNC: 213 MG/DL (ref 120–199)
CHOLEST/HDLC SERPL: 3.6 {RATIO} (ref 2–5)
CO2 SERPL-SCNC: 21 MMOL/L (ref 23–29)
CREAT SERPL-MCNC: 0.9 MG/DL (ref 0.5–1.4)
EST. GFR  (AFRICAN AMERICAN): >60 ML/MIN/1.73 M^2
EST. GFR  (NON AFRICAN AMERICAN): >60 ML/MIN/1.73 M^2
ESTIMATED AVG GLUCOSE: 123 MG/DL (ref 68–131)
GLUCOSE SERPL-MCNC: 106 MG/DL (ref 70–110)
HBA1C MFR BLD: 5.9 % (ref 4–5.6)
HDLC SERPL-MCNC: 59 MG/DL (ref 40–75)
HDLC SERPL: 27.7 % (ref 20–50)
LDLC SERPL CALC-MCNC: 140.2 MG/DL (ref 63–159)
NONHDLC SERPL-MCNC: 154 MG/DL
POTASSIUM SERPL-SCNC: 4.4 MMOL/L (ref 3.5–5.1)
PROT SERPL-MCNC: 7.2 G/DL (ref 6–8.4)
SODIUM SERPL-SCNC: 140 MMOL/L (ref 136–145)
TRIGL SERPL-MCNC: 69 MG/DL (ref 30–150)
TSH SERPL DL<=0.005 MIU/L-ACNC: 0.77 UIU/ML (ref 0.4–4)

## 2021-05-19 PROCEDURE — 80053 COMPREHEN METABOLIC PANEL: CPT | Performed by: FAMILY MEDICINE

## 2021-05-19 PROCEDURE — 84443 ASSAY THYROID STIM HORMONE: CPT | Performed by: FAMILY MEDICINE

## 2021-05-19 PROCEDURE — 83036 HEMOGLOBIN GLYCOSYLATED A1C: CPT | Performed by: FAMILY MEDICINE

## 2021-05-19 PROCEDURE — 36415 COLL VENOUS BLD VENIPUNCTURE: CPT | Mod: PO | Performed by: FAMILY MEDICINE

## 2021-05-19 PROCEDURE — 80061 LIPID PANEL: CPT | Performed by: FAMILY MEDICINE

## 2021-06-16 ENCOUNTER — TELEPHONE (OUTPATIENT)
Dept: ADMINISTRATIVE | Facility: HOSPITAL | Age: 71
End: 2021-06-16

## 2021-06-17 ENCOUNTER — PATIENT MESSAGE (OUTPATIENT)
Dept: INTERNAL MEDICINE | Facility: CLINIC | Age: 71
End: 2021-06-17

## 2021-07-19 ENCOUNTER — TELEPHONE (OUTPATIENT)
Dept: RADIOLOGY | Facility: HOSPITAL | Age: 71
End: 2021-07-19

## 2021-07-20 ENCOUNTER — HOSPITAL ENCOUNTER (OUTPATIENT)
Dept: RADIOLOGY | Facility: HOSPITAL | Age: 71
Discharge: HOME | End: 2021-07-20
Attending: FAMILY MEDICINE
Payer: MEDICARE

## 2021-07-20 ENCOUNTER — HOSPITAL ENCOUNTER (OUTPATIENT)
Dept: RADIOLOGY | Facility: HOSPITAL | Age: 71
Discharge: HOME | End: 2021-07-20
Attending: ORTHOPAEDIC SURGERY
Payer: MEDICARE

## 2021-07-20 VITALS — HEIGHT: 69 IN | BODY MASS INDEX: 32.39 KG/M2 | WEIGHT: 218.69 LBS

## 2021-07-20 DIAGNOSIS — E04.2 MULTIPLE THYROID NODULES: ICD-10-CM

## 2021-07-20 DIAGNOSIS — Z12.31 SCREENING MAMMOGRAM, ENCOUNTER FOR: ICD-10-CM

## 2021-07-20 PROCEDURE — 77067 SCR MAMMO BI INCL CAD: CPT | Mod: 26,,, | Performed by: RADIOLOGY

## 2021-07-20 PROCEDURE — 77067 MAMMO DIGITAL SCREENING BILAT WITH TOMO: ICD-10-PCS | Mod: 26,,, | Performed by: RADIOLOGY

## 2021-07-20 PROCEDURE — 76536 US EXAM OF HEAD AND NECK: CPT | Mod: 26,,, | Performed by: RADIOLOGY

## 2021-07-20 PROCEDURE — 76536 US EXAM OF HEAD AND NECK: CPT | Mod: TC

## 2021-07-20 PROCEDURE — 76536 US SOFT TISSUE HEAD NECK THYROID: ICD-10-PCS | Mod: 26,,, | Performed by: RADIOLOGY

## 2021-07-20 PROCEDURE — 77063 MAMMO DIGITAL SCREENING BILAT WITH TOMO: ICD-10-PCS | Mod: 26,,, | Performed by: RADIOLOGY

## 2021-07-20 PROCEDURE — 77063 BREAST TOMOSYNTHESIS BI: CPT | Mod: 26,,, | Performed by: RADIOLOGY

## 2021-07-20 PROCEDURE — 77067 SCR MAMMO BI INCL CAD: CPT | Mod: TC

## 2021-07-29 ENCOUNTER — TELEPHONE (OUTPATIENT)
Dept: CARDIOLOGY | Facility: CLINIC | Age: 71
End: 2021-07-29

## 2021-07-29 DIAGNOSIS — I11.0 HYPERTENSIVE HEART DISEASE WITH CHRONIC DIASTOLIC CONGESTIVE HEART FAILURE: ICD-10-CM

## 2021-07-29 DIAGNOSIS — I50.32 HYPERTENSIVE HEART DISEASE WITH CHRONIC DIASTOLIC CONGESTIVE HEART FAILURE: ICD-10-CM

## 2021-07-29 DIAGNOSIS — I50.9 CHF (NYHA CLASS I, ACC/AHA STAGE B): Primary | ICD-10-CM

## 2021-08-02 ENCOUNTER — OFFICE VISIT (OUTPATIENT)
Dept: OTOLARYNGOLOGY | Facility: CLINIC | Age: 71
End: 2021-08-02
Payer: MEDICARE

## 2021-08-02 VITALS — BODY MASS INDEX: 32.72 KG/M2 | WEIGHT: 221.56 LBS | TEMPERATURE: 98 F

## 2021-08-02 DIAGNOSIS — H93.13 TINNITUS, BILATERAL: ICD-10-CM

## 2021-08-02 DIAGNOSIS — E04.2 MULTIPLE THYROID NODULES: Primary | ICD-10-CM

## 2021-08-02 DIAGNOSIS — J30.89 NON-SEASONAL ALLERGIC RHINITIS, UNSPECIFIED TRIGGER: ICD-10-CM

## 2021-08-02 PROCEDURE — 99213 OFFICE O/P EST LOW 20 MIN: CPT | Mod: PBBFAC | Performed by: ORTHOPAEDIC SURGERY

## 2021-08-02 PROCEDURE — 99214 OFFICE O/P EST MOD 30 MIN: CPT | Mod: S$PBB,,, | Performed by: ORTHOPAEDIC SURGERY

## 2021-08-02 PROCEDURE — 99999 PR PBB SHADOW E&M-EST. PATIENT-LVL III: CPT | Mod: PBBFAC,,, | Performed by: ORTHOPAEDIC SURGERY

## 2021-08-02 PROCEDURE — 99214 PR OFFICE/OUTPT VISIT, EST, LEVL IV, 30-39 MIN: ICD-10-PCS | Mod: S$PBB,,, | Performed by: ORTHOPAEDIC SURGERY

## 2021-08-02 PROCEDURE — 99999 PR PBB SHADOW E&M-EST. PATIENT-LVL III: ICD-10-PCS | Mod: PBBFAC,,, | Performed by: ORTHOPAEDIC SURGERY

## 2021-08-05 ENCOUNTER — CLINICAL SUPPORT (OUTPATIENT)
Dept: AUDIOLOGY | Facility: CLINIC | Age: 71
End: 2021-08-05
Payer: MEDICARE

## 2021-08-05 DIAGNOSIS — H93.13 TINNITUS, BILATERAL: ICD-10-CM

## 2021-08-05 DIAGNOSIS — H90.3 SENSORINEURAL HEARING LOSS (SNHL), BILATERAL: Primary | ICD-10-CM

## 2021-08-05 PROCEDURE — 92567 TYMPANOMETRY: CPT | Mod: PBBFAC | Performed by: AUDIOLOGIST-HEARING AID FITTER

## 2021-08-05 PROCEDURE — 92557 COMPREHENSIVE HEARING TEST: CPT | Mod: PBBFAC | Performed by: AUDIOLOGIST-HEARING AID FITTER

## 2021-08-12 ENCOUNTER — LAB VISIT (OUTPATIENT)
Dept: LAB | Facility: HOSPITAL | Age: 71
End: 2021-08-12
Attending: NUCLEAR MEDICINE
Payer: MEDICARE

## 2021-08-12 DIAGNOSIS — I11.0 HYPERTENSIVE HEART DISEASE WITH CHRONIC DIASTOLIC CONGESTIVE HEART FAILURE: ICD-10-CM

## 2021-08-12 DIAGNOSIS — I50.9 CHF (NYHA CLASS I, ACC/AHA STAGE B): ICD-10-CM

## 2021-08-12 DIAGNOSIS — I50.32 HYPERTENSIVE HEART DISEASE WITH CHRONIC DIASTOLIC CONGESTIVE HEART FAILURE: ICD-10-CM

## 2021-08-12 LAB
ANION GAP SERPL CALC-SCNC: 9 MMOL/L (ref 8–16)
BUN SERPL-MCNC: 17 MG/DL (ref 8–23)
CALCIUM SERPL-MCNC: 9.7 MG/DL (ref 8.7–10.5)
CHLORIDE SERPL-SCNC: 101 MMOL/L (ref 95–110)
CO2 SERPL-SCNC: 27 MMOL/L (ref 23–29)
CREAT SERPL-MCNC: 0.8 MG/DL (ref 0.5–1.4)
EST. GFR  (AFRICAN AMERICAN): >60 ML/MIN/1.73 M^2
EST. GFR  (NON AFRICAN AMERICAN): >60 ML/MIN/1.73 M^2
GLUCOSE SERPL-MCNC: 106 MG/DL (ref 70–110)
POTASSIUM SERPL-SCNC: 5 MMOL/L (ref 3.5–5.1)
SODIUM SERPL-SCNC: 137 MMOL/L (ref 136–145)

## 2021-08-12 PROCEDURE — 80048 BASIC METABOLIC PNL TOTAL CA: CPT | Performed by: NUCLEAR MEDICINE

## 2021-08-12 PROCEDURE — 36415 COLL VENOUS BLD VENIPUNCTURE: CPT | Performed by: NUCLEAR MEDICINE

## 2021-09-15 ENCOUNTER — OFFICE VISIT (OUTPATIENT)
Dept: INTERNAL MEDICINE | Facility: CLINIC | Age: 71
End: 2021-09-15
Payer: MEDICARE

## 2021-09-15 ENCOUNTER — OFFICE VISIT (OUTPATIENT)
Dept: TRANSPLANT | Facility: CLINIC | Age: 71
End: 2021-09-15
Payer: MEDICARE

## 2021-09-15 VITALS
HEART RATE: 73 BPM | DIASTOLIC BLOOD PRESSURE: 80 MMHG | WEIGHT: 220.44 LBS | BODY MASS INDEX: 32.65 KG/M2 | SYSTOLIC BLOOD PRESSURE: 130 MMHG | HEIGHT: 69 IN | OXYGEN SATURATION: 96 %

## 2021-09-15 VITALS
HEART RATE: 73 BPM | SYSTOLIC BLOOD PRESSURE: 130 MMHG | DIASTOLIC BLOOD PRESSURE: 80 MMHG | OXYGEN SATURATION: 96 % | HEIGHT: 67 IN | BODY MASS INDEX: 34.6 KG/M2 | WEIGHT: 220.44 LBS

## 2021-09-15 DIAGNOSIS — E11.59 HYPERTENSION ASSOCIATED WITH DIABETES: Primary | Chronic | ICD-10-CM

## 2021-09-15 DIAGNOSIS — E66.01 SEVERE OBESITY (BMI 35.0-35.9 WITH COMORBIDITY): ICD-10-CM

## 2021-09-15 DIAGNOSIS — Z00.00 ENCOUNTER FOR PREVENTIVE HEALTH EXAMINATION: Primary | ICD-10-CM

## 2021-09-15 DIAGNOSIS — E04.2 MULTIPLE THYROID NODULES: ICD-10-CM

## 2021-09-15 DIAGNOSIS — E11.69 HYPERLIPIDEMIA ASSOCIATED WITH TYPE 2 DIABETES MELLITUS: ICD-10-CM

## 2021-09-15 DIAGNOSIS — I10 HYPERTENSION, UNSPECIFIED TYPE: ICD-10-CM

## 2021-09-15 DIAGNOSIS — I87.2 VENOUS INSUFFICIENCY: ICD-10-CM

## 2021-09-15 DIAGNOSIS — Z82.49 FAMILY HISTORY OF THORACIC AORTIC ANEURYSM: ICD-10-CM

## 2021-09-15 DIAGNOSIS — E78.5 HYPERLIPIDEMIA ASSOCIATED WITH TYPE 2 DIABETES MELLITUS: ICD-10-CM

## 2021-09-15 DIAGNOSIS — I50.9 CHF (NYHA CLASS I, ACC/AHA STAGE B): Chronic | ICD-10-CM

## 2021-09-15 DIAGNOSIS — F41.9 ANXIETY: ICD-10-CM

## 2021-09-15 DIAGNOSIS — I15.2 HYPERTENSION ASSOCIATED WITH DIABETES: Primary | Chronic | ICD-10-CM

## 2021-09-15 PROCEDURE — 99215 OFFICE O/P EST HI 40 MIN: CPT | Mod: PBBFAC,27 | Performed by: NUCLEAR MEDICINE

## 2021-09-15 PROCEDURE — 99999 PR PBB SHADOW E&M-EST. PATIENT-LVL V: ICD-10-PCS | Mod: PBBFAC,,, | Performed by: NUCLEAR MEDICINE

## 2021-09-15 PROCEDURE — 99999 PR PBB SHADOW E&M-EST. PATIENT-LVL V: ICD-10-PCS | Mod: PBBFAC,,, | Performed by: NURSE PRACTITIONER

## 2021-09-15 PROCEDURE — 99999 PR PBB SHADOW E&M-EST. PATIENT-LVL V: CPT | Mod: PBBFAC,,, | Performed by: NURSE PRACTITIONER

## 2021-09-15 PROCEDURE — 99214 PR OFFICE/OUTPT VISIT, EST, LEVL IV, 30-39 MIN: ICD-10-PCS | Mod: S$PBB,,, | Performed by: NUCLEAR MEDICINE

## 2021-09-15 PROCEDURE — 99215 OFFICE O/P EST HI 40 MIN: CPT | Mod: PBBFAC | Performed by: NURSE PRACTITIONER

## 2021-09-15 PROCEDURE — 99999 PR PBB SHADOW E&M-EST. PATIENT-LVL V: CPT | Mod: PBBFAC,,, | Performed by: NUCLEAR MEDICINE

## 2021-09-15 PROCEDURE — G0439 PPPS, SUBSEQ VISIT: HCPCS | Mod: ,,, | Performed by: NURSE PRACTITIONER

## 2021-09-15 PROCEDURE — 99214 OFFICE O/P EST MOD 30 MIN: CPT | Mod: S$PBB,,, | Performed by: NUCLEAR MEDICINE

## 2021-09-15 PROCEDURE — G0439 PR MEDICARE ANNUAL WELLNESS SUBSEQUENT VISIT: ICD-10-PCS | Mod: ,,, | Performed by: NURSE PRACTITIONER

## 2021-09-15 RX ORDER — FUROSEMIDE 20 MG/1
20 TABLET ORAL
Qty: 90 TABLET | Refills: 3
Start: 2021-09-15 | End: 2023-01-23 | Stop reason: SDUPTHER

## 2021-10-25 ENCOUNTER — PATIENT MESSAGE (OUTPATIENT)
Dept: INTERNAL MEDICINE | Facility: CLINIC | Age: 71
End: 2021-10-25
Payer: MEDICARE

## 2021-10-25 DIAGNOSIS — E11.9 DIABETES MELLITUS WITHOUT COMPLICATION: Primary | ICD-10-CM

## 2021-10-26 ENCOUNTER — LAB VISIT (OUTPATIENT)
Dept: LAB | Facility: HOSPITAL | Age: 71
End: 2021-10-26
Attending: FAMILY MEDICINE
Payer: MEDICARE

## 2021-10-26 DIAGNOSIS — E11.9 DIABETES MELLITUS WITHOUT COMPLICATION: ICD-10-CM

## 2021-10-26 PROCEDURE — 82570 ASSAY OF URINE CREATININE: CPT | Performed by: FAMILY MEDICINE

## 2021-10-27 LAB
ALBUMIN/CREAT UR: NORMAL UG/MG (ref 0–30)
CREAT UR-MCNC: 55 MG/DL (ref 15–325)
MICROALBUMIN UR DL<=1MG/L-MCNC: <5 UG/ML

## 2021-11-08 ENCOUNTER — PATIENT OUTREACH (OUTPATIENT)
Dept: ADMINISTRATIVE | Facility: OTHER | Age: 71
End: 2021-11-08
Payer: MEDICARE

## 2021-11-09 ENCOUNTER — OFFICE VISIT (OUTPATIENT)
Dept: OPHTHALMOLOGY | Facility: CLINIC | Age: 71
End: 2021-11-09
Payer: MEDICARE

## 2021-11-09 DIAGNOSIS — Z83.518 FAMILY HISTORY OF MACULAR DEGENERATION: ICD-10-CM

## 2021-11-09 DIAGNOSIS — H52.4 BILATERAL PRESBYOPIA: ICD-10-CM

## 2021-11-09 DIAGNOSIS — E11.9 DIABETES MELLITUS TYPE 2 WITHOUT RETINOPATHY: Primary | ICD-10-CM

## 2021-11-09 DIAGNOSIS — E11.36 DIABETIC CATARACT: ICD-10-CM

## 2021-11-09 PROCEDURE — 99211 OFF/OP EST MAY X REQ PHY/QHP: CPT | Mod: PBBFAC | Performed by: OPTOMETRIST

## 2021-11-09 PROCEDURE — 92014 PR EYE EXAM, EST PATIENT,COMPREHESV: ICD-10-PCS | Mod: S$PBB,,, | Performed by: OPTOMETRIST

## 2021-11-09 PROCEDURE — 99999 PR PBB SHADOW E&M-EST. PATIENT-LVL I: ICD-10-PCS | Mod: PBBFAC,,, | Performed by: OPTOMETRIST

## 2021-11-09 PROCEDURE — 92015 DETERMINE REFRACTIVE STATE: CPT | Mod: ,,, | Performed by: OPTOMETRIST

## 2021-11-09 PROCEDURE — 92014 COMPRE OPH EXAM EST PT 1/>: CPT | Mod: S$PBB,,, | Performed by: OPTOMETRIST

## 2021-11-09 PROCEDURE — 92015 PR REFRACTION: ICD-10-PCS | Mod: ,,, | Performed by: OPTOMETRIST

## 2021-11-09 PROCEDURE — 99999 PR PBB SHADOW E&M-EST. PATIENT-LVL I: CPT | Mod: PBBFAC,,, | Performed by: OPTOMETRIST

## 2021-11-10 ENCOUNTER — PATIENT MESSAGE (OUTPATIENT)
Dept: TRANSPLANT | Facility: CLINIC | Age: 71
End: 2021-11-10
Payer: MEDICARE

## 2021-11-16 ENCOUNTER — TELEPHONE (OUTPATIENT)
Dept: CARDIOLOGY | Facility: CLINIC | Age: 71
End: 2021-11-16
Payer: MEDICARE

## 2021-11-16 DIAGNOSIS — I50.9 CHF (NYHA CLASS I, ACC/AHA STAGE B): Primary | ICD-10-CM

## 2021-11-24 ENCOUNTER — OFFICE VISIT (OUTPATIENT)
Dept: TRANSPLANT | Facility: CLINIC | Age: 71
End: 2021-11-24
Payer: MEDICARE

## 2021-11-24 ENCOUNTER — LAB VISIT (OUTPATIENT)
Dept: LAB | Facility: HOSPITAL | Age: 71
End: 2021-11-24
Attending: NUCLEAR MEDICINE
Payer: MEDICARE

## 2021-11-24 VITALS
OXYGEN SATURATION: 99 % | SYSTOLIC BLOOD PRESSURE: 135 MMHG | WEIGHT: 216.06 LBS | DIASTOLIC BLOOD PRESSURE: 90 MMHG | HEIGHT: 67 IN | BODY MASS INDEX: 33.91 KG/M2 | HEART RATE: 77 BPM

## 2021-11-24 DIAGNOSIS — I50.9 CHF (NYHA CLASS I, ACC/AHA STAGE B): Chronic | ICD-10-CM

## 2021-11-24 DIAGNOSIS — E11.69 HYPERLIPIDEMIA ASSOCIATED WITH TYPE 2 DIABETES MELLITUS: ICD-10-CM

## 2021-11-24 DIAGNOSIS — E78.5 HYPERLIPIDEMIA ASSOCIATED WITH TYPE 2 DIABETES MELLITUS: ICD-10-CM

## 2021-11-24 DIAGNOSIS — I15.2 HYPERTENSION ASSOCIATED WITH DIABETES: Primary | Chronic | ICD-10-CM

## 2021-11-24 DIAGNOSIS — I50.9 CHF (NYHA CLASS I, ACC/AHA STAGE B): ICD-10-CM

## 2021-11-24 DIAGNOSIS — E11.59 HYPERTENSION ASSOCIATED WITH DIABETES: Primary | Chronic | ICD-10-CM

## 2021-11-24 LAB
ANION GAP SERPL CALC-SCNC: 12 MMOL/L (ref 8–16)
BUN SERPL-MCNC: 20 MG/DL (ref 8–23)
CALCIUM SERPL-MCNC: 9.8 MG/DL (ref 8.7–10.5)
CHLORIDE SERPL-SCNC: 100 MMOL/L (ref 95–110)
CO2 SERPL-SCNC: 25 MMOL/L (ref 23–29)
CREAT SERPL-MCNC: 0.9 MG/DL (ref 0.5–1.4)
EST. GFR  (AFRICAN AMERICAN): >60 ML/MIN/1.73 M^2
EST. GFR  (NON AFRICAN AMERICAN): >60 ML/MIN/1.73 M^2
GLUCOSE SERPL-MCNC: 112 MG/DL (ref 70–110)
POTASSIUM SERPL-SCNC: 4.9 MMOL/L (ref 3.5–5.1)
SODIUM SERPL-SCNC: 137 MMOL/L (ref 136–145)

## 2021-11-24 PROCEDURE — 80048 BASIC METABOLIC PNL TOTAL CA: CPT | Performed by: NUCLEAR MEDICINE

## 2021-11-24 PROCEDURE — 36415 COLL VENOUS BLD VENIPUNCTURE: CPT | Performed by: NUCLEAR MEDICINE

## 2021-11-24 PROCEDURE — 99214 OFFICE O/P EST MOD 30 MIN: CPT | Mod: S$PBB,,, | Performed by: NUCLEAR MEDICINE

## 2021-11-24 PROCEDURE — 99999 PR PBB SHADOW E&M-EST. PATIENT-LVL V: ICD-10-PCS | Mod: PBBFAC,,, | Performed by: NUCLEAR MEDICINE

## 2021-11-24 PROCEDURE — 99215 OFFICE O/P EST HI 40 MIN: CPT | Mod: PBBFAC | Performed by: NUCLEAR MEDICINE

## 2021-11-24 PROCEDURE — 99214 PR OFFICE/OUTPT VISIT, EST, LEVL IV, 30-39 MIN: ICD-10-PCS | Mod: S$PBB,,, | Performed by: NUCLEAR MEDICINE

## 2021-11-24 PROCEDURE — 99999 PR PBB SHADOW E&M-EST. PATIENT-LVL V: CPT | Mod: PBBFAC,,, | Performed by: NUCLEAR MEDICINE

## 2021-11-26 ENCOUNTER — TELEPHONE (OUTPATIENT)
Dept: TRANSPLANT | Facility: CLINIC | Age: 71
End: 2021-11-26
Payer: MEDICARE

## 2021-11-29 NOTE — PROGRESS NOTES
Health Maintenance Due   Topic Date Due    Influenza Vaccine (1) 08/01/2020    Urine Microalbumin  11/12/2020    Foot Exam  12/20/2020     Updates were requested from care everywhere.  Chart was reviewed for overdue Proactive [facility] Encounters ([facility]) topics (CRS, Breast Cancer Screening, Eye exam)  Health Maintenance has been updated.  [place] immunization registry triggered.  Immunizations were reconciled.     oral

## 2021-12-01 ENCOUNTER — CLINICAL SUPPORT (OUTPATIENT)
Dept: CARDIOLOGY | Facility: CLINIC | Age: 71
End: 2021-12-01
Payer: MEDICARE

## 2021-12-01 DIAGNOSIS — I50.32 DIASTOLIC CHF, CHRONIC: Chronic | ICD-10-CM

## 2021-12-01 RX ORDER — CARVEDILOL 12.5 MG/1
12.5 TABLET ORAL 2 TIMES DAILY WITH MEALS
Qty: 180 TABLET | Refills: 3 | Status: SHIPPED | OUTPATIENT
Start: 2021-12-01 | End: 2022-06-09 | Stop reason: SDUPTHER

## 2021-12-01 RX ORDER — SPIRONOLACTONE 50 MG/1
50 TABLET, FILM COATED ORAL NIGHTLY
Qty: 90 TABLET | Refills: 3 | Status: SHIPPED | OUTPATIENT
Start: 2021-12-01 | End: 2022-12-12 | Stop reason: SDUPTHER

## 2021-12-10 ENCOUNTER — TELEPHONE (OUTPATIENT)
Dept: TRANSPLANT | Facility: CLINIC | Age: 71
End: 2021-12-10
Payer: MEDICARE

## 2021-12-20 DIAGNOSIS — I50.32 HYPERTENSIVE HEART DISEASE WITH CHRONIC DIASTOLIC CONGESTIVE HEART FAILURE: Primary | ICD-10-CM

## 2021-12-20 DIAGNOSIS — I11.0 HYPERTENSIVE HEART DISEASE WITH CHRONIC DIASTOLIC CONGESTIVE HEART FAILURE: Primary | ICD-10-CM

## 2022-01-03 ENCOUNTER — HOSPITAL ENCOUNTER (OUTPATIENT)
Dept: CARDIOLOGY | Facility: HOSPITAL | Age: 72
Discharge: HOME | End: 2022-01-03
Attending: INTERNAL MEDICINE
Payer: MEDICARE

## 2022-01-03 ENCOUNTER — OFFICE VISIT (OUTPATIENT)
Dept: CARDIOLOGY | Facility: CLINIC | Age: 72
End: 2022-01-03
Payer: MEDICARE

## 2022-01-03 VITALS
HEART RATE: 69 BPM | WEIGHT: 213.63 LBS | HEIGHT: 67 IN | BODY MASS INDEX: 33.53 KG/M2 | SYSTOLIC BLOOD PRESSURE: 130 MMHG | DIASTOLIC BLOOD PRESSURE: 82 MMHG | OXYGEN SATURATION: 99 %

## 2022-01-03 DIAGNOSIS — E11.9 TYPE 2 DIABETES MELLITUS WITHOUT COMPLICATION, WITHOUT LONG-TERM CURRENT USE OF INSULIN: ICD-10-CM

## 2022-01-03 DIAGNOSIS — I50.9 CHF (NYHA CLASS I, ACC/AHA STAGE B): Primary | Chronic | ICD-10-CM

## 2022-01-03 DIAGNOSIS — I11.0 HYPERTENSIVE HEART DISEASE WITH CHRONIC DIASTOLIC CONGESTIVE HEART FAILURE: ICD-10-CM

## 2022-01-03 DIAGNOSIS — Z78.9 STATIN INTOLERANCE: ICD-10-CM

## 2022-01-03 DIAGNOSIS — I15.2 HYPERTENSION ASSOCIATED WITH DIABETES: Chronic | ICD-10-CM

## 2022-01-03 DIAGNOSIS — I87.2 VENOUS INSUFFICIENCY (CHRONIC) (PERIPHERAL): Chronic | ICD-10-CM

## 2022-01-03 DIAGNOSIS — I34.0 NONRHEUMATIC MITRAL VALVE REGURGITATION: ICD-10-CM

## 2022-01-03 DIAGNOSIS — E11.69 HYPERLIPIDEMIA ASSOCIATED WITH TYPE 2 DIABETES MELLITUS: ICD-10-CM

## 2022-01-03 DIAGNOSIS — E78.5 HYPERLIPIDEMIA ASSOCIATED WITH TYPE 2 DIABETES MELLITUS: ICD-10-CM

## 2022-01-03 DIAGNOSIS — I50.32 HYPERTENSIVE HEART DISEASE WITH CHRONIC DIASTOLIC CONGESTIVE HEART FAILURE: ICD-10-CM

## 2022-01-03 DIAGNOSIS — E11.59 HYPERTENSION ASSOCIATED WITH DIABETES: Chronic | ICD-10-CM

## 2022-01-03 PROCEDURE — 93010 ELECTROCARDIOGRAM REPORT: CPT | Mod: ,,, | Performed by: INTERNAL MEDICINE

## 2022-01-03 PROCEDURE — 93005 ELECTROCARDIOGRAM TRACING: CPT

## 2022-01-03 PROCEDURE — 99214 OFFICE O/P EST MOD 30 MIN: CPT | Mod: PBBFAC | Performed by: INTERNAL MEDICINE

## 2022-01-03 PROCEDURE — 99999 PR PBB SHADOW E&M-EST. PATIENT-LVL IV: ICD-10-PCS | Mod: PBBFAC,,, | Performed by: INTERNAL MEDICINE

## 2022-01-03 PROCEDURE — 99214 OFFICE O/P EST MOD 30 MIN: CPT | Mod: S$PBB,,, | Performed by: INTERNAL MEDICINE

## 2022-01-03 PROCEDURE — 99999 PR PBB SHADOW E&M-EST. PATIENT-LVL IV: CPT | Mod: PBBFAC,,, | Performed by: INTERNAL MEDICINE

## 2022-01-03 PROCEDURE — 93010 EKG 12-LEAD: ICD-10-PCS | Mod: ,,, | Performed by: INTERNAL MEDICINE

## 2022-01-03 PROCEDURE — 99214 PR OFFICE/OUTPT VISIT, EST, LEVL IV, 30-39 MIN: ICD-10-PCS | Mod: S$PBB,,, | Performed by: INTERNAL MEDICINE

## 2022-01-03 NOTE — PROGRESS NOTES
Subjective:   Patient ID:  Deirdre Yanez is a 71 y.o. female who presents for follow up of No chief complaint on file.      72 yo female, 1 yr f/u  Prior cardiologist Dr Castañeda, retired  [facility] HTN CHFpEF HLD and DM.    echo normal EF LVH and mid MR  EKG today NSR  BPlog reviewed in the office and BP controlled  Mother and TAA and brother  of brain qaneurysm         Past Medical History:   Diagnosis Date    Abrasion     left eye    Abrasion and/or friction burn of abdominal wall with infection     left eye    Arthritis     gouty arthritis    Back pain     Diabetes mellitus       2013    Diverticulosis     DM (diabetes mellitus)      2014  A1C 6.0 x 2 weeks    DM (diabetes mellitus)     BS 89 10/14/2017 A1C 5.7   Diet controlled    DM (diabetes mellitus)     BS 90 am 10/23/2018     DM (diabetes mellitus)     BS didn't check 10/23/2019    Hepatitis     Hx of B/C from cadaver in past    Hepatitis B     Hepatitis C     Hiatal hernia     Hip bursitis, left     HTN (hypertension)     Hypertensive CHF (congestive heart failure) 3/28/2014    Obesity     Pneumonia     Positive ALBIN (antinuclear antibody)     Pure hypercholesterolemia 2016    Rheumatoid arthritis     questionable diagnosis    Scoliosis     Type 2 diabetes mellitus without complication, without long-term current use of insulin     Urinary incontinence     intermittent- only when lifting heavy items > 20 lbs       Past Surgical History:   Procedure Laterality Date    APPENDECTOMY      bone grafts      CERVICAL CONIZATION   W/ LASER      COLONOSCOPY N/A 1/10/2019    Procedure: COLONOSCOPY;  Surgeon: Nixon Thornton MD;  Location: [facility];  Service: Endoscopy;  Laterality: N/A;    COSMETIC SURGERY Bilateral     ears    DILATION AND CURETTAGE OF UTERUS      INJECTION OF ANESTHETIC AGENT INTO SACROILIAC JOINT Bilateral 2020    Procedure: Bilateral BLOCK, SACROILIAC JOINT  and bilatearl GTB with RN IV sedation;  Surgeon: Vin Shukla MD;  Location: [facility];  Service: Pain Management;  Laterality: Bilateral;    INJECTION OF ANESTHETIC AGENT INTO SACROILIAC JOINT Bilateral 9/30/2020    Procedure: Bilateral GT bursa + bilateral SIJ injection;  Surgeon: Vin Shukla MD;  Location: [facility];  Service: Pain Management;  Laterality: Bilateral;    INJECTION OF JOINT Bilateral 9/30/2020    Procedure: Bilateral GT bursa + bilateral SIJ injection;  Surgeon: Vin Shukla MD;  Location: [facility];  Service: Pain Management;  Laterality: Bilateral;    KNEE ARTHROSCOPY W/ MENISCAL REPAIR Right     LIVER BIOPSY      sinus lift      2003    TONSILLECTOMY, ADENOIDECTOMY      TUBAL LIGATION         Social History     Tobacco Use    Smoking status: Never Smoker    Smokeless tobacco: Never Used   Substance Use Topics    Alcohol use: Yes     Alcohol/week: 3.0 standard drinks     Types: 3 Glasses of wine per week    Drug use: No       Family History   Problem Relation Age of Onset    Colon cancer Maternal Grandfather     Diabetes Maternal Grandmother     Hypertension Maternal Grandmother     Breast cancer Maternal Grandmother     Cataracts Mother     Glaucoma Mother     Macular degeneration Mother     Hypertension Mother     Aortic aneurysm Mother     Diabetes Paternal Grandmother     Cataracts Maternal Aunt     Glaucoma Maternal Aunt     Macular degeneration Maternal Aunt     Melanoma Maternal Aunt     Heart disease Unknown         pat side    Aneurysm Brother         brain    Stroke Neg Hx     Kidney disease Neg Hx     Hyperlipidemia Neg Hx          Review of Systems   Constitutional: Negative for chills, fever, night sweats, weight gain and weight loss.   HENT: Negative for nosebleeds.    Eyes: Negative for blurred vision, double vision and visual disturbance.   Cardiovascular: Negative for chest pain, dyspnea on exertion, irregular heartbeat, leg swelling,  orthopnea, palpitations, paroxysmal nocturnal dyspnea and syncope.   Respiratory: Negative for cough, hemoptysis and wheezing.    Endocrine: Negative for polydipsia and polyuria.   Hematologic/Lymphatic: Does not bruise/bleed easily.   Skin: Negative for rash.   Musculoskeletal: Negative for joint pain, joint swelling, muscle weakness and myalgias.   Gastrointestinal: Negative for abdominal pain, hematemesis, jaundice and melena.   Genitourinary: Negative for dysuria, hematuria and nocturia.   Neurological: Negative for dizziness, focal weakness, headaches, sensory change and weakness.   Psychiatric/Behavioral: Negative for depression. The patient does not have insomnia and is not nervous/anxious.        Objective:   Physical Exam  Constitutional:       General: She is not in acute distress.     Appearance: She is well-developed.   HENT:      Head: Normocephalic.   Eyes:      General: No scleral icterus.     Conjunctiva/sclera: Conjunctivae normal.      Pupils: Pupils are equal, round, and reactive to light.   Neck:      Thyroid: No thyroid mass or thyromegaly.      Vascular: Normal carotid pulses. No carotid bruit, hepatojugular reflux or JVD.   Cardiovascular:      Rate and Rhythm: Normal rate and regular rhythm.      Chest Wall: PMI is not displaced.      Pulses: Intact distal pulses.           Carotid pulses are 2+ on the right side and 2+ on the left side.       Radial pulses are 2+ on the right side and 2+ on the left side.        Femoral pulses are 2+ on the right side and 2+ on the left side.       Popliteal pulses are 2+ on the right side and 2+ on the left side.        Dorsalis pedis pulses are 2+ on the right side and 2+ on the left side.        Posterior tibial pulses are 2+ on the right side and 2+ on the left side.      Heart sounds: Normal heart sounds, S1 normal and S2 normal. No murmur heard.  No friction rub. No gallop.    Pulmonary:      Effort: Pulmonary effort is normal.      Breath sounds:  Normal breath sounds. No wheezing or rales.   Chest:      Chest wall: No tenderness.   Breasts:      Right: No supraclavicular adenopathy.      Left: No supraclavicular adenopathy.       Abdominal:      General: Bowel sounds are normal.      Palpations: Abdomen is soft. There is no mass or pulsatile mass.      Tenderness: There is no abdominal tenderness.   Musculoskeletal:         General: No tenderness. Normal range of motion.      Cervical back: Normal range of motion and neck supple. No edema.      Thoracic back: Normal.      Lumbar back: Normal.   Lymphadenopathy:      Cervical: No cervical adenopathy.      Upper Body:      Right upper body: No supraclavicular adenopathy.      Left upper body: No supraclavicular adenopathy.   Skin:     General: Skin is warm.      Coloration: Skin is not pale.      Findings: No rash.      Nails: There is no clubbing.   Neurological:      Mental Status: She is alert and oriented to person, place, and time.      Sensory: No sensory deficit.      Gait: Gait normal.      Deep Tendon Reflexes: Reflexes are normal and symmetric.   Psychiatric:         Speech: Speech normal.         Behavior: Behavior normal.         Lab Results   Component Value Date    CHOL 213 (H) 05/19/2021    CHOL 207 (H) 06/19/2020    CHOL 222 (H) 11/12/2019     Lab Results   Component Value Date    HDL 59 05/19/2021    HDL 58 06/19/2020    HDL 62 11/12/2019     Lab Results   Component Value Date    LDLCALC 140.2 05/19/2021    LDLCALC 134.2 06/19/2020    LDLCALC 144.6 11/12/2019     Lab Results   Component Value Date    TRIG 69 05/19/2021    TRIG 74 06/19/2020    TRIG 77 11/12/2019     Lab Results   Component Value Date    CHOLHDL 27.7 05/19/2021    CHOLHDL 28.0 06/19/2020    CHOLHDL 27.9 11/12/2019       Chemistry        Component Value Date/Time     11/24/2021 0958    K 4.9 11/24/2021 0958     11/24/2021 0958    CO2 25 11/24/2021 0958    BUN 20 11/24/2021 0958    CREATININE 0.9 11/24/2021 0958      (H) 11/24/2021 0958        Component Value Date/Time    CALCIUM 9.8 11/24/2021 0958    ALKPHOS 67 05/19/2021 0806    AST 29 05/19/2021 0806    ALT 22 05/19/2021 0806    BILITOT 0.9 05/19/2021 0806    ESTGFRAFRICA >60.0 11/24/2021 0958    EGFRNONAA >60.0 11/24/2021 0958          Lab Results   Component Value Date    HGBA1C 5.9 (H) 05/19/2021     Lab Results   Component Value Date    TSH 0.773 05/19/2021     No results found for: INR, PROTIME  Lab Results   Component Value Date    WBC 8.03 05/03/2019    HGB 14.2 05/03/2019    HCT 44.6 05/03/2019    MCV 89 05/03/2019     05/03/2019     BMP  Sodium   Date Value Ref Range Status   11/24/2021 137 136 - 145 mmol/L Final     Potassium   Date Value Ref Range Status   11/24/2021 4.9 3.5 - 5.1 mmol/L Final     Chloride   Date Value Ref Range Status   11/24/2021 100 95 - 110 mmol/L Final     CO2   Date Value Ref Range Status   11/24/2021 25 23 - 29 mmol/L Final     BUN   Date Value Ref Range Status   11/24/2021 20 8 - 23 mg/dL Final     Creatinine   Date Value Ref Range Status   11/24/2021 0.9 0.5 - 1.4 mg/dL Final     Calcium   Date Value Ref Range Status   11/24/2021 9.8 8.7 - 10.5 mg/dL Final     Anion Gap   Date Value Ref Range Status   11/24/2021 12 8 - 16 mmol/L Final     eGFR if    Date Value Ref Range Status   11/24/2021 >60.0 >60 mL/min/1.73 m^2 Final     eGFR if non    Date Value Ref Range Status   11/24/2021 >60.0 >60 mL/min/1.73 m^2 Final     Comment:     Calculation used to obtain the estimated glomerular filtration  rate (eGFR) is the CKD-EPI equation.        BNP  @LABRCNTIP(BNP,BNPTRIAGEBLO)@  @LABRCNTIP(troponini)@  CrCl cannot be calculated (Patient's most recent lab result is older than the maximum 7 days allowed.).  No results found in the last 24 hours.  No results found in the last 24 hours.  No results found in the last 24 hours.    Assessment:      1. CHF (NYHA class I, ACC/AHA stage B)    2. Hypertension  associated with diabetes    3. Venous insufficiency (chronic) (peripheral)    4. Hyperlipidemia associated with type 2 diabetes mellitus    5. Type 2 diabetes mellitus without complication, without long-term current use of insulin    6. Statin intolerance      BP controlled  CHFpEF compensated  BS and lipid controlled    Plan:   Continue coreg and Aldactone for HTN and CHFpEF  Echo ordered  DM Rx per PCP  Counseled DASH  Check Lipid profile in 6 months  Recommend heart-healthy diet, weight control and regular exercise.  Regina. Risk modification.   I have reviewed all pertinent labs and cardiac studies independently. Plans and recommendations have been formulated under my direct supervision. All questions answered and patient voiced understanding.   If symptoms persist go to the ED  RTC in 12 months

## 2022-01-06 ENCOUNTER — HOSPITAL ENCOUNTER (OUTPATIENT)
Dept: CARDIOLOGY | Facility: HOSPITAL | Age: 72
Discharge: HOME | End: 2022-01-06
Attending: INTERNAL MEDICINE
Payer: MEDICARE

## 2022-01-06 VITALS
SYSTOLIC BLOOD PRESSURE: 130 MMHG | WEIGHT: 213 LBS | HEART RATE: 60 BPM | DIASTOLIC BLOOD PRESSURE: 80 MMHG | HEIGHT: 66 IN | BODY MASS INDEX: 34.23 KG/M2

## 2022-01-06 DIAGNOSIS — I34.0 NONRHEUMATIC MITRAL VALVE REGURGITATION: ICD-10-CM

## 2022-01-06 DIAGNOSIS — I50.9 CHF (NYHA CLASS I, ACC/AHA STAGE B): ICD-10-CM

## 2022-01-06 LAB
AORTIC ROOT ANNULUS: 2.91 CM
ASCENDING AORTA: 2.91 CM
AV INDEX (PROSTH): 0.51
AV MEAN GRADIENT: 10 MMHG
AV PEAK GRADIENT: 20 MMHG
AV VALVE AREA: 1.58 CM2
AV VELOCITY RATIO: 0.49
BSA FOR ECHO PROCEDURE: 2.12 M2
CV ECHO LV RWT: 0.54 CM
DOP CALC AO PEAK VEL: 2.25 M/S
DOP CALC AO VTI: 49 CM
DOP CALC LVOT AREA: 3.1 CM2
DOP CALC LVOT DIAMETER: 1.98 CM
DOP CALC LVOT PEAK VEL: 1.11 M/S
DOP CALC LVOT STROKE VOLUME: 77.25 CM3
DOP CALC RVOT PEAK VEL: 0.64 M/S
DOP CALC RVOT VTI: 14.6 CM
DOP CALCLVOT PEAK VEL VTI: 25.1 CM
E WAVE DECELERATION TIME: 226.58 MSEC
E/A RATIO: 0.89
E/E' RATIO: 9.3 M/S
ECHO EF ESTIMATED: 73 %
ECHO LV POSTERIOR WALL: 1.14 CM (ref 0.6–1.1)
EJECTION FRACTION: 60 %
FRACTIONAL SHORTENING: 42 % (ref 28–44)
INTERVENTRICULAR SEPTUM: 1.07 CM (ref 0.6–1.1)
IVRT: 102.76 MSEC
LA MAJOR: 5.04 CM
LA MINOR: 4.82 CM
LA WIDTH: 3.52 CM
LEFT ATRIUM SIZE: 3.77 CM
LEFT ATRIUM VOLUME INDEX MOD: 17 ML/M2
LEFT ATRIUM VOLUME INDEX: 27.1 ML/M2
LEFT ATRIUM VOLUME MOD: 34.91 CM3
LEFT ATRIUM VOLUME: 55.58 CM3
LEFT INTERNAL DIMENSION IN SYSTOLE: 2.44 CM (ref 2.1–4)
LEFT VENTRICLE DIASTOLIC VOLUME INDEX: 38.54 ML/M2
LEFT VENTRICLE DIASTOLIC VOLUME: 79 ML
LEFT VENTRICLE MASS INDEX: 77 G/M2
LEFT VENTRICLE SYSTOLIC VOLUME INDEX: 10.3 ML/M2
LEFT VENTRICLE SYSTOLIC VOLUME: 21.07 ML
LEFT VENTRICULAR INTERNAL DIMENSION IN DIASTOLE: 4.21 CM (ref 3.5–6)
LEFT VENTRICULAR MASS: 158.67 G
LV LATERAL E/E' RATIO: 8.45 M/S
LV SEPTAL E/E' RATIO: 10.33 M/S
LVOT MG: 2.75 MMHG
LVOT MV: 0.78 CM/S
MV PEAK A VEL: 1.04 M/S
MV PEAK E VEL: 0.93 M/S
MV STENOSIS PRESSURE HALF TIME: 65.71 MS
MV VALVE AREA P 1/2 METHOD: 3.35 CM2
PISA TR MAX VEL: 2.42 M/S
PULM VEIN S/D RATIO: 0.98
PV MEAN GRADIENT: 0.85 MMHG
PV PEAK D VEL: 0.46 M/S
PV PEAK S VEL: 0.45 M/S
PV PEAK VELOCITY: 1.12 CM/S
RA MAJOR: 4.41 CM
RA PRESSURE: 3 MMHG
RA WIDTH: 3.73 CM
RIGHT VENTRICULAR END-DIASTOLIC DIMENSION: 3.4 CM
SINUS: 2.52 CM
STJ: 2.13 CM
TDI LATERAL: 0.11 M/S
TDI SEPTAL: 0.09 M/S
TDI: 0.1 M/S
TR MAX PG: 23 MMHG
TV REST PULMONARY ARTERY PRESSURE: 26 MMHG

## 2022-01-06 PROCEDURE — 93306 TTE W/DOPPLER COMPLETE: CPT | Mod: 26,,, | Performed by: INTERNAL MEDICINE

## 2022-01-06 PROCEDURE — 93306 TTE W/DOPPLER COMPLETE: CPT

## 2022-01-06 PROCEDURE — 93306 ECHO (CUPID ONLY): ICD-10-PCS | Mod: 26,,, | Performed by: INTERNAL MEDICINE

## 2022-01-10 ENCOUNTER — TELEPHONE (OUTPATIENT)
Dept: CARDIOLOGY | Facility: CLINIC | Age: 72
End: 2022-01-10
Payer: MEDICARE

## 2022-01-10 NOTE — TELEPHONE ENCOUNTER
Results given, verbalized understanding.      ----- Message from Vaughn Dailey MD sent at 1/10/2022 12:31 PM CST -----  Echo showed normal function and valvular structures

## 2022-06-06 ENCOUNTER — PATIENT OUTREACH (OUTPATIENT)
Dept: ADMINISTRATIVE | Facility: HOSPITAL | Age: 72
End: 2022-06-06
Payer: MEDICARE

## 2022-06-06 DIAGNOSIS — E11.9 TYPE 2 DIABETES MELLITUS WITHOUT COMPLICATION, WITHOUT LONG-TERM CURRENT USE OF INSULIN: Primary | ICD-10-CM

## 2022-06-06 NOTE — PROGRESS NOTES
DM Report: Attempted to contact the patient to schedule fasting lab appointment. Patient has a PCP appointment scheduled on 6/9/22.

## 2022-06-07 ENCOUNTER — LAB VISIT (OUTPATIENT)
Dept: LAB | Facility: HOSPITAL | Age: 72
End: 2022-06-07
Attending: FAMILY MEDICINE
Payer: MEDICARE

## 2022-06-07 DIAGNOSIS — E11.9 TYPE 2 DIABETES MELLITUS WITHOUT COMPLICATION, WITHOUT LONG-TERM CURRENT USE OF INSULIN: ICD-10-CM

## 2022-06-07 PROCEDURE — 80053 COMPREHEN METABOLIC PANEL: CPT | Performed by: FAMILY MEDICINE

## 2022-06-07 PROCEDURE — 36415 COLL VENOUS BLD VENIPUNCTURE: CPT | Mod: PO | Performed by: FAMILY MEDICINE

## 2022-06-07 PROCEDURE — 83036 HEMOGLOBIN GLYCOSYLATED A1C: CPT | Performed by: FAMILY MEDICINE

## 2022-06-07 PROCEDURE — 80061 LIPID PANEL: CPT | Performed by: FAMILY MEDICINE

## 2022-06-08 LAB
ALBUMIN SERPL BCP-MCNC: 4.2 G/DL (ref 3.5–5.2)
ALP SERPL-CCNC: 69 U/L (ref 55–135)
ALT SERPL W/O P-5'-P-CCNC: 21 U/L (ref 10–44)
ANION GAP SERPL CALC-SCNC: 10 MMOL/L (ref 8–16)
AST SERPL-CCNC: 25 U/L (ref 10–40)
BILIRUB SERPL-MCNC: 1.5 MG/DL (ref 0.1–1)
BUN SERPL-MCNC: 17 MG/DL (ref 8–23)
CALCIUM SERPL-MCNC: 10 MG/DL (ref 8.7–10.5)
CHLORIDE SERPL-SCNC: 101 MMOL/L (ref 95–110)
CHOLEST SERPL-MCNC: 211 MG/DL (ref 120–199)
CHOLEST/HDLC SERPL: 3.6 {RATIO} (ref 2–5)
CO2 SERPL-SCNC: 27 MMOL/L (ref 23–29)
CREAT SERPL-MCNC: 0.9 MG/DL (ref 0.5–1.4)
EST. GFR  (AFRICAN AMERICAN): >60 ML/MIN/1.73 M^2
EST. GFR  (NON AFRICAN AMERICAN): >60 ML/MIN/1.73 M^2
ESTIMATED AVG GLUCOSE: 120 MG/DL (ref 68–131)
GLUCOSE SERPL-MCNC: 108 MG/DL (ref 70–110)
HBA1C MFR BLD: 5.8 % (ref 4–5.6)
HDLC SERPL-MCNC: 58 MG/DL (ref 40–75)
HDLC SERPL: 27.5 % (ref 20–50)
LDLC SERPL CALC-MCNC: 137.2 MG/DL (ref 63–159)
NONHDLC SERPL-MCNC: 153 MG/DL
POTASSIUM SERPL-SCNC: 5.1 MMOL/L (ref 3.5–5.1)
PROT SERPL-MCNC: 6.8 G/DL (ref 6–8.4)
SODIUM SERPL-SCNC: 138 MMOL/L (ref 136–145)
TRIGL SERPL-MCNC: 79 MG/DL (ref 30–150)

## 2022-06-09 ENCOUNTER — OFFICE VISIT (OUTPATIENT)
Dept: INTERNAL MEDICINE | Facility: CLINIC | Age: 72
End: 2022-06-09
Payer: MEDICARE

## 2022-06-09 VITALS
WEIGHT: 201.5 LBS | TEMPERATURE: 98 F | DIASTOLIC BLOOD PRESSURE: 84 MMHG | HEIGHT: 66 IN | RESPIRATION RATE: 18 BRPM | BODY MASS INDEX: 32.38 KG/M2 | HEART RATE: 84 BPM | OXYGEN SATURATION: 97 % | SYSTOLIC BLOOD PRESSURE: 132 MMHG

## 2022-06-09 DIAGNOSIS — M25.562 CHRONIC PAIN OF BOTH KNEES: ICD-10-CM

## 2022-06-09 DIAGNOSIS — K62.5 RECTAL BLEEDING: ICD-10-CM

## 2022-06-09 DIAGNOSIS — M46.1 SACROILIITIS, NOT ELSEWHERE CLASSIFIED: ICD-10-CM

## 2022-06-09 DIAGNOSIS — Z12.31 SCREENING MAMMOGRAM FOR BREAST CANCER: ICD-10-CM

## 2022-06-09 DIAGNOSIS — K57.90 DIVERTICULOSIS: ICD-10-CM

## 2022-06-09 DIAGNOSIS — M25.561 CHRONIC PAIN OF BOTH KNEES: ICD-10-CM

## 2022-06-09 DIAGNOSIS — G89.29 CHRONIC PAIN OF BOTH KNEES: ICD-10-CM

## 2022-06-09 DIAGNOSIS — E66.01 SEVERE OBESITY (BMI 35.0-35.9 WITH COMORBIDITY): ICD-10-CM

## 2022-06-09 DIAGNOSIS — I50.32 DIASTOLIC CHF, CHRONIC: Chronic | ICD-10-CM

## 2022-06-09 DIAGNOSIS — Z00.00 ROUTINE PHYSICAL EXAMINATION: Primary | ICD-10-CM

## 2022-06-09 PROCEDURE — 99999 PR PBB SHADOW E&M-EST. PATIENT-LVL V: CPT | Mod: PBBFAC,,, | Performed by: FAMILY MEDICINE

## 2022-06-09 PROCEDURE — 99397 PR PREVENTIVE VISIT,EST,65 & OVER: ICD-10-PCS | Mod: S$PBB,GY,, | Performed by: FAMILY MEDICINE

## 2022-06-09 PROCEDURE — 99999 PR PBB SHADOW E&M-EST. PATIENT-LVL V: ICD-10-PCS | Mod: PBBFAC,,, | Performed by: FAMILY MEDICINE

## 2022-06-09 PROCEDURE — 99397 PER PM REEVAL EST PAT 65+ YR: CPT | Mod: S$PBB,GY,, | Performed by: FAMILY MEDICINE

## 2022-06-09 PROCEDURE — 99215 OFFICE O/P EST HI 40 MIN: CPT | Mod: PBBFAC | Performed by: FAMILY MEDICINE

## 2022-06-09 RX ORDER — CARVEDILOL 6.25 MG/1
6.25 TABLET ORAL 2 TIMES DAILY WITH MEALS
Qty: 180 TABLET | Refills: 3 | Status: SHIPPED | OUTPATIENT
Start: 2022-06-09 | End: 2022-12-13 | Stop reason: SDUPTHER

## 2022-06-09 NOTE — PROGRESS NOTES
Deirdre Yanez  06/09/2022  [id number]    Jazmine Montalvo MD  Patient Care Team:  Jazmine Montalvo MD as PCP - General (Internal Medicine)  Santino Chowdhury OD as Consulting Physician (Optometry)  Debra Delgado LPN (Inactive) as Care Coordinator (Internal Medicine)  Lashae Norwood MD as Consulting Physician (Otolaryngology)  Jesús Castañeda MD (Inactive) as Consulting Physician (Cardiology)  Vin Shukla MD as Consulting Physician (Pain Medicine)    Has the patient seen any provider outside of the network since the last visit ? (no). If yes, HIPPA forms completed and records requested.      Visit Type:a scheduled routine follow-up visit    Chief Complaint:  Chief Complaint   Patient presents with    Annual Exam       History of Present Illness:  HPI Ms. Yanez presents today for her annual exam. She has not had a change in her medical history, surgical history or family history.       Can't eat nuts or popcorn without bleeding (GI issues)  Diverticulosis Hx  Was put on metamucil   She has not been eating nuts or popcorn.     Having bleeding randomly  Not sure what she is eating to cause this   She has started back taking the metamucil  This has helped some    a1c 5.8 from 5.9      Started a anxiety and stress relief over the counter   ashwagandha and L-theanine    Cut carvedilol in half due to side effects   107/76, 111/74, 108/77   Cutting dose she has been 132/82, 117/82   Symptoms have decreased with cutting it in half      Review of Systems   Constitutional: Negative for chills and fever.   HENT: Negative for congestion, hearing loss and tinnitus.    Eyes: Negative for blurred vision, pain and discharge.   Respiratory: Negative for cough and wheezing.    Cardiovascular: Negative for chest pain, palpitations, orthopnea and leg swelling.   Gastrointestinal: Positive for blood in stool. Negative for abdominal pain, constipation, diarrhea, heartburn, nausea and vomiting.   Genitourinary: Negative for  dysuria, flank pain, frequency, hematuria and urgency.   Musculoskeletal: Negative for neck pain.   Skin: Negative for itching and rash.   Neurological: Negative for dizziness, tingling, weakness and headaches.   Endo/Heme/Allergies: Negative for polydipsia.   Psychiatric/Behavioral: Negative for depression.         Screening Questionnaires:    In the last two weeks how often have you felt down, depressed, or hopeless ( no )    In the last two weeks how often have you had little interest or pleasure in doing  (no )    In the last two weeks how often have you been bothered by the following problems:  1. Feeling nervous, anxious, or on edge ( no )    2. Not being able to stop or control worrying ( no)    3. Worrying too much about different things ( no)    4. Trouble relaxing ( no )    5. Being so restless that it is hard to sit still  (no )    6. Becoming easily annoyed or irritable (no)    7. Feeling afraid as if something awful might happen (no )    How often do you have a drink containing Alcohol? denied     Do you exercise  (no ) moderately active    Do you take a baby Aspirin daily ( no)    Do you have an advance directive ( no ) The patient was given information regarding Living Will/Durable Power-of- if requested.     The following were reviewed: Active problem list, medication list, allergies, family history, social history, and Health Maintenance.     History:  Past Medical History:   Diagnosis Date    Abrasion     left eye    Abrasion and/or friction burn of abdominal wall with infection     left eye    Arthritis     gouty arthritis    Back pain     Diabetes mellitus     2011  11/17/2013    Diverticulosis     DM (diabetes mellitus) 2011     12/03/2014  A1C 6.0 x 2 weeks    DM (diabetes mellitus) 2011    BS 89 10/14/2017 A1C 5.7   Diet controlled    DM (diabetes mellitus) 2011    BS 90 am 10/23/2018     DM (diabetes mellitus) 2011    BS didn't check 10/23/2019    Hepatitis      Hx of B/C from cadaver in past    Hepatitis B     Hepatitis C     Hiatal hernia     Hip bursitis, left     HTN (hypertension)     Hypertensive CHF (congestive heart failure) 3/28/2014    Obesity     Pneumonia     Positive ALBIN (antinuclear antibody)     Pure hypercholesterolemia 9/30/2016    Rheumatoid arthritis     questionable diagnosis    Scoliosis     Type 2 diabetes mellitus without complication, without long-term current use of insulin     Urinary incontinence     intermittent- only when lifting heavy items > 20 lbs     Past Surgical History:   Procedure Laterality Date    APPENDECTOMY      bone grafts      CERVICAL CONIZATION   W/ LASER      COLONOSCOPY N/A 1/10/2019    Procedure: COLONOSCOPY;  Surgeon: Nixon Thornton MD;  Location: [facility] ENDO;  Service: Endoscopy;  Laterality: N/A;    COSMETIC SURGERY Bilateral     ears    DILATION AND CURETTAGE OF UTERUS      INJECTION OF ANESTHETIC AGENT INTO SACROILIAC JOINT Bilateral 7/23/2020    Procedure: Bilateral BLOCK, SACROILIAC JOINT and bilatearl GTB with RN IV sedation;  Surgeon: Vin Shukla MD;  Location: [facility] PAIN MGT;  Service: Pain Management;  Laterality: Bilateral;    INJECTION OF ANESTHETIC AGENT INTO SACROILIAC JOINT Bilateral 9/30/2020    Procedure: Bilateral GT bursa + bilateral SIJ injection;  Surgeon: Vin Shukla MD;  Location: [facility] PAIN MGT;  Service: Pain Management;  Laterality: Bilateral;    INJECTION OF JOINT Bilateral 9/30/2020    Procedure: Bilateral GT bursa + bilateral SIJ injection;  Surgeon: Vin Shukla MD;  Location: [facility] PAIN MGT;  Service: Pain Management;  Laterality: Bilateral;    KNEE ARTHROSCOPY W/ MENISCAL REPAIR Right     LIVER BIOPSY      sinus lift      2003    TONSILLECTOMY, ADENOIDECTOMY      TUBAL LIGATION       Family History   Problem Relation Age of Onset    Colon cancer Maternal Grandfather     Diabetes Maternal Grandmother     Hypertension Maternal Grandmother     Breast cancer Maternal  Grandmother     Cataracts Mother     Glaucoma Mother     Macular degeneration Mother     Hypertension Mother     Aortic aneurysm Mother     Diabetes Paternal Grandmother     Cataracts Maternal Aunt     Glaucoma Maternal Aunt     Macular degeneration Maternal Aunt     Melanoma Maternal Aunt     Heart disease Unknown         pat side    Aneurysm Brother         brain    Stroke Neg Hx     Kidney disease Neg Hx     Hyperlipidemia Neg Hx      Social History     Socioeconomic History    Marital status:     Number of children: 0    Highest education level: Master's degree (e.g., MA, MS, [employer], MEd, MSW, CARLOZ)   Occupational History    Occupation: Retired Teacher   Tobacco Use    Smoking status: Never Smoker    Smokeless tobacco: Never Used   Substance and Sexual Activity    Alcohol use: Yes     Alcohol/week: 3.0 standard drinks     Types: 3 Glasses of wine per week    Drug use: No    Sexual activity: Yes     Partners: Male   Other Topics Concern    Are you pregnant or think you may be? No    Breast-feeding No     Social Determinants of Health     Financial Resource Strain: Low Risk     Difficulty of Paying Living Expenses: Not hard at all   Food Insecurity: No Food Insecurity    Worried About Running Out of Food in the Last Year: Never true    Ran Out of Food in the Last Year: Never true   Transportation Needs: No Transportation Needs    Lack of Transportation (Medical): No    Lack of Transportation (Non-Medical): No   Physical Activity: Inactive    Days of Exercise per Week: 0 days    Minutes of Exercise per Session: 0 min   Stress: No Stress Concern Present    Feeling of Stress : Not at all   Social Connections: Moderately Isolated    Frequency of Communication with Friends and Family: More than three times a week    Frequency of Social Gatherings with Friends and Family: Once a week    Attends Bahai Services: Never    Active Member of Clubs or Organizations: No    Attends  "Club or Organization Meetings: Never    Marital Status:    Housing Stability: Low Risk     Unable to Pay for Housing in the Last Year: No    Number of Places Lived in the Last Year: 1    Unstable Housing in the Last Year: No     Patient Active Problem List   Diagnosis    Type 2 diabetes mellitus without complication, without long-term current use of insulin    CHF (NYHA class I, ACC/AHA stage B)    Allergic rhinitis    Hypertension associated with diabetes    Family history of glaucoma    Cataracts, bilateral    Family history of macular degeneration    Alopecia areata universalis    Hepatitis B core antibody positive    FH: melanoma    History of cervical dysplasia    Hyperlipidemia associated with type 2 diabetes mellitus    Vitamin D deficiency    Statin intolerance    Thyroid nodule    Venous insufficiency (chronic) (peripheral)    Sacroiliitis, not elsewhere classified    Severe obesity (BMI 35.0-35.9 with comorbidity)    Sacroiliac joint pain    Nonrheumatic mitral valve regurgitation     Review of patient's allergies indicates:   Allergen Reactions    Arb-angiotensin receptor antagonist Edema    Hydralazine analogues      "Lupus reaction"    Metoprolol Other (See Comments)     Alopecia    Sulfa (sulfonamide antibiotics)      Passed out and almost stopped breathing    Calcium channel blocking agents-dihydropyridines      Edema      Ace inhibitors Other (See Comments)     cough       Health Maintenance  Health Maintenance Topics with due status: Not Due       Topic Last Completion Date    Influenza Vaccine 10/02/2009    TETANUS VACCINE 05/08/2013    Colorectal Cancer Screening 01/10/2019    DEXA Scan 07/19/2019    Diabetes Urine Screening 10/26/2021    Eye Exam 11/09/2021    Lipid Panel 06/07/2022    Hemoglobin A1c 06/07/2022     Health Maintenance Due   Topic Date Due    COVID-19 Vaccine (1) Never done    Foot Exam  05/13/2022    Mammogram  07/20/2022 "       Medications:  Current Outpatient Medications on File Prior to Visit   Medication Sig Dispense Refill    ascorbic acid, vitamin C, (VITAMIN C) 1000 MG tablet Take 1,000 mg by mouth 2 (two) times daily.      asenapine maleate (SAPHRIS, BLACK SILVA, SL) Take by mouth as needed.      b complex vitamins tablet Take 1 tablet by mouth once daily.      CALCIUM CITRATE ORAL Take 600 mg by mouth.      CHROMIUM ORAL Take by mouth.      coenzyme Q10 10 mg capsule Take 10 mg by mouth as needed.      ergocalciferol, vitamin D2, (VITAMIN D ORAL) Take 1,000 mg by mouth.      lilliam primrose/linoleic/gamoleni (PRIMROSE OIL ORAL) Take by mouth.      fish oil-omega-3 fatty acids 300-1,000 mg capsule Take 2 capsules by mouth once daily.      fluticasone propionate (FLONASE) 50 mcg/actuation nasal spray SHAKE LIQUID AND USE 2 SPRAYS IN EACH NOSTRIL DAILY 16 g 12    furosemide (LASIX) 20 MG tablet Take 1 tablet (20 mg total) by mouth as needed (increase edema of  LE  AND OR DESAI). 90 tablet 3    glucosamine sulfate 500 mg Tab Take 1,000 mg by mouth as needed.       inositol 500 mg Tab Take 500 mg by mouth 2 (two) times daily.      MAGNESIUM ORAL Take 100 mg by mouth.      methylsulfonylmethane 1,000 mg Cap Take by mouth as needed.       multivitamin (THERAGRAN) per tablet Take 1 tablet by mouth once daily. Per pt [facility] Women's 50 plus      spironolactone (ALDACTONE) 50 MG tablet Take 1 tablet (50 mg total) by mouth every evening. 90 tablet 3    tiZANidine (ZANAFLEX) 2 MG tablet TAKE 1 TABLET(2 MG) BY MOUTH EVERY 8 HOURS AS NEEDED 30 tablet 5    UNABLE TO FIND Liver Refresh      [DISCONTINUED] carvediloL (COREG) 12.5 MG tablet Take 1 tablet (12.5 mg total) by mouth 2 (two) times daily with meals. 180 tablet 3    [DISCONTINUED] ALPRAZolam (XANAX) 0.25 MG tablet Take 1 tablet (0.25 mg total) by mouth 2 (two) times daily as needed for Insomnia or Anxiety. 40 tablet 0     No current facility-administered medications on  file prior to visit.       Medications have been reviewed and reconciled with patient at visit today.    Barriers to medications present (no )    Adverse reactions to current medications (no)    Over the counter medications reviewed (Yes) and if needed added to active Medication list.    Exam:  Vitals:    06/09/22 0932   BP: 132/84   Pulse: 84   Resp: 18   Temp: 97.8 °F (36.6 °C)     Weight: 91.4 kg (201 lb 8 oz)   Body mass index is 32.52 kg/m².      Physical Exam  Vitals reviewed.   Constitutional:       General: She is not in acute distress.  HENT:      Head: Normocephalic and atraumatic.      Right Ear: External ear normal.      Left Ear: External ear normal.      Nose: Nose normal.   Eyes:      General:         Right eye: No discharge.         Left eye: No discharge.      Pupils: Pupils are equal, round, and reactive to light.   Neck:      Thyroid: No thyromegaly.   Cardiovascular:      Rate and Rhythm: Normal rate and regular rhythm.      Heart sounds: Murmur heard.   Pulmonary:      Effort: Pulmonary effort is normal. No respiratory distress.      Breath sounds: Normal breath sounds. No wheezing.   Abdominal:      General: Bowel sounds are normal. There is no distension.      Palpations: Abdomen is soft.      Tenderness: There is no abdominal tenderness.   Musculoskeletal:         General: Normal range of motion.      Cervical back: Normal range of motion and neck supple.   Skin:     General: Skin is warm and dry.      Findings: No rash.   Neurological:      Mental Status: She is alert and oriented to person, place, and time.      Coordination: Coordination normal.   Psychiatric:         Behavior: Behavior normal.     Protective Sensation (w/ 10 gram monofilament):  Right: Intact  Left: Intact    Visual Inspection:  Normal -  Bilateral    Pedal Pulses:   Right: Present  Left: Present    Posterior tibialis:   Right:Present  Left: Present        Laboratory Reviewed: (yes)  Lab Results   Component Value Date     WBC 8.03 05/03/2019    HGB 14.2 05/03/2019    HCT 44.6 05/03/2019     05/03/2019    CHOL 211 (H) 06/07/2022    TRIG 79 06/07/2022    HDL 58 06/07/2022    ALT 21 06/07/2022    AST 25 06/07/2022     06/07/2022    K 5.1 06/07/2022     06/07/2022    CREATININE 0.9 06/07/2022    BUN 17 06/07/2022    CO2 27 06/07/2022    TSH 0.773 05/19/2021    HGBA1C 5.8 (H) 06/07/2022       Assessment:  The primary encounter diagnosis was Routine physical examination. Diagnoses of Screening mammogram for breast cancer, Severe obesity (BMI 35.0-35.9 with comorbidity), Sacroiliitis, not elsewhere classified, Rectal bleeding, Diverticulosis, Diastolic CHF, chronic, and Chronic pain of both knees were also pertinent to this visit.    Plan:  Routine physical examination    Screening mammogram for breast cancer  -     Mammo Digital Screening Bilat w/ Zak; Future; Expected date: 06/09/2022    Severe obesity (BMI 35.0-35.9 with comorbidity)    Sacroiliitis, not elsewhere classified    Rectal bleeding  -     Ambulatory referral/consult to Gastroenterology; Future; Expected date: 06/16/2022    Diverticulosis  -     Ambulatory referral/consult to Gastroenterology; Future; Expected date: 06/16/2022    Diastolic CHF, chronic  -     carvediloL (COREG) 6.25 MG tablet; Take 1 tablet (6.25 mg total) by mouth 2 (two) times daily with meals.  Dispense: 180 tablet; Refill: 3    Chronic pain of both knees  -     Ambulatory referral/consult to Orthopedics; Future; Expected date: 06/16/2022      -Patient's lab results were reviewed and discussed with patient  -Treatment options and alternatives were discussed with the patient. Patient expressed understanding. Patient was given the opportunity to ask questions and be an active participant in their medical care. Patient had no further questions or concerns at this time.   -Documentation of patient's health and condition was obtained from family member who was present during visit.  -Patient  is an overall moderate risk for health complications from their medical conditions.     Follow up: follow up 6 months       Care Plan/Goals: Reviewed N/A   Goals    None             After visit summary printed and given to patient upon discharge.  Patient goals and care plan are included in After visit summary.

## 2022-07-21 ENCOUNTER — HOSPITAL ENCOUNTER (OUTPATIENT)
Dept: RADIOLOGY | Facility: HOSPITAL | Age: 72
Discharge: HOME | End: 2022-07-21
Attending: FAMILY MEDICINE
Payer: MEDICARE

## 2022-07-21 DIAGNOSIS — Z12.31 SCREENING MAMMOGRAM FOR BREAST CANCER: ICD-10-CM

## 2022-07-21 PROCEDURE — 77067 SCR MAMMO BI INCL CAD: CPT | Mod: 26,,, | Performed by: RADIOLOGY

## 2022-07-21 PROCEDURE — 77063 BREAST TOMOSYNTHESIS BI: CPT | Mod: TC

## 2022-07-21 PROCEDURE — 77063 BREAST TOMOSYNTHESIS BI: CPT | Mod: 26,,, | Performed by: RADIOLOGY

## 2022-07-21 PROCEDURE — 77063 MAMMO DIGITAL SCREENING BILAT WITH TOMO: ICD-10-PCS | Mod: 26,,, | Performed by: RADIOLOGY

## 2022-07-21 PROCEDURE — 77067 MAMMO DIGITAL SCREENING BILAT WITH TOMO: ICD-10-PCS | Mod: 26,,, | Performed by: RADIOLOGY

## 2022-07-28 ENCOUNTER — TELEPHONE (OUTPATIENT)
Dept: GASTROENTEROLOGY | Facility: CLINIC | Age: 72
End: 2022-07-28

## 2022-07-28 ENCOUNTER — OFFICE VISIT (OUTPATIENT)
Dept: GASTROENTEROLOGY | Facility: CLINIC | Age: 72
End: 2022-07-28
Payer: MEDICARE

## 2022-07-28 VITALS
HEART RATE: 66 BPM | OXYGEN SATURATION: 99 % | SYSTOLIC BLOOD PRESSURE: 144 MMHG | HEIGHT: 66 IN | DIASTOLIC BLOOD PRESSURE: 72 MMHG | BODY MASS INDEX: 32.64 KG/M2 | WEIGHT: 203.06 LBS

## 2022-07-28 DIAGNOSIS — K62.5 RECTAL BLEEDING: Primary | ICD-10-CM

## 2022-07-28 DIAGNOSIS — R19.4 CHANGE IN BOWEL HABITS: ICD-10-CM

## 2022-07-28 DIAGNOSIS — K57.90 DIVERTICULOSIS: ICD-10-CM

## 2022-07-28 PROCEDURE — 99999 PR PBB SHADOW E&M-EST. PATIENT-LVL V: CPT | Mod: PBBFAC,,, | Performed by: INTERNAL MEDICINE

## 2022-07-28 PROCEDURE — 99204 PR OFFICE/OUTPT VISIT, NEW, LEVL IV, 45-59 MIN: ICD-10-PCS | Mod: S$PBB,,, | Performed by: INTERNAL MEDICINE

## 2022-07-28 PROCEDURE — 99215 OFFICE O/P EST HI 40 MIN: CPT | Mod: PBBFAC | Performed by: INTERNAL MEDICINE

## 2022-07-28 PROCEDURE — 99204 OFFICE O/P NEW MOD 45 MIN: CPT | Mod: S$PBB,,, | Performed by: INTERNAL MEDICINE

## 2022-07-28 PROCEDURE — 99999 PR PBB SHADOW E&M-EST. PATIENT-LVL V: ICD-10-PCS | Mod: PBBFAC,,, | Performed by: INTERNAL MEDICINE

## 2022-07-28 RX ORDER — POLYETHYLENE GLYCOL-3350 AND ELECTROLYTES WITH FLAVOR PACK 240; 5.84; 2.98; 6.72; 22.72 G/278.26G; G/278.26G; G/278.26G; G/278.26G; G/278.26G
4000 POWDER, FOR SOLUTION ORAL ONCE
Qty: 4000 ML | Refills: 0 | Status: SHIPPED | OUTPATIENT
Start: 2022-07-28 | End: 2022-07-28

## 2022-07-28 RX ORDER — CALC/MAG/B COMPLEX/D3/HERB 61
15 TABLET ORAL DAILY
COMMUNITY

## 2022-07-28 NOTE — TELEPHONE ENCOUNTER
Location Screening:    If answers yes to any of the following, schedule at [place] ONLY. If No, OK for either location.    1. Is there a diagnosis of heart failure, severe heart valve disease (aortic stenosis) or mechanical valve? no  a. Is the Left Ventricle Ejection Fraction <30% ? not applicable    2. Does the pt wear a Life Vest or external pacer that may require the use of a magnet?    3. Does the pt have a BMI >55  no     4. Does the pt have a history of airway issues requiring oxygen or significant compromise?  no    5. Is procedure for esophageal banding? no      COVID Screening    1. Are you experiencing shortness of breath, cough, muscle aches, loss of taste or loss of smell?  no    If answered yes, then the patient must seek medical attention with their PCP, urgent care or ED.  Do not schedule their procedure.     2. Have you had Covid or tested positive for Covid in the last 120 days? no. If yes, no Covid screen is needed.        ENDO screening    1. All patients 80 years of age and older must be seen in the GI clinic before a procedure can be scheduled - please schedule an CJ appointment - Do not schedule a procedure appointment.     2. Have you been admitted for cardiac, kidney or pulmonary causes to the hospital in the past 3 months? no   If yes, schedule an appointment with PCP before scheduling endoscopic procedure.     3. Have you had a stent placed in the last 12 months? no   If yes, for a screening visit, cancel and message the ordering provider.  The patient will need a new order when the time is appropriate.     4. Have you had a stroke or heart attack in the past 6 months? no   If yes, cancel and refer patient to ordering provider for clearance, also message ordering provider to inform.     5. Have you had any chest pain in the past 3 months? no   If yes, have you been evaluated by your PCP and/or cardiologist and it was determined to not be heart related? not applicable   If No, pt needs to  "be seen by PCP or Cardiologist .  Pt can be scheduled once clearance obtained by either of those providers.     6. Do you take prescription weight loss medications?  no   If yes, must stop for 2 weeks prior to procedure.     7. Have you been diagnosed with diverticulitis within the past 3 months? NO   If yes, must have been seen by GI or Gen Surg within the last 3 months, if not schedule with GI CJ.    If pt has been seen by GI or Gen Surg, schedule procedure 8-12 weeks post antibiotic treatment.     8. Are you on Dialysis? no  If yes, schedule procedure for the day AFTER dialysis.  Appt time should be 9am or later, patient arrival time is 2 hours prior.  Nulytely or miralax prep for all patients with an eGFR below 30. (CKD 4 or 5)    9. Are you diabetic?  yes   If yes, schedule morning appt. Advise pt to hold all diabetic meds day of procedure.      10. Is patient on a "high risk" medication (blood thinner/antiplatelet agent)?  no   If yes, has cardiac clearance been obtained within the last 60 days? N/A   If no, a new clearance needs to be obtained.     Final Questions:    1.I have reviewed the last colonoscopy for recommendations regarding next procedure bowel prep.  yes  2. I have reviewed medications and allergies.  yes  3. I have verified the pharmacy information and appropriate prep sent if needed. yes  4. Prep instructions have been mailed or sent to portal per patient request. yes        All schedulers will have ability to reach out to the ordering GI provider to clarify any issues.     "

## 2022-07-28 NOTE — PROGRESS NOTES
[facility] [place]  Gastroenterology    Patient evaluated at the request of Jazmine Montalvo MD  [address] [place] [address]    PCP: Jazmine Montalvo MD    7/28/22    HPI     Rectal Bleeding      Additional comments: Hx of diverticulosis dx 1995; Metamucil has helped for years, but not so much anymore.              Hiatal Hernia      Additional comments: Dx 1995, no current issues.          Last edited by Latisha Son MA on 7/28/2022 10:13 AM. (History)        Reason for Visit: Rectal Bleeding    Subjective:   Deirdre Yanez is a 72 y.o. female with PMH of diverticulosis here for evaluation of rectal bleeding. Patient Last colonoscopy in 01/2019 which showed internal and external hemorrhoids. There were also 3 small polyps removed, benign. Patient reports she has had rectal bleeding for he past 8-9 months. Also notes her stools are paste like in quality, other times small hard balls. She usually takes Metamucil regularly but lately she wasn't taking it because it makes her gag.       Past Medical History:   Diagnosis Date    Abrasion     left eye    Abrasion and/or friction burn of abdominal wall with infection     left eye    Arthritis     gouty arthritis    Back pain     Diabetes mellitus     2011  11/17/2013    Diverticulosis     DM (diabetes mellitus) 2011     12/03/2014  A1C 6.0 x 2 weeks    DM (diabetes mellitus) 2011    BS 89 10/14/2017 A1C 5.7   Diet controlled    DM (diabetes mellitus) 2011    BS 90 am 10/23/2018     DM (diabetes mellitus) 2011    BS didn't check 10/23/2019    Hepatitis     Hx of B/C from cadaver in past    Hepatitis B     Hepatitis C     Hiatal hernia     Hip bursitis, left     HTN (hypertension)     Hypertensive CHF (congestive heart failure) 3/28/2014    Obesity     Pneumonia     Positive ALBIN (antinuclear antibody)     Pure hypercholesterolemia 9/30/2016    Rheumatoid arthritis     questionable diagnosis    Scoliosis      Type 2 diabetes mellitus without complication, without long-term current use of insulin     Urinary incontinence     intermittent- only when lifting heavy items > 20 lbs       Past Surgical History:   Procedure Laterality Date    APPENDECTOMY      bone grafts      CERVICAL CONIZATION   W/ LASER      COLONOSCOPY N/A 1/10/2019    Procedure: COLONOSCOPY;  Surgeon: Nixon Thornton MD;  [facility] ENDO;  Service: Endoscopy;  Laterality: N/A;    COSMETIC SURGERY Bilateral     ears    DILATION AND CURETTAGE OF UTERUS      INJECTION OF ANESTHETIC AGENT INTO SACROILIAC JOINT Bilateral 7/23/2020    Procedure: Bilateral BLOCK, SACROILIAC JOINT and bilatearl GTB with RN IV sedation;  Surgeon: Vin Shukla MD;  Location: [facility] PAIN MGT;  Service: Pain Management;  Laterality: Bilateral;    INJECTION OF ANESTHETIC AGENT INTO SACROILIAC JOINT Bilateral 9/30/2020    Procedure: Bilateral GT bursa + bilateral SIJ injection;  Surgeon: Vin Shukla MD;  Location: [facility] PAIN MGT;  Service: Pain Management;  Laterality: Bilateral;    INJECTION OF JOINT Bilateral 9/30/2020    Procedure: Bilateral GT bursa + bilateral SIJ injection;  Surgeon: Vin Shukla MD;  Location: [facility] PAIN MGT;  Service: Pain Management;  Laterality: Bilateral;    KNEE ARTHROSCOPY W/ MENISCAL REPAIR Right     LIVER BIOPSY      sinus lift      2003    TONSILLECTOMY, ADENOIDECTOMY      TUBAL LIGATION         Current Outpatient Medications on File Prior to Visit   Medication Sig Dispense Refill    ascorbic acid, vitamin C, (VITAMIN C) 1000 MG tablet Take 1,000 mg by mouth 2 (two) times daily.      b complex vitamins tablet Take 1 tablet by mouth once daily.      CALCIUM CITRATE ORAL Take 600 mg by mouth.      carvediloL (COREG) 6.25 MG tablet Take 1 tablet (6.25 mg total) by mouth 2 (two) times daily with meals. 180 tablet 3    coenzyme Q10 10 mg capsule Take 10 mg by mouth as needed.      ergocalciferol, vitamin D2, (VITAMIN D ORAL) Take 1,000 mg  "by mouth.      lilliam primrose/linoleic/gamoleni (PRIMROSE OIL ORAL) Take by mouth.      fluticasone propionate (FLONASE) 50 mcg/actuation nasal spray SHAKE LIQUID AND USE 2 SPRAYS IN EACH NOSTRIL DAILY 16 g 12    furosemide (LASIX) 20 MG tablet Take 1 tablet (20 mg total) by mouth as needed (increase edema of  LE  AND OR DESAI). 90 tablet 3    inositol 500 mg Tab Take 500 mg by mouth 2 (two) times daily.      lansoprazole (PREVACID) 15 MG capsule Take 15 mg by mouth once daily.      MAGNESIUM ORAL Take 100 mg by mouth.      methylsulfonylmethane 1,000 mg Cap Take by mouth as needed.       multivitamin (THERAGRAN) per tablet Take 1 tablet by mouth once daily. Per pt C Women's 50 plus      spironolactone (ALDACTONE) 50 MG tablet Take 1 tablet (50 mg total) by mouth every evening. 90 tablet 3    tiZANidine (ZANAFLEX) 2 MG tablet TAKE 1 TABLET(2 MG) BY MOUTH EVERY 8 HOURS AS NEEDED 30 tablet 5    asenapine maleate (SAPHRIS, BLACK SILVA, SL) Take by mouth as needed.      CHROMIUM ORAL Take by mouth.      fish oil-omega-3 fatty acids 300-1,000 mg capsule Take 2 capsules by mouth once daily.      glucosamine sulfate 500 mg Tab Take 1,000 mg by mouth as needed.       UNABLE TO FIND Liver Refresh       No current facility-administered medications on file prior to visit.       Review of patient's allergies indicates:   Allergen Reactions    Arb-angiotensin receptor antagonist Edema    Hydralazine analogues      "Lupus reaction"    Metoprolol Other (See Comments)     Alopecia    Sulfa (sulfonamide antibiotics)      Passed out and almost stopped breathing    Calcium channel blocking agents-dihydropyridines      Edema      Ace inhibitors Other (See Comments)     cough       Social History     Socioeconomic History    Marital status:     Number of children: 0    Highest education level: Master's degree (e.g., MA, MS, [place], MEd, MSW, CARLOZ)   Occupational History    Occupation: Retired Teacher "   Tobacco Use    Smoking status: Never Smoker    Smokeless tobacco: Never Used   Substance and Sexual Activity    Alcohol use: Yes     Alcohol/week: 3.0 standard drinks     Types: 3 Glasses of wine per week    Drug use: No    Sexual activity: Yes     Partners: Male   Other Topics Concern    Are you pregnant or think you may be? No    Breast-feeding No     Social Determinants of Health     Financial Resource Strain: Low Risk     Difficulty of Paying Living Expenses: Not hard at all   Food Insecurity: No Food Insecurity    Worried About Running Out of Food in the Last Year: Never true    Ran Out of Food in the Last Year: Never true   Transportation Needs: No Transportation Needs    Lack of Transportation (Medical): No    Lack of Transportation (Non-Medical): No   Physical Activity: Inactive    Days of Exercise per Week: 0 days    Minutes of Exercise per Session: 0 min   Stress: No Stress Concern Present    Feeling of Stress : Not at all   Social Connections: Moderately Isolated    Frequency of Communication with Friends and Family: More than three times a week    Frequency of Social Gatherings with Friends and Family: Once a week    Attends Spiritism Services: Never    Active Member of Clubs or Organizations: No    Attends Club or Organization Meetings: Never    Marital Status:    Housing Stability: Low Risk     Unable to Pay for Housing in the Last Year: No    Number of Places Lived in the Last Year: 1    Unstable Housing in the Last Year: No       Family History   Problem Relation Age of Onset    Colon cancer Maternal Grandfather     Diabetes Maternal Grandmother     Hypertension Maternal Grandmother     Breast cancer Maternal Grandmother     Cataracts Mother     Glaucoma Mother     Macular degeneration Mother     Hypertension Mother     Aortic aneurysm Mother     Diabetes Paternal Grandmother     Cataracts Maternal Aunt     Glaucoma Maternal Aunt     Macular degeneration  Maternal Aunt     Melanoma Maternal Aunt     Heart disease Unknown         pat side    Aneurysm Brother         brain    Stroke Neg Hx     Kidney disease Neg Hx     Hyperlipidemia Neg Hx        Review of Systems   Constitutional: Negative for appetite change, fever and unexpected weight change.   HENT: Negative for postnasal drip, rhinorrhea, sneezing, sore throat and trouble swallowing.    Eyes: Negative for visual disturbance.   Respiratory: Negative for cough, shortness of breath and wheezing.    Cardiovascular: Negative for chest pain, palpitations and leg swelling.   Gastrointestinal: Positive for anal bleeding. Negative for abdominal pain, blood in stool, constipation, diarrhea, nausea and vomiting.   Genitourinary: Negative for dysuria.   Musculoskeletal: Negative for arthralgias, joint swelling and myalgias.   Skin: Negative for color change, pallor and rash.   Neurological: Negative for weakness, light-headedness, numbness and headaches.   Hematological: Negative for adenopathy. Does not bruise/bleed easily.   Psychiatric/Behavioral: Negative for agitation.       Objective:   Vitals:   Vitals:    07/28/22 1013   BP: (!) 144/72   Pulse: 66       Physical Exam  Vitals reviewed.   Constitutional:       General: She is not in acute distress.     Appearance: She is not diaphoretic.   HENT:      Head: Normocephalic and atraumatic.      Mouth/Throat:      Pharynx: No oropharyngeal exudate.   Eyes:      General: No scleral icterus.        Right eye: No discharge.         Left eye: No discharge.      Conjunctiva/sclera: Conjunctivae normal.      Pupils: Pupils are equal, round, and reactive to light.   Cardiovascular:      Rate and Rhythm: Normal rate and regular rhythm.      Heart sounds: Normal heart sounds. No murmur heard.    No friction rub. No gallop.   Pulmonary:      Effort: Pulmonary effort is normal. No respiratory distress.      Breath sounds: Normal breath sounds. No stridor. No wheezing or rales.    Abdominal:      General: Bowel sounds are normal. There is no distension.      Palpations: Abdomen is soft. There is no mass.      Tenderness: There is no abdominal tenderness. There is no guarding.   Musculoskeletal:         General: Normal range of motion.      Cervical back: Normal range of motion.   Skin:     General: Skin is warm and dry.      Coloration: Skin is not pale.      Findings: No erythema or rash.   Neurological:      Mental Status: She is alert and oriented to person, place, and time.         IMPRESSION     Problem List Items Addressed This Visit    None     Visit Diagnoses     Rectal bleeding    -  Primary    Relevant Orders    Case Request Endoscopy: COLONOSCOPY (Completed)    Diverticulosis        Relevant Orders    Case Request Endoscopy: COLONOSCOPY (Completed)    Change in bowel habits              PLANS:    - Recommend colonoscopy for further evaluation. Will schedule  - Continue with high fiber diet  - Avoid constipation, straining and/or sitting on toilet bowl for extended periods of time  - Further recommendations pending the above    Rectal bleeding  -     Ambulatory referral/consult to Gastroenterology  -     Case Request Endoscopy: COLONOSCOPY    Diverticulosis  -     Ambulatory referral/consult to Gastroenterology  -     Case Request Endoscopy: COLONOSCOPY    Change in bowel habits    Other orders  -     polyethylene glycol (GAVILYTE-C) 240-22.72-6.72 -5.84 gram SolR; Take 4,000 mLs by mouth once. for 1 dose  Dispense: 4000 mL; Refill: 0      Shanti Arriaga MD  Gastroenterology and Hepatology

## 2022-08-02 ENCOUNTER — HOSPITAL ENCOUNTER (OUTPATIENT)
Dept: RADIOLOGY | Facility: HOSPITAL | Age: 72
Discharge: HOME | End: 2022-08-02
Attending: ORTHOPAEDIC SURGERY
Payer: MEDICARE

## 2022-08-02 DIAGNOSIS — E04.2 MULTIPLE THYROID NODULES: ICD-10-CM

## 2022-08-02 PROCEDURE — 76536 US EXAM OF HEAD AND NECK: CPT | Mod: 26,,, | Performed by: RADIOLOGY

## 2022-08-02 PROCEDURE — 76536 US EXAM OF HEAD AND NECK: CPT | Mod: TC

## 2022-08-02 PROCEDURE — 76536 US SOFT TISSUE HEAD NECK THYROID: ICD-10-PCS | Mod: 26,,, | Performed by: RADIOLOGY

## 2022-08-08 ENCOUNTER — OFFICE VISIT (OUTPATIENT)
Dept: OTOLARYNGOLOGY | Facility: CLINIC | Age: 72
End: 2022-08-08
Payer: MEDICARE

## 2022-08-08 VITALS — BODY MASS INDEX: 33.45 KG/M2 | TEMPERATURE: 98 F | WEIGHT: 207.25 LBS

## 2022-08-08 DIAGNOSIS — H90.3 SENSORINEURAL HEARING LOSS (SNHL) OF BOTH EARS: ICD-10-CM

## 2022-08-08 DIAGNOSIS — E04.2 MULTIPLE THYROID NODULES: Primary | ICD-10-CM

## 2022-08-08 DIAGNOSIS — H93.13 TINNITUS, BILATERAL: ICD-10-CM

## 2022-08-08 PROCEDURE — 99213 OFFICE O/P EST LOW 20 MIN: CPT | Mod: S$PBB,,, | Performed by: ORTHOPAEDIC SURGERY

## 2022-08-08 PROCEDURE — 99213 OFFICE O/P EST LOW 20 MIN: CPT | Mod: PBBFAC | Performed by: ORTHOPAEDIC SURGERY

## 2022-08-08 PROCEDURE — 99999 PR PBB SHADOW E&M-EST. PATIENT-LVL III: CPT | Mod: PBBFAC,,, | Performed by: ORTHOPAEDIC SURGERY

## 2022-08-08 PROCEDURE — 99213 PR OFFICE/OUTPT VISIT, EST, LEVL III, 20-29 MIN: ICD-10-PCS | Mod: S$PBB,,, | Performed by: ORTHOPAEDIC SURGERY

## 2022-08-08 PROCEDURE — 99999 PR PBB SHADOW E&M-EST. PATIENT-LVL III: ICD-10-PCS | Mod: PBBFAC,,, | Performed by: ORTHOPAEDIC SURGERY

## 2022-08-08 RX ORDER — FLUTICASONE PROPIONATE 50 MCG
2 SPRAY, SUSPENSION (ML) NASAL DAILY
Qty: 16 G | Refills: 12 | Status: SHIPPED | OUTPATIENT
Start: 2022-08-08 | End: 2023-09-13 | Stop reason: SDUPTHER

## 2022-08-08 NOTE — PROGRESS NOTES
Subjective:      Patient ID: Deirdre Yanez is a 72 y.o. female.    Chief Complaint: thyroid f/u (Pt would like a refill on flonase)    Patient is a very pleasant 64 year old female here to see me today in followup for evaluation of her thyroid nodules.  Overall, since her last visit she has been doing well since her last visit from a thyroid standpoint.  Her most recent TSH was 5/2021 and that was normal.  She is continuing with alopecia.  We are following her for thyroid nodules.   She has no difficulty breathing or hoarseness.  She has been using Flonase since her last visit, and is very pleased with her progress.   She is concerned that she is exposed to mold when mowing her grass.  We have previously sent her for an US guided FNA of her thyroid nodule as it has slightly enlarged.  However, they were unable to complete the FNA due to blood vessels in the area.  Therefore, we elected to follow her with serial US, but she had some interval growth.  She has had an US guided FNA 7/2019, done with Dr. Dickens, and they were able to obtain a sample showing benign follicular cells.  At her visit last year she had noted increased tinnitus, audiogram last year showed bilateral SNHL.  She thinks her increased tinnitus at that time was related to BP meds which have since been adjusted.        Review of Systems   Constitutional: Negative.    HENT: Negative.    Eyes: Negative.    Respiratory: Negative.    Cardiovascular: Negative.    Gastrointestinal: Negative.    Endocrine: Negative.    Genitourinary: Negative.    Musculoskeletal: Negative.    Skin: Negative.    Allergic/Immunologic: Negative.    Neurological: Negative.    Hematological: Negative.    Psychiatric/Behavioral: Negative.        Objective:       Physical Exam  Constitutional:       General: She is not in acute distress.     Appearance: She is well-developed.   HENT:      Head: Normocephalic and atraumatic.      Right Ear: Tympanic membrane, ear canal and external  ear normal.      Left Ear: Tympanic membrane, ear canal and external ear normal.      Nose: Nose normal. No nasal deformity, septal deviation, mucosal edema or rhinorrhea.      Right Sinus: No maxillary sinus tenderness or frontal sinus tenderness.      Left Sinus: No maxillary sinus tenderness or frontal sinus tenderness.      Mouth/Throat:      Mouth: Mucous membranes are not pale and not dry.      Dentition: No dental caries.      Pharynx: Uvula midline. No oropharyngeal exudate or posterior oropharyngeal erythema.   Eyes:      General: Lids are normal. No scleral icterus.     Extraocular Movements:      Right eye: Normal extraocular motion and no nystagmus.      Left eye: Normal extraocular motion and no nystagmus.      Conjunctiva/sclera: Conjunctivae normal.      Right eye: Right conjunctiva is not injected. No chemosis.     Left eye: Left conjunctiva is not injected. No chemosis.     Pupils: Pupils are equal, round, and reactive to light.   Neck:      Thyroid: No thyroid mass or thyromegaly.      Trachea: Trachea and phonation normal. No tracheal tenderness or tracheal deviation.   Pulmonary:      Effort: Pulmonary effort is normal. No respiratory distress.      Breath sounds: No stridor.   Abdominal:      General: There is no distension.   Lymphadenopathy:      Head:      Right side of head: No submental, submandibular, preauricular, posterior auricular or occipital adenopathy.      Left side of head: No submental, submandibular, preauricular, posterior auricular or occipital adenopathy.      Cervical: No cervical adenopathy.   Skin:     General: Skin is warm and dry.      Findings: No erythema or rash.   Neurological:      Mental Status: She is alert and oriented to person, place, and time.      Cranial Nerves: No cranial nerve deficit.   Psychiatric:         Behavior: Behavior normal.     THYROID US:  FINDINGS:  The thyroid gland is normal in size.  The right lobe measures 5.1 x 2.1 x 1.6 cm.  The left  lobe measures 4.0 x 1.9 x 2.0 cm. Thyroid isthmus measures 3 mm AP.  Total thyroid volume measures approximately 15.3 mL.  Thyroid parenchyma is again noted to be diffusely heterogeneous with multiple nodules again demonstrated.  The largest nodule on the right appears mixed cystic and solid measuring up to 2.4 cm at the lower pole.  The largest nodule on the left appears predominately cystic measuring up to 2.1 cm at the lower pole.  Multiple additional smaller cystic and mixed cystic/solid nodules also remain unchanged.  No lymphadenopathy is seen.     Impression:     No detrimental interval change in appearance of multiple thyroid nodules as above.    Assessment:       1. Multiple thyroid nodules    2. Sensorineural hearing loss (SNHL) of both ears    3. Tinnitus, bilateral        Plan:     Multiple thyroid nodules:  Stable, RTC one year.    Sensorineural hearing loss (SNHL) of both ears    Tinnitus, bilateral:  Improved, not bothering her as much at this time.  Repeat audiogram at return visit.

## 2022-08-23 DIAGNOSIS — M25.569 KNEE PAIN, UNSPECIFIED CHRONICITY, UNSPECIFIED LATERALITY: Primary | ICD-10-CM

## 2022-08-29 ENCOUNTER — HOSPITAL ENCOUNTER (OUTPATIENT)
Dept: RADIOLOGY | Facility: HOSPITAL | Age: 72
Discharge: HOME | End: 2022-08-29
Attending: ORTHOPAEDIC SURGERY
Payer: MEDICARE

## 2022-08-29 ENCOUNTER — OFFICE VISIT (OUTPATIENT)
Dept: ORTHOPEDICS | Facility: CLINIC | Age: 72
End: 2022-08-29
Payer: MEDICARE

## 2022-08-29 VITALS
HEART RATE: 89 BPM | WEIGHT: 207.25 LBS | DIASTOLIC BLOOD PRESSURE: 115 MMHG | BODY MASS INDEX: 33.31 KG/M2 | HEIGHT: 66 IN | SYSTOLIC BLOOD PRESSURE: 148 MMHG

## 2022-08-29 DIAGNOSIS — M25.569 KNEE PAIN, UNSPECIFIED CHRONICITY, UNSPECIFIED LATERALITY: ICD-10-CM

## 2022-08-29 DIAGNOSIS — M17.11 ARTHRITIS OF KNEE, RIGHT: Primary | ICD-10-CM

## 2022-08-29 DIAGNOSIS — G89.29 CHRONIC PAIN OF BOTH KNEES: ICD-10-CM

## 2022-08-29 DIAGNOSIS — M23.41 LOOSE BODY IN KNEE, RIGHT KNEE: ICD-10-CM

## 2022-08-29 DIAGNOSIS — M41.56 SCOLIOSIS OF LUMBAR REGION DUE TO DEGENERATIVE DISEASE OF SPINE IN ADULT: ICD-10-CM

## 2022-08-29 DIAGNOSIS — M48.061 SPINAL STENOSIS OF LUMBAR REGION, UNSPECIFIED WHETHER NEUROGENIC CLAUDICATION PRESENT: ICD-10-CM

## 2022-08-29 DIAGNOSIS — M25.561 CHRONIC PAIN OF BOTH KNEES: ICD-10-CM

## 2022-08-29 DIAGNOSIS — M21.161 ACQUIRED VARUS DEFORMITY KNEE, RIGHT: ICD-10-CM

## 2022-08-29 DIAGNOSIS — M25.562 CHRONIC PAIN OF BOTH KNEES: ICD-10-CM

## 2022-08-29 PROCEDURE — 99999 PR PBB SHADOW E&M-EST. PATIENT-LVL V: ICD-10-PCS | Mod: PBBFAC,,, | Performed by: ORTHOPAEDIC SURGERY

## 2022-08-29 PROCEDURE — 99214 OFFICE O/P EST MOD 30 MIN: CPT | Mod: 25,S$PBB,, | Performed by: ORTHOPAEDIC SURGERY

## 2022-08-29 PROCEDURE — 20610 LARGE JOINT ASPIRATION/INJECTION: R KNEE: ICD-10-PCS | Mod: S$PBB,RT,, | Performed by: ORTHOPAEDIC SURGERY

## 2022-08-29 PROCEDURE — 73564 X-RAY EXAM KNEE 4 OR MORE: CPT | Mod: TC,50

## 2022-08-29 PROCEDURE — 73564 XR KNEE ORTHO BILAT WITH FLEXION: ICD-10-PCS | Mod: 26,50,, | Performed by: STUDENT IN AN ORGANIZED HEALTH CARE EDUCATION/TRAINING PROGRAM

## 2022-08-29 PROCEDURE — 20610 DRAIN/INJ JOINT/BURSA W/O US: CPT | Mod: PBBFAC,RT | Performed by: ORTHOPAEDIC SURGERY

## 2022-08-29 PROCEDURE — 99999 PR PBB SHADOW E&M-EST. PATIENT-LVL V: CPT | Mod: PBBFAC,,, | Performed by: ORTHOPAEDIC SURGERY

## 2022-08-29 PROCEDURE — 99214 PR OFFICE/OUTPT VISIT, EST, LEVL IV, 30-39 MIN: ICD-10-PCS | Mod: 25,S$PBB,, | Performed by: ORTHOPAEDIC SURGERY

## 2022-08-29 PROCEDURE — 73564 X-RAY EXAM KNEE 4 OR MORE: CPT | Mod: 26,50,, | Performed by: STUDENT IN AN ORGANIZED HEALTH CARE EDUCATION/TRAINING PROGRAM

## 2022-08-29 PROCEDURE — 99215 OFFICE O/P EST HI 40 MIN: CPT | Mod: PBBFAC,25 | Performed by: ORTHOPAEDIC SURGERY

## 2022-08-29 RX ORDER — MELOXICAM 15 MG/1
15 TABLET ORAL DAILY
Qty: 30 TABLET | Refills: 6 | Status: SHIPPED | OUTPATIENT
Start: 2022-08-29

## 2022-08-29 RX ORDER — METHYLPREDNISOLONE ACETATE 80 MG/ML
80 INJECTION, SUSPENSION INTRA-ARTICULAR; INTRALESIONAL; INTRAMUSCULAR; SOFT TISSUE
Status: DISCONTINUED | OUTPATIENT
Start: 2022-08-29 | End: 2022-08-29 | Stop reason: HOSPADM

## 2022-08-29 RX ADMIN — METHYLPREDNISOLONE ACETATE 80 MG: 80 INJECTION, SUSPENSION INTRALESIONAL; INTRAMUSCULAR; INTRASYNOVIAL; SOFT TISSUE at 02:08

## 2022-08-29 NOTE — PROGRESS NOTES
Subjective:     Patient ID: Deirdre Yanez is a 72 y.o. female.    Chief Complaint: Pain of the Right Knee and Pain of the Left Knee    HPI:  08/29/2022 right knee severe pain compared to the left.  Gives history of injuring hernia almost 20 years ago and also falling around 20 ft landed on a rock on her feet.  She had some back and hip issues at that time and all got better.  In 2007 had arthroscopic surgery treated by Dr. Saenz where she also received injections did well for that.  She had been diagnosed with mild scoliosis as a teenager and she did yoga all her life until she got injured she could not do it over the last several years.  Her scoliosis got worst at down 1 point she had an MRI that showed some spinal stenosis.  She declined denies any having pain and numbness or tingling in the legs at this time.  Denies loss of bowel bladder control.  She is a retired .  She lives with her .  She tries to exercise every day feed a 1 point she was taking ibuprofen and got good relief however she was taken off by the cardiologist because of history of hypertension and the amount she was taken.  Now she takes MS am and chondroitin glucosamine occasional Tylenol.  She lives with her  she uses a cane to get around.  She is looking for what she can do to help herself.  She was asking about recumbent inverting table that could help her back and did not want to get unless she asked me and she wanted to know what is going on with her knee.  No fever no chills no shortness of breath or difficulty with chewing swallowing loss of bowel bladder control blurry vision double vision loss smell or taste    Past Medical History:   Diagnosis Date    Abrasion     left eye    Abrasion and/or friction burn of abdominal wall with infection     left eye    Arthritis     gouty arthritis    Back pain     Diabetes mellitus     2011  11/17/2013    Diverticulosis     DM (diabetes mellitus) 2011      12/03/2014  A1C 6.0 x 2 weeks    DM (diabetes mellitus) 2011    BS 89 10/14/2017 A1C 5.7   Diet controlled    DM (diabetes mellitus) 2011    BS 90 am 10/23/2018     DM (diabetes mellitus) 2011    BS didn't check 10/23/2019    Hepatitis     Hx of B/C from cadaver in past    Hepatitis B     Hepatitis C     Hiatal hernia     Hip bursitis, left     HTN (hypertension)     Hypertensive CHF (congestive heart failure) 3/28/2014    Obesity     Pneumonia     Positive ALBIN (antinuclear antibody)     Pure hypercholesterolemia 9/30/2016    Rheumatoid arthritis     questionable diagnosis    Scoliosis     Type 2 diabetes mellitus without complication, without long-term current use of insulin     Urinary incontinence     intermittent- only when lifting heavy items > 20 lbs     Past Surgical History:   Procedure Laterality Date    APPENDECTOMY      bone grafts      CERVICAL CONIZATION   W/ LASER      COLONOSCOPY N/A 1/10/2019    Procedure: COLONOSCOPY;  Surgeon: Nixon Thornton MD;  Location: [facility] ENDO;  Service: Endoscopy;  Laterality: N/A;    COSMETIC SURGERY Bilateral     ears    DILATION AND CURETTAGE OF UTERUS      INJECTION OF ANESTHETIC AGENT INTO SACROILIAC JOINT Bilateral 7/23/2020    Procedure: Bilateral BLOCK, SACROILIAC JOINT and bilatearl GTB with RN IV sedation;  Surgeon: Vin Shukla MD;  Location: [facility] PAIN MGT;  Service: Pain Management;  Laterality: Bilateral;    INJECTION OF ANESTHETIC AGENT INTO SACROILIAC JOINT Bilateral 9/30/2020    Procedure: Bilateral GT bursa + bilateral SIJ injection;  Surgeon: Vin Shukla MD;  Location: [facility] PAIN MGT;  Service: Pain Management;  Laterality: Bilateral;    INJECTION OF JOINT Bilateral 9/30/2020    Procedure: Bilateral GT bursa + bilateral SIJ injection;  Surgeon: Vin Shukla MD;  Location: [facility] PAIN MGT;  Service: Pain Management;  Laterality: Bilateral;    KNEE ARTHROSCOPY W/ MENISCAL REPAIR Right     LIVER BIOPSY      sinus lift      2003    TONSILLECTOMY, ADENOIDECTOMY       TUBAL LIGATION       Family History   Problem Relation Age of Onset    Colon cancer Maternal Grandfather     Diabetes Maternal Grandmother     Hypertension Maternal Grandmother     Breast cancer Maternal Grandmother     Cataracts Mother     Glaucoma Mother     Macular degeneration Mother     Hypertension Mother     Aortic aneurysm Mother     Diabetes Paternal Grandmother     Cataracts Maternal Aunt     Glaucoma Maternal Aunt     Macular degeneration Maternal Aunt     Melanoma Maternal Aunt     Heart disease Unknown         pat side    Aneurysm Brother         brain    Stroke Neg Hx     Kidney disease Neg Hx     Hyperlipidemia Neg Hx      Social History     Socioeconomic History    Marital status:     Number of children: 0    Highest education level: Master's degree (e.g., MA, MS, [employer], MEd, MSW, CARLOZ)   Occupational History    Occupation: Retired Teacher   Tobacco Use    Smoking status: Never    Smokeless tobacco: Never   Substance and Sexual Activity    Alcohol use: Yes     Alcohol/week: 3.0 standard drinks     Types: 3 Glasses of wine per week    Drug use: No    Sexual activity: Yes     Partners: Male   Other Topics Concern    Are you pregnant or think you may be? No    Breast-feeding No     Social Determinants of Health     Financial Resource Strain: Low Risk     Difficulty of Paying Living Expenses: Not hard at all   Food Insecurity: No Food Insecurity    Worried About Running Out of Food in the Last Year: Never true    Ran Out of Food in the Last Year: Never true   Transportation Needs: No Transportation Needs    Lack of Transportation (Medical): No    Lack of Transportation (Non-Medical): No   Physical Activity: Inactive    Days of Exercise per Week: 0 days    Minutes of Exercise per Session: 0 min   Stress: No Stress Concern Present    Feeling of Stress : Not at all   Social Connections: Moderately Isolated    Frequency of Communication with Friends and Family: More than three times a week     Frequency of Social Gatherings with Friends and Family: Once a week    Attends Nondenominational Services: Never    Active Member of Clubs or Organizations: No    Attends Club or Organization Meetings: Never    Marital Status:    Housing Stability: Low Risk     Unable to Pay for Housing in the Last Year: No    Number of Places Lived in the Last Year: 1    Unstable Housing in the Last Year: No     Medication List with Changes/Refills   New Medications    MELOXICAM (MOBIC) 15 MG TABLET    Take 1 tablet (15 mg total) by mouth once daily. Take with food   Current Medications    ASCORBIC ACID, VITAMIN C, (VITAMIN C) 1000 MG TABLET    Take 1,000 mg by mouth 2 (two) times daily.    ASENAPINE MALEATE (SAPHRIS, BLACK SILVA, SL)    Take by mouth as needed.    B COMPLEX VITAMINS TABLET    Take 1 tablet by mouth once daily.    CALCIUM CITRATE ORAL    Take 600 mg by mouth.    CARVEDILOL (COREG) 6.25 MG TABLET    Take 1 tablet (6.25 mg total) by mouth 2 (two) times daily with meals.    CHROMIUM ORAL    Take by mouth.    COENZYME Q10 10 MG CAPSULE    Take 10 mg by mouth as needed.    ERGOCALCIFEROL, VITAMIN D2, (VITAMIN D ORAL)    Take 1,000 mg by mouth.    FLAKITO PRIMROSE/LINOLEIC/GAMOLENI (PRIMROSE OIL ORAL)    Take by mouth.    FISH OIL-OMEGA-3 FATTY ACIDS 300-1,000 MG CAPSULE    Take 2 capsules by mouth once daily.    FLUTICASONE PROPIONATE (FLONASE) 50 MCG/ACTUATION NASAL SPRAY    SHAKE LIQUID AND USE 2 SPRAYS IN EACH NOSTRIL DAILY    FLUTICASONE PROPIONATE (FLONASE) 50 MCG/ACTUATION NASAL SPRAY    2 sprays (100 mcg total) by Each Nostril route once daily.    FUROSEMIDE (LASIX) 20 MG TABLET    Take 1 tablet (20 mg total) by mouth as needed (increase edema of  LE  AND OR DESAI).    GLUCOSAMINE SULFATE 500 MG TAB    Take 1,000 mg by mouth as needed.     INOSITOL 500 MG TAB    Take 500 mg by mouth 2 (two) times daily.    LANSOPRAZOLE (PREVACID) 15 MG CAPSULE    Take 15 mg by mouth once daily.    MAGNESIUM ORAL    Take 100 mg by  "mouth.    METHYLSULFONYLMETHANE 1,000 MG CAP    Take by mouth as needed.     MULTIVITAMIN (THERAGRAN) PER TABLET    Take 1 tablet by mouth once daily. Per pt [facility] Women's 50 plus    SPIRONOLACTONE (ALDACTONE) 50 MG TABLET    Take 1 tablet (50 mg total) by mouth every evening.    TIZANIDINE (ZANAFLEX) 2 MG TABLET    TAKE 1 TABLET(2 MG) BY MOUTH EVERY 8 HOURS AS NEEDED    UNABLE TO FIND    Liver Refresh     Review of patient's allergies indicates:   Allergen Reactions    Arb-angiotensin receptor antagonist Edema    Hydralazine analogues      "Lupus reaction"    Metoprolol Other (See Comments)     Alopecia    Sulfa (sulfonamide antibiotics)      Passed out and almost stopped breathing    Calcium channel blocking agents-dihydropyridines      Edema      Ace inhibitors Other (See Comments)     cough     Review of Systems   Constitutional: Negative for decreased appetite.   HENT:  Negative for tinnitus.    Eyes:  Negative for double vision.   Cardiovascular:  Negative for chest pain.   Respiratory:  Negative for wheezing.    Hematologic/Lymphatic: Negative for bleeding problem.   Skin:  Negative for dry skin.   Musculoskeletal:  Positive for arthritis, back pain, joint pain and joint swelling. Negative for gout, neck pain and stiffness.   Gastrointestinal:  Negative for abdominal pain.   Genitourinary:  Negative for bladder incontinence.   Neurological:  Negative for numbness, paresthesias and sensory change.   Psychiatric/Behavioral:  Negative for altered mental status.      Objective:   Body mass index is 33.45 kg/m².  Vitals:    08/29/22 1357   BP: (!) 148/115   Pulse: 89          General    Constitutional: She is oriented to person, place, and time. She appears well-developed.   HENT:   Head: Atraumatic.   Eyes: EOM are normal.   Pulmonary/Chest: Effort normal.   Neurological: She is alert and oriented to person, place, and time.   Psychiatric: Judgment normal.         Ambulating with cane with a very slight limp  You " could see that she is hunched over with of the severe scoliosis in the back  Pelvis is level  Bilateral hips passive motion no pain in the groin.    Hip flexors abductors adductors quads and hamstrings ankle extensors and flexors are 5/5   Right knee with around 5° of contractures and flexes to 120° with medial joint pain.  There is mild to moderate swelling.  There is crepitus to compression patella to range of motion.  Collaterals and cruciates are stable.  No defect in the patella or quadriceps tendon   Left knee with 0-120 degrees of flexion with very minimal tenderness if any.  Mild crepitus.  No swelling today.  There is no defect in the patella or quadriceps tendon.  Collaterals and cruciates are stable.    Calves are soft  He has numerous varicosities to both lower extremities  Ankle motion is intact   Skin is warm to touch no obvious lesions    Relevant imaging results reviewed and interpreted by me, discussed with the patient and / or family today   X-ray 8/29/22 right knee with complete loss of medial joint space.  Marginal osteophytic changes.  Sclerosis.  Loose body in the superior pouch consistent with end-stage arthritis with varus deformity.  The left knee is mild joint line narrowing mild degenerative changes.  No fracture seen  Assessment:     Encounter Diagnoses   Name Primary?    Scoliosis of lumbar region due to degenerative disease of spine in adult     Spinal stenosis of lumbar region, unspecified whether neurogenic claudication present     Chronic pain of both knees     Arthritis of knee, right Yes    Acquired varus deformity knee, right     Loose body in knee, right knee         Plan:   Arthritis of knee, right  -     Large Joint Aspiration/Injection: R knee  -     meloxicam (MOBIC) 15 MG tablet; Take 1 tablet (15 mg total) by mouth once daily. Take with food  Dispense: 30 tablet; Refill: 6    Scoliosis of lumbar region due to degenerative disease of spine in adult    Spinal stenosis of  lumbar region, unspecified whether neurogenic claudication present  -     meloxicam (MOBIC) 15 MG tablet; Take 1 tablet (15 mg total) by mouth once daily. Take with food  Dispense: 30 tablet; Refill: 6    Chronic pain of both knees  -     Ambulatory referral/consult to Orthopedics    Acquired varus deformity knee, right    Loose body in knee, right knee       Patient Instructions   X-ray showing right knee complete loss of joint space on the inside with bone spurs and loose body floating in the superior pouch that could give you catching locking feeling in the knee in consistent of severe arthritis   The left knee has very mild if any arthritic changes  The cardiologist a 1 point stops her from taking ibuprofen because of hypertension   You received arthroscopic surgery years ago by Dr. Saenz with good relief and also received by him at that time in 2007 injections   Right now the pain is 5/10 with activities   You do have severe scoliosis of the spine which progressed over the years  Plan  You can use a recumbent table which will might help your back but make sure you have some moderate home case you need to turn around  You can use meloxicam 15 mg not to exceed twice a week if needed on a bad day way you have a lot of work to do in you hurting   You can still take Tylenol 325 mg 2 tablets not to exceed 3 times a day as needed even though you taking meloxicam  Will inject the right knee with 80 mg Depo-Medrol mixed with 5 cc of Novocain 08/29/2022   Ice the knee next few days in might swell up or hurt more because of the injection it will go away   Continue being very active and exercise for the knee because you had lost a little bit of motion  In the future if things fail you need a total knee replacement    I will give you a brochure about total knee for reading  You can use a stationary bicycle for exercise but the seat a little bit high  You can walk to tolerance   Swimming his ideal if you  can        Disclaimer: This note was prepared using a voice recognition system and is likely to have sound alike errors within the text.

## 2022-08-29 NOTE — PROCEDURES
Large Joint Aspiration/Injection: R knee    Date/Time: 8/29/2022 2:20 PM  Performed by: Delvin Puga MD  Authorized by: Delvin Puga MD     Consent Done?:  Yes (Verbal)  Indications:  Arthritis  Site marked: the procedure site was marked    Timeout: prior to procedure the correct patient, procedure, and site was verified      Local anesthesia used?: Yes    Local anesthetic:  Lidocaine 1% without epinephrine    Details:  Needle Size:  22 G  Ultrasonic Guidance for needle placement?: No    Approach:  Anterolateral  Location:  Knee  Site:  R knee  Medications:  80 mg methylPREDNISolone acetate 80 mg/mL  Patient tolerance:  Patient tolerated the procedure well with no immediate complications

## 2022-09-01 ENCOUNTER — HOSPITAL ENCOUNTER (OUTPATIENT)
Facility: HOSPITAL | Age: 72
Discharge: HOME | End: 2022-09-01
Attending: INTERNAL MEDICINE | Admitting: INTERNAL MEDICINE
Payer: MEDICARE

## 2022-09-01 ENCOUNTER — ANESTHESIA (OUTPATIENT)
Dept: ENDOSCOPY | Facility: HOSPITAL | Age: 72
End: 2022-09-01
Payer: MEDICARE

## 2022-09-01 ENCOUNTER — ANESTHESIA EVENT (OUTPATIENT)
Dept: ENDOSCOPY | Facility: HOSPITAL | Age: 72
End: 2022-09-01
Payer: MEDICARE

## 2022-09-01 VITALS
BODY MASS INDEX: 32.62 KG/M2 | WEIGHT: 203 LBS | HEART RATE: 72 BPM | DIASTOLIC BLOOD PRESSURE: 98 MMHG | SYSTOLIC BLOOD PRESSURE: 160 MMHG | OXYGEN SATURATION: 100 % | RESPIRATION RATE: 16 BRPM | HEIGHT: 66 IN | TEMPERATURE: 98 F

## 2022-09-01 DIAGNOSIS — K62.5 RECTAL BLEEDING: Primary | ICD-10-CM

## 2022-09-01 DIAGNOSIS — R19.4 CHANGE IN BOWEL HABITS: ICD-10-CM

## 2022-09-01 DIAGNOSIS — K57.90 DIVERTICULOSIS: ICD-10-CM

## 2022-09-01 LAB
CTP QC/QA: YES
SARS-COV-2 AG RESP QL IA.RAPID: NEGATIVE

## 2022-09-01 PROCEDURE — 82962 GLUCOSE BLOOD TEST: CPT | Performed by: INTERNAL MEDICINE

## 2022-09-01 PROCEDURE — 45378 PR COLONOSCOPY,DIAGNOSTIC: ICD-10-PCS | Mod: ,,, | Performed by: INTERNAL MEDICINE

## 2022-09-01 PROCEDURE — 25000003 PHARM REV CODE 250: Performed by: NURSE ANESTHETIST, CERTIFIED REGISTERED

## 2022-09-01 PROCEDURE — 37000008 HC ANESTHESIA 1ST 15 MINUTES: Performed by: INTERNAL MEDICINE

## 2022-09-01 PROCEDURE — 45378 DIAGNOSTIC COLONOSCOPY: CPT | Performed by: INTERNAL MEDICINE

## 2022-09-01 PROCEDURE — 45378 DIAGNOSTIC COLONOSCOPY: CPT | Mod: ,,, | Performed by: INTERNAL MEDICINE

## 2022-09-01 PROCEDURE — 63600175 PHARM REV CODE 636 W HCPCS: Performed by: NURSE ANESTHETIST, CERTIFIED REGISTERED

## 2022-09-01 PROCEDURE — 37000009 HC ANESTHESIA EA ADD 15 MINS: Performed by: INTERNAL MEDICINE

## 2022-09-01 RX ORDER — LIDOCAINE HYDROCHLORIDE 10 MG/ML
INJECTION, SOLUTION EPIDURAL; INFILTRATION; INTRACAUDAL; PERINEURAL
Status: DISCONTINUED | OUTPATIENT
Start: 2022-09-01 | End: 2022-09-01

## 2022-09-01 RX ORDER — SODIUM CHLORIDE, SODIUM LACTATE, POTASSIUM CHLORIDE, CALCIUM CHLORIDE 600; 310; 30; 20 MG/100ML; MG/100ML; MG/100ML; MG/100ML
INJECTION, SOLUTION INTRAVENOUS CONTINUOUS PRN
Status: DISCONTINUED | OUTPATIENT
Start: 2022-09-01 | End: 2022-09-01

## 2022-09-01 RX ORDER — PROPOFOL 10 MG/ML
VIAL (ML) INTRAVENOUS
Status: DISCONTINUED | OUTPATIENT
Start: 2022-09-01 | End: 2022-09-01

## 2022-09-01 RX ADMIN — PROPOFOL 40 MG: 10 INJECTION, EMULSION INTRAVENOUS at 11:09

## 2022-09-01 RX ADMIN — LIDOCAINE HYDROCHLORIDE 50 MG: 10 INJECTION, SOLUTION EPIDURAL; INFILTRATION; INTRACAUDAL; PERINEURAL at 11:09

## 2022-09-01 RX ADMIN — PROPOFOL 50 MG: 10 INJECTION, EMULSION INTRAVENOUS at 11:09

## 2022-09-01 RX ADMIN — PROPOFOL 70 MG: 10 INJECTION, EMULSION INTRAVENOUS at 11:09

## 2022-09-01 RX ADMIN — SODIUM CHLORIDE, SODIUM LACTATE, POTASSIUM CHLORIDE, AND CALCIUM CHLORIDE: 600; 310; 30; 20 INJECTION, SOLUTION INTRAVENOUS at 10:09

## 2022-09-01 NOTE — TRANSFER OF CARE
"Anesthesia Transfer of Care Note    Patient: Deirdre MAGANA Field    Procedure(s) Performed: Procedure(s) (LRB):  COLONOSCOPY (N/A)    Patient location: GI    Anesthesia Type: MAC    Transport from OR: Transported from OR on room air with adequate spontaneous ventilation    Post pain: adequate analgesia    Post assessment: no apparent anesthetic complications and tolerated procedure well    Post vital signs: stable    Level of consciousness: awake, alert and oriented    Nausea/Vomiting: no nausea/vomiting    Complications: none    Transfer of care protocol was followed      Last vitals:   Visit Vitals  BP (!) 167/96 (BP Location: Left arm, Patient Position: Sitting)   Pulse 70   Temp 36.8 °C (98.2 °F)   Resp 18   Ht 5' 6" (1.676 m)   Wt 92.1 kg (203 lb)   SpO2 99%   BMI 32.77 kg/m²     "

## 2022-09-01 NOTE — DISCHARGE SUMMARY
[facility] - Endoscopy (Hospital)  Discharge Note  Short Stay    Procedure(s) (LRB):  COLONOSCOPY (N/A)    OUTCOME: Patient tolerated treatment/procedure well without complication and is now ready for discharge.    DISPOSITION: Home or Self Care    FINAL DIAGNOSIS:  Rectal bleeding    FOLLOWUP: In clinic    DISCHARGE INSTRUCTIONS:  No discharge procedures on file.

## 2022-09-01 NOTE — H&P
Short Stay Endoscopy History and Physical    PCP - Jazmine Montalvo MD    Procedure - Colonoscopy  ASA - 2  Mallampati - per anesthesia  History of Anesthesia problems - no  Family history Anesthesia problems -  no     HPI:  This is a 72 y.o. female here for evaluation of :   Active Hospital Problems    Diagnosis  POA    *Rectal bleeding [K62.5]  No    Change in bowel habits [R19.4]  No    Diverticulosis [K57.90]  No      Resolved Hospital Problems   No resolved problems to display.         Health Maintenance         Date Due Completion Date    COVID-19 Vaccine (1) Never done ---    Influenza Vaccine (1) 09/01/2022 10/2/2009    Diabetes Urine Screening 10/26/2022 10/26/2021    Eye Exam 11/09/2022 11/9/2021    Hemoglobin A1c 12/07/2022 6/7/2022    TETANUS VACCINE 05/08/2023 5/8/2013    Lipid Panel 06/07/2023 6/7/2022    Foot Exam 06/09/2023 6/9/2022    Override on 12/20/2019: Done    Override on 9/16/2014: Done    Mammogram 07/21/2023 7/21/2022    Colorectal Cancer Screening 01/10/2024 1/10/2019    DEXA Scan 07/19/2024 7/19/2019            ROS:  CONSTITUTIONAL: Denies weight change,  fatigue, fevers, chills, night sweats.  CARDIOVASCULAR: Denies chest pain, shortness of breath, orthopnea and edema.  RESPIRATORY: Denies cough, hemoptysis, dyspnea, and wheezing.  GI: See HPI.    Medical History:   Past Medical History:   Diagnosis Date    Abrasion     left eye    Abrasion and/or friction burn of abdominal wall with infection     left eye    Arthritis     gouty arthritis    Back pain     Diabetes mellitus     2011  11/17/2013    Diverticulosis     DM (diabetes mellitus) 2011     12/03/2014  A1C 6.0 x 2 weeks    DM (diabetes mellitus) 2011    BS 89 10/14/2017 A1C 5.7   Diet controlled    DM (diabetes mellitus) 2011    BS 90 am 10/23/2018     DM (diabetes mellitus) 2011    BS didn't check 10/23/2019    Hepatitis     Hx of B/C from cadaver in past    Hepatitis B     Hepatitis C     Hiatal hernia     Hip  bursitis, left     HTN (hypertension)     Hypertensive CHF (congestive heart failure) 3/28/2014    Obesity     Pneumonia     Positive ALBIN (antinuclear antibody)     Pure hypercholesterolemia 9/30/2016    Rheumatoid arthritis     questionable diagnosis    Scoliosis     Type 2 diabetes mellitus without complication, without long-term current use of insulin     Urinary incontinence     intermittent- only when lifting heavy items > 20 lbs       Surgical History:   Past Surgical History:   Procedure Laterality Date    APPENDECTOMY      bone grafts      CERVICAL CONIZATION   W/ LASER      COLONOSCOPY N/A 1/10/2019    Procedure: COLONOSCOPY;  Surgeon: Nixon Thornton MD;  Location: [facility] ENDO;  Service: Endoscopy;  Laterality: N/A;    COSMETIC SURGERY Bilateral     ears    DILATION AND CURETTAGE OF UTERUS      INJECTION OF ANESTHETIC AGENT INTO SACROILIAC JOINT Bilateral 7/23/2020    Procedure: Bilateral BLOCK, SACROILIAC JOINT and bilatearl GTB with RN IV sedation;  Surgeon: Vin Shukla MD;  Location: [facility] PAIN [facility]GT;  Service: Pain Management;  Laterality: Bilateral;    INJECTION OF ANESTHETIC AGENT INTO SACROILIAC JOINT Bilateral 9/30/2020    Procedure: Bilateral GT bursa + bilateral SIJ injection;  Surgeon: Vin Shukla MD;  Location: [facility] PAIN [facility]GT;  Service: Pain Management;  Laterality: Bilateral;    INJECTION OF JOINT Bilateral 9/30/2020    Procedure: Bilateral GT bursa + bilateral SIJ injection;  Surgeon: Vin Shukla MD;  Location: [facility] PAIN MGT;  Service: Pain Management;  Laterality: Bilateral;    KNEE ARTHROSCOPY W/ MENISCAL REPAIR Right     LIVER BIOPSY      sinus lift      2003    TONSILLECTOMY, ADENOIDECTOMY      TUBAL LIGATION         Family History:   Family History   Problem Relation Age of Onset    Colon cancer Maternal Grandfather     Diabetes Maternal Grandmother     Hypertension Maternal Grandmother     Breast cancer Maternal Grandmother     Cataracts Mother     Glaucoma Mother     Macular degeneration  "Mother     Hypertension Mother     Aortic aneurysm Mother     Diabetes Paternal Grandmother     Cataracts Maternal Aunt     Glaucoma Maternal Aunt     Macular degeneration Maternal Aunt     Melanoma Maternal Aunt     Heart disease Unknown         pat side    Aneurysm Brother         brain    Stroke Neg Hx     Kidney disease Neg Hx     Hyperlipidemia Neg Hx        Social History:   Social History     Tobacco Use    Smoking status: Never    Smokeless tobacco: Never   Substance Use Topics    Alcohol use: Yes     Alcohol/week: 3.0 standard drinks     Types: 3 Glasses of wine per week    Drug use: No       Allergies:   Review of patient's allergies indicates:   Allergen Reactions    Arb-angiotensin receptor antagonist Edema    Hydralazine analogues      "Lupus reaction"    Metoprolol Other (See Comments)     Alopecia    Sulfa (sulfonamide antibiotics)      Passed out and almost stopped breathing    Calcium channel blocking agents-dihydropyridines      Edema      Ace inhibitors Other (See Comments)     cough       Medications:   No current facility-administered medications on file prior to encounter.     Current Outpatient Medications on File Prior to Encounter   Medication Sig Dispense Refill    ascorbic acid, vitamin C, (VITAMIN C) 1000 MG tablet Take 1,000 mg by mouth 2 (two) times daily.      b complex vitamins tablet Take 1 tablet by mouth once daily.      CALCIUM CITRATE ORAL Take 600 mg by mouth.      carvediloL (COREG) 6.25 MG tablet Take 1 tablet (6.25 mg total) by mouth 2 (two) times daily with meals. 180 tablet 3    CHROMIUM ORAL Take by mouth.      coenzyme Q10 10 mg capsule Take 10 mg by mouth as needed.      ergocalciferol, vitamin D2, (VITAMIN D ORAL) Take 1,000 mg by mouth.      lilliam primrose/linoleic/gamoleni (PRIMROSE OIL ORAL) Take by mouth.      fish oil-omega-3 fatty acids 300-1,000 mg capsule Take 2 capsules by mouth once daily.      fluticasone propionate (FLONASE) 50 mcg/actuation nasal spray SHAKE " LIQUID AND USE 2 SPRAYS IN EACH NOSTRIL DAILY 16 g 12    furosemide (LASIX) 20 MG tablet Take 1 tablet (20 mg total) by mouth as needed (increase edema of  LE  AND OR DESAI). 90 tablet 3    glucosamine sulfate 500 mg Tab Take 1,000 mg by mouth as needed.       inositol 500 mg Tab Take 500 mg by mouth 2 (two) times daily.      lansoprazole (PREVACID) 15 MG capsule Take 15 mg by mouth once daily.      MAGNESIUM ORAL Take 100 mg by mouth.      methylsulfonylmethane 1,000 mg Cap Take by mouth as needed.       multivitamin (THERAGRAN) per tablet Take 1 tablet by mouth once daily. Per pt [facility] Women's 50 plus      spironolactone (ALDACTONE) 50 MG tablet Take 1 tablet (50 mg total) by mouth every evening. 90 tablet 3    tiZANidine (ZANAFLEX) 2 MG tablet TAKE 1 TABLET(2 MG) BY MOUTH EVERY 8 HOURS AS NEEDED 30 tablet 5    asenapine maleate (SAPHRIS, BLACK SILVA, SL) Take by mouth as needed.      UNABLE TO FIND Liver Refresh         Physical Exam:  Vital Signs:   Vitals:    09/01/22 1010   BP: (!) 167/96   Pulse: 70   Resp: 18   Temp: 98.2 °F (36.8 °C)     General Appearance: Well appearing in no acute distress  ENT: OP clear  Chest: CTA B  CV: RRR, no m/r/g  Abd: s/nt/nd/nabs  Ext: no edema    Labs:Reviewed    IMP:  Active Hospital Problems    Diagnosis  POA    *Rectal bleeding [K62.5]  No    Change in bowel habits [R19.4]  No    Diverticulosis [K57.90]  No      Resolved Hospital Problems   No resolved problems to display.         Plan:   I have explained the risks and benefits of colonoscopy to the patient including but not limited to bleeding, perforation, infection, and death. The patient wishes to proceed.

## 2022-09-01 NOTE — PLAN OF CARE
Dr Arriaga at  to discuss findings. VSS. No  Pain, no GI bleeding. Pt to be discharged from unit.

## 2022-09-01 NOTE — PROVATION PATIENT INSTRUCTIONS
Discharge Summary/Instructions after an Endoscopic Procedure  Patient Name: Deirdre Yanez  Patient MRN: [id number]  Patient YOB: 1950  Thursday, September 1, 2022 Shanti Arriaga MD  Dear patient,  As a result of recent federal legislation (The Federal Cures Act), you may   receive lab or pathology results from your procedure in your [facility]   account before your physician is able to contact you. Your physician or   their representative will relay the results to you with their   recommendations at their soonest availability.  Thank you,  RESTRICTIONS:  During your procedure today, you received medications for sedation.  These   medications may affect your judgment, balance and coordination.  Therefore,   for 24 hours, you have the following restrictions:   - DO NOT drive a car, operate machinery, make legal/financial decisions,   sign important papers or drink alcohol.    ACTIVITY:  Today: no heavy lifting, straining or running due to procedural   sedation/anesthesia.  The following day: return to full activity including work.  DIET:  Eat and drink normally unless instructed otherwise.     TREATMENT FOR COMMON SIDE EFFECTS:  - Mild abdominal pain, nausea, belching, bloating or excessive gas:  rest,   eat lightly and use a heating pad.  - Sore Throat: treat with throat lozenges and/or gargle with warm salt   water.  - Because air was used during the procedure, expelling large amounts of air   from your rectum or belching is normal.  - If a bowel prep was taken, you may not have a bowel movement for 1-3 days.    This is normal.  SYMPTOMS TO WATCH FOR AND REPORT TO YOUR PHYSICIAN:  1. Abdominal pain or bloating, other than gas cramps.  2. Chest pain.  3. Back pain.  4. Signs of infection such as: chills or fever occurring within 24 hours   after the procedure.  5. Rectal bleeding, which would show as bright red, maroon, or black stools.   (A tablespoon of blood from the rectum is not serious, especially  if   hemorrhoids are present.)  6. Vomiting.  7. Weakness or dizziness.  GO DIRECTLY TO THE NEAREST EMERGENCY ROOM IF YOU HAVE ANY OF THE FOLLOWING:      Difficulty breathing              Chills and/or fever over 101 F   Persistent vomiting and/or vomiting blood   Severe abdominal pain   Severe chest pain   Black, tarry stools   Bleeding- more than one tablespoon   Any other symptom or condition that you feel may need urgent attention  Your doctor recommends these additional instructions:  If any biopsies were taken, your doctors clinic will contact you in 1 to 2   weeks with any results.  - Discharge patient to home (via wheelchair).   - Resume previous diet.   - Continue present medications.   - Repeat colonoscopy in 5 years for surveillance.   - Patient has a contact number available for emergencies.  The signs and   symptoms of potential delayed complications were discussed with the   patient.  Return to normal activities tomorrow.  Written discharge   instructions were provided to the patient.  For questions, problems or results please call your physician Shanti Arriaga MD at Work:  [phone]  If you have any questions about the above instructions, call the GI   department at [phone] or call the endoscopy unit at [phone]   from 7am until 3 pm.  [facility] [place], EMERGENCY ROOM PHONE NUMBER:   [phone]  IF A COMPLICATION OR EMERGENCY SITUATION ARISES AND YOU ARE UNABLE TO REACH   YOUR PHYSICIAN - GO DIRECTLY TO THE EMERGENCY ROOM.  I have read or have had read to me these discharge instructions for my   procedure and have received a written copy.  I understand these   instructions and will follow-up with my physician if I have any questions.     __________________________________       _____________________________________  Nurse Signature                                          Patient/Designated   Responsible Party Signature  MD Shanti Escudero  MD Bart  9/1/2022 11:18:46 AM  [employer]

## 2022-09-01 NOTE — ANESTHESIA RELEASE NOTE
"Anesthesia Release from PACU Note    Patient: Deirdre MAGANA Field    Procedure(s) Performed: Procedure(s) (LRB):  COLONOSCOPY (N/A)    Anesthesia type: MAC    Post pain: Adequate analgesia    Post assessment: no apparent anesthetic complications and tolerated procedure well    Last Vitals:   Visit Vitals  BP (!) 160/98   Pulse 72   Temp 36.5 °C (97.7 °F) (Temporal)   Resp 16   Ht 5' 6" (1.676 m)   Wt 92.1 kg (203 lb)   SpO2 100%   BMI 32.77 kg/m²       Post vital signs: stable    Level of consciousness: awake, alert  and oriented    Nausea/Vomiting: no nausea/no vomiting    Complications: none    Airway Patency: patent    Respiratory: unassisted, spontaneous ventilation, room air    Cardiovascular: stable and blood pressure at baseline    Hydration: euvolemic  "

## 2022-09-01 NOTE — ANESTHESIA PREPROCEDURE EVALUATION
09/01/2022  Deirdre Yanez is a 72 y.o., female.      Pre-op Assessment    I have reviewed the Patient Summary Reports.    I have reviewed the NPO Status.   I have reviewed the Medications.     Review of Systems  Anesthesia Hx:  No problems with previous Anesthesia    Social:  Non-Smoker, Social Alcohol Use    Hematology/Oncology:  Hematology Normal   Oncology Normal     EENT/Dental:EENT/Dental Normal   Cardiovascular:   Hypertension, well controlled CHF ECG has been reviewed. Vent. Rate : 069 BPM     Atrial Rate : 069 BPM      P-R Int : 168 ms          QRS Dur : 092 ms       QT Int : 378 ms       P-R-T Axes : 015 063 042 degrees      QTc Int : 405 ms     Normal sinus rhythm   Incomplete right bundle branch block   Borderline Abnormal ECG   When compared with ECG of 25-JAN-2021 09:03,   No significant change was found   Confirmed by SUPRIYA ZAMUDIO MD ([id number]) on 1/3/2022 2:24:50 PM       ECHO 01/2022    ? Concentric remodeling and normal systolic function.  ? The estimated ejection fraction is 60%.  ? Normal left ventricular diastolic function.  ? With normal right ventricular systolic function.  ? Normal central venous pressure (3 mmHg).  ? The estimated PA systolic pressure is 26 mmHg   Pulmonary:  Pulmonary Normal    Renal/:  Renal/ Normal     Hepatic/GI:   Hiatal Hernia, GERD, well controlled Hepatitis, C    Musculoskeletal:   Arthritis  Scoliosis  Spine Disorders: lumbar Chronic Pain    Neurological:  Neurology Normal    Endocrine:  Endocrine Normal Denies Diabetes.  Obesity / BMI > 30  Dermatological:  Skin Normal    Psych:  Psychiatric Normal           Physical Exam  General: Well nourished, Cooperative, Alert and Oriented    Airway:  Mallampati: II   Mouth Opening: Normal  TM Distance: Normal  Tongue: Normal  Neck ROM: Normal ROM    Dental:  Intact        Anesthesia Plan  Type of Anesthesia, risks  & benefits discussed:    Anesthesia Type: MAC  Intra-op Monitoring Plan: Standard ASA Monitors  Post Op Pain Control Plan: multimodal analgesia  Informed Consent: Informed consent signed with the Patient and all parties understand the risks and agree with anesthesia plan.  All questions answered.   ASA Score: 3  Day of Surgery Review of History & Physical: H&P Update referred to the surgeon/provider.    Ready For Surgery From Anesthesia Perspective.     .

## 2022-09-01 NOTE — ANESTHESIA POSTPROCEDURE EVALUATION
Anesthesia Post Evaluation    Patient: Deirdre Yanez    Procedure(s) Performed: Procedure(s) (LRB):  COLONOSCOPY (N/A)    Final Anesthesia Type: MAC      Patient location during evaluation: GI PACU  Patient participation: Yes- Able to Participate  Level of consciousness: awake and alert and oriented  Post-procedure vital signs: reviewed and stable  Pain management: adequate  Airway patency: patent  YENNI mitigation strategies: Multimodal analgesia  PONV status at discharge: No PONV  Anesthetic complications: no      Cardiovascular status: hemodynamically stable  Respiratory status: unassisted, spontaneous ventilation and room air  Hydration status: euvolemic  Follow-up not needed.          Vitals Value Taken Time   /98 09/01/22 1130   Temp 36.5 °C (97.7 °F) 09/01/22 1120   Pulse 72 09/01/22 1130   Resp 16 09/01/22 1130   SpO2 100 % 09/01/22 1130         No case tracking events are documented in the log.      Pain/Markel Score: Markel Score: 10 (9/1/2022 11:30 AM)

## 2022-09-11 DIAGNOSIS — M41.9 SCOLIOSIS OF THORACOLUMBAR SPINE, UNSPECIFIED SCOLIOSIS TYPE: ICD-10-CM

## 2022-09-12 RX ORDER — TIZANIDINE 2 MG/1
4 TABLET ORAL NIGHTLY PRN
Qty: 30 TABLET | Refills: 5 | Status: SHIPPED | OUTPATIENT
Start: 2022-09-12 | End: 2023-06-18 | Stop reason: SDUPTHER

## 2022-09-12 NOTE — TELEPHONE ENCOUNTER
No new care gaps identified.  [facility] Embedded Care Gaps. Reference number: [id number]. 9/11/2022   8:24:46 PM CDT

## 2022-09-19 ENCOUNTER — OFFICE VISIT (OUTPATIENT)
Dept: INTERNAL MEDICINE | Facility: CLINIC | Age: 72
End: 2022-09-19
Payer: MEDICARE

## 2022-09-19 VITALS
OXYGEN SATURATION: 99 % | WEIGHT: 208.31 LBS | BODY MASS INDEX: 33.48 KG/M2 | HEIGHT: 66 IN | HEART RATE: 72 BPM | DIASTOLIC BLOOD PRESSURE: 98 MMHG | SYSTOLIC BLOOD PRESSURE: 136 MMHG

## 2022-09-19 DIAGNOSIS — I50.9 CHF (NYHA CLASS I, ACC/AHA STAGE B): Chronic | ICD-10-CM

## 2022-09-19 DIAGNOSIS — I10 HYPERTENSION, UNSPECIFIED TYPE: ICD-10-CM

## 2022-09-19 DIAGNOSIS — Z00.00 ENCOUNTER FOR PREVENTIVE HEALTH EXAMINATION: Primary | ICD-10-CM

## 2022-09-19 DIAGNOSIS — F41.9 ANXIETY: ICD-10-CM

## 2022-09-19 DIAGNOSIS — Z99.89 DEPENDENCE ON OTHER ENABLING MACHINES AND DEVICES: ICD-10-CM

## 2022-09-19 DIAGNOSIS — E11.69 HYPERLIPIDEMIA ASSOCIATED WITH TYPE 2 DIABETES MELLITUS: ICD-10-CM

## 2022-09-19 DIAGNOSIS — E66.9 OBESITY (BMI 30.0-34.9): ICD-10-CM

## 2022-09-19 DIAGNOSIS — E04.2 MULTIPLE THYROID NODULES: ICD-10-CM

## 2022-09-19 DIAGNOSIS — I87.2 VENOUS INSUFFICIENCY: ICD-10-CM

## 2022-09-19 DIAGNOSIS — E78.5 HYPERLIPIDEMIA ASSOCIATED WITH TYPE 2 DIABETES MELLITUS: ICD-10-CM

## 2022-09-19 PROCEDURE — 99215 OFFICE O/P EST HI 40 MIN: CPT | Mod: PBBFAC | Performed by: NURSE PRACTITIONER

## 2022-09-19 PROCEDURE — G0439 PPPS, SUBSEQ VISIT: HCPCS | Mod: ,,, | Performed by: NURSE PRACTITIONER

## 2022-09-19 PROCEDURE — G0439 PR MEDICARE ANNUAL WELLNESS SUBSEQUENT VISIT: ICD-10-PCS | Mod: ,,, | Performed by: NURSE PRACTITIONER

## 2022-09-19 PROCEDURE — 99999 PR PBB SHADOW E&M-EST. PATIENT-LVL V: CPT | Mod: PBBFAC,,, | Performed by: NURSE PRACTITIONER

## 2022-09-19 PROCEDURE — 99999 PR PBB SHADOW E&M-EST. PATIENT-LVL V: ICD-10-PCS | Mod: PBBFAC,,, | Performed by: NURSE PRACTITIONER

## 2022-09-19 RX ORDER — ELECTROLYTES/DEXTROSE
SOLUTION, ORAL ORAL
COMMUNITY

## 2022-09-19 NOTE — PATIENT INSTRUCTIONS
Counseling and Referral of Other Preventative  (Italic type indicates deductible and co-insurance are waived)    Patient Name: Deirdre Yanez  Today's Date: 9/19/2022    Health Maintenance       Date Due Completion Date    Diabetes Urine Screening 10/26/2022 10/26/2021    Influenza Vaccine (1) 06/30/2023 (Originally 9/1/2022) 10/2/2009    COVID-19 Vaccine (1) 09/19/2023 (Originally 1950) ---    Eye Exam 11/09/2022 11/9/2021    Hemoglobin A1c 12/07/2022 6/7/2022    TETANUS VACCINE 05/08/2023 5/8/2013    Lipid Panel 06/07/2023 6/7/2022    Foot Exam 06/09/2023 6/9/2022    Override on 12/20/2019: Done    Override on 9/16/2014: Done    Mammogram 07/21/2023 7/21/2022    DEXA Scan 07/19/2024 7/19/2019    Colorectal Cancer Screening 09/01/2027 9/1/2022        No orders of the defined types were placed in this encounter.    The following information is provided to all patients.  This information is to help you find resources for any of the problems found today that may be affecting your health:                Living healthy guide: www.[facility].[place].gov      Understanding Diabetes: www.diabetes.org      Eating healthy: www.cdc.gov/healthyweight      CDC home safety checklist: www.cdc.gov/steadi/patient.html      Agency on Aging: www.goea.[place].[facility]      Alcoholics anonymous (AA): www.aa.org      Physical Activity: www.minerva.nih.gov/qn3udzm      Tobacco use: www.quitwithusla.org

## 2022-09-19 NOTE — PROGRESS NOTES
"  Deirdre Yanez presented for a  Medicare AWV and comprehensive Health Risk Assessment today. The following components were reviewed and updated:    Medical history  Family History  Social history  Allergies and Current Medications  Health Risk Assessment  Health Maintenance  Care Team         ** See Completed Assessments for Annual Wellness Visit within the encounter summary.**         The following assessments were completed:  Living Situation  CAGE  Depression Screening  Timed Get Up and Go  Whisper Test-na  Cognitive Function Screening  Nutrition Screening  ADL Screening  PAQ Screening        Vitals:    09/19/22 0910 09/19/22 0954   BP: (!) 144/94 (!) 136/98   BP Location: Left arm Left arm   Patient Position: Sitting Sitting   Pulse: 72    SpO2: 99%    Weight: 94.5 kg (208 lb 5.4 oz)    Height: 5' 6" (1.676 m)      Body mass index is 33.63 kg/m².  Physical Exam  Vitals and nursing note reviewed.   Constitutional:       Appearance: She is well-developed.   HENT:      Head: Normocephalic.   Cardiovascular:      Rate and Rhythm: Normal rate and regular rhythm.      Heart sounds: Normal heart sounds.   Pulmonary:      Effort: Pulmonary effort is normal. No respiratory distress.      Breath sounds: Normal breath sounds.   Abdominal:      Palpations: Abdomen is soft. There is no mass.      Tenderness: There is no abdominal tenderness.   Musculoskeletal:         General: Normal range of motion.   Skin:     General: Skin is warm and dry.   Neurological:      Mental Status: She is alert and oriented to person, place, and time.      Motor: No abnormal muscle tone.   Psychiatric:         Speech: Speech normal.         Behavior: Behavior normal.             Diagnoses and health risks identified today and associated recommendations/orders:    1. Encounter for preventive health examination  Declines flu vaccine and COVID vaccine    Reports cardiac conditions are stable.      2. CHF (NYHA class I, ACC/AHA stage B)  Stable. " Continue current treatment plan as previously prescribed with your  cardiologist.     3. Hypertension, unspecified type  BP elevated at today's visit. Reports home readings 110s-120s/70s. Advised patient to monitor BP (keep a log) and if continues to stay elevated (greater than 130/80) to follow up with PCP for further evaluation and treatment. She will also come in for a nurse visit. She expressed understanding.        4. Hyperlipidemia associated with type 2 diabetes mellitus  A1c 5.8  Lipid- not at goal  Continue current treatment plan as previously prescribed with your  pcp and cardiologist.     5. Multiple thyroid nodules  US 8/22  Continue current treatment plan as previously prescribed with your  ENT    6. Venous insufficiency  Continue current treatment plan as previously prescribed with your  cardiologist.     7. Obesity (BMI 30.0-34.9)  Continue current treatment plan as previously prescribed with your  pcp     8. Anxiety  Continue current treatment plan as previously prescribed with your  pcp     9. Dependence on other enabling machines and devices  Normal timed get up and go test. Denies any falls in the last 12 months. Uses a cane to aid with ambulation.   Fall precautions reviewed with patient. Advised to follow up with PCP for further recommendations. Patient expressed understanding.       Review for Opioid Screening: Pt does not have Rx for Opioids       Review for Substance Use Disorders: Patient drinks alcohol (see flow sheet for detail). No drug use per chart        Provided Deirdre with a 5-10 year written screening schedule and personal prevention plan. Recommendations were developed using the USPSTF age appropriate recommendations. Education, counseling, and referrals were provided as needed. After Visit Summary printed and given to patient which includes a list of additional screenings\tests needed.    Follow up in about 1 year (around 9/19/2023) for awv.    Mulu Jovel NP  I offered to  discuss advanced care planning, including how to pick a person who would make decisions for you if you were unable to make them for yourself, called a health care power of , and what kind of decisions you might make such as use of life sustaining treatments such as ventilators and tube feeding when faced with a life limiting illness recorded on a living will that they will need to know. (How you want to be cared for as you near the end of your natural life)     X  Patient has advanced directives written and agrees to provide copies to the institution.

## 2022-11-30 LAB — POCT GLUCOSE: 95 MG/DL (ref 70–110)

## 2022-12-05 PROBLEM — K62.5 RECTAL BLEEDING: Status: RESOLVED | Noted: 2022-09-01 | Resolved: 2022-12-05

## 2022-12-12 ENCOUNTER — OFFICE VISIT (OUTPATIENT)
Dept: ORTHOPEDICS | Facility: CLINIC | Age: 72
End: 2022-12-12
Payer: MEDICARE

## 2022-12-12 ENCOUNTER — PATIENT MESSAGE (OUTPATIENT)
Dept: CARDIOLOGY | Facility: CLINIC | Age: 72
End: 2022-12-12
Payer: MEDICARE

## 2022-12-12 VITALS — WEIGHT: 210.56 LBS | HEIGHT: 66 IN | BODY MASS INDEX: 33.84 KG/M2

## 2022-12-12 DIAGNOSIS — M21.161 ACQUIRED VARUS DEFORMITY KNEE, RIGHT: ICD-10-CM

## 2022-12-12 DIAGNOSIS — M48.061 SPINAL STENOSIS OF LUMBAR REGION, UNSPECIFIED WHETHER NEUROGENIC CLAUDICATION PRESENT: ICD-10-CM

## 2022-12-12 DIAGNOSIS — M17.11 ARTHRITIS OF KNEE, RIGHT: Primary | ICD-10-CM

## 2022-12-12 DIAGNOSIS — M17.11 PRIMARY OSTEOARTHRITIS OF RIGHT KNEE: Primary | ICD-10-CM

## 2022-12-12 DIAGNOSIS — M23.41 LOOSE BODY IN KNEE, RIGHT KNEE: ICD-10-CM

## 2022-12-12 DIAGNOSIS — I50.32 DIASTOLIC CHF, CHRONIC: Chronic | ICD-10-CM

## 2022-12-12 DIAGNOSIS — M41.56 SCOLIOSIS OF LUMBAR REGION DUE TO DEGENERATIVE DISEASE OF SPINE IN ADULT: ICD-10-CM

## 2022-12-12 PROCEDURE — 99213 OFFICE O/P EST LOW 20 MIN: CPT | Mod: S$PBB,,, | Performed by: ORTHOPAEDIC SURGERY

## 2022-12-12 PROCEDURE — 99999 PR PBB SHADOW E&M-EST. PATIENT-LVL IV: CPT | Mod: PBBFAC,,, | Performed by: ORTHOPAEDIC SURGERY

## 2022-12-12 PROCEDURE — 99999 PR PBB SHADOW E&M-EST. PATIENT-LVL IV: ICD-10-PCS | Mod: PBBFAC,,, | Performed by: ORTHOPAEDIC SURGERY

## 2022-12-12 PROCEDURE — 99213 PR OFFICE/OUTPT VISIT, EST, LEVL III, 20-29 MIN: ICD-10-PCS | Mod: S$PBB,,, | Performed by: ORTHOPAEDIC SURGERY

## 2022-12-12 PROCEDURE — 99214 OFFICE O/P EST MOD 30 MIN: CPT | Mod: PBBFAC | Performed by: ORTHOPAEDIC SURGERY

## 2022-12-12 RX ORDER — PREDNISONE 5 MG/1
5 TABLET ORAL DAILY
Qty: 15 TABLET | Refills: 1 | Status: SHIPPED | OUTPATIENT
Start: 2022-12-12 | End: 2023-04-27 | Stop reason: SDUPTHER

## 2022-12-12 NOTE — PATIENT INSTRUCTIONS
Will give a knee brace to wear as needed  I will give you prednisone 5 mg tablets to take for a week and then only as needed after that   Do not take meloxicam with prednisone   Make sure you eat when you take the meloxicam  Prednisone might increase her blood pressure little bit and heart rate and make you a little hungry.  Also watch what she eat about carbohydrates the stay away from it because also increases her blood sugar  Continue being very active   Total knee replacement whenever you decide that can not live with what you have.  Now it is considered outpatient surgery you go home the same day.  We will arrange for home health and home therapy for 2 weeks and then you go for outpatient therapy after that.  It will take roughly 3-6 months for complete healing to occur you will be walking immediately on it in recovery room once her more awake  I will get you approved for rooster comb gel injection to the right knee  I will see you back in around 6 weeks to give you the injection

## 2022-12-12 NOTE — PROGRESS NOTES
Subjective:     Patient ID: Deirdre Yanez is a 72 y.o. female.    Chief Complaint: Pain of the Right Knee      HPI:  08/29/2022 right knee severe pain compared to the left.  Gives history of injuring hernia almost 20 years ago and also falling around 20 ft landed on a rock on her feet.  She had some back and hip issues at that time and all got better.  In 2007 had arthroscopic surgery treated by Dr. Saenz where she also received injections did well for that.  She had been diagnosed with mild scoliosis as a teenager and she did yoga all her life until she got injured she could not do it over the last several years.  Her scoliosis got worst at down 1 point she had an MRI that showed some spinal stenosis.  She declined denies any having pain and numbness or tingling in the legs at this time.  Denies loss of bowel bladder control.  She is a retired .  She lives with her .  She tries to exercise every day feed a 1 point she was taking ibuprofen and got good relief however she was taken off by the cardiologist because of history of hypertension and the amount she was taken.  Now she takes MS am and chondroitin glucosamine occasional Tylenol.  She lives with her  she uses a cane to get around.  She is looking for what she can do to help herself.  She was asking about recumbent inverting table that could help her back and did not want to get unless she asked me and she wanted to know what is going on with her knee.  No fever no chills no shortness of breath or difficulty with chewing swallowing loss of bowel bladder control blurry vision double vision loss smell or taste        12/12/2022       Right knee severe arthritis with varus deformity and complete loss of medial joint space .  Received steroid injection Depo-Medrol last visit 08/29/2022 which helped some.  She is quite active walking around despite the fact she is using a cane.  She just got herself younger dog and that has to drag her  around so she keeping quite a bit in shape.  She does use exercise bike.  The meloxicam we gave her does help but the next day starts back again.  She knows she can not take it on a daily basis because of cardiac issues in the cardiologist does not want her to be on massive amounts of NSAIDs.  She is contemplating having surgery next year however her mother low got cancer and now she has to delay that a little bit until she knows what is going to happen.  We went over the procedure of total knee replacement that it is outpatient that she goes home the same day.  Also discussed about wearing a brace and she does not have any.  She discussed her blood pressure medications I did tell her this so many new ones that she needs to discuss it with primary care or cardiology at that time.  Wondering about swelling that comes I said to her that arthritis will give her swelling in the knee it is like riding on a dirt road it is not smooth anymore and occasionally you hit a pothole and the knee will swell up and I will take a day or 2 to go away.  Some swelling in the lower extremity is not related from the knee especially if it is around the ankle.  She does have quite a bit of varicosities which could cause that swelling.  She is wearing compressive stockings  Pain is 3/10  Past Medical History:   Diagnosis Date    Abrasion     left eye    Abrasion and/or friction burn of abdominal wall with infection     left eye    Arthritis     gouty arthritis    Back pain     Diabetes mellitus     2011  11/17/2013    Diverticulosis     DM (diabetes mellitus) 2011     12/03/2014  A1C 6.0 x 2 weeks    DM (diabetes mellitus) 2011    BS 89 10/14/2017 A1C 5.7   Diet controlled    DM (diabetes mellitus) 2011    BS 90 am 10/23/2018     DM (diabetes mellitus) 2011    BS didn't check 10/23/2019    Hepatitis     Hx of B/C from cadaver in past    Hepatitis B     Hepatitis C     Hiatal hernia     Hip bursitis, left     HTN (hypertension)      Hypertensive CHF (congestive heart failure) 3/28/2014    Obesity     Pneumonia     Positive ALBIN (antinuclear antibody)     Pure hypercholesterolemia 9/30/2016    Rheumatoid arthritis     questionable diagnosis    Scoliosis     Type 2 diabetes mellitus without complication, without long-term current use of insulin     Urinary incontinence     intermittent- only when lifting heavy items > 20 lbs     Past Surgical History:   Procedure Laterality Date    APPENDECTOMY      bone grafts      CERVICAL CONIZATION   W/ LASER      COLONOSCOPY N/A 1/10/2019    Procedure: COLONOSCOPY;  Surgeon: Nixon Thornton MD;  Location: [facility] ENDO;  Service: Endoscopy;  Laterality: N/A;    COLONOSCOPY N/A 9/1/2022    Procedure: COLONOSCOPY;  Surgeon: Shanti Arriaga MD;  Location: [facility] ENDO;  Service: Endoscopy;  Laterality: N/A;    COSMETIC SURGERY Bilateral     ears    DILATION AND CURETTAGE OF UTERUS      INJECTION OF ANESTHETIC AGENT INTO SACROILIAC JOINT Bilateral 7/23/2020    Procedure: Bilateral BLOCK, SACROILIAC JOINT and bilatearl GTB with RN IV sedation;  Surgeon: Vin Shukla MD;  Location: [facility]GT;  Service: Pain Management;  Laterality: Bilateral;    INJECTION OF ANESTHETIC AGENT INTO SACROILIAC JOINT Bilateral 9/30/2020    Procedure: Bilateral GT bursa + bilateral SIJ injection;  Surgeon: Vin Shukla MD;  Location: [facility];  Service: Pain Management;  Laterality: Bilateral;    INJECTION OF JOINT Bilateral 9/30/2020    Procedure: Bilateral GT bursa + bilateral SIJ injection;  Surgeon: Vin Shukla MD;  Location: [facility];  Service: Pain Management;  Laterality: Bilateral;    KNEE ARTHROSCOPY W/ MENISCAL REPAIR Right     LIVER BIOPSY      sinus lift      2003    TONSILLECTOMY, ADENOIDECTOMY      TUBAL LIGATION       Family History   Problem Relation Age of Onset    Colon cancer Maternal Grandfather     Diabetes Maternal Grandmother     Hypertension Maternal Grandmother     Breast cancer Maternal Grandmother      Cataracts Mother     Glaucoma Mother     Macular degeneration Mother     Hypertension Mother     Aortic aneurysm Mother     Diabetes Paternal Grandmother     Cataracts Maternal Aunt     Glaucoma Maternal Aunt     Macular degeneration Maternal Aunt     Melanoma Maternal Aunt     Heart disease Unknown         pat side    Aneurysm Brother         brain    Stroke Neg Hx     Kidney disease Neg Hx     Hyperlipidemia Neg Hx      Social History     Socioeconomic History    Marital status:     Number of children: 0    Highest education level: Master's degree (e.g., MA, MS, [employer], MEd, MSW, CARLOZ)   Occupational History    Occupation: Retired Teacher   Tobacco Use    Smoking status: Never    Smokeless tobacco: Never   Substance and Sexual Activity    Alcohol use: Yes     Alcohol/week: 3.0 standard drinks     Types: 3 Glasses of wine per week    Drug use: No    Sexual activity: Yes     Partners: Male   Other Topics Concern    Are you pregnant or think you may be? No    Breast-feeding No     Social Determinants of Health     Financial Resource Strain: Low Risk     Difficulty of Paying Living Expenses: Not hard at all   Food Insecurity: No Food Insecurity    Worried About Running Out of Food in the Last Year: Never true    Ran Out of Food in the Last Year: Never true   Transportation Needs: No Transportation Needs    Lack of Transportation (Medical): No    Lack of Transportation (Non-Medical): No   Physical Activity: Sufficiently Active    Days of Exercise per Week: 7 days    Minutes of Exercise per Session: 60 min   Stress: No Stress Concern Present    Feeling of Stress : Not at all   Social Connections: Moderately Integrated    Frequency of Communication with Friends and Family: More than three times a week    Frequency of Social Gatherings with Friends and Family: Once a week    Attends [employer] Services: Never    Active Member of Clubs or Organizations: Yes    Attends Club or Organization Meetings: More than 4 times  per year    Marital Status:    Housing Stability: Low Risk     Unable to Pay for Housing in the Last Year: No    Number of Places Lived in the Last Year: 1    Unstable Housing in the Last Year: No     Medication List with Changes/Refills   New Medications    PREDNISONE (DELTASONE) 5 MG TABLET    Take 1 tablet (5 mg total) by mouth once daily.   Current Medications    ASCORBIC ACID, VITAMIN C, (VITAMIN C) 1000 MG TABLET    Take 1,000 mg by mouth 2 (two) times daily.    ASENAPINE MALEATE (SAPHRIS, BLACK SILVA, SL)    Take by mouth as needed.    ASHWAGANDHA ROOT EXTRACT ORAL    Take by mouth. With L-theanine    B COMPLEX VITAMINS TABLET    Take 1 tablet by mouth once daily.    BIOTIN 5 MG CAP    Take by mouth.    CALCIUM CITRATE ORAL    Take 400 mg by mouth.    CARVEDILOL (COREG) 6.25 MG TABLET    Take 1 tablet (6.25 mg total) by mouth 2 (two) times daily with meals.    CHOLECALCIFEROL, VITAMIN D3, (VITAMIN D3 ORAL)    Take 2,000 mg by mouth.    CHROMIUM ORAL    Take by mouth.    COENZYME Q10 10 MG CAPSULE    Take 60 mg by mouth once daily.    ERGOCALCIFEROL, VITAMIN D2, (VITAMIN D ORAL)    Take 2,000 mg by mouth.    FLAKITO PRIMROSE/LINOLEIC/GAMOLENI (PRIMROSE OIL ORAL)    Take by mouth.    FISH OIL-OMEGA-3 FATTY ACIDS 300-1,000 MG CAPSULE    Take 2 capsules by mouth once daily.    FLUTICASONE PROPIONATE (FLONASE) 50 MCG/ACTUATION NASAL SPRAY    SHAKE LIQUID AND USE 2 SPRAYS IN EACH NOSTRIL DAILY    FLUTICASONE PROPIONATE (FLONASE) 50 MCG/ACTUATION NASAL SPRAY    2 sprays (100 mcg total) by Each Nostril route once daily.    FUROSEMIDE (LASIX) 20 MG TABLET    Take 1 tablet (20 mg total) by mouth as needed (increase edema of  LE  AND OR DESAI).    GLUCOSAMINE SULFATE 500 MG TAB    Take 1,000 mg by mouth as needed.     INOSITOL 500 MG TAB    Take 750 mg by mouth. BID    LANSOPRAZOLE (PREVACID) 15 MG CAPSULE    Take 15 mg by mouth once daily.    MAGNESIUM ORAL    Take 100 mg by mouth.    MELOXICAM (MOBIC) 15 MG TABLET    " Take 1 tablet (15 mg total) by mouth once daily. Take with food    METHYLSULFONYLMETHANE 1,000 MG CAP    Take by mouth as needed.     MULTIVITAMIN (THERAGRAN) PER TABLET    Take 1 tablet by mouth once daily. Per pt C Women's 50 plus    PSYLLIUM HUSK (METAMUCIL ORAL)    Take by mouth 2 (two) times a day.    SPIRONOLACTONE (ALDACTONE) 50 MG TABLET    Take 1 tablet (50 mg total) by mouth every evening.    TIZANIDINE (ZANAFLEX) 2 MG TABLET    Take 2 tablets (4 mg total) by mouth nightly as needed.    UNABLE TO FIND    Liver Refresh     Review of patient's allergies indicates:   Allergen Reactions    Arb-angiotensin receptor antagonist Edema    Hydralazine analogues      "Lupus reaction"    Metoprolol Other (See Comments)     Alopecia    Sulfa (sulfonamide antibiotics)      Passed out and almost stopped breathing    Calcium channel blocking agents-dihydropyridines      Edema      Ace inhibitors Other (See Comments)     cough     Review of Systems   Constitutional: Negative for decreased appetite.   HENT:  Negative for tinnitus.    Eyes:  Negative for double vision.   Cardiovascular:  Negative for chest pain.   Respiratory:  Negative for wheezing.    Hematologic/Lymphatic: Negative for bleeding problem.   Skin:  Negative for dry skin.   Musculoskeletal:  Positive for arthritis, back pain, joint pain and joint swelling. Negative for gout, neck pain and stiffness.   Gastrointestinal:  Negative for abdominal pain.   Genitourinary:  Negative for bladder incontinence.   Neurological:  Negative for numbness, paresthesias and sensory change.   Psychiatric/Behavioral:  Negative for altered mental status.      Objective:   Body mass index is 33.98 kg/m².  There were no vitals filed for this visit.         General    Constitutional: She is oriented to person, place, and time. She appears well-developed.   HENT:   Head: Atraumatic.   Eyes: EOM are normal.   Pulmonary/Chest: Effort normal.   Neurological: She is alert and oriented " to person, place, and time.   Psychiatric: Judgment normal.         Ambulating with cane with a very slight limp  You could see that she is hunched over with of the severe scoliosis in the back  Pelvis is level  Bilateral hips passive motion no pain in the groin.    Hip flexors abductors adductors quads and hamstrings ankle extensors and flexors are 5/5   Right knee with around 5° of contractures and flexes to 120° with medial joint pain.  There is mild to moderate swelling.  There is crepitus to compression patella to range of motion.  Collaterals and cruciates are stable.  No defect in the patella or quadriceps tendon   Left knee with 0-120 degrees of flexion with very minimal tenderness if any.  Mild crepitus.  No swelling today.  There is no defect in the patella or quadriceps tendon.  Collaterals and cruciates are stable.    Calves are soft  He has numerous varicosities to both lower extremities  Ankle motion is intact   Skin is warm to touch no obvious lesions    Relevant imaging results reviewed and interpreted by me, discussed with the patient and / or family today   X-ray 8/29/22 right knee with complete loss of medial joint space.  Marginal osteophytic changes.  Sclerosis.  Loose body in the superior pouch consistent with end-stage arthritis with varus deformity.  The left knee is mild joint line narrowing mild degenerative changes.  No fracture seen  Assessment:     Encounter Diagnoses   Name Primary?    Arthritis of knee, right Yes    Acquired varus deformity knee, right     Scoliosis of lumbar region due to degenerative disease of spine in adult     Spinal stenosis of lumbar region, unspecified whether neurogenic claudication present     Loose body in knee, right knee         Plan:   Arthritis of knee, right  -     predniSONE (DELTASONE) 5 MG tablet; Take 1 tablet (5 mg total) by mouth once daily.  Dispense: 15 tablet; Refill: 1    Acquired varus deformity knee, right    Scoliosis of lumbar region due  to degenerative disease of spine in adult    Spinal stenosis of lumbar region, unspecified whether neurogenic claudication present    Loose body in knee, right knee       Patient Instructions   Will give a knee brace to wear as needed  I will give you prednisone 5 mg tablets to take for a week and then only as needed after that   Do not take meloxicam with prednisone   Make sure you eat when you take the meloxicam  Prednisone might increase her blood pressure little bit and heart rate and make you a little hungry.  Also watch what she eat about carbohydrates the stay away from it because also increases her blood sugar  Continue being very active   Total knee replacement whenever you decide that can not live with what you have.  Now it is considered outpatient surgery you go home the same day.  We will arrange for home health and home therapy for 2 weeks and then you go for outpatient therapy after that.  It will take roughly 3-6 months for complete healing to occur you will be walking immediately on it in recovery room once her more awake  I will get you approved for rooster comb gel injection to the right knee  I will see you back in around 6 weeks to give you the injection        Disclaimer: This note was prepared using a voice recognition system and is likely to have sound alike errors within the text.

## 2022-12-13 RX ORDER — SPIRONOLACTONE 50 MG/1
50 TABLET, FILM COATED ORAL NIGHTLY
Qty: 90 TABLET | Refills: 3 | Status: SHIPPED | OUTPATIENT
Start: 2022-12-13 | End: 2023-01-23 | Stop reason: SDUPTHER

## 2022-12-14 DIAGNOSIS — E11.9 TYPE 2 DIABETES MELLITUS WITHOUT COMPLICATION: ICD-10-CM

## 2022-12-14 RX ORDER — CARVEDILOL 6.25 MG/1
6.25 TABLET ORAL 2 TIMES DAILY WITH MEALS
Qty: 180 TABLET | Refills: 3 | Status: SHIPPED | OUTPATIENT
Start: 2022-12-14 | End: 2023-01-23 | Stop reason: SDUPTHER

## 2023-01-04 DIAGNOSIS — E11.9 TYPE 2 DIABETES MELLITUS WITHOUT COMPLICATION: ICD-10-CM

## 2023-01-23 ENCOUNTER — OFFICE VISIT (OUTPATIENT)
Dept: CARDIOLOGY | Facility: CLINIC | Age: 73
End: 2023-01-23
Payer: MEDICARE

## 2023-01-23 VITALS
OXYGEN SATURATION: 99 % | BODY MASS INDEX: 34.48 KG/M2 | SYSTOLIC BLOOD PRESSURE: 126 MMHG | WEIGHT: 213.63 LBS | DIASTOLIC BLOOD PRESSURE: 82 MMHG | HEART RATE: 80 BPM

## 2023-01-23 DIAGNOSIS — I15.2 HYPERTENSION ASSOCIATED WITH DIABETES: Primary | Chronic | ICD-10-CM

## 2023-01-23 DIAGNOSIS — I50.9 CHF (NYHA CLASS I, ACC/AHA STAGE B): Chronic | ICD-10-CM

## 2023-01-23 DIAGNOSIS — I73.9 PVD (PERIPHERAL VASCULAR DISEASE): ICD-10-CM

## 2023-01-23 DIAGNOSIS — E66.9 OBESITY (BMI 30.0-34.9): ICD-10-CM

## 2023-01-23 DIAGNOSIS — E11.59 HYPERTENSION ASSOCIATED WITH DIABETES: Primary | Chronic | ICD-10-CM

## 2023-01-23 DIAGNOSIS — E66.01 SEVERE OBESITY (BMI 35.0-35.9 WITH COMORBIDITY): ICD-10-CM

## 2023-01-23 DIAGNOSIS — I50.32 DIASTOLIC CHF, CHRONIC: Chronic | ICD-10-CM

## 2023-01-23 DIAGNOSIS — E11.69 HYPERLIPIDEMIA ASSOCIATED WITH TYPE 2 DIABETES MELLITUS: ICD-10-CM

## 2023-01-23 DIAGNOSIS — E78.5 HYPERLIPIDEMIA ASSOCIATED WITH TYPE 2 DIABETES MELLITUS: ICD-10-CM

## 2023-01-23 DIAGNOSIS — I34.0 NONRHEUMATIC MITRAL VALVE REGURGITATION: ICD-10-CM

## 2023-01-23 PROCEDURE — 99999 PR PBB SHADOW E&M-EST. PATIENT-LVL IV: ICD-10-PCS | Mod: PBBFAC,,, | Performed by: INTERNAL MEDICINE

## 2023-01-23 PROCEDURE — 99214 OFFICE O/P EST MOD 30 MIN: CPT | Mod: S$PBB,,, | Performed by: INTERNAL MEDICINE

## 2023-01-23 PROCEDURE — 99214 PR OFFICE/OUTPT VISIT, EST, LEVL IV, 30-39 MIN: ICD-10-PCS | Mod: S$PBB,,, | Performed by: INTERNAL MEDICINE

## 2023-01-23 PROCEDURE — 99999 PR PBB SHADOW E&M-EST. PATIENT-LVL IV: CPT | Mod: PBBFAC,,, | Performed by: INTERNAL MEDICINE

## 2023-01-23 PROCEDURE — 99214 OFFICE O/P EST MOD 30 MIN: CPT | Mod: PBBFAC | Performed by: INTERNAL MEDICINE

## 2023-01-23 RX ORDER — FUROSEMIDE 40 MG/1
40 TABLET ORAL
Qty: 90 TABLET | Refills: 2 | Status: SHIPPED | OUTPATIENT
Start: 2023-01-23 | End: 2023-01-25 | Stop reason: SDUPTHER

## 2023-01-23 RX ORDER — SPIRONOLACTONE 50 MG/1
50 TABLET, FILM COATED ORAL NIGHTLY
Qty: 90 TABLET | Refills: 3 | Status: SHIPPED | OUTPATIENT
Start: 2023-01-23

## 2023-01-23 RX ORDER — CARVEDILOL 12.5 MG/1
12.5 TABLET ORAL 2 TIMES DAILY WITH MEALS
Qty: 180 TABLET | Refills: 3 | Status: SHIPPED | OUTPATIENT
Start: 2023-01-23 | End: 2024-01-23

## 2023-01-23 NOTE — PROGRESS NOTES
Subjective:   Patient ID:  Deirdre Yanez is a 72 y.o. female who presents for follow up of No chief complaint on file.      73 yo female, 1 yr f/u  PMH HTN CHFpEF HLD and preDM. Prior cardiologist Dr Castañeda, retired   echo normal EF LVH and mid MR  EKG today NSR  BP log reviewed in the office and BP controlled  Mother and TAA and brother  of brain aneurysm    Interval history  Mild SOB. No syncope chest pain palpitation and dizziness  Lives  and does house work. Limited walking due to the bad knee.  Plan knee replacement             Past Medical History:   Diagnosis Date    Abrasion     left eye    Abrasion and/or friction burn of abdominal wall with infection     left eye    Arthritis     gouty arthritis    Back pain     Diabetes mellitus       2013    Diverticulosis     DM (diabetes mellitus)      2014  A1C 6.0 x 2 weeks    DM (diabetes mellitus)     BS 89 10/14/2017 A1C 5.7   Diet controlled    DM (diabetes mellitus)     BS 90 am 10/23/2018     DM (diabetes mellitus)     BS didn't check 10/23/2019    Hepatitis     Hx of B/C from cadaver in past    Hepatitis B     Hepatitis C     Hiatal hernia     Hip bursitis, left     HTN (hypertension)     Hypertensive CHF (congestive heart failure) 3/28/2014    Obesity     Pneumonia     Positive ALBIN (antinuclear antibody)     Pure hypercholesterolemia 2016    Rheumatoid arthritis     questionable diagnosis    Scoliosis     Type 2 diabetes mellitus without complication, without long-term current use of insulin     Urinary incontinence     intermittent- only when lifting heavy items > 20 lbs       Past Surgical History:   Procedure Laterality Date    APPENDECTOMY      bone grafts      CERVICAL CONIZATION   W/ LASER      COLONOSCOPY N/A 1/10/2019    Procedure: COLONOSCOPY;  Surgeon: Nixon Thornton MD;  Location: [facility];  Service: Endoscopy;  Laterality: N/A;    COLONOSCOPY N/A 2022    Procedure:  COLONOSCOPY;  Surgeon: Shanti Arriaga MD;  Location: [facility];  Service: Endoscopy;  Laterality: N/A;    COSMETIC SURGERY Bilateral     ears    DILATION AND CURETTAGE OF UTERUS      INJECTION OF ANESTHETIC AGENT INTO SACROILIAC JOINT Bilateral 7/23/2020    Procedure: Bilateral BLOCK, SACROILIAC JOINT and bilatearl GTB with RN IV sedation;  Surgeon: Vin Shukla MD;  Location: [facility];  Service: Pain Management;  Laterality: Bilateral;    INJECTION OF ANESTHETIC AGENT INTO SACROILIAC JOINT Bilateral 9/30/2020    Procedure: Bilateral GT bursa + bilateral SIJ injection;  Surgeon: Vin Shukla MD;  Location: [facility];  Service: Pain Management;  Laterality: Bilateral;    INJECTION OF JOINT Bilateral 9/30/2020    Procedure: Bilateral GT bursa + bilateral SIJ injection;  Surgeon: Vin Shukla MD;  Location: [facility];  Service: Pain Management;  Laterality: Bilateral;    KNEE ARTHROSCOPY W/ MENISCAL REPAIR Right     LIVER BIOPSY      sinus lift      2003    TONSILLECTOMY, ADENOIDECTOMY      TUBAL LIGATION         Social History     Tobacco Use    Smoking status: Never    Smokeless tobacco: Never   Substance Use Topics    Alcohol use: Yes     Alcohol/week: 3.0 standard drinks     Types: 3 Glasses of wine per week    Drug use: No       Family History   Problem Relation Age of Onset    Colon cancer Maternal Grandfather     Diabetes Maternal Grandmother     Hypertension Maternal Grandmother     Breast cancer Maternal Grandmother     Cataracts Mother     Glaucoma Mother     Macular degeneration Mother     Hypertension Mother     Aortic aneurysm Mother     Diabetes Paternal Grandmother     Cataracts Maternal Aunt     Glaucoma Maternal Aunt     Macular degeneration Maternal Aunt     Melanoma Maternal Aunt     Heart disease Unknown         pat side    Aneurysm Brother         brain    Stroke Neg Hx     Kidney disease Neg Hx     Hyperlipidemia Neg Hx          Review of Systems   Constitutional: Negative for  chills, fever, night sweats, weight gain and weight loss.   HENT:  Negative for nosebleeds.    Eyes:  Negative for blurred vision, double vision and visual disturbance.   Cardiovascular:  Negative for chest pain, dyspnea on exertion, irregular heartbeat, leg swelling, orthopnea, palpitations, paroxysmal nocturnal dyspnea and syncope.   Respiratory:  Negative for cough, hemoptysis and wheezing.    Endocrine: Negative for polydipsia and polyuria.   Hematologic/Lymphatic: Does not bruise/bleed easily.   Skin:  Negative for rash.   Musculoskeletal:  Positive for arthritis, back pain and joint pain. Negative for joint swelling, muscle weakness and myalgias.   Gastrointestinal:  Negative for abdominal pain, hematemesis, jaundice and melena.   Genitourinary:  Negative for dysuria, hematuria and nocturia.   Neurological:  Negative for dizziness, focal weakness, headaches, sensory change and weakness.   Psychiatric/Behavioral:  Negative for depression. The patient does not have insomnia and is not nervous/anxious.      Objective:   Physical Exam  Constitutional:       General: She is not in acute distress.     Appearance: She is well-developed.   HENT:      Head: Normocephalic.   Eyes:      General: No scleral icterus.     Conjunctiva/sclera: Conjunctivae normal.      Pupils: Pupils are equal, round, and reactive to light.   Neck:      Thyroid: No thyroid mass or thyromegaly.      Vascular: Normal carotid pulses. No carotid bruit, hepatojugular reflux or JVD.   Cardiovascular:      Rate and Rhythm: Normal rate and regular rhythm.      Chest Wall: PMI is not displaced.      Pulses: Intact distal pulses.           Carotid pulses are 2+ on the right side and 2+ on the left side.       Radial pulses are 2+ on the right side and 2+ on the left side.        Femoral pulses are 2+ on the right side and 2+ on the left side.       Popliteal pulses are 2+ on the right side and 2+ on the left side.        Dorsalis pedis pulses are 2+ on  the right side and 2+ on the left side.        Posterior tibial pulses are 2+ on the right side and 2+ on the left side.      Heart sounds: Normal heart sounds, S1 normal and S2 normal. No murmur heard.    No friction rub. No gallop.   Pulmonary:      Effort: Pulmonary effort is normal.      Breath sounds: Normal breath sounds. No wheezing or rales.   Chest:      Chest wall: No tenderness.   Abdominal:      General: Bowel sounds are normal.      Palpations: Abdomen is soft. There is no mass or pulsatile mass.      Tenderness: There is no abdominal tenderness.   Musculoskeletal:         General: Swelling present. No tenderness. Normal range of motion.      Cervical back: Normal range of motion and neck supple. No edema.      Thoracic back: Normal.      Lumbar back: Normal.   Lymphadenopathy:      Cervical: No cervical adenopathy.      Upper Body:      Right upper body: No supraclavicular adenopathy.      Left upper body: No supraclavicular adenopathy.   Skin:     General: Skin is warm.      Coloration: Skin is not pale.      Findings: No rash.      Nails: There is no clubbing.   Neurological:      Mental Status: She is alert and oriented to person, place, and time.      Sensory: No sensory deficit.      Gait: Gait normal.      Deep Tendon Reflexes: Reflexes are normal and symmetric.   Psychiatric:         Speech: Speech normal.         Behavior: Behavior normal.       Lab Results   Component Value Date    CHOL 211 (H) 06/07/2022    CHOL 213 (H) 05/19/2021    CHOL 207 (H) 06/19/2020     Lab Results   Component Value Date    HDL 58 06/07/2022    HDL 59 05/19/2021    HDL 58 06/19/2020     Lab Results   Component Value Date    LDLCALC 137.2 06/07/2022    LDLCALC 140.2 05/19/2021    LDLCALC 134.2 06/19/2020     Lab Results   Component Value Date    TRIG 79 06/07/2022    TRIG 69 05/19/2021    TRIG 74 06/19/2020     Lab Results   Component Value Date    CHOLHDL 27.5 06/07/2022    CHOLHDL 27.7 05/19/2021    CHOLHDL 28.0  06/19/2020       Chemistry        Component Value Date/Time     06/07/2022 0845    K 5.1 06/07/2022 0845     06/07/2022 0845    CO2 27 06/07/2022 0845    BUN 17 06/07/2022 0845    CREATININE 0.9 06/07/2022 0845     06/07/2022 0845        Component Value Date/Time    CALCIUM 10.0 06/07/2022 0845    ALKPHOS 69 06/07/2022 0845    AST 25 06/07/2022 0845    ALT 21 06/07/2022 0845    BILITOT 1.5 (H) 06/07/2022 0845    ESTGFRAFRICA >60.0 06/07/2022 0845    EGFRNONAA >60.0 06/07/2022 0845          Lab Results   Component Value Date    HGBA1C 5.8 (H) 06/07/2022     Lab Results   Component Value Date    TSH 0.773 05/19/2021     No results found for: INR, PROTIME  Lab Results   Component Value Date    WBC 8.03 05/03/2019    HGB 14.2 05/03/2019    HCT 44.6 05/03/2019    MCV 89 05/03/2019     05/03/2019     BMP  Sodium   Date Value Ref Range Status   06/07/2022 138 136 - 145 mmol/L Final     Potassium   Date Value Ref Range Status   06/07/2022 5.1 3.5 - 5.1 mmol/L Final     Chloride   Date Value Ref Range Status   06/07/2022 101 95 - 110 mmol/L Final     CO2   Date Value Ref Range Status   06/07/2022 27 23 - 29 mmol/L Final     BUN   Date Value Ref Range Status   06/07/2022 17 8 - 23 mg/dL Final     Creatinine   Date Value Ref Range Status   06/07/2022 0.9 0.5 - 1.4 mg/dL Final     Calcium   Date Value Ref Range Status   06/07/2022 10.0 8.7 - 10.5 mg/dL Final     Anion Gap   Date Value Ref Range Status   06/07/2022 10 8 - 16 mmol/L Final     eGFR if    Date Value Ref Range Status   06/07/2022 >60.0 >60 mL/min/1.73 m^2 Final     eGFR if non    Date Value Ref Range Status   06/07/2022 >60.0 >60 mL/min/1.73 m^2 Final     Comment:     Calculation used to obtain the estimated glomerular filtration  rate (eGFR) is the CKD-EPI equation.        BNP  @LABRCNTIP(BNP,BNPTRIAGEBLO)@  @LABRCNTIP(troponini)@  CrCl cannot be calculated (Patient's most recent lab result is older than the  maximum 7 days allowed.).  No results found in the last 24 hours.  No results found in the last 24 hours.  No results found in the last 24 hours.    Assessment:      1. Hypertension associated with diabetes    2. Hyperlipidemia associated with type 2 diabetes mellitus    3. Nonrheumatic mitral valve regurgitation    4. CHF (NYHA class I, ACC/AHA stage B)    5. Obesity (BMI 30.0-34.9)    6. Severe obesity (BMI 35.0-35.9 with comorbidity)    7. Diastolic CHF, chronic        Plan:   Refer to [facility]    Continue coreg and Aldactone for HTN and CHFpEF  Low K diet  Increase lasix 40 mg daily prn for leg swelling    Counseled DASH  Check Lipid profile in 6 months  Recommend heart-healthy diet, weight control and regular exercise.  Regina. Risk modification.   I have reviewed all pertinent labs and cardiac studies independently. Plans and recommendations have been formulated under my direct supervision. All questions answered and patient voiced understanding.   If symptoms persist go to the ED  RTC in 12 months

## 2023-01-24 ENCOUNTER — PATIENT MESSAGE (OUTPATIENT)
Dept: CARDIOLOGY | Facility: CLINIC | Age: 73
End: 2023-01-24
Payer: MEDICARE

## 2023-01-25 ENCOUNTER — TELEPHONE (OUTPATIENT)
Dept: CARDIOLOGY | Facility: CLINIC | Age: 73
End: 2023-01-25
Payer: MEDICARE

## 2023-01-25 ENCOUNTER — PATIENT MESSAGE (OUTPATIENT)
Dept: ADMINISTRATIVE | Facility: HOSPITAL | Age: 73
End: 2023-01-25
Payer: MEDICARE

## 2023-01-25 RX ORDER — FUROSEMIDE 40 MG/1
40 TABLET ORAL
Qty: 90 TABLET | Refills: 2 | Status: SHIPPED | OUTPATIENT
Start: 2023-01-25 | End: 2023-10-16

## 2023-01-26 ENCOUNTER — OFFICE VISIT (OUTPATIENT)
Dept: ORTHOPEDICS | Facility: CLINIC | Age: 73
End: 2023-01-26
Payer: MEDICARE

## 2023-01-26 VITALS — BODY MASS INDEX: 34.33 KG/M2 | WEIGHT: 213.63 LBS | HEIGHT: 66 IN

## 2023-01-26 DIAGNOSIS — M48.061 SPINAL STENOSIS OF LUMBAR REGION, UNSPECIFIED WHETHER NEUROGENIC CLAUDICATION PRESENT: ICD-10-CM

## 2023-01-26 DIAGNOSIS — M17.11 ARTHRITIS OF KNEE, RIGHT: Primary | ICD-10-CM

## 2023-01-26 DIAGNOSIS — M21.161 ACQUIRED VARUS DEFORMITY KNEE, RIGHT: ICD-10-CM

## 2023-01-26 DIAGNOSIS — M23.41 LOOSE BODY IN KNEE, RIGHT KNEE: ICD-10-CM

## 2023-01-26 DIAGNOSIS — M41.56 SCOLIOSIS OF LUMBAR REGION DUE TO DEGENERATIVE DISEASE OF SPINE IN ADULT: ICD-10-CM

## 2023-01-26 PROCEDURE — 99212 OFFICE O/P EST SF 10 MIN: CPT | Mod: PBBFAC | Performed by: ORTHOPAEDIC SURGERY

## 2023-01-26 PROCEDURE — 99999 PR PBB SHADOW E&M-EST. PATIENT-LVL II: CPT | Mod: PBBFAC,,, | Performed by: ORTHOPAEDIC SURGERY

## 2023-01-26 PROCEDURE — 20610 DRAIN/INJ JOINT/BURSA W/O US: CPT | Mod: PBBFAC | Performed by: ORTHOPAEDIC SURGERY

## 2023-01-26 PROCEDURE — 99499 NO LOS: ICD-10-PCS | Mod: S$PBB,,, | Performed by: ORTHOPAEDIC SURGERY

## 2023-01-26 PROCEDURE — 99999 PR PBB SHADOW E&M-EST. PATIENT-LVL II: ICD-10-PCS | Mod: PBBFAC,,, | Performed by: ORTHOPAEDIC SURGERY

## 2023-01-26 PROCEDURE — 20610 LARGE JOINT ASPIRATION/INJECTION: R KNEE: ICD-10-PCS | Mod: S$PBB,RT,, | Performed by: ORTHOPAEDIC SURGERY

## 2023-01-26 PROCEDURE — 99499 UNLISTED E&M SERVICE: CPT | Mod: S$PBB,,, | Performed by: ORTHOPAEDIC SURGERY

## 2023-01-26 RX ADMIN — Medication 4 ML: at 10:01

## 2023-01-26 NOTE — PROGRESS NOTES
Subjective:     Patient ID: Deirdre Yanez is a 72 y.o. female.    Chief Complaint: Pain of the Right Knee      HPI:  08/29/2022 right knee severe pain compared to the left.  Gives history of injuring hernia almost 20 years ago and also falling around 20 ft landed on a rock on her feet.  She had some back and hip issues at that time and all got better.  In 2007 had arthroscopic surgery treated by Dr. Saenz where she also received injections did well for that.  She had been diagnosed with mild scoliosis as a teenager and she did yoga all her life until she got injured she could not do it over the last several years.  Her scoliosis got worst at down 1 point she had an MRI that showed some spinal stenosis.  She declined denies any having pain and numbness or tingling in the legs at this time.  Denies loss of bowel bladder control.  She is a retired .  She lives with her .  She tries to exercise every day feed a 1 point she was taking ibuprofen and got good relief however she was taken off by the cardiologist because of history of hypertension and the amount she was taken.  Now she takes MS am and chondroitin glucosamine occasional Tylenol.  She lives with her  she uses a cane to get around.  She is looking for what she can do to help herself.  She was asking about recumbent inverting table that could help her back and did not want to get unless she asked me and she wanted to know what is going on with her knee.  No fever no chills no shortness of breath or difficulty with chewing swallowing loss of bowel bladder control blurry vision double vision loss smell or taste        12/12/2022       Right knee severe arthritis with varus deformity and complete loss of medial joint space .  Received steroid injection Depo-Medrol last visit 08/29/2022 which helped some.  She is quite active walking around despite the fact she is using a cane.  She just got herself younger dog and that has to drag her  around so she keeping quite a bit in shape.  She does use exercise bike.  The meloxicam we gave her does help but the next day starts back again.  She knows she can not take it on a daily basis because of cardiac issues in the cardiologist does not want her to be on massive amounts of NSAIDs.  She is contemplating having surgery next year however her mother low got cancer and now she has to delay that a little bit until she knows what is going to happen.  We went over the procedure of total knee replacement that it is outpatient that she goes home the same day.  Also discussed about wearing a brace and she does not have any.  She discussed her blood pressure medications I did tell her this so many new ones that she needs to discuss it with primary care or cardiology at that time.  Wondering about swelling that comes I said to her that arthritis will give her swelling in the knee it is like riding on a dirt road it is not smooth anymore and occasionally you hit a pothole and the knee will swell up and I will take a day or 2 to go away.  Some swelling in the lower extremity is not related from the knee especially if it is around the ankle.  She does have quite a bit of varicosities which could cause that swelling.  She is wearing compressive stockings  Pain is 3/10      01/29/2022   Arthritis of the right knee.  The prednisone pills helped a lot and quite things down for her.  She did not renew it.  She occasionally takes Tylenol occasionally takes meloxicam.  She does bicycling and she is been doing it for long time in order to keep busy.  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  She is approved for Monovisc into the right knee.  She did receive a steroid injection Depo-Medrol 08/29/2022.  She wants to proceed with the viscosupplementation and wants to avoid surgery.  Her pain is 2/10   No fever no chills no shortness of breath difficulty with chewing or swallowing loss bowel bladder control blurry vision  double vision loss sense smell or taste  Past Medical History:   Diagnosis Date    Abrasion     left eye    Abrasion and/or friction burn of abdominal wall with infection     left eye    Arthritis     gouty arthritis    Back pain     Diabetes mellitus     2011  11/17/2013    Diverticulosis     DM (diabetes mellitus) 2011     12/03/2014  A1C 6.0 x 2 weeks    DM (diabetes mellitus) 2011    BS 89 10/14/2017 A1C 5.7   Diet controlled    DM (diabetes mellitus) 2011    BS 90 am 10/23/2018     DM (diabetes mellitus) 2011    BS didn't check 10/23/2019    Hepatitis     Hx of B/C from cadaver in past    Hepatitis B     Hepatitis C     Hiatal hernia     Hip bursitis, left     HTN (hypertension)     Hypertensive CHF (congestive heart failure) 3/28/2014    Obesity     Pneumonia     Positive ALBIN (antinuclear antibody)     Pure hypercholesterolemia 9/30/2016    PVD (peripheral vascular disease) 1/23/2023    Rheumatoid arthritis     questionable diagnosis    Scoliosis     Type 2 diabetes mellitus without complication, without long-term current use of insulin     Urinary incontinence     intermittent- only when lifting heavy items > 20 lbs     Past Surgical History:   Procedure Laterality Date    APPENDECTOMY      bone grafts      CERVICAL CONIZATION   W/ LASER      COLONOSCOPY N/A 1/10/2019    Procedure: COLONOSCOPY;  Surgeon: Nixon Thornton MD;  Location: [facility];  Service: Endoscopy;  Laterality: N/A;    COLONOSCOPY N/A 9/1/2022    Procedure: COLONOSCOPY;  Surgeon: Shanti Arriaga MD;  Location: [facility];  Service: Endoscopy;  Laterality: N/A;    COSMETIC SURGERY Bilateral     ears    DILATION AND CURETTAGE OF UTERUS      INJECTION OF ANESTHETIC AGENT INTO SACROILIAC JOINT Bilateral 7/23/2020    Procedure: Bilateral BLOCK, SACROILIAC JOINT and bilatearl GTB with RN IV sedation;  Surgeon: Vin Shukla MD;  Location: [facility];  Service: Pain Management;  Laterality: Bilateral;    INJECTION OF ANESTHETIC AGENT  INTO SACROILIAC JOINT Bilateral 9/30/2020    Procedure: Bilateral GT bursa + bilateral SIJ injection;  Surgeon: Vin Shukla MD;  Location: [facility];  Service: Pain Management;  Laterality: Bilateral;    INJECTION OF JOINT Bilateral 9/30/2020    Procedure: Bilateral GT bursa + bilateral SIJ injection;  Surgeon: Vin Shukla MD;  Location: [facility];  Service: Pain Management;  Laterality: Bilateral;    KNEE ARTHROSCOPY W/ MENISCAL REPAIR Right     LIVER BIOPSY      sinus lift      2003    TONSILLECTOMY, ADENOIDECTOMY      TUBAL LIGATION       Family History   Problem Relation Age of Onset    Colon cancer Maternal Grandfather     Diabetes Maternal Grandmother     Hypertension Maternal Grandmother     Breast cancer Maternal Grandmother     Cataracts Mother     Glaucoma Mother     Macular degeneration Mother     Hypertension Mother     Aortic aneurysm Mother     Diabetes Paternal Grandmother     Cataracts Maternal Aunt     Glaucoma Maternal Aunt     Macular degeneration Maternal Aunt     Melanoma Maternal Aunt     Heart disease Unknown         pat side    Aneurysm Brother         brain    Stroke Neg Hx     Kidney disease Neg Hx     Hyperlipidemia Neg Hx      Social History     Socioeconomic History    Marital status:     Number of children: 0    Highest education level: Master's degree (e.g., MA, MS, [employer], MEd, MSW, CARLOZ)   Occupational History    Occupation: Retired Teacher   Tobacco Use    Smoking status: Never    Smokeless tobacco: Never   Substance and Sexual Activity    Alcohol use: Yes     Alcohol/week: 3.0 standard drinks     Types: 3 Glasses of wine per week    Drug use: No    Sexual activity: Yes     Partners: Male   Other Topics Concern    Are you pregnant or think you may be? No    Breast-feeding No     Social Determinants of Health     Financial Resource Strain: Low Risk     Difficulty of Paying Living Expenses: Not hard at all   Food Insecurity: No Food Insecurity    Worried About Running  Out of Food in the Last Year: Never true    Ran Out of Food in the Last Year: Never true   Transportation Needs: No Transportation Needs    Lack of Transportation (Medical): No    Lack of Transportation (Non-Medical): No   Physical Activity: Insufficiently Active    Days of Exercise per Week: 1 day    Minutes of Exercise per Session: 10 min   Stress: No Stress Concern Present    Feeling of Stress : Not at all   Social Connections: Moderately Integrated    Frequency of Communication with Friends and Family: Twice a week    Frequency of Social Gatherings with Friends and Family: Once a week    Attends Anglican Services: Never    Active Member of Clubs or Organizations: Yes    Attends Club or Organization Meetings: 1 to 4 times per year    Marital Status:    Housing Stability: Low Risk     Unable to Pay for Housing in the Last Year: No    Number of Places Lived in the Last Year: 1    Unstable Housing in the Last Year: No     Medication List with Changes/Refills   Current Medications    ASCORBIC ACID, VITAMIN C, (VITAMIN C) 1000 MG TABLET    Take 1,000 mg by mouth 2 (two) times daily.    ASENAPINE MALEATE (SAPHRIS, BLACK SILVA, SL)    Take by mouth as needed.    ASHWAGANDHA ROOT EXTRACT ORAL    Take by mouth. With L-theanine    B COMPLEX VITAMINS TABLET    Take 1 tablet by mouth once daily.    BIOTIN 5 MG CAP    Take by mouth.    CALCIUM CITRATE ORAL    Take 400 mg by mouth.    CARVEDILOL (COREG) 12.5 MG TABLET    Take 1 tablet (12.5 mg total) by mouth 2 (two) times daily with meals.    CHOLECALCIFEROL, VITAMIN D3, (VITAMIN D3 ORAL)    Take 2,000 mg by mouth.    CHROMIUM ORAL    Take by mouth.    COENZYME Q10 10 MG CAPSULE    Take 60 mg by mouth once daily.    ERGOCALCIFEROL, VITAMIN D2, (VITAMIN D ORAL)    Take 2,000 mg by mouth.    FLAKITO PRIMROSE/LINOLEIC/GAMOLENI (PRIMROSE OIL ORAL)    Take by mouth.    FISH OIL-OMEGA-3 FATTY ACIDS 300-1,000 MG CAPSULE    Take 2 capsules by mouth once daily.    FLUTICASONE  "PROPIONATE (FLONASE) 50 MCG/ACTUATION NASAL SPRAY    SHAKE LIQUID AND USE 2 SPRAYS IN EACH NOSTRIL DAILY    FLUTICASONE PROPIONATE (FLONASE) 50 MCG/ACTUATION NASAL SPRAY    2 sprays (100 mcg total) by Each Nostril route once daily.    FUROSEMIDE (LASIX) 40 MG TABLET    Take 1 tablet (40 mg total) by mouth as needed (increase edema of  LE  AND OR DESAI).    GLUCOSAMINE SULFATE 500 MG TAB    Take 1,000 mg by mouth as needed.     INOSITOL 500 MG TAB    Take 750 mg by mouth. BID    LANSOPRAZOLE (PREVACID) 15 MG CAPSULE    Take 15 mg by mouth once daily.    MAGNESIUM ORAL    Take 100 mg by mouth.    MELOXICAM (MOBIC) 15 MG TABLET    Take 1 tablet (15 mg total) by mouth once daily. Take with food    METHYLSULFONYLMETHANE 1,000 MG CAP    Take by mouth as needed.     MULTIVITAMIN (THERAGRAN) PER TABLET    Take 1 tablet by mouth once daily. Per pt [facility] Women's 50 plus    PREDNISONE (DELTASONE) 5 MG TABLET    Take 1 tablet (5 mg total) by mouth once daily.    PSYLLIUM HUSK (METAMUCIL ORAL)    Take by mouth 2 (two) times a day.    SPIRONOLACTONE (ALDACTONE) 50 MG TABLET    Take 1 tablet (50 mg total) by mouth every evening.    TIZANIDINE (ZANAFLEX) 2 MG TABLET    Take 2 tablets (4 mg total) by mouth nightly as needed.    UNABLE TO FIND    Liver Refresh     Review of patient's allergies indicates:   Allergen Reactions    Arb-angiotensin receptor antagonist Edema    Hydralazine analogues      "Lupus reaction"    Metoprolol Other (See Comments)     Alopecia    Sulfa (sulfonamide antibiotics)      Passed out and almost stopped breathing    Calcium channel blocking agents-dihydropyridines      Edema      Ace inhibitors Other (See Comments)     cough     Review of Systems   Constitutional: Negative for decreased appetite.   HENT:  Negative for tinnitus.    Eyes:  Negative for double vision.   Cardiovascular:  Negative for chest pain.   Respiratory:  Negative for wheezing.    Hematologic/Lymphatic: Negative for bleeding problem.   Skin: "  Negative for dry skin.   Musculoskeletal:  Positive for arthritis, back pain, joint pain and joint swelling. Negative for gout, neck pain and stiffness.   Gastrointestinal:  Negative for abdominal pain.   Genitourinary:  Negative for bladder incontinence.   Neurological:  Negative for numbness, paresthesias and sensory change.   Psychiatric/Behavioral:  Negative for altered mental status.      Objective:   Body mass index is 34.48 kg/m².  There were no vitals filed for this visit.         General    Constitutional: She is oriented to person, place, and time. She appears well-developed.   HENT:   Head: Atraumatic.   Eyes: EOM are normal.   Pulmonary/Chest: Effort normal.   Neurological: She is alert and oriented to person, place, and time.   Psychiatric: Judgment normal.         Ambulating with cane with a very slight limp  You could see that she is hunched over with of the severe scoliosis in the back  Pelvis is level  Bilateral hips passive motion no pain in the groin.    Hip flexors abductors adductors quads and hamstrings ankle extensors and flexors are 5/5   Right knee with around 5° of contractures and flexes to 120° with medial joint pain.  There is mild to moderate swelling.  There is crepitus to compression patella to range of motion.  Collaterals and cruciates are stable.  No defect in the patella or quadriceps tendon   Left knee with 0-120 degrees of flexion with very minimal tenderness if any.  Mild crepitus.  No swelling today.  There is no defect in the patella or quadriceps tendon.  Collaterals and cruciates are stable.    Calves are soft  He has numerous varicosities to both lower extremities  Ankle motion is intact   Skin is warm to touch no obvious lesions    Relevant imaging results reviewed and interpreted by me, discussed with the patient and / or family today   X-ray 8/29/22 right knee with complete loss of medial joint space.  Marginal osteophytic changes.  Sclerosis.  Loose body in the  superior pouch consistent with end-stage arthritis with varus deformity.  The left knee is mild joint line narrowing mild degenerative changes.  No fracture seen  Assessment:     Encounter Diagnoses   Name Primary?    Arthritis of knee, right Yes    Acquired varus deformity knee, right     Scoliosis of lumbar region due to degenerative disease of spine in adult     Spinal stenosis of lumbar region, unspecified whether neurogenic claudication present     Loose body in knee, right knee         Plan:   Arthritis of knee, right  -     Large Joint Aspiration/Injection: R knee    Acquired varus deformity knee, right    Scoliosis of lumbar region due to degenerative disease of spine in adult    Spinal stenosis of lumbar region, unspecified whether neurogenic claudication present    Loose body in knee, right knee       Patient Instructions   Continue with her exercise in the bicycling   You may take her meloxicam and Tylenol as needed  Still have a prednisone refill to take only absolutely needed  Will inject the right knee with Monovisc/rooster comb gel 01/29/2023.    Ice the knee next few days and might swell up on you it will go away if it hurts  In might take roughly 4-6 weeks for it to start working some people sooner some people little bit later  You may have that rooster comb gel repeated once every 6 months  You can have cortisone injections if needed once every 3 months   I will have you see my assistant in 3 months to see how you been        Disclaimer: This note was prepared using a voice recognition system and is likely to have sound alike errors within the text.

## 2023-01-26 NOTE — PATIENT INSTRUCTIONS
Continue with her exercise in the bicycling   You may take her meloxicam and Tylenol as needed  Still have a prednisone refill to take only absolutely needed  Will inject the right knee with Monovisc/rooster comb gel 01/29/2023.    Ice the knee next few days and might swell up on you it will go away if it hurts  In might take roughly 4-6 weeks for it to start working some people sooner some people little bit later  You may have that rooster comb gel repeated once every 6 months  You can have cortisone injections if needed once every 3 months   I will have you see my assistant in 3 months to see how you been

## 2023-01-26 NOTE — PROCEDURES
Large Joint Aspiration/Injection: R knee    Date/Time: 1/26/2023 10:00 AM  Performed by: Delvin Puga MD  Authorized by: Delvin Puga MD     Consent Done?:  Yes (Verbal)  Indications:  Arthritis  Site marked: the procedure site was marked    Timeout: prior to procedure the correct patient, procedure, and site was verified      Local anesthesia used?: Yes    Local anesthetic:  Lidocaine 1% without epinephrine    Details:  Needle Size:  22 G  Ultrasonic Guidance for needle placement?: No    Approach:  Anterolateral  Location:  Knee  Site:  R knee  Medications:  4 mL hyaluronate sodium, stabilized 88 mg/4 mL  Patient tolerance:  Patient tolerated the procedure well with no immediate complications

## 2023-03-07 ENCOUNTER — PATIENT MESSAGE (OUTPATIENT)
Dept: ADMINISTRATIVE | Facility: HOSPITAL | Age: 73
End: 2023-03-07
Payer: MEDICARE

## 2023-04-19 ENCOUNTER — PATIENT MESSAGE (OUTPATIENT)
Dept: ADMINISTRATIVE | Facility: HOSPITAL | Age: 73
End: 2023-04-19
Payer: MEDICARE

## 2023-04-27 ENCOUNTER — OFFICE VISIT (OUTPATIENT)
Dept: ORTHOPEDICS | Facility: CLINIC | Age: 73
End: 2023-04-27
Payer: MEDICARE

## 2023-04-27 VITALS
SYSTOLIC BLOOD PRESSURE: 134 MMHG | DIASTOLIC BLOOD PRESSURE: 87 MMHG | BODY MASS INDEX: 34.23 KG/M2 | WEIGHT: 213 LBS | HEART RATE: 72 BPM | HEIGHT: 66 IN

## 2023-04-27 DIAGNOSIS — M17.11 ARTHRITIS OF KNEE, RIGHT: Primary | ICD-10-CM

## 2023-04-27 DIAGNOSIS — M21.161 ACQUIRED VARUS DEFORMITY KNEE, RIGHT: ICD-10-CM

## 2023-04-27 PROCEDURE — 99213 OFFICE O/P EST LOW 20 MIN: CPT | Mod: S$PBB,25,, | Performed by: PHYSICIAN ASSISTANT

## 2023-04-27 PROCEDURE — 20610 LARGE JOINT ASPIRATION/INJECTION: R KNEE: ICD-10-PCS | Mod: S$PBB,RT,, | Performed by: PHYSICIAN ASSISTANT

## 2023-04-27 PROCEDURE — 20610 DRAIN/INJ JOINT/BURSA W/O US: CPT | Mod: PBBFAC,RT | Performed by: PHYSICIAN ASSISTANT

## 2023-04-27 PROCEDURE — 99213 PR OFFICE/OUTPT VISIT, EST, LEVL III, 20-29 MIN: ICD-10-PCS | Mod: S$PBB,25,, | Performed by: PHYSICIAN ASSISTANT

## 2023-04-27 PROCEDURE — 99999 PR PBB SHADOW E&M-EST. PATIENT-LVL IV: CPT | Mod: PBBFAC,,, | Performed by: PHYSICIAN ASSISTANT

## 2023-04-27 PROCEDURE — 20610 DRAIN/INJ JOINT/BURSA W/O US: CPT | Mod: S$PBB,RT,, | Performed by: PHYSICIAN ASSISTANT

## 2023-04-27 PROCEDURE — 99214 OFFICE O/P EST MOD 30 MIN: CPT | Mod: PBBFAC,25 | Performed by: PHYSICIAN ASSISTANT

## 2023-04-27 PROCEDURE — 99999 PR PBB SHADOW E&M-EST. PATIENT-LVL IV: ICD-10-PCS | Mod: PBBFAC,,, | Performed by: PHYSICIAN ASSISTANT

## 2023-04-27 RX ORDER — METHYLPREDNISOLONE ACETATE 80 MG/ML
80 INJECTION, SUSPENSION INTRA-ARTICULAR; INTRALESIONAL; INTRAMUSCULAR; SOFT TISSUE
Status: DISCONTINUED | OUTPATIENT
Start: 2023-04-27 | End: 2023-04-27 | Stop reason: HOSPADM

## 2023-04-27 RX ORDER — LIDOCAINE HYDROCHLORIDE 10 MG/ML
5 INJECTION INFILTRATION; PERINEURAL
Status: DISCONTINUED | OUTPATIENT
Start: 2023-04-27 | End: 2023-04-27 | Stop reason: HOSPADM

## 2023-04-27 RX ORDER — PREDNISONE 5 MG/1
5 TABLET ORAL DAILY
Qty: 15 TABLET | Refills: 1 | Status: SHIPPED | OUTPATIENT
Start: 2023-05-11 | End: 2023-08-07 | Stop reason: SDUPTHER

## 2023-04-27 RX ADMIN — METHYLPREDNISOLONE ACETATE 80 MG: 80 INJECTION, SUSPENSION INTRALESIONAL; INTRAMUSCULAR; INTRASYNOVIAL; SOFT TISSUE at 09:04

## 2023-04-27 RX ADMIN — LIDOCAINE HYDROCHLORIDE 5 ML: 10 INJECTION, SOLUTION INFILTRATION; PERINEURAL at 09:04

## 2023-04-27 NOTE — PROCEDURES
Large Joint Aspiration/Injection: R knee    Date/Time: 4/27/2023 9:30 AM  Performed by: MINA Love  Authorized by: MINA Love     Consent Done?:  Yes (Verbal)  Indications:  Arthritis and pain  Site marked: the procedure site was marked      Local anesthesia used?: Yes    Local anesthetic:  Topical anesthetic    Details:  Needle Size:  22 G  Approach:  Anterolateral  Location:  Knee  Site:  R knee  Medications:  5 mL LIDOcaine HCL 10 mg/ml (1%) 10 mg/mL (1 %); 80 mg methylPREDNISolone acetate 80 mg/mL  Patient tolerance:  Patient tolerated the procedure well with no immediate complications     Verbal consent was obtained  The patient's ID, site, side was verified  The site was sterile prepped in standard fashion  The injection was performed in the dm-lateral side without complication  A sterile Band-Aid was applied    Patient was directed to apply ice today at roughly 15 minutes at a time as needed.  It was discussed that they may be sore for the next few days or so.  Please avoid strenuous activity over the next 24 hours.  It was also discussed that the patient may have a increase in glucose if diabetic and should monitor levels.  Patient was instructed to call as needed.

## 2023-04-27 NOTE — PROGRESS NOTES
Subjective:     Patient ID: Deirdre Yanez is a 73 y.o. female.    Chief Complaint: Pain of the Right Knee      HPI:  08/29/2022 right knee severe pain compared to the left.  Gives history of injuring hernia almost 20 years ago and also falling around 20 ft landed on a rock on her feet.  She had some back and hip issues at that time and all got better.  In 2007 had arthroscopic surgery treated by Dr. Saenz where she also received injections did well for that.  She had been diagnosed with mild scoliosis as a teenager and she did yoga all her life until she got injured she could not do it over the last several years.  Her scoliosis got worst at down 1 point she had an MRI that showed some spinal stenosis.  She declined denies any having pain and numbness or tingling in the legs at this time.  Denies loss of bowel bladder control.  She is a retired .  She lives with her .  She tries to exercise every day feed a 1 point she was taking ibuprofen and got good relief however she was taken off by the cardiologist because of history of hypertension and the amount she was taken.  Now she takes MS am and chondroitin glucosamine occasional Tylenol.  She lives with her  she uses a cane to get around.  She is looking for what she can do to help herself.  She was asking about recumbent inverting table that could help her back and did not want to get unless she asked me and she wanted to know what is going on with her knee.  No fever no chills no shortness of breath or difficulty with chewing swallowing loss of bowel bladder control blurry vision double vision loss smell or taste        12/12/2022       Right knee severe arthritis with varus deformity and complete loss of medial joint space .  Received steroid injection Depo-Medrol last visit 08/29/2022 which helped some.  She is quite active walking around despite the fact she is using a cane.  She just got herself younger dog and that has to drag her  around so she keeping quite a bit in shape.  She does use exercise bike.  The meloxicam we gave her does help but the next day starts back again.  She knows she can not take it on a daily basis because of cardiac issues in the cardiologist does not want her to be on massive amounts of NSAIDs.  She is contemplating having surgery next year however her mother low got cancer and now she has to delay that a little bit until she knows what is going to happen.  We went over the procedure of total knee replacement that it is outpatient that she goes home the same day.  Also discussed about wearing a brace and she does not have any.  She discussed her blood pressure medications I did tell her this so many new ones that she needs to discuss it with primary care or cardiology at that time.  Wondering about swelling that comes I said to her that arthritis will give her swelling in the knee it is like riding on a dirt road it is not smooth anymore and occasionally you hit a pothole and the knee will swell up and I will take a day or 2 to go away.  Some swelling in the lower extremity is not related from the knee especially if it is around the ankle.  She does have quite a bit of varicosities which could cause that swelling.  She is wearing compressive stockings  Pain is 3/10      01/29/2022   Arthritis of the right knee.  The prednisone pills helped a lot and quite things down for her.  She did not renew it.  She occasionally takes Tylenol occasionally takes meloxicam.  She does bicycling and she is been doing it for long time in order to keep busy.  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  She is approved for Monovisc into the right knee.  She did receive a steroid injection Depo-Medrol 08/29/2022.  She wants to proceed with the viscosupplementation and wants to avoid surgery.  Her pain is 2/10   No fever no chills no shortness of breath difficulty with chewing or swallowing loss bowel bladder control blurry vision  double vision loss sense smell or taste    4/27/2023  Patient presents today as a new patient to me with chief complaint of right knee pain.  Patient states pain is at worst a 4 to 5/10 affecting activity of daily living and thus her quality of life.  Patient states she is been struggling this week especially Monday when she is noticed the gel injection that she received in January is not as effective.  She is been trying to take care of a very ill dog that has her up and down.  She remains quite active in her daily routine.  She states prior to this the gel injection had shown to be quite effective.  She inquires if there is anything where she can receive today.  She is using a cane to assist in ambulation   She remains quite active in her daily routine and home life     Patient is presently denying any shortness of breath, chest pain, fever/chills, nausea/vomiting, loss of taste or smell, numbness/tingling or sensation changes, loss of bladder or bowel function, loss of taste/smell.         Past Medical History:   Diagnosis Date    Abrasion     left eye    Abrasion and/or friction burn of abdominal wall with infection     left eye    Arthritis     gouty arthritis    Back pain     Diabetes mellitus     2011  11/17/2013    Diverticulosis     DM (diabetes mellitus) 2011     12/03/2014  A1C 6.0 x 2 weeks    DM (diabetes mellitus) 2011    BS 89 10/14/2017 A1C 5.7   Diet controlled    DM (diabetes mellitus) 2011    BS 90 am 10/23/2018     DM (diabetes mellitus) 2011    BS didn't check 10/23/2019    Hepatitis     Hx of B/C from cadaver in past    Hepatitis B     Hepatitis C     Hiatal hernia     Hip bursitis, left     HTN (hypertension)     Hypertensive CHF (congestive heart failure) 3/28/2014    Obesity     Pneumonia     Positive ALBIN (antinuclear antibody)     Pure hypercholesterolemia 9/30/2016    PVD (peripheral vascular disease) 1/23/2023    Rheumatoid arthritis     questionable diagnosis    Scoliosis      Type 2 diabetes mellitus without complication, without long-term current use of insulin     Urinary incontinence     intermittent- only when lifting heavy items > 20 lbs     Past Surgical History:   Procedure Laterality Date    APPENDECTOMY      bone grafts      CERVICAL CONIZATION   W/ LASER      COLONOSCOPY N/A 1/10/2019    Procedure: COLONOSCOPY;  Surgeon: Nixon Thornton MD;  Location: [facility];  Service: Endoscopy;  Laterality: N/A;    COLONOSCOPY N/A 9/1/2022    Procedure: COLONOSCOPY;  Surgeon: Shanti Arriaga MD;  Location: [facility];  Service: Endoscopy;  Laterality: N/A;    COSMETIC SURGERY Bilateral     ears    DILATION AND CURETTAGE OF UTERUS      INJECTION OF ANESTHETIC AGENT INTO SACROILIAC JOINT Bilateral 7/23/2020    Procedure: Bilateral BLOCK, SACROILIAC JOINT and bilatearl GTB with RN IV sedation;  Surgeon: Vin Shukla MD;  Location: [facility];  Service: Pain Management;  Laterality: Bilateral;    INJECTION OF ANESTHETIC AGENT INTO SACROILIAC JOINT Bilateral 9/30/2020    Procedure: Bilateral GT bursa + bilateral SIJ injection;  Surgeon: Vin Shukla MD;  Location: [facility];  Service: Pain Management;  Laterality: Bilateral;    INJECTION OF JOINT Bilateral 9/30/2020    Procedure: Bilateral GT bursa + bilateral SIJ injection;  Surgeon: Vin Shukla MD;  Location: [facility];  Service: Pain Management;  Laterality: Bilateral;    KNEE ARTHROSCOPY W/ MENISCAL REPAIR Right     LIVER BIOPSY      sinus lift      2003    TONSILLECTOMY, ADENOIDECTOMY      TUBAL LIGATION       Family History   Problem Relation Age of Onset    Colon cancer Maternal Grandfather     Diabetes Maternal Grandmother     Hypertension Maternal Grandmother     Breast cancer Maternal Grandmother     Cataracts Mother     Glaucoma Mother     Macular degeneration Mother     Hypertension Mother     Aortic aneurysm Mother     Diabetes Paternal Grandmother     Cataracts Maternal Aunt     Glaucoma Maternal Aunt     Macular  degeneration Maternal Aunt     Melanoma Maternal Aunt     Heart disease Unknown         pat side    Aneurysm Brother         brain    Stroke Neg Hx     Kidney disease Neg Hx     Hyperlipidemia Neg Hx      Social History     Socioeconomic History    Marital status:     Number of children: 0    Highest education level: Master's degree (e.g., MA, MS, [employer], MEd, MSW, CARLOZ)   Occupational History    Occupation: Retired Teacher   Tobacco Use    Smoking status: Never    Smokeless tobacco: Never   Substance and Sexual Activity    Alcohol use: Yes     Alcohol/week: 3.0 standard drinks     Types: 3 Glasses of wine per week    Drug use: No    Sexual activity: Yes     Partners: Male   Other Topics Concern    Are you pregnant or think you may be? No    Breast-feeding No     Social Determinants of Health     Financial Resource Strain: Low Risk     Difficulty of Paying Living Expenses: Not hard at all   Food Insecurity: No Food Insecurity    Worried About Running Out of Food in the Last Year: Never true    Ran Out of Food in the Last Year: Never true   Transportation Needs: No Transportation Needs    Lack of Transportation (Medical): No    Lack of Transportation (Non-Medical): No   Physical Activity: Insufficiently Active    Days of Exercise per Week: 1 day    Minutes of Exercise per Session: 10 min   Stress: No Stress Concern Present    Feeling of Stress : Not at all   Social Connections: Moderately Integrated    Frequency of Communication with Friends and Family: Twice a week    Frequency of Social Gatherings with Friends and Family: Once a week    Attends Bahai Services: Never    Active Member of Clubs or Organizations: Yes    Attends Club or Organization Meetings: 1 to 4 times per year    Marital Status:    Housing Stability: Low Risk     Unable to Pay for Housing in the Last Year: No    Number of Places Lived in the Last Year: 1    Unstable Housing in the Last Year: No     Medication List with Changes/Refills    Current Medications    ASCORBIC ACID, VITAMIN C, (VITAMIN C) 1000 MG TABLET    Take 1,000 mg by mouth 2 (two) times daily.    ASENAPINE MALEATE (SAPHRIS, BLACK SILVA, SL)    Take by mouth as needed.    ASHWAGANDHA ROOT EXTRACT ORAL    Take by mouth. With L-theanine    B COMPLEX VITAMINS TABLET    Take 1 tablet by mouth once daily.    BIOTIN 5 MG CAP    Take by mouth.    CALCIUM CITRATE ORAL    Take 400 mg by mouth.    CARVEDILOL (COREG) 12.5 MG TABLET    Take 1 tablet (12.5 mg total) by mouth 2 (two) times daily with meals.    CHOLECALCIFEROL, VITAMIN D3, (VITAMIN D3 ORAL)    Take 2,000 mg by mouth.    CHROMIUM ORAL    Take by mouth.    COENZYME Q10 10 MG CAPSULE    Take 60 mg by mouth once daily.    ERGOCALCIFEROL, VITAMIN D2, (VITAMIN D ORAL)    Take 2,000 mg by mouth.    FLAKITO PRIMROSE/LINOLEIC/GAMOLENI (PRIMROSE OIL ORAL)    Take by mouth.    FISH OIL-OMEGA-3 FATTY ACIDS 300-1,000 MG CAPSULE    Take 2 capsules by mouth once daily.    FLUTICASONE PROPIONATE (FLONASE) 50 MCG/ACTUATION NASAL SPRAY    SHAKE LIQUID AND USE 2 SPRAYS IN EACH NOSTRIL DAILY    FLUTICASONE PROPIONATE (FLONASE) 50 MCG/ACTUATION NASAL SPRAY    2 sprays (100 mcg total) by Each Nostril route once daily.    FUROSEMIDE (LASIX) 40 MG TABLET    Take 1 tablet (40 mg total) by mouth as needed (increase edema of  LE  AND OR DESAI).    GLUCOSAMINE SULFATE 500 MG TAB    Take 1,000 mg by mouth as needed.     INOSITOL 500 MG TAB    Take 750 mg by mouth. BID    LANSOPRAZOLE (PREVACID) 15 MG CAPSULE    Take 15 mg by mouth once daily.    MAGNESIUM ORAL    Take 100 mg by mouth.    MELOXICAM (MOBIC) 15 MG TABLET    Take 1 tablet (15 mg total) by mouth once daily. Take with food    METHYLSULFONYLMETHANE 1,000 MG CAP    Take by mouth as needed.     MULTIVITAMIN (THERAGRAN) PER TABLET    Take 1 tablet by mouth once daily. Per pt C Women's 50 plus    PSYLLIUM HUSK (METAMUCIL ORAL)    Take by mouth 2 (two) times a day.    SPIRONOLACTONE (ALDACTONE) 50 MG TABLET   "  Take 1 tablet (50 mg total) by mouth every evening.    TIZANIDINE (ZANAFLEX) 2 MG TABLET    Take 2 tablets (4 mg total) by mouth nightly as needed.    UNABLE TO FIND    Liver Refresh   Changed and/or Refilled Medications    Modified Medication Previous Medication    PREDNISONE (DELTASONE) 5 MG TABLET predniSONE (DELTASONE) 5 MG tablet       Take 1 tablet (5 mg total) by mouth once daily.    Take 1 tablet (5 mg total) by mouth once daily.     Review of patient's allergies indicates:   Allergen Reactions    Arb-angiotensin receptor antagonist Edema    Hydralazine analogues      "Lupus reaction"    Metoprolol Other (See Comments)     Alopecia    Sulfa (sulfonamide antibiotics)      Passed out and almost stopped breathing    Calcium channel blocking agents-dihydropyridines      Edema      Ace inhibitors Other (See Comments)     cough     Review of Systems   Constitutional: Negative for decreased appetite.   HENT:  Negative for tinnitus.    Eyes:  Negative for double vision.   Cardiovascular:  Negative for chest pain.   Respiratory:  Negative for wheezing.    Hematologic/Lymphatic: Negative for bleeding problem.   Skin:  Negative for dry skin.   Musculoskeletal:  Positive for arthritis, back pain, joint pain and joint swelling. Negative for gout, neck pain and stiffness.   Gastrointestinal:  Negative for abdominal pain.   Genitourinary:  Negative for bladder incontinence.   Neurological:  Negative for numbness, paresthesias and sensory change.   Psychiatric/Behavioral:  Negative for altered mental status.      Objective:   Body mass index is 34.38 kg/m².  Vitals:    04/27/23 0929   BP: 134/87   Pulse: 72            General    Constitutional: She is oriented to person, place, and time. She appears well-developed.   HENT:   Head: Atraumatic.   Eyes: EOM are normal.   Pulmonary/Chest: Effort normal.   Neurological: She is alert and oriented to person, place, and time.   Psychiatric: Judgment normal.            Right knee "   Range of motion of 5-120    There is mild to moderate swelling.    There is crepitus to compression patella to range of motion.    There is joint pain medially on palpation    Collaterals and cruciates are stable.    No defect in the patella or quadriceps tendon    Calves are soft, nontender  numerous varicosities to both lower extremities  DS/PF is intact   Skin is warm to touch, no obvious lesions    X-ray bilateral knees 08/29/2022   FINDINGS:  Four views of the bilateral knees demonstrate genu varum deformities.  There is moderate right and mild left tricompartmental osteoarthritis.  There is no fracture.  There is no joint effusion.  There is no soft tissue swelling.   kellegren Cristi 3   Assessment:     Encounter Diagnosis   Name Primary?    Arthritis of knee, right Yes        Plan:   Arthritis of knee, right  -     Prior authorization Order  -     predniSONE (DELTASONE) 5 MG tablet; Take 1 tablet (5 mg total) by mouth once daily.  Dispense: 15 tablet; Refill: 1  -     Large Joint Aspiration/Injection: R knee         There are no Patient Instructions on file for this visit.    Patient presents as a new patient to me today with chief complaint of right knee pain. We had a long discussion today regarding degenerative arthritis in the knees. The patient understands that arthritis is chronic and will worsen over time.  The patient also understands that arthritis may cause episodic flare-ups in pain. Management or if arthritis is achieved through a multi-modal approach including weight loss in obese individuals, activity modification, NSAIDs (topical vs oral) where appropriate, periodic intra-articular steroid injections, viscosupplementation, physical therapy, knee bracing, ambulatory aids, as well as geniculate nerve blocks.  Patient elected to proceed with right knee short acting steroid injection today.  She was told for elevation and ice as needed with light activity.  I would like to see her back in 3  months for further evaluation.  Since she is had great relief from the gel injection I would like to get her preapproved for these today.  She may call with any questions or concerns in the interim.    Disclaimer: This note was prepared using a voice recognition system and is likely to have sound alike errors within the text.

## 2023-06-06 ENCOUNTER — PATIENT MESSAGE (OUTPATIENT)
Dept: ADMINISTRATIVE | Facility: HOSPITAL | Age: 73
End: 2023-06-06
Payer: MEDICARE

## 2023-06-18 DIAGNOSIS — M41.9 SCOLIOSIS OF THORACOLUMBAR SPINE, UNSPECIFIED SCOLIOSIS TYPE: ICD-10-CM

## 2023-06-18 NOTE — TELEPHONE ENCOUNTER
No care due was identified.  [facility] Embedded Care Due Messages. Reference number: [id number].   6/18/2023 9:48:14 AM CDT

## 2023-06-19 RX ORDER — TIZANIDINE 2 MG/1
2 TABLET ORAL EVERY 8 HOURS PRN
Qty: 30 TABLET | Refills: 0 | OUTPATIENT
Start: 2023-06-19

## 2023-06-19 RX ORDER — TIZANIDINE 2 MG/1
4 TABLET ORAL NIGHTLY PRN
Qty: 30 TABLET | Refills: 5 | Status: SHIPPED | OUTPATIENT
Start: 2023-06-19

## 2023-06-19 NOTE — TELEPHONE ENCOUNTER
LOV: 06/09/2022  
No care due was identified.  [facility] Embedded Care Due Messages. Reference number: [id number].   6/18/2023 9:46:27 AM CDT  
Clear

## 2023-06-21 DIAGNOSIS — E11.69 HYPERLIPIDEMIA ASSOCIATED WITH TYPE 2 DIABETES MELLITUS: ICD-10-CM

## 2023-06-21 DIAGNOSIS — E11.9 TYPE 2 DIABETES MELLITUS WITHOUT COMPLICATION: ICD-10-CM

## 2023-06-21 DIAGNOSIS — E78.5 HYPERLIPIDEMIA ASSOCIATED WITH TYPE 2 DIABETES MELLITUS: ICD-10-CM

## 2023-06-26 DIAGNOSIS — I50.32 DIASTOLIC CHF, CHRONIC: Chronic | ICD-10-CM

## 2023-06-26 RX ORDER — CARVEDILOL 6.25 MG/1
TABLET ORAL
Qty: 180 TABLET | Refills: 3 | Status: SHIPPED | OUTPATIENT
Start: 2023-06-26 | End: 2024-02-08

## 2023-06-26 NOTE — TELEPHONE ENCOUNTER
No care due was identified.  [facility] Embedded Care Due Messages. Reference number: [id number].   6/26/2023 12:54:54 PM CDT

## 2023-06-26 NOTE — TELEPHONE ENCOUNTER
Refill Routing Note   Medication(s) are not appropriate for processing by [facility] for the following reason(s):      Med not active on med list     [facility] action(s):  Defer None identified   Medication Therapy Plan: The original prescription was discontinued on 12/14/2022 by Vaughn Dailey MD      Appointments  past 12m or future 3m with PCP    Date Provider   Last Visit   6/9/2022 Jazmine Montalvo MD   Next Visit   Visit date not found Jazmine Montalvo MD   ED visits in past 90 days: 0        Note composed:6:10 PM 06/26/2023

## 2023-08-07 ENCOUNTER — OFFICE VISIT (OUTPATIENT)
Dept: ORTHOPEDICS | Facility: CLINIC | Age: 73
End: 2023-08-07
Payer: MEDICARE

## 2023-08-07 VITALS — BODY MASS INDEX: 34.23 KG/M2 | WEIGHT: 213 LBS | HEIGHT: 66 IN

## 2023-08-07 DIAGNOSIS — M17.11 ARTHRITIS OF KNEE, RIGHT: Primary | ICD-10-CM

## 2023-08-07 PROCEDURE — 99999 PR PBB SHADOW E&M-EST. PATIENT-LVL III: CPT | Mod: PBBFAC,,, | Performed by: PHYSICIAN ASSISTANT

## 2023-08-07 PROCEDURE — 99213 PR OFFICE/OUTPT VISIT, EST, LEVL III, 20-29 MIN: ICD-10-PCS | Mod: S$PBB,,, | Performed by: PHYSICIAN ASSISTANT

## 2023-08-07 PROCEDURE — 99213 OFFICE O/P EST LOW 20 MIN: CPT | Mod: PBBFAC | Performed by: PHYSICIAN ASSISTANT

## 2023-08-07 PROCEDURE — 99999 PR PBB SHADOW E&M-EST. PATIENT-LVL III: ICD-10-PCS | Mod: PBBFAC,,, | Performed by: PHYSICIAN ASSISTANT

## 2023-08-07 PROCEDURE — 99213 OFFICE O/P EST LOW 20 MIN: CPT | Mod: S$PBB,,, | Performed by: PHYSICIAN ASSISTANT

## 2023-08-07 RX ORDER — PREDNISONE 5 MG/1
5 TABLET ORAL DAILY
Qty: 15 TABLET | Refills: 1 | Status: SHIPPED | OUTPATIENT
Start: 2023-08-07 | End: 2023-11-09 | Stop reason: SDUPTHER

## 2023-08-07 NOTE — PROGRESS NOTES
Subjective:     Patient ID: Deirdre Yanez is a 73 y.o. female.    Chief Complaint: Pain of the Right Knee      HPI:  08/29/2022 right knee severe pain compared to the left.  Gives history of injuring hernia almost 20 years ago and also falling around 20 ft landed on a rock on her feet.  She had some back and hip issues at that time and all got better.  In 2007 had arthroscopic surgery treated by Dr. Saenz where she also received injections did well for that.  She had been diagnosed with mild scoliosis as a teenager and she did yoga all her life until she got injured she could not do it over the last several years.  Her scoliosis got worst at down 1 point she had an MRI that showed some spinal stenosis.  She declined denies any having pain and numbness or tingling in the legs at this time.  Denies loss of bowel bladder control.  She is a retired .  She lives with her .  She tries to exercise every day feed a 1 point she was taking ibuprofen and got good relief however she was taken off by the cardiologist because of history of hypertension and the amount she was taken.  Now she takes MS am and chondroitin glucosamine occasional Tylenol.  She lives with her  she uses a cane to get around.  She is looking for what she can do to help herself.  She was asking about recumbent inverting table that could help her back and did not want to get unless she asked me and she wanted to know what is going on with her knee.  No fever no chills no shortness of breath or difficulty with chewing swallowing loss of bowel bladder control blurry vision double vision loss smell or taste        12/12/2022       Right knee severe arthritis with varus deformity and complete loss of medial joint space .  Received steroid injection Depo-Medrol last visit 08/29/2022 which helped some.  She is quite active walking around despite the fact she is using a cane.  She just got herself younger dog and that has to drag her  around so she keeping quite a bit in shape.  She does use exercise bike.  The meloxicam we gave her does help but the next day starts back again.  She knows she can not take it on a daily basis because of cardiac issues in the cardiologist does not want her to be on massive amounts of NSAIDs.  She is contemplating having surgery next year however her mother low got cancer and now she has to delay that a little bit until she knows what is going to happen.  We went over the procedure of total knee replacement that it is outpatient that she goes home the same day.  Also discussed about wearing a brace and she does not have any.  She discussed her blood pressure medications I did tell her this so many new ones that she needs to discuss it with primary care or cardiology at that time.  Wondering about swelling that comes I said to her that arthritis will give her swelling in the knee it is like riding on a dirt road it is not smooth anymore and occasionally you hit a pothole and the knee will swell up and I will take a day or 2 to go away.  Some swelling in the lower extremity is not related from the knee especially if it is around the ankle.  She does have quite a bit of varicosities which could cause that swelling.  She is wearing compressive stockings  Pain is 3/10      01/29/2022   Arthritis of the right knee.  The prednisone pills helped a lot and quite things down for her.  She did not renew it.  She occasionally takes Tylenol occasionally takes meloxicam.  She does bicycling and she is been doing it for long time in order to keep busy.  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  She is approved for Monovisc into the right knee.  She did receive a steroid injection Depo-Medrol 08/29/2022.  She wants to proceed with the viscosupplementation and wants to avoid surgery.  Her pain is 2/10   No fever no chills no shortness of breath difficulty with chewing or swallowing loss bowel bladder control blurry vision  double vision loss sense smell or taste    4/27/2023  Patient presents today as a new patient to me with chief complaint of right knee pain.  Patient states pain is at worst a 4 to 5/10 affecting activity of daily living and thus her quality of life.  Patient states she is been struggling this week especially Monday when she is noticed the gel injection that she received in January is not as effective.  She is been trying to take care of a very ill dog that has her up and down.  She remains quite active in her daily routine.  She states prior to this the gel injection had shown to be quite effective.  She inquires if there is anything where she can receive today.  She is using a cane to assist in ambulation   She remains quite active in her daily routine and home life     Patient is presently denying any shortness of breath, chest pain, fever/chills, nausea/vomiting, loss of taste or smell, numbness/tingling or sensation changes, loss of bladder or bowel function, loss of taste/smell.     8/7/2023  Patient presents with chief complaint of right knee pain.  Patient states pain is at worst a 4/10 affecting activity of daily living.  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  She notices the gel injection did provide relief as well as the steroid that she received in April.  She says she gets the most relief from the combination of the gel injection as well as oral steroids as opposed to the steroid injection.  She requests a refill of this today.  She is using a cane to assist with ambulation although again remains quite active in her daily routine taking care of the forearm at home.  She denies any trauma or recent falls   Patient is presently denying any shortness of breath, chest pain, fever/chills, nausea/vomiting, loss of taste or smell, numbness/tingling or sensation changes, loss of bladder or bowel function, loss of taste/smell.       Past Medical History:   Diagnosis Date     Abrasion     left eye    Abrasion and/or friction burn of abdominal wall with infection     left eye    Arthritis     gouty arthritis    Back pain     Diabetes mellitus     2011  11/17/2013    Diverticulosis     DM (diabetes mellitus) 2011     12/03/2014  A1C 6.0 x 2 weeks    DM (diabetes mellitus) 2011    BS 89 10/14/2017 A1C 5.7   Diet controlled    DM (diabetes mellitus) 2011    BS 90 am 10/23/2018     DM (diabetes mellitus) 2011    BS didn't check 10/23/2019    Hepatitis     Hx of B/C from cadaver in past    Hepatitis B     Hepatitis C     Hiatal hernia     Hip bursitis, left     HTN (hypertension)     Hypertensive CHF (congestive heart failure) 3/28/2014    Obesity     Pneumonia     Positive ALBIN (antinuclear antibody)     Pure hypercholesterolemia 9/30/2016    PVD (peripheral vascular disease) 1/23/2023    Rheumatoid arthritis     questionable diagnosis    Scoliosis     Type 2 diabetes mellitus without complication, without long-term current use of insulin     Urinary incontinence     intermittent- only when lifting heavy items > 20 lbs     Past Surgical History:   Procedure Laterality Date    APPENDECTOMY      bone grafts      CERVICAL CONIZATION   W/ LASER      COLONOSCOPY N/A 1/10/2019    Procedure: COLONOSCOPY;  Surgeon: Nixon Thornton MD;  Location: [facility];  Service: Endoscopy;  Laterality: N/A;    COLONOSCOPY N/A 9/1/2022    Procedure: COLONOSCOPY;  Surgeon: Shanti Arriaga MD;  Location: [facility];  Service: Endoscopy;  Laterality: N/A;    COSMETIC SURGERY Bilateral     ears    DILATION AND CURETTAGE OF UTERUS      INJECTION OF ANESTHETIC AGENT INTO SACROILIAC JOINT Bilateral 7/23/2020    Procedure: Bilateral BLOCK, SACROILIAC JOINT and bilatearl GTB with RN IV sedation;  Surgeon: Vin Shukla MD;  Location: [facility];  Service: Pain Management;  Laterality: Bilateral;    INJECTION OF ANESTHETIC AGENT INTO SACROILIAC JOINT Bilateral 9/30/2020    Procedure: Bilateral GT bursa +  bilateral SIJ injection;  Surgeon: Vin Shukla MD;  Location: [facility];  Service: Pain Management;  Laterality: Bilateral;    INJECTION OF JOINT Bilateral 9/30/2020    Procedure: Bilateral GT bursa + bilateral SIJ injection;  Surgeon: Vin Shukla MD;  Location: [facility];  Service: Pain Management;  Laterality: Bilateral;    KNEE ARTHROSCOPY W/ MENISCAL REPAIR Right     LIVER BIOPSY      sinus lift      2003    TONSILLECTOMY, ADENOIDECTOMY      TUBAL LIGATION       Family History   Problem Relation Age of Onset    Colon cancer Maternal Grandfather     Diabetes Maternal Grandmother     Hypertension Maternal Grandmother     Breast cancer Maternal Grandmother     Cataracts Mother     Glaucoma Mother     Macular degeneration Mother     Hypertension Mother     Aortic aneurysm Mother     Diabetes Paternal Grandmother     Cataracts Maternal Aunt     Glaucoma Maternal Aunt     Macular degeneration Maternal Aunt     Melanoma Maternal Aunt     Heart disease Unknown         pat side    Aneurysm Brother         brain    Stroke Neg Hx     Kidney disease Neg Hx     Hyperlipidemia Neg Hx      Social History     Socioeconomic History    Marital status:     Number of children: 0    Highest education level: Master's degree (e.g., MA, MS, [employer], MEd, MSW, CARLOZ)   Occupational History    Occupation: Retired Teacher   Tobacco Use    Smoking status: Never    Smokeless tobacco: Never   Substance and Sexual Activity    Alcohol use: Yes     Alcohol/week: 3.0 standard drinks of alcohol     Types: 3 Glasses of wine per week    Drug use: No    Sexual activity: Yes     Partners: Male   Other Topics Concern    Are you pregnant or think you may be? No    Breast-feeding No     Social Determinants of Health     Financial Resource Strain: Low Risk  (1/22/2023)    Overall Financial Resource Strain (CARDIA)     Difficulty of Paying Living Expenses: Not hard at all   Food Insecurity: No Food Insecurity (1/22/2023)    Hunger Vital Sign      Worried About Running Out of Food in the Last Year: Never true     Ran Out of Food in the Last Year: Never true   Transportation Needs: No Transportation Needs (1/22/2023)    PRAPARE - Transportation     Lack of Transportation (Medical): No     Lack of Transportation (Non-Medical): No   Physical Activity: Insufficiently Active (1/22/2023)    Exercise Vital Sign     Days of Exercise per Week: 1 day     Minutes of Exercise per Session: 10 min   Stress: No Stress Concern Present (1/22/2023)    [employer] [place] [employer] - Occupational Stress Questionnaire     Feeling of Stress : Not at all   Social Connections: Moderately Integrated (1/22/2023)    Social Connection and Isolation Panel [NHANES]     Frequency of Communication with Friends and Family: Twice a week     Frequency of Social Gatherings with Friends and Family: Once a week     Attends Islam Services: Never     Active Member of Clubs or Organizations: Yes     Attends Club or Organization Meetings: 1 to 4 times per year     Marital Status:    Housing Stability: Low Risk  (1/22/2023)    Housing Stability Vital Sign     Unable to Pay for Housing in the Last Year: No     Number of Places Lived in the Last Year: 1     Unstable Housing in the Last Year: No     Medication List with Changes/Refills   Current Medications    ASCORBIC ACID, VITAMIN C, (VITAMIN C) 1000 MG TABLET    Take 1,000 mg by mouth 2 (two) times daily.    ASENAPINE MALEATE (SAPHRIS, BLACK SILVA, SL)    Take by mouth as needed.    ASHWAGANDHA ROOT EXTRACT ORAL    Take by mouth. With L-theanine    B COMPLEX VITAMINS TABLET    Take 1 tablet by mouth once daily.    BIOTIN 5 MG CAP    Take by mouth.    CALCIUM CITRATE ORAL    Take 400 mg by mouth.    CARVEDILOL (COREG) 12.5 MG TABLET    Take 1 tablet (12.5 mg total) by mouth 2 (two) times daily with meals.    CARVEDILOL (COREG) 6.25 MG TABLET    TAKE 1 TABLET(6.25 MG) BY MOUTH TWICE DAILY WITH MEALS    CHOLECALCIFEROL,  VITAMIN D3, (VITAMIN D3 ORAL)    Take 2,000 mg by mouth.    CHROMIUM ORAL    Take by mouth.    COENZYME Q10 10 MG CAPSULE    Take 60 mg by mouth once daily.    ERGOCALCIFEROL, VITAMIN D2, (VITAMIN D ORAL)    Take 2,000 mg by mouth.    FLAKITO PRIMROSE/LINOLEIC/GAMOLENI (PRIMROSE OIL ORAL)    Take by mouth.    FISH OIL-OMEGA-3 FATTY ACIDS 300-1,000 MG CAPSULE    Take 2 capsules by mouth once daily.    FLUTICASONE PROPIONATE (FLONASE) 50 MCG/ACTUATION NASAL SPRAY    SHAKE LIQUID AND USE 2 SPRAYS IN EACH NOSTRIL DAILY    FLUTICASONE PROPIONATE (FLONASE) 50 MCG/ACTUATION NASAL SPRAY    2 sprays (100 mcg total) by Each Nostril route once daily.    FUROSEMIDE (LASIX) 40 MG TABLET    Take 1 tablet (40 mg total) by mouth as needed (increase edema of  LE  AND OR DESAI).    GLUCOSAMINE SULFATE 500 MG TAB    Take 1,000 mg by mouth as needed.     INOSITOL 500 MG TAB    Take 750 mg by mouth. BID    LANSOPRAZOLE (PREVACID) 15 MG CAPSULE    Take 15 mg by mouth once daily.    MAGNESIUM ORAL    Take 100 mg by mouth.    MELOXICAM (MOBIC) 15 MG TABLET    Take 1 tablet (15 mg total) by mouth once daily. Take with food    METHYLSULFONYLMETHANE 1,000 MG CAP    Take by mouth as needed.     MULTIVITAMIN (THERAGRAN) PER TABLET    Take 1 tablet by mouth once daily. Per pt C Women's 50 plus    PSYLLIUM HUSK (METAMUCIL ORAL)    Take by mouth 2 (two) times a day.    SPIRONOLACTONE (ALDACTONE) 50 MG TABLET    Take 1 tablet (50 mg total) by mouth every evening.    TIZANIDINE (ZANAFLEX) 2 MG TABLET    Take 2 tablets (4 mg total) by mouth nightly as needed.    UNABLE TO FIND    Liver Refresh   Changed and/or Refilled Medications    Modified Medication Previous Medication    PREDNISONE (DELTASONE) 5 MG TABLET predniSONE (DELTASONE) 5 MG tablet       Take 1 tablet (5 mg total) by mouth once daily.    Take 1 tablet (5 mg total) by mouth once daily.     Review of patient's allergies indicates:   Allergen Reactions    Arb-angiotensin receptor antagonist  "Edema    Hydralazine analogues      "Lupus reaction"    Metoprolol Other (See Comments)     Alopecia    Sulfa (sulfonamide antibiotics)      Passed out and almost stopped breathing    Calcium channel blocking agents-dihydropyridines      Edema      Ace inhibitors Other (See Comments)     cough     Review of Systems   Constitutional: Negative for decreased appetite.   HENT:  Negative for tinnitus.    Eyes:  Negative for double vision.   Cardiovascular:  Negative for chest pain.   Respiratory:  Negative for wheezing.    Hematologic/Lymphatic: Negative for bleeding problem.   Skin:  Negative for dry skin.   Musculoskeletal:  Positive for arthritis, back pain, joint pain and joint swelling. Negative for gout, neck pain and stiffness.   Gastrointestinal:  Negative for abdominal pain.   Genitourinary:  Negative for bladder incontinence.   Neurological:  Negative for numbness, paresthesias and sensory change.   Psychiatric/Behavioral:  Negative for altered mental status.        Objective:   Body mass index is 34.38 kg/m².  There were no vitals filed for this visit.           General    Constitutional: She is oriented to person, place, and time. She appears well-developed.   HENT:   Head: Atraumatic.   Eyes: EOM are normal.   Pulmonary/Chest: Effort normal.   Neurological: She is alert and oriented to person, place, and time.   Psychiatric: Judgment normal.              Right knee   Range of motion of 5-120    There is mild to moderate swelling.    There is crepitus to compression patella to range of motion.    There is joint pain medially and laterally on palpation    Collaterals and cruciates are stable.    No defect in the patella or quadriceps tendon    Calves are soft, nontender  numerous varicosities to both lower extremities  DF/PF is intact   Skin is warm to touch, no obvious lesions    X-ray bilateral knees 08/29/2022   FINDINGS:  Four views of the bilateral knees demonstrate genu varum deformities.  There is " moderate right and mild left tricompartmental osteoarthritis.  There is no fracture.  There is no joint effusion.  There is no soft tissue swelling.   kellegren Cristi 3   Assessment:     Encounter Diagnosis   Name Primary?    Arthritis of knee, right Yes        Plan:   Arthritis of knee, right  -     predniSONE (DELTASONE) 5 MG tablet; Take 1 tablet (5 mg total) by mouth once daily.  Dispense: 15 tablet; Refill: 1         There are no Patient Instructions on file for this visit.    Patient presents as a new patient to me today with chief complaint of right knee pain. We had a long discussion today regarding degenerative arthritis in the knees. The patient understands that arthritis is chronic and will worsen over time.  The patient also understands that arthritis may cause episodic flare-ups in pain. Management or if arthritis is achieved through a multi-modal approach including weight loss in obese individuals, activity modification, NSAIDs (topical vs oral) where appropriate, periodic intra-articular steroid injections, viscosupplementation, physical therapy, knee bracing, ambulatory aids, as well as geniculate nerve blocks.  The patient elected to proceed with right knee Monovisc gel injection today.  Ask that she refrain from soaking in standing water for 24 hours such as a pool or tub.  She may elevate and ice the extremity as well as advancement of activity as tolerated today.  I would like to see her back in 3 months for further evaluation and possible short acting steroid injection.  If she is doing well at this time she may cancel this appointment.  We discussed that these injections may be sore for roughly 5-10 days.  They may show full effectiveness in 6-8 weeks.  They may be repeated every 6 months.  Additionally I would like to schedule her an appointment in 6 months for further evaluation.  She may call with any questions or concerns in the interim.        Disclaimer: This note was prepared using a  voice recognition system and is likely to have sound alike errors within the text.

## 2023-08-08 ENCOUNTER — HOSPITAL ENCOUNTER (OUTPATIENT)
Dept: RADIOLOGY | Facility: HOSPITAL | Age: 73
Discharge: HOME | End: 2023-08-08
Attending: ORTHOPAEDIC SURGERY
Payer: MEDICARE

## 2023-08-08 ENCOUNTER — PES CALL (OUTPATIENT)
Dept: ADMINISTRATIVE | Facility: CLINIC | Age: 73
End: 2023-08-08
Payer: MEDICARE

## 2023-08-08 DIAGNOSIS — E04.2 MULTIPLE THYROID NODULES: ICD-10-CM

## 2023-08-08 PROCEDURE — 76536 US SOFT TISSUE HEAD NECK THYROID: ICD-10-PCS | Mod: 26,,, | Performed by: RADIOLOGY

## 2023-08-08 PROCEDURE — 76536 US EXAM OF HEAD AND NECK: CPT | Mod: TC

## 2023-08-08 PROCEDURE — 76536 US EXAM OF HEAD AND NECK: CPT | Mod: 26,,, | Performed by: RADIOLOGY

## 2023-09-13 ENCOUNTER — OFFICE VISIT (OUTPATIENT)
Dept: OTOLARYNGOLOGY | Facility: CLINIC | Age: 73
End: 2023-09-13
Payer: MEDICARE

## 2023-09-13 VITALS — BODY MASS INDEX: 33.45 KG/M2 | WEIGHT: 207.25 LBS

## 2023-09-13 DIAGNOSIS — J30.89 NON-SEASONAL ALLERGIC RHINITIS, UNSPECIFIED TRIGGER: ICD-10-CM

## 2023-09-13 DIAGNOSIS — E04.2 MULTIPLE THYROID NODULES: Primary | ICD-10-CM

## 2023-09-13 PROCEDURE — 99999 PR PBB SHADOW E&M-EST. PATIENT-LVL II: ICD-10-PCS | Mod: PBBFAC,,, | Performed by: ORTHOPAEDIC SURGERY

## 2023-09-13 PROCEDURE — 99212 OFFICE O/P EST SF 10 MIN: CPT | Mod: PBBFAC | Performed by: ORTHOPAEDIC SURGERY

## 2023-09-13 PROCEDURE — 99213 PR OFFICE/OUTPT VISIT, EST, LEVL III, 20-29 MIN: ICD-10-PCS | Mod: S$PBB,,, | Performed by: ORTHOPAEDIC SURGERY

## 2023-09-13 PROCEDURE — 99213 OFFICE O/P EST LOW 20 MIN: CPT | Mod: S$PBB,,, | Performed by: ORTHOPAEDIC SURGERY

## 2023-09-13 PROCEDURE — 99999 PR PBB SHADOW E&M-EST. PATIENT-LVL II: CPT | Mod: PBBFAC,,, | Performed by: ORTHOPAEDIC SURGERY

## 2023-09-13 RX ORDER — FLUTICASONE PROPIONATE 50 MCG
2 SPRAY, SUSPENSION (ML) NASAL DAILY
Qty: 16 G | Refills: 12 | Status: SHIPPED | OUTPATIENT
Start: 2023-09-13

## 2023-09-13 NOTE — PROGRESS NOTES
Subjective:      Patient ID: Deirdre Yanez is a 73 y.o. female.    Chief Complaint: Follow-up (Follow up on thyroid US done that was resulted normal also would like a refill on flonase )    Patient is a very pleasant 64 year old female here to see me today in followup for evaluation of her thyroid nodules.  Overall, since her last visit she has been doing well since her last visit from a thyroid standpoint.  Her most recent TSH was 5/2021 and that was normal.  She is continuing with alopecia.  We are following her for thyroid nodules.   She has no difficulty breathing or hoarseness.  She has been using Flonase since her last visit, and is very pleased with her progress.   She is concerned that she is exposed to mold when mowing her grass.  We have previously sent her for an US guided FNA of her thyroid nodule as it has slightly enlarged.  However, they were unable to complete the FNA due to blood vessels in the area.  Therefore, we elected to follow her with serial US, but she had some interval growth.  She has had an US guided FNA 7/2019, done with Dr. Dickens, and they were able to obtain a sample showing benign follicular cells.  At her visit last year she had noted increased tinnitus, audiogram last year showed bilateral SNHL.  She thinks her increased tinnitus at that time was related to BP meds which have since been adjusted.          Review of Systems   Constitutional: Negative.    HENT: Negative.     Eyes: Negative.    Respiratory: Negative.     Cardiovascular: Negative.    Gastrointestinal: Negative.    Endocrine: Negative.    Genitourinary: Negative.    Musculoskeletal: Negative.    Skin: Negative.    Allergic/Immunologic: Negative.    Neurological: Negative.    Hematological: Negative.    Psychiatric/Behavioral: Negative.         Objective:       Physical Exam  Constitutional:       General: She is not in acute distress.     Appearance: She is well-developed.   HENT:      Head: Normocephalic and  atraumatic.      Right Ear: Tympanic membrane, ear canal and external ear normal.      Left Ear: Tympanic membrane, ear canal and external ear normal.      Nose: Nose normal. No nasal deformity, septal deviation, mucosal edema or rhinorrhea.      Right Sinus: No maxillary sinus tenderness or frontal sinus tenderness.      Left Sinus: No maxillary sinus tenderness or frontal sinus tenderness.      Mouth/Throat:      Mouth: Mucous membranes are not pale and not dry.      Dentition: No dental caries.      Pharynx: Uvula midline. No oropharyngeal exudate or posterior oropharyngeal erythema.   Eyes:      General: Lids are normal. No scleral icterus.     Extraocular Movements:      Right eye: Normal extraocular motion and no nystagmus.      Left eye: Normal extraocular motion and no nystagmus.      Conjunctiva/sclera: Conjunctivae normal.      Right eye: Right conjunctiva is not injected. No chemosis.     Left eye: Left conjunctiva is not injected. No chemosis.     Pupils: Pupils are equal, round, and reactive to light.   Neck:      Thyroid: No thyroid mass or thyromegaly.      Trachea: Trachea and phonation normal. No tracheal tenderness or tracheal deviation.   Pulmonary:      Effort: Pulmonary effort is normal. No respiratory distress.      Breath sounds: No stridor.   Abdominal:      General: There is no distension.   Lymphadenopathy:      Head:      Right side of head: No submental, submandibular, preauricular, posterior auricular or occipital adenopathy.      Left side of head: No submental, submandibular, preauricular, posterior auricular or occipital adenopathy.      Cervical: No cervical adenopathy.   Skin:     General: Skin is warm and dry.      Findings: No erythema or rash.   Neurological:      Mental Status: She is alert and oriented to person, place, and time.      Cranial Nerves: No cranial nerve deficit.   Psychiatric:         Behavior: Behavior normal.           THYROID US:  FINDINGS:  The thyroid gland  is normal in size.  The right lobe measures 5.5 x 2.4 x 1.8 cm.  The left lobe measures 5.9 x 1.6 x 1.5 cm. Thyroid isthmus measures 2.7 mm AP.  Total thyroid volume measures approximately 18.0 mL.  Thyroid parenchyma appears diffusely heterogeneous with multiple nodules again noted.  The majority of the nodules appear mixed cystic and solid with isoechoic to hyperechoic solid components measuring 11 mm at the midportion of the right lobe, 3.1 cm at the lower pole of the right lobe, 5 mm at the isthmus, and 9 mm at the lower pole of the left lobe.  Multiple additional scattered smaller cystic nodules are also present.  No detrimental interval change from prior.  No lymphadenopathy is seen.     Impression:     No detrimental interval change in appearance of multiple thyroid nodules as above.      Assessment:       1. Multiple thyroid nodules    2. Non-seasonal allergic rhinitis, unspecified trigger        Plan:     Multiple thyroid nodules:  Stable, no change.  RTC 1 year for repeat US.  Discussed that this could be done by PCP, but she is currently in transition to new PCP.  -     US Soft Tissue Head Neck Thyroid; Future; Expected date: 09/13/2023    Non-seasonal allergic rhinitis, unspecified trigger:  Continue with flonase, add saline.    Other orders  -     fluticasone propionate (FLONASE) 50 mcg/actuation nasal spray; 2 sprays (100 mcg total) by Each Nostril route once daily.  Dispense: 16 g; Refill: 12

## 2023-10-16 RX ORDER — FUROSEMIDE 40 MG/1
TABLET ORAL
Qty: 90 TABLET | Refills: 2 | Status: SHIPPED | OUTPATIENT
Start: 2023-10-16

## 2023-10-18 ENCOUNTER — TELEPHONE (OUTPATIENT)
Dept: ORTHOPEDICS | Facility: CLINIC | Age: 73
End: 2023-10-18
Payer: MEDICARE

## 2023-10-18 DIAGNOSIS — M25.562 CHRONIC PAIN OF BOTH KNEES: Primary | ICD-10-CM

## 2023-10-18 DIAGNOSIS — G89.29 CHRONIC PAIN OF BOTH KNEES: Primary | ICD-10-CM

## 2023-10-18 DIAGNOSIS — M17.11 PRIMARY OSTEOARTHRITIS OF RIGHT KNEE: Primary | ICD-10-CM

## 2023-10-18 DIAGNOSIS — M25.561 CHRONIC PAIN OF BOTH KNEES: Primary | ICD-10-CM

## 2023-11-09 ENCOUNTER — PROCEDURE VISIT (OUTPATIENT)
Dept: ORTHOPEDICS | Facility: CLINIC | Age: 73
End: 2023-11-09
Payer: MEDICARE

## 2023-11-09 ENCOUNTER — HOSPITAL ENCOUNTER (OUTPATIENT)
Dept: RADIOLOGY | Facility: HOSPITAL | Age: 73
Discharge: HOME | End: 2023-11-09
Attending: PHYSICIAN ASSISTANT
Payer: MEDICARE

## 2023-11-09 VITALS
BODY MASS INDEX: 33.27 KG/M2 | HEIGHT: 66 IN | DIASTOLIC BLOOD PRESSURE: 92 MMHG | WEIGHT: 207 LBS | HEART RATE: 70 BPM | SYSTOLIC BLOOD PRESSURE: 142 MMHG

## 2023-11-09 DIAGNOSIS — G89.29 CHRONIC PAIN OF BOTH KNEES: ICD-10-CM

## 2023-11-09 DIAGNOSIS — M17.11 PRIMARY OSTEOARTHRITIS OF RIGHT KNEE: Primary | ICD-10-CM

## 2023-11-09 DIAGNOSIS — M25.562 CHRONIC PAIN OF BOTH KNEES: ICD-10-CM

## 2023-11-09 DIAGNOSIS — M17.11 ARTHRITIS OF KNEE, RIGHT: ICD-10-CM

## 2023-11-09 DIAGNOSIS — M25.561 CHRONIC PAIN OF BOTH KNEES: ICD-10-CM

## 2023-11-09 PROCEDURE — 99999PBSHW PR PBB SHADOW TECHNICAL ONLY FILED TO HB: ICD-10-PCS | Mod: PBBFAC,,,

## 2023-11-09 PROCEDURE — 99999PBSHW PR PBB SHADOW TECHNICAL ONLY FILED TO HB: Mod: PBBFAC,,,

## 2023-11-09 PROCEDURE — 73564 X-RAY EXAM KNEE 4 OR MORE: CPT | Mod: TC,50

## 2023-11-09 PROCEDURE — 20610 LARGE JOINT ASPIRATION/INJECTION: R KNEE: ICD-10-PCS | Mod: S$PBB,RT,, | Performed by: PHYSICIAN ASSISTANT

## 2023-11-09 PROCEDURE — 73564 X-RAY EXAM KNEE 4 OR MORE: CPT | Mod: 26,50,, | Performed by: RADIOLOGY

## 2023-11-09 PROCEDURE — 20610 DRAIN/INJ JOINT/BURSA W/O US: CPT | Mod: PBBFAC,RT | Performed by: PHYSICIAN ASSISTANT

## 2023-11-09 PROCEDURE — 99213 OFFICE O/P EST LOW 20 MIN: CPT | Mod: S$PBB,25,, | Performed by: PHYSICIAN ASSISTANT

## 2023-11-09 PROCEDURE — 73564 XR KNEE ORTHO BILAT WITH FLEXION: ICD-10-PCS | Mod: 26,50,, | Performed by: RADIOLOGY

## 2023-11-09 PROCEDURE — 99213 PR OFFICE/OUTPT VISIT, EST, LEVL III, 20-29 MIN: ICD-10-PCS | Mod: S$PBB,25,, | Performed by: PHYSICIAN ASSISTANT

## 2023-11-09 PROCEDURE — 20610 DRAIN/INJ JOINT/BURSA W/O US: CPT | Mod: S$PBB,RT,, | Performed by: PHYSICIAN ASSISTANT

## 2023-11-09 RX ORDER — LIDOCAINE HYDROCHLORIDE 10 MG/ML
5 INJECTION INFILTRATION; PERINEURAL
Status: DISCONTINUED | OUTPATIENT
Start: 2023-11-09 | End: 2023-11-09 | Stop reason: HOSPADM

## 2023-11-09 RX ORDER — METHYLPREDNISOLONE ACETATE 80 MG/ML
80 INJECTION, SUSPENSION INTRA-ARTICULAR; INTRALESIONAL; INTRAMUSCULAR; SOFT TISSUE
Status: DISCONTINUED | OUTPATIENT
Start: 2023-11-09 | End: 2023-11-09 | Stop reason: HOSPADM

## 2023-11-09 RX ORDER — PREDNISONE 5 MG/1
5 TABLET ORAL DAILY
Qty: 15 TABLET | Refills: 1 | Status: SHIPPED | OUTPATIENT
Start: 2023-11-09 | End: 2024-02-08 | Stop reason: SDUPTHER

## 2023-11-09 RX ADMIN — METHYLPREDNISOLONE ACETATE 80 MG: 80 INJECTION, SUSPENSION INTRALESIONAL; INTRAMUSCULAR; INTRASYNOVIAL; SOFT TISSUE at 09:11

## 2023-11-09 RX ADMIN — LIDOCAINE HYDROCHLORIDE 5 ML: 10 INJECTION, SOLUTION INFILTRATION; PERINEURAL at 09:11

## 2023-11-09 NOTE — PROCEDURES
Large Joint Aspiration/Injection: R knee    Date/Time: 11/9/2023 9:45 AM    Performed by: Natasha Ko PA  Authorized by: Natasha Ko PA    Consent Done?:  Yes (Verbal)  Indications:  Arthritis, joint swelling and pain  Site marked: the procedure site was marked      Local anesthesia used?: Yes    Local anesthetic:  Topical anesthetic    Details:  Needle Size:  22 G  Approach:  Anterolateral  Location:  Knee  Site:  R knee  Medications:  5 mL LIDOcaine HCL 10 mg/ml (1%) 10 mg/mL (1 %); 80 mg methylPREDNISolone acetate 80 mg/mL  Patient tolerance:  Patient tolerated the procedure well with no immediate complications     Verbal consent was obtained  The patient's ID, site, side was verified  The site was sterile prepped in standard fashion  The injection was performed in the dm-lateral side without complication  A sterile Band-Aid was applied    Patient was directed to apply ice today at roughly 15 minutes at a time as needed.  It was discussed that they may be sore for the next few days or so.  Please avoid strenuous activity over the next 24 hours.  It was also discussed that the patient may have a increase in glucose if diabetic and should monitor levels.  Patient was instructed to call as needed.

## 2024-01-24 ENCOUNTER — TELEPHONE (OUTPATIENT)
Dept: OTOLARYNGOLOGY | Facility: CLINIC | Age: 74
End: 2024-01-24
Payer: MEDICARE

## 2024-02-08 ENCOUNTER — PROCEDURE VISIT (OUTPATIENT)
Dept: ORTHOPEDICS | Facility: CLINIC | Age: 74
End: 2024-02-08
Payer: MEDICARE

## 2024-02-08 ENCOUNTER — PATIENT MESSAGE (OUTPATIENT)
Dept: ORTHOPEDICS | Facility: CLINIC | Age: 74
End: 2024-02-08

## 2024-02-08 VITALS
WEIGHT: 207 LBS | BODY MASS INDEX: 33.27 KG/M2 | SYSTOLIC BLOOD PRESSURE: 155 MMHG | HEART RATE: 66 BPM | DIASTOLIC BLOOD PRESSURE: 87 MMHG | HEIGHT: 66 IN

## 2024-02-08 DIAGNOSIS — M17.11 PRIMARY OSTEOARTHRITIS OF RIGHT KNEE: Primary | ICD-10-CM

## 2024-02-08 DIAGNOSIS — M17.11 ARTHRITIS OF KNEE, RIGHT: ICD-10-CM

## 2024-02-08 PROCEDURE — 20610 DRAIN/INJ JOINT/BURSA W/O US: CPT | Mod: PBBFAC | Performed by: PHYSICIAN ASSISTANT

## 2024-02-08 PROCEDURE — 99999PBSHW PR PBB SHADOW TECHNICAL ONLY FILED TO HB: Mod: JZ,PBBFAC,,

## 2024-02-08 PROCEDURE — 99499 UNLISTED E&M SERVICE: CPT | Mod: S$PBB,,, | Performed by: PHYSICIAN ASSISTANT

## 2024-02-08 PROCEDURE — 20610 DRAIN/INJ JOINT/BURSA W/O US: CPT | Mod: S$PBB,RT,, | Performed by: PHYSICIAN ASSISTANT

## 2024-02-08 RX ORDER — ROSUVASTATIN CALCIUM 5 MG/1
5 TABLET, COATED ORAL NIGHTLY
COMMUNITY
Start: 2024-01-29

## 2024-02-08 RX ORDER — CARVEDILOL 12.5 MG/1
12.5 TABLET ORAL 2 TIMES DAILY
COMMUNITY
Start: 2024-01-09

## 2024-02-08 RX ADMIN — Medication 88 MG: at 09:02

## 2024-02-08 NOTE — PROCEDURES
Large Joint Aspiration/Injection: R knee    Date/Time: 2/8/2024 9:45 AM    Performed by: Natasha Ko PA  Authorized by: Natasha Ko PA    Consent Done?:  Yes (Verbal)  Indications:  Arthritis and joint swelling  Site marked: the procedure site was marked      Local anesthesia used?: Yes    Local anesthetic:  Topical anesthetic    Details:  Needle Size:  22 G  Approach:  Anterolateral  Location:  Knee  Site:  R knee  Medications:  88 mg hyaluronate sodium, stabilized 88 mg/4 mL  Patient tolerance:  Patient tolerated the procedure well with no immediate complications     Verbal consent was obtained  The patient's ID, site, side was verified  The site was sterile prepped in standard fashion  The injection was performed in the dm-lateral side without complication  A sterile Band-Aid was applied    Patient was directed to apply ice today at roughly 15 minutes at a time as needed.  It was discussed that they may be sore for the next few days or so.  Please avoid strenuous activity over the next 24 hours.  It was also discussed that the patient may have a increase in glucose if diabetic and should monitor levels.  Patient was instructed to call as needed.

## 2024-02-09 RX ORDER — PREDNISONE 5 MG/1
5 TABLET ORAL DAILY
Qty: 15 TABLET | Refills: 1 | Status: SHIPPED | OUTPATIENT
Start: 2024-02-09 | End: 2024-05-17 | Stop reason: SDUPTHER

## 2024-05-17 ENCOUNTER — OFFICE VISIT (OUTPATIENT)
Dept: ORTHOPEDICS | Facility: CLINIC | Age: 74
End: 2024-05-17
Payer: MEDICARE

## 2024-05-17 VITALS
SYSTOLIC BLOOD PRESSURE: 130 MMHG | BODY MASS INDEX: 33.27 KG/M2 | WEIGHT: 207 LBS | DIASTOLIC BLOOD PRESSURE: 82 MMHG | HEIGHT: 66 IN

## 2024-05-17 DIAGNOSIS — M17.11 PRIMARY OSTEOARTHRITIS OF RIGHT KNEE: Primary | ICD-10-CM

## 2024-05-17 DIAGNOSIS — M54.18 SACRAL RADICULOPATHY: ICD-10-CM

## 2024-05-17 DIAGNOSIS — M17.11 ARTHRITIS OF KNEE, RIGHT: ICD-10-CM

## 2024-05-17 DIAGNOSIS — M21.161 ACQUIRED VARUS DEFORMITY KNEE, RIGHT: ICD-10-CM

## 2024-05-17 PROCEDURE — 99214 OFFICE O/P EST MOD 30 MIN: CPT | Mod: S$PBB,25,, | Performed by: PHYSICIAN ASSISTANT

## 2024-05-17 PROCEDURE — 20610 DRAIN/INJ JOINT/BURSA W/O US: CPT | Mod: PBBFAC,RT | Performed by: PHYSICIAN ASSISTANT

## 2024-05-17 PROCEDURE — 20610 DRAIN/INJ JOINT/BURSA W/O US: CPT | Mod: S$PBB,RT,, | Performed by: PHYSICIAN ASSISTANT

## 2024-05-17 PROCEDURE — 99213 OFFICE O/P EST LOW 20 MIN: CPT | Mod: PBBFAC,25 | Performed by: PHYSICIAN ASSISTANT

## 2024-05-17 PROCEDURE — 99999 PR PBB SHADOW E&M-EST. PATIENT-LVL III: CPT | Mod: PBBFAC,,, | Performed by: PHYSICIAN ASSISTANT

## 2024-05-17 RX ORDER — PREDNISONE 5 MG/1
5 TABLET ORAL DAILY
Qty: 15 TABLET | Refills: 1 | Status: SHIPPED | OUTPATIENT
Start: 2024-05-17

## 2024-05-17 RX ORDER — METHYLPREDNISOLONE ACETATE 80 MG/ML
80 INJECTION, SUSPENSION INTRA-ARTICULAR; INTRALESIONAL; INTRAMUSCULAR; SOFT TISSUE
Status: DISCONTINUED | OUTPATIENT
Start: 2024-05-17 | End: 2024-05-17 | Stop reason: HOSPADM

## 2024-05-17 RX ORDER — LIDOCAINE HYDROCHLORIDE 10 MG/ML
5 INJECTION INFILTRATION; PERINEURAL
Status: DISCONTINUED | OUTPATIENT
Start: 2024-05-17 | End: 2024-05-17 | Stop reason: HOSPADM

## 2024-05-17 RX ADMIN — METHYLPREDNISOLONE ACETATE 80 MG: 80 INJECTION, SUSPENSION INTRALESIONAL; INTRAMUSCULAR; INTRASYNOVIAL; SOFT TISSUE at 11:05

## 2024-05-17 RX ADMIN — LIDOCAINE HYDROCHLORIDE 5 ML: 10 INJECTION, SOLUTION INFILTRATION; PERINEURAL at 11:05

## 2024-05-17 NOTE — PROCEDURES
Large Joint Aspiration/Injection: R knee    Date/Time: 5/17/2024 11:00 AM    Performed by: Natasha Ko PA  Authorized by: Natasha Ko PA    Consent Done?:  Yes (Verbal)  Indications:  Arthritis, joint swelling and pain  Site marked: the procedure site was marked      Local anesthesia used?: Yes    Local anesthetic:  Topical anesthetic    Details:  Needle Size:  21 G  Approach:  Anterolateral  Location:  Knee  Site:  R knee  Medications:  5 mL LIDOcaine HCL 10 mg/ml (1%) 10 mg/mL (1 %); 80 mg methylPREDNISolone acetate 80 mg/mL  Patient tolerance:  Patient tolerated the procedure well with no immediate complications     Verbal consent was obtained  The patient's ID, site, side was verified  The site was sterile prepped in standard fashion  The injection was performed in the dm-lateral side without complication  A sterile Band-Aid was applied    Patient was directed to apply ice today at roughly 15 minutes at a time as needed.  It was discussed that they may be sore for the next few days or so.  Please avoid strenuous activity over the next 24 hours.  It was also discussed that the patient may have a increase in glucose if diabetic and should monitor levels.  Patient was instructed to call as needed.

## 2024-05-17 NOTE — PROGRESS NOTES
Subjective:     Patient ID: Deirdre Yanez is a 74 y.o. female.    Chief Complaint: Pain of the Right Knee      HPI:  08/29/2022 right knee severe pain compared to the left.  Gives history of injuring hernia almost 20 years ago and also falling around 20 ft landed on a rock on her feet.  She had some back and hip issues at that time and all got better.  In 2007 had arthroscopic surgery treated by Dr. Saenz where she also received injections did well for that.  She had been diagnosed with mild scoliosis as a teenager and she did yoga all her life until she got injured she could not do it over the last several years.  Her scoliosis got worst at down 1 point she had an MRI that showed some spinal stenosis.  She declined denies any having pain and numbness or tingling in the legs at this time.  Denies loss of bowel bladder control.  She is a retired .  She lives with her .  She tries to exercise every day feed a 1 point she was taking ibuprofen and got good relief however she was taken off by the cardiologist because of history of hypertension and the amount she was taken.  Now she takes MS am and chondroitin glucosamine occasional Tylenol.  She lives with her  she uses a cane to get around.  She is looking for what she can do to help herself.  She was asking about recumbent inverting table that could help her back and did not want to get unless she asked me and she wanted to know what is going on with her knee.  No fever no chills no shortness of breath or difficulty with chewing swallowing loss of bowel bladder control blurry vision double vision loss smell or taste        12/12/2022     Right knee severe arthritis with varus deformity and complete loss of medial joint space .  Received steroid injection Depo-Medrol last visit 08/29/2022 which helped some.  She is quite active walking around despite the fact she is using a cane.  She just got herself younger dog and that has to drag her  around so she keeping quite a bit in shape.  She does use exercise bike.  The meloxicam we gave her does help but the next day starts back again.  She knows she can not take it on a daily basis because of cardiac issues in the cardiologist does not want her to be on massive amounts of NSAIDs.  She is contemplating having surgery next year however her mother low got cancer and now she has to delay that a little bit until she knows what is going to happen.  We went over the procedure of total knee replacement that it is outpatient that she goes home the same day.  Also discussed about wearing a brace and she does not have any.  She discussed her blood pressure medications I did tell her this so many new ones that she needs to discuss it with primary care or cardiology at that time.  Wondering about swelling that comes I said to her that arthritis will give her swelling in the knee it is like riding on a dirt road it is not smooth anymore and occasionally you hit a pothole and the knee will swell up and I will take a day or 2 to go away.  Some swelling in the lower extremity is not related from the knee especially if it is around the ankle.  She does have quite a bit of varicosities which could cause that swelling.  She is wearing compressive stockings  Pain is 3/10      01/29/2022   Arthritis of the right knee.  The prednisone pills helped a lot and quite things down for her.  She did not renew it.  She occasionally takes Tylenol occasionally takes meloxicam.  She does bicycling and she is been doing it for long time in order to keep busy.  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  She is approved for Monovisc into the right knee.  She did receive a steroid injection Depo-Medrol 08/29/2022.  She wants to proceed with the viscosupplementation and wants to avoid surgery.  Her pain is 2/10   No fever no chills no shortness of breath difficulty with chewing or swallowing loss bowel bladder control blurry vision  double vision loss sense smell or taste    4/27/2023  Patient presents today as a new patient to me with chief complaint of right knee pain.  Patient states pain is at worst a 4 to 5/10 affecting activity of daily living and thus her quality of life.  Patient states she is been struggling this week especially Monday when she is noticed the gel injection that she received in January is not as effective.  She is been trying to take care of a very ill dog that has her up and down.  She remains quite active in her daily routine.  She states prior to this the gel injection had shown to be quite effective.  She inquires if there is anything where she can receive today.  She is using a cane to assist in ambulation   She remains quite active in her daily routine and home life     Patient is presently denying any shortness of breath, chest pain, fever/chills, nausea/vomiting, loss of taste or smell, numbness/tingling or sensation changes, loss of bladder or bowel function, loss of taste/smell.     8/7/2023  Patient presents with chief complaint of right knee pain.  Patient states pain is at worst a 4/10 affecting activity of daily living.  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  She notices the gel injection did provide relief as well as the steroid that she received in April.  She says she gets the most relief from the combination of the gel injection as well as oral steroids as opposed to the steroid injection.  She requests a refill of this today.  She is using a cane to assist with ambulation although again remains quite active in her daily routine taking care of the forearm at home.  She denies any trauma or recent falls   Patient is presently denying any shortness of breath, chest pain, fever/chills, nausea/vomiting, loss of taste or smell, numbness/tingling or sensation changes, loss of bladder or bowel function, loss of taste/smell.     5/17/2024    Patient presents today for  follow-up on right knee pain.  Patient states she has been seeing some relief since receiving the gel injection in February.  Pain is at worst a 2/10 affecting activity of daily living and thus her quality of life.  She presents today for follow-up and wishes to proceed with short-acting steroid as previously.  Additionally she notes she has been having more referred pain from her low back.  She inquires if there is anyone she can see.  I discussed with her that I do not treat the back but I will gladly placed referral for back and spine department for her today.    Patient is presently denying any shortness of breath, chest pain, fever/chills, nausea/vomiting, loss of taste or smell, numbness/tingling or sensation changes, loss of bladder or bowel function, loss of taste/smell.       Past Medical History:   Diagnosis Date    Abrasion     left eye    Abrasion and/or friction burn of abdominal wall with infection     left eye    Arthritis     gouty arthritis    Back pain     Diabetes mellitus     2011  11/17/2013    Diverticulosis     DM (diabetes mellitus) 2011     12/03/2014  A1C 6.0 x 2 weeks    DM (diabetes mellitus) 2011    BS 89 10/14/2017 A1C 5.7   Diet controlled    DM (diabetes mellitus) 2011    BS 90 am 10/23/2018     DM (diabetes mellitus) 2011    BS didn't check 10/23/2019    Hepatitis     Hx of B/C from cadaver in past    Hepatitis B     Hepatitis C     Hiatal hernia     Hip bursitis, left     HTN (hypertension)     Hypertensive CHF (congestive heart failure) 3/28/2014    Obesity     Pneumonia     Positive ALBIN (antinuclear antibody)     Pure hypercholesterolemia 9/30/2016    PVD (peripheral vascular disease) 1/23/2023    Rheumatoid arthritis     questionable diagnosis    Scoliosis     Type 2 diabetes mellitus without complication, without long-term current use of insulin     Urinary incontinence     intermittent- only when lifting heavy items > 20 lbs     Past Surgical History:   Procedure  Laterality Date    APPENDECTOMY      bone grafts      CERVICAL CONIZATION   W/ LASER      COLONOSCOPY N/A 1/10/2019    Procedure: COLONOSCOPY;  Surgeon: Nixon Thornton MD;  Location: [facility] ENDO;  Service: Endoscopy;  Laterality: N/A;    COLONOSCOPY N/A 9/1/2022    Procedure: COLONOSCOPY;  Surgeon: Shanti Arriaga MD;  Location: [facility] ENDO;  Service: Endoscopy;  Laterality: N/A;    COSMETIC SURGERY Bilateral     ears    DILATION AND CURETTAGE OF UTERUS      INJECTION OF ANESTHETIC AGENT INTO SACROILIAC JOINT Bilateral 7/23/2020    Procedure: Bilateral BLOCK, SACROILIAC JOINT and bilatearl GTB with RN IV sedation;  Surgeon: Vin Shukla MD;  Location: [facility] PAIN MGT;  Service: Pain Management;  Laterality: Bilateral;    INJECTION OF ANESTHETIC AGENT INTO SACROILIAC JOINT Bilateral 9/30/2020    Procedure: Bilateral GT bursa + bilateral SIJ injection;  Surgeon: Vin Shukla MD;  Location: [facility] PAIN MGT;  Service: Pain Management;  Laterality: Bilateral;    INJECTION OF JOINT Bilateral 9/30/2020    Procedure: Bilateral GT bursa + bilateral SIJ injection;  Surgeon: Vin Shukla MD;  Location: [facility] PAIN MGT;  Service: Pain Management;  Laterality: Bilateral;    KNEE ARTHROSCOPY W/ MENISCAL REPAIR Right     LIVER BIOPSY      sinus lift      2003    TONSILLECTOMY, ADENOIDECTOMY      TUBAL LIGATION       Family History   Problem Relation Name Age of Onset    Colon cancer Maternal Grandfather      Diabetes Maternal Grandmother      Hypertension Maternal Grandmother      Breast cancer Maternal Grandmother      Cataracts Mother      Glaucoma Mother      Macular degeneration Mother      Hypertension Mother      Aortic aneurysm Mother      Diabetes Paternal Grandmother      Cataracts Maternal Aunt      Glaucoma Maternal Aunt      Macular degeneration Maternal Aunt      Melanoma Maternal Aunt      Heart disease Unknown          pat side    Aneurysm Brother          brain    Stroke Neg Hx      Kidney disease Neg Hx       "Hyperlipidemia Neg Hx       Social History     Socioeconomic History    Marital status:     Number of children: 0    Highest education level: Master's degree (e.g., MA, MS, [employer], MEd, MSW, CARLOZ)   Occupational History    Occupation: Retired Teacher   Tobacco Use    Smoking status: Never    Smokeless tobacco: Never   Substance and Sexual Activity    Alcohol use: Yes     Alcohol/week: 3.0 standard drinks of alcohol     Types: 3 Glasses of wine per week    Drug use: No    Sexual activity: Yes     Partners: Male   Other Topics Concern    Are you pregnant or think you may be? No    Breast-feeding No     Social Determinants of Health     Financial Resource Strain: Low Risk  (1/22/2023)    Overall Financial Resource Strain (CARDIA)     Difficulty of Paying Living Expenses: Not hard at all   Food Insecurity: No Food Insecurity (1/22/2023)    Hunger Vital Sign     Worried About Running Out of Food in the Last Year: Never true     Ran Out of Food in the Last Year: Never true   Transportation Needs: No Transportation Needs (1/22/2023)    PRAPARE - Transportation     Lack of Transportation (Medical): No     Lack of Transportation (Non-Medical): No   Physical Activity: Insufficiently Active (1/22/2023)    Exercise Vital Sign     Days of Exercise per Week: 1 day     Minutes of Exercise per Session: 10 min   Stress: No Stress Concern Present (1/22/2023)    [employer] [place] [employer] - Occupational Stress Questionnaire     Feeling of Stress : Not at all   Housing Stability: Low Risk  (1/22/2023)    Housing Stability Vital Sign     Unable to Pay for Housing in the Last Year: No     Number of Places Lived in the Last Year: 1     Unstable Housing in the Last Year: No       Review of patient's allergies indicates:   Allergen Reactions    Arb-angiotensin receptor antagonist Edema    Hydralazine analogues      "Lupus reaction"    Metoprolol Other (See Comments)     Alopecia    Sulfa (sulfonamide antibiotics)      " Passed out and almost stopped breathing    Calcium channel blocking agents-dihydropyridines      Edema      Ace inhibitors Other (See Comments)     cough     Review of Systems   Constitutional: Negative for decreased appetite.   HENT:  Negative for tinnitus.    Eyes:  Negative for double vision.   Cardiovascular:  Negative for chest pain.   Respiratory:  Negative for wheezing.    Hematologic/Lymphatic: Negative for bleeding problem.   Skin:  Negative for dry skin.   Musculoskeletal:  Positive for arthritis, back pain, joint pain and joint swelling. Negative for gout, neck pain and stiffness.   Gastrointestinal:  Negative for abdominal pain.   Genitourinary:  Negative for bladder incontinence.   Neurological:  Negative for numbness, paresthesias and sensory change.   Psychiatric/Behavioral:  Negative for altered mental status.        Objective:   Body mass index is 33.41 kg/m².  Vitals:    05/17/24 1100   BP: 130/82   Pulse: (P) 65              General    Constitutional: She is oriented to person, place, and time. She appears well-developed.   HENT:   Head: Atraumatic.   Eyes: EOM are normal.   Pulmonary/Chest: Effort normal.   Neurological: She is alert and oriented to person, place, and time.   Psychiatric: Judgment normal.              Right knee   Range of motion of 5-120    There is mild to moderate swelling.    There is crepitus to compression patella to range of motion.    There is joint pain medially and laterally on palpation    Collaterals and cruciates are stable.    No defect in the patella or quadriceps tendon    Calves are soft, nontender  numerous varicosities to both lower extremities  DF/PF is intact   Skin is warm to touch, no obvious lesions    X-ray bilateral knees 08/29/2022   FINDINGS:  Four views of the bilateral knees demonstrate genu varum deformities.  There is moderate right and mild left tricompartmental osteoarthritis.  There is no fracture.  There is no joint effusion.  There is no soft  tissue swelling.   kellegren Critsi 3     Assessment:     Encounter Diagnoses   Name Primary?    Primary osteoarthritis of right knee Yes    Acquired varus deformity knee, right         Plan:   Primary osteoarthritis of right knee  -     Prior authorization Order    Acquired varus deformity knee, right         There are no Patient Instructions on file for this visit.    Patient presents today with chief complaint of right knee pain. We had a long discussion today regarding degenerative arthritis in the knees. The patient understands that arthritis is chronic and will worsen over time.  The patient also understands that arthritis may cause episodic flare-ups in pain. Management or if arthritis is achieved through a multi-modal approach including weight loss in obese individuals, activity modification, NSAIDs (topical vs oral) where appropriate, periodic intra-articular steroid injections, viscosupplementation, physical therapy, knee bracing, ambulatory aids, as well as geniculate nerve blocks.  Patient elected to proceed with right knee short-acting steroid injection today.  Ask that she refrain from soaking in standing water such as a pool or tub for 24 hours.  She may advance activity as tolerated with elevation and ice as needed.  Additionally we discussed that she may repeat gel injection in 3 months.  I will place this authorization today as patient states she has had a change in insurance.  She may call with any questions or concerns in the interim.    Disclaimer: This note was prepared using a voice recognition system and is likely to have sound alike errors within the text.

## 2024-07-25 ENCOUNTER — PATIENT MESSAGE (OUTPATIENT)
Dept: ORTHOPEDICS | Facility: CLINIC | Age: 74
End: 2024-07-25
Payer: MEDICARE

## 2024-08-15 ENCOUNTER — HOSPITAL ENCOUNTER (OUTPATIENT)
Dept: RADIOLOGY | Facility: HOSPITAL | Age: 74
Discharge: HOME | End: 2024-08-15
Attending: ORTHOPAEDIC SURGERY
Payer: MEDICARE

## 2024-08-15 DIAGNOSIS — E04.2 MULTIPLE THYROID NODULES: ICD-10-CM

## 2024-08-15 PROCEDURE — 76536 US EXAM OF HEAD AND NECK: CPT | Mod: TC

## 2024-08-15 PROCEDURE — 76536 US EXAM OF HEAD AND NECK: CPT | Mod: 26,,, | Performed by: RADIOLOGY

## 2024-08-22 ENCOUNTER — PROCEDURE VISIT (OUTPATIENT)
Dept: ORTHOPEDICS | Facility: CLINIC | Age: 74
End: 2024-08-22
Payer: MEDICARE

## 2024-08-22 VITALS — BODY MASS INDEX: 33.27 KG/M2 | WEIGHT: 207 LBS | HEIGHT: 66 IN

## 2024-08-22 DIAGNOSIS — M17.11 PRIMARY OSTEOARTHRITIS OF RIGHT KNEE: Primary | ICD-10-CM

## 2024-08-22 NOTE — PROCEDURES
Large Joint Aspiration/Injection: R knee    Date/Time: 8/22/2024 10:45 AM    Performed by: Natasha Ko PA  Authorized by: Natasha Ko PA    Consent Done?:  Yes (Verbal)  Indications:  Arthritis, joint swelling and pain  Site marked: the procedure site was marked      Local anesthesia used?: Yes      Details:  Needle Size:  22 G and 21 G  Approach:  Anterolateral  Location:  Knee  Site:  R knee  Medications:  88 mg hyaluronate sodium, stabilized 88 mg/4 mL  Patient tolerance:  Patient tolerated the procedure well with no immediate complications     Verbal consent was obtained  The patient's ID, site, side was verified  The site was sterile prepped in standard fashion  The injection was performed in the dm-lateral side without complication  A sterile Band-Aid was applied    Patient was directed to apply ice today at roughly 15 minutes at a time as needed.  It was discussed that they may be sore for the next few days or so.  Please avoid strenuous activity over the next 24 hours.  It was also discussed that the patient may have a increase in glucose if diabetic and should monitor levels.  Patient was instructed to call as needed.

## 2024-08-23 ENCOUNTER — PATIENT MESSAGE (OUTPATIENT)
Dept: ORTHOPEDICS | Facility: CLINIC | Age: 74
End: 2024-08-23
Payer: MEDICARE

## 2024-08-23 DIAGNOSIS — M17.11 ARTHRITIS OF KNEE, RIGHT: ICD-10-CM

## 2024-08-26 RX ORDER — PREDNISONE 5 MG/1
5 TABLET ORAL DAILY
Qty: 15 TABLET | Refills: 1 | Status: SHIPPED | OUTPATIENT
Start: 2024-08-26

## 2024-08-26 NOTE — TELEPHONE ENCOUNTER
Natasha Ko, PA  You15 minutes ago (2:29 PM)       I HAVE RESENT THE RX  NOT A BOTHER AT ALL - ALWAYS CALL

## 2024-09-23 ENCOUNTER — OFFICE VISIT (OUTPATIENT)
Dept: PAIN MEDICINE | Facility: CLINIC | Age: 74
End: 2024-09-23
Payer: MEDICARE

## 2024-09-23 VITALS
WEIGHT: 204.81 LBS | DIASTOLIC BLOOD PRESSURE: 94 MMHG | HEART RATE: 76 BPM | SYSTOLIC BLOOD PRESSURE: 161 MMHG | RESPIRATION RATE: 16 BRPM | BODY MASS INDEX: 32.92 KG/M2 | HEIGHT: 66 IN

## 2024-09-23 DIAGNOSIS — M54.18 SACRAL RADICULOPATHY: ICD-10-CM

## 2024-09-23 DIAGNOSIS — M70.72 ISCHIAL BURSITIS OF LEFT SIDE: ICD-10-CM

## 2024-09-23 DIAGNOSIS — M70.62 TROCHANTERIC BURSITIS OF BOTH HIPS: Primary | ICD-10-CM

## 2024-09-23 DIAGNOSIS — G57.02 PIRIFORMIS SYNDROME OF LEFT SIDE: ICD-10-CM

## 2024-09-23 DIAGNOSIS — M70.61 TROCHANTERIC BURSITIS OF BOTH HIPS: Primary | ICD-10-CM

## 2024-09-23 PROCEDURE — 99204 OFFICE O/P NEW MOD 45 MIN: CPT | Mod: S$GLB,,, | Performed by: PHYSICAL MEDICINE & REHABILITATION

## 2024-09-23 PROCEDURE — 1125F AMNT PAIN NOTED PAIN PRSNT: CPT | Mod: CPTII,S$GLB,, | Performed by: PHYSICAL MEDICINE & REHABILITATION

## 2024-09-23 PROCEDURE — 3080F DIAST BP >= 90 MM HG: CPT | Mod: CPTII,S$GLB,, | Performed by: PHYSICAL MEDICINE & REHABILITATION

## 2024-09-23 PROCEDURE — 3077F SYST BP >= 140 MM HG: CPT | Mod: CPTII,S$GLB,, | Performed by: PHYSICAL MEDICINE & REHABILITATION

## 2024-09-23 PROCEDURE — 1101F PT FALLS ASSESS-DOCD LE1/YR: CPT | Mod: CPTII,S$GLB,, | Performed by: PHYSICAL MEDICINE & REHABILITATION

## 2024-09-23 PROCEDURE — 99999 PR PBB SHADOW E&M-EST. PATIENT-LVL III: CPT | Mod: PBBFAC,,, | Performed by: PHYSICAL MEDICINE & REHABILITATION

## 2024-09-23 PROCEDURE — G2211 COMPLEX E/M VISIT ADD ON: HCPCS | Mod: S$GLB,,, | Performed by: PHYSICAL MEDICINE & REHABILITATION

## 2024-09-23 PROCEDURE — 1159F MED LIST DOCD IN RCRD: CPT | Mod: CPTII,S$GLB,, | Performed by: PHYSICAL MEDICINE & REHABILITATION

## 2024-09-23 PROCEDURE — 3288F FALL RISK ASSESSMENT DOCD: CPT | Mod: CPTII,S$GLB,, | Performed by: PHYSICAL MEDICINE & REHABILITATION

## 2024-09-23 NOTE — PROGRESS NOTES
New Patient Chronic Pain Note (Initial Visit)    Referring Physician: Natasha Ko PA    PCP: Sarah, Primary Doctor    Chief Complaint:   Chief Complaint   Patient presents with    Tailbone Pain     Patient has pain in sacral joint (bilateral) which makes knee pain worst.  Pain level 4/10        SUBJECTIVE:    Deirdre Yanez is a 74 y.o. female who presents to the clinic for the evaluation of lower back and leg pain.  She was referred by Orthopedics for further evaluation and management of this pain.  She has past medical history scoliosis, CHF, hypertension, hyperlipidemia, peripheral vascular disease, DM2, and multiple other medical comorbidities as listed in her chart.  The pain started several years ago following multiple different traumas, but had a fall about 2 years ago where she exacerbated her back and hip pain.  and symptoms have been worsening.The pain is located in the lateral lumbosacral area and radiates to the left buttock and hip area and right hip area.  The pain is described as  sharp, stabbing, aching  and is rated as 4/10. The pain is rated with a score of  3/10 on the BEST day and a score of 9/10 on the WORST day.  Symptoms interfere with daily activity. The pain is exacerbated by walking, prolonged standing.  The pain is mitigated by anti-inflammatory. Employment status:  Retired teacher    Patient denies night fever/night sweats, urinary incontinence, bowel incontinence, significant weight loss, significant motor weakness, and loss of sensations.    Pain Disability Index Review:         10/28/2020     8:00 AM 9/16/2020     9:00 AM   Last 3 PDI Scores   Pain Disability Index (PDI) 31 27       Non-Pharmacologic Treatments:  Physical Therapy/Home Exercise: yes  Ice/Heat:yes  TENS: no  Acupuncture: no  Massage: no  Chiropractic: no    Other: no      Pain Medications:  - Opioids:  None recently  - Adjuvant Medications:  Mobic, prednisone, tizanidine  - Anti-Coagulants:  None     report:   Reviewed and consistent with medication use as prescribed.    Pain Procedures:   -previous SI joint and GTB injections  -previous knee joint injections      Imaging:   X-ray bilateral knees 11/09/2023:  1.  Negative for acute process involving the right or left knee.  2.  Tricompartment degenerative changes most significantly involving the right medial tibiofemoral compartment.  3.  Incidental findings as noted above.  This includes what is most likely large intra-articular is body within the right suprapatellar recess.    Past Medical History:   Diagnosis Date    Abrasion     left eye    Abrasion and/or friction burn of abdominal wall with infection     left eye    Arthritis     gouty arthritis    Back pain     Diabetes mellitus     2011  11/17/2013    Diverticulosis     DM (diabetes mellitus) 2011     12/03/2014  A1C 6.0 x 2 weeks    DM (diabetes mellitus) 2011    BS 89 10/14/2017 A1C 5.7   Diet controlled    DM (diabetes mellitus) 2011    BS 90 am 10/23/2018     DM (diabetes mellitus) 2011    BS didn't check 10/23/2019    Hepatitis     Hx of B/C from cadaver in past    Hepatitis B     Hepatitis C     Hiatal hernia     Hip bursitis, left     HTN (hypertension)     Hypertensive CHF (congestive heart failure) 3/28/2014    Obesity     Pneumonia     Positive ALBIN (antinuclear antibody)     Pure hypercholesterolemia 9/30/2016    PVD (peripheral vascular disease) 1/23/2023    Rheumatoid arthritis     questionable diagnosis    Scoliosis     Type 2 diabetes mellitus without complication, without long-term current use of insulin     Urinary incontinence     intermittent- only when lifting heavy items > 20 lbs     Past Surgical History:   Procedure Laterality Date    APPENDECTOMY      bone grafts      CERVICAL CONIZATION   W/ LASER      COLONOSCOPY N/A 1/10/2019    Procedure: COLONOSCOPY;  Surgeon: Nixon Thornton MD;  Location: [facility];  Service: Endoscopy;  Laterality: N/A;    COLONOSCOPY N/A 9/1/2022     Procedure: COLONOSCOPY;  Surgeon: Shanti Arriaga MD;  Location: [facility] ENDO;  Service: Endoscopy;  Laterality: N/A;    COSMETIC SURGERY Bilateral     ears    DILATION AND CURETTAGE OF UTERUS      INJECTION OF ANESTHETIC AGENT INTO SACROILIAC JOINT Bilateral 7/23/2020    Procedure: Bilateral BLOCK, SACROILIAC JOINT and bilatearl GTB with RN IV sedation;  Surgeon: Vin Shukla MD;  Location: [facility] PAIN MGT;  Service: Pain Management;  Laterality: Bilateral;    INJECTION OF ANESTHETIC AGENT INTO SACROILIAC JOINT Bilateral 9/30/2020    Procedure: Bilateral GT bursa + bilateral SIJ injection;  Surgeon: Vin Shukla MD;  Location: [facility] PAIN [facility]GT;  Service: Pain Management;  Laterality: Bilateral;    INJECTION OF JOINT Bilateral 9/30/2020    Procedure: Bilateral GT bursa + bilateral SIJ injection;  Surgeon: Vin Shukla MD;  Location: [facility]IN [facility];  Service: Pain Management;  Laterality: Bilateral;    KNEE ARTHROSCOPY W/ MENISCAL REPAIR Right     LIVER BIOPSY      sinus lift      2003    TONSILLECTOMY, ADENOIDECTOMY      TUBAL LIGATION       Social History     Socioeconomic History    Marital status:     Number of children: 0    Highest education level: Master's degree (e.g., MA, MS, [employer], MEd, MSW, CARLOZ)   Occupational History    Occupation: Retired Teacher   Tobacco Use    Smoking status: Never    Smokeless tobacco: Never   Substance and Sexual Activity    Alcohol use: Yes     Alcohol/week: 3.0 standard drinks of alcohol     Types: 3 Glasses of wine per week    Drug use: No    Sexual activity: Yes     Partners: Male   Other Topics Concern    Are you pregnant or think you may be? No    Breast-feeding No     Social Determinants of Health     Financial Resource Strain: Low Risk  (1/22/2023)    Overall Financial Resource Strain (CARDIA)     Difficulty of Paying Living Expenses: Not hard at all   Food Insecurity: No Food Insecurity (1/22/2023)    Hunger Vital Sign     Worried About Running Out of Food in the Last  "Year: Never true     Ran Out of Food in the Last Year: Never true   Transportation Needs: No Transportation Needs (1/22/2023)    PRAPARE - Transportation     Lack of Transportation (Medical): No     Lack of Transportation (Non-Medical): No   Physical Activity: Insufficiently Active (1/22/2023)    Exercise Vital Sign     Days of Exercise per Week: 1 day     Minutes of Exercise per Session: 10 min   Stress: No Stress Concern Present (1/22/2023)    [employer] - Occupational Stress Questionnaire     Feeling of Stress : Not at all   Housing Stability: Low Risk  (1/22/2023)    Housing Stability Vital Sign     Unable to Pay for Housing in the Last Year: No     Number of Places Lived in the Last Year: 1     Unstable Housing in the Last Year: No     Family History   Problem Relation Name Age of Onset    Colon cancer Maternal Grandfather      Diabetes Maternal Grandmother      Hypertension Maternal Grandmother      Breast cancer Maternal Grandmother      Cataracts Mother      Glaucoma Mother      Macular degeneration Mother      Hypertension Mother      Aortic aneurysm Mother      Diabetes Paternal Grandmother      Cataracts Maternal Aunt      Glaucoma Maternal Aunt      Macular degeneration Maternal Aunt      Melanoma Maternal Aunt      Heart disease Unknown          pat side    Aneurysm Brother          brain    Stroke Neg Hx      Kidney disease Neg Hx      Hyperlipidemia Neg Hx         Review of patient's allergies indicates:   Allergen Reactions    Arb-angiotensin receptor antagonist Edema    Hydralazine analogues      "Lupus reaction"    Metoprolol Other (See Comments)     Alopecia    Sulfa (sulfonamide antibiotics)      Passed out and almost stopped breathing    Calcium channel blocking agents-dihydropyridines      Edema      Ace inhibitors Other (See Comments)     cough       Current Outpatient Medications   Medication Sig    ascorbic acid, vitamin C, (VITAMIN C) 1000 MG tablet Take 1,000 " mg by mouth 2 (two) times daily.    asenapine maleate (SAPHRIS, BLACK SILVA, SL) Take by mouth as needed.    ASHWAGANDHA ROOT EXTRACT ORAL Take by mouth. With L-theanine    b complex vitamins tablet Take 1 tablet by mouth once daily.    biotin 5 mg Cap Take by mouth.    CALCIUM CITRATE ORAL Take 400 mg by mouth.    carvediloL (COREG) 12.5 MG tablet Take 12.5 mg by mouth 2 (two) times daily.    cholecalciferol, vitamin D3, (VITAMIN D3 ORAL) Take 2,000 mg by mouth.    CHROMIUM ORAL Take by mouth.    coenzyme Q10 10 mg capsule Take 60 mg by mouth once daily.    ergocalciferol, vitamin D2, (VITAMIN D ORAL) Take 2,000 mg by mouth.    lilliam primrose/linoleic/gamoleni (PRIMROSE OIL ORAL) Take by mouth.    fluticasone propionate (FLONASE) 50 mcg/actuation nasal spray SHAKE LIQUID AND USE 2 SPRAYS IN EACH NOSTRIL DAILY    fluticasone propionate (FLONASE) 50 mcg/actuation nasal spray 2 sprays (100 mcg total) by Each Nostril route once daily.    furosemide (LASIX) 40 MG tablet TAKE 1 TABLET BY MOUTH AS NEEDED FOR INCREASED EDEMA OF LE AND OR DESAI    glucosamine sulfate 500 mg Tab Take 1,000 mg by mouth as needed.     inositol 500 mg Tab Take 750 mg by mouth. BID    lansoprazole (PREVACID) 15 MG capsule Take 15 mg by mouth once daily.    MAGNESIUM ORAL Take 100 mg by mouth.    meloxicam (MOBIC) 15 MG tablet Take 1 tablet (15 mg total) by mouth once daily. Take with food (Patient taking differently: Take 15 mg by mouth once daily. Take with food  Takes prn)    methylsulfonylmethane 1,000 mg Cap Take by mouth as needed.     multivitamin (THERAGRAN) per tablet Take 1 tablet by mouth once daily. Per pt [facility] Women's 50 plus    predniSONE (DELTASONE) 5 MG tablet Take 1 tablet (5 mg total) by mouth once daily.    psyllium husk (METAMUCIL ORAL) Take by mouth 2 (two) times a day.    rosuvastatin (CRESTOR) 5 MG tablet Take 5 mg by mouth every evening.    spironolactone (ALDACTONE) 50 MG tablet Take 1 tablet (50 mg total) by mouth every  "evening.    tiZANidine (ZANAFLEX) 2 MG tablet Take 2 tablets (4 mg total) by mouth nightly as needed.    UNABLE TO FIND Liver Refresh    fish oil-omega-3 fatty acids 300-1,000 mg capsule Take 2 capsules by mouth once daily.     No current facility-administered medications for this visit.       Review of Systems     GENERAL:  No weight loss, malaise or fevers.  HEENT:   No recent changes in vision or hearing  NECK:  Negative for lumps, no difficulty with swallowing.  RESPIRATORY:  Negative for cough, wheezing or shortness of breath, patient denies any recent URI.  CARDIOVASCULAR:  Negative for chest pain, leg swelling or palpitations.  GI:  Negative for abdominal discomfort, blood in stools or black stools or change in bowel habits.  MUSCULOSKELETAL:  See HPI.  SKIN:  Negative for lesions, rash, and itching.  PSYCH:  No mood disorder or recent psychosocial stressors.  Patients sleep is not disturbed secondary to pain.  HEMATOLOGY/LYMPHOLOGY:  Negative for prolonged bleeding, bruising easily or swollen nodes.  Patient is not currently taking any anti-coagulants  NEURO:   No history of headaches, syncope, paralysis, seizures or tremors.  All other reviewed and negative other than HPI.    OBJECTIVE:    BP (!) 161/94   Pulse 76   Resp 16   Ht 5' 6" (1.676 m)   Wt 92.9 kg (204 lb 12.9 oz)   BMI 33.06 kg/m²         Physical Exam    GENERAL: Well appearing, in no acute distress, alert and oriented x3.  PSYCH:  Mood and affect appropriate.  SKIN: Skin color, texture, turgor normal, no rashes or lesions.  HEAD/FACE:  Normocephalic, atraumatic. Cranial nerves grossly intact.  CV: RRR with palpation of the radial artery.  PULM: No evidence of respiratory difficulty, symmetric chest rise.  GI:  Soft and non-tender.  BACK:  Positive Roberto Carlos's test.  Straight leg raising in the sitting and supine positions is negative to radicular pain. No pain to palpation over the facet joints of the lumbar spine or spinous processes.  Limited " range of motion secondary to pain reproduction. Directional preference:  Flexion  EXTREMITIES: No deformities, edema, or skin discoloration. Good capillary refill.  MUSCULOSKELETAL:  Tenderness palpation over the bilateral trochanteric bursa, left ischial bursa and left piriformis muscle.  Positive FADIR maneuver on the left.  Mild amount of tenderness over the SI joints bilaterally.  Bilateral upper and lower extremity strength is normal and symmetric.  No atrophy or tone abnormalities are noted.  NEURO: Bilateral upper and lower extremity coordination and muscle stretch reflexes are physiologic and symmetric.  Plantar response are downgoing. No clonus.  No loss of sensation is noted.  GAIT:  Slow, antalgic.      LABS:  Lab Results   Component Value Date    WBC 8.03 05/03/2019    HGB 14.2 05/03/2019    HCT 44.6 05/03/2019    MCV 89 05/03/2019     05/03/2019       CMP  Sodium   Date Value Ref Range Status   06/07/2022 138 136 - 145 mmol/L Final     Potassium   Date Value Ref Range Status   06/07/2022 5.1 3.5 - 5.1 mmol/L Final     Chloride   Date Value Ref Range Status   06/07/2022 101 95 - 110 mmol/L Final     CO2   Date Value Ref Range Status   06/07/2022 27 23 - 29 mmol/L Final     Glucose   Date Value Ref Range Status   06/07/2022 108 70 - 110 mg/dL Final     BUN   Date Value Ref Range Status   06/07/2022 17 8 - 23 mg/dL Final     Creatinine   Date Value Ref Range Status   06/07/2022 0.9 0.5 - 1.4 mg/dL Final     Calcium   Date Value Ref Range Status   06/07/2022 10.0 8.7 - 10.5 mg/dL Final     Total Protein   Date Value Ref Range Status   06/07/2022 6.8 6.0 - 8.4 g/dL Final     Albumin   Date Value Ref Range Status   06/07/2022 4.2 3.5 - 5.2 g/dL Final     Total Bilirubin   Date Value Ref Range Status   06/07/2022 1.5 (H) 0.1 - 1.0 mg/dL Final     Comment:     For infants and newborns, interpretation of results should be based  on gestational age, weight and in agreement with  clinical  observations.    Premature Infant recommended reference ranges:  Up to 24 hours.............<8.0 mg/dL  Up to 48 hours............<12.0 mg/dL  3-5 days..................<15.0 mg/dL  6-29 days.................<15.0 mg/dL       Alkaline Phosphatase   Date Value Ref Range Status   06/07/2022 69 55 - 135 U/L Final     AST   Date Value Ref Range Status   06/07/2022 25 10 - 40 U/L Final     ALT   Date Value Ref Range Status   06/07/2022 21 10 - 44 U/L Final     Anion Gap   Date Value Ref Range Status   06/07/2022 10 8 - 16 mmol/L Final     eGFR if    Date Value Ref Range Status   06/07/2022 >60.0 >60 mL/min/1.73 m^2 Final     eGFR if non    Date Value Ref Range Status   06/07/2022 >60.0 >60 mL/min/1.73 m^2 Final     Comment:     Calculation used to obtain the estimated glomerular filtration  rate (eGFR) is the CKD-EPI equation.          Lab Results   Component Value Date    HGBA1C 5.8 (H) 06/07/2022             ASSESSMENT: 74 y.o. year old female with buttock and hip pain, consistent with     1. Trochanteric bursitis of both hips  Case Request-RAD/Other Procedure Area: Left GTB, ischial bursa, and piriformis trigger point injection and right GTB      2. Sacral radiculopathy  Ambulatory referral[facility]consult to [facility]    Case Request-RAD/Other Procedure Area: Left GTB, ischial bursa, and piriformis trigger point injection and right GTB      3. Ischial bursitis of left side  Case Request-RAD/Other Procedure Area: Left GTB, ischial bursa, and piriformis trigger point injection and right GTB      4. Piriformis syndrome of left side  Case Request-RAD/Other Procedure Area: Left GTB, ischial bursa, and piriformis trigger point injection and right GTB            PLAN:   - Interventions:  Scheduled for bilateral GTB injections along with left-sided ischial bursa and piriformis trigger point injection under fluoroscopy for diagnostic and therapeutic purposes . Explained the risks  and benefits of the procedure in detail with the patient today in clinic along with alternative treatment options, and the patient elected to pursue the intervention at this time.      - Anticoagulation use:  None    - Medications: I have stressed the importance of physical activity and a home exercise plan to help with pain and improve health. and Patient can continue with medications for now since they are providing benefits, using them appropriately, and without side effects.     - Therapy:  Advised patient continue with activities as tolerated    - Psychological:  Discussed coping mechanisms to help address chronic pain issues    - Labs:  Reviewed    - Imaging: Reviewed available imaging with patient and answered any questions they had regarding study.    - Consults/Referrals:  None at this time    - Records:  Reviewed/Obtain old records from outside physicians and imaging    - Follow up visit: return to clinic 4-6 weeks post procedure or as needed    - Counseled patient regarding the importance of activity modification and physical therapy    - This condition does not require this patient to take time off of work, and the primary goal of our Pain Management services is to improve the patient's functional capacity.    - Patient Questions: Answered all of the patient's questions regarding diagnosis, therapy, and treatment        The above plan and management options were discussed at length with patient. Patient is in agreement with the above and verbalized understanding.    I discussed the goals of interventional chronic pain management with the patient on today's visit.  I explained the utility of injections for diagnostic and therapeutic purposes.  We discussed a multimodal approach to pain including treating the patient's given worst pain at any given time.  We will use a systematic approach to addressing pain.  We will also adopt a multimodal approach that includes injections, adjuvant medications, physical  therapy, at times psychiatry.  There may be a limited role for opioid use intermittently in the treatment of pain, more particularly for acute pain although no one approach can be used as a sole treatment modality.    I emphasized the importance of regular exercise, core strengthening and stretching, diet and weight loss as a cornerstone of long-term pain management.    Visit today included increased complexity associated with the care of the episodic problem of chronic pain which was addressed and continue to manage the longitudinal care of the patient due to the serious and/or complex managed problem(s) listed above.      Wiliam Hemphill MD  Interventional Pain Management  [facility]    Disclaimer:  This note was prepared using voice recognition system and is likely to have sound alike errors that may have been overlooked even after proof reading.  Please call me with any questions

## 2024-09-26 NOTE — PRE-PROCEDURE INSTRUCTIONS
Spoke with patient regarding procedure scheduled on 10.8    Arrival time 0645     Has patient been sick with fever or on antibiotics within the last 7 days? No     Does the patient have any open wounds, sores or rashes? No     Does the patient have any recent fractures? no     Has patient received a vaccination within the last 7 days? No     Received the COVID vaccination? yes     Has the patient stopped all medications as directed? na     Does patient have a pacemaker, defibrillator, or implantable stimulator? No     Does the patient have a ride to and from procedure and someone reliable to remain with patient?        Is the patient diabetic? YES     Does the patient have sleep apnea? Or use O2 at home? no     Is the patient receiving sedation? Yes      Is the patient instructed to remain NPO beginning at midnight the night before their procedure? yes     Procedure location confirmed with patient? Yes     Covid- Denies signs/symptoms. Instructed to notify PAT/MD if any changes.

## 2024-10-08 ENCOUNTER — HOSPITAL ENCOUNTER (OUTPATIENT)
Facility: HOSPITAL | Age: 74
Discharge: HOME | End: 2024-10-08
Attending: PHYSICAL MEDICINE & REHABILITATION | Admitting: PHYSICAL MEDICINE & REHABILITATION
Payer: MEDICARE

## 2024-10-08 VITALS
BODY MASS INDEX: 32.24 KG/M2 | RESPIRATION RATE: 16 BRPM | DIASTOLIC BLOOD PRESSURE: 93 MMHG | HEART RATE: 61 BPM | OXYGEN SATURATION: 99 % | WEIGHT: 200.63 LBS | TEMPERATURE: 97 F | HEIGHT: 66 IN | SYSTOLIC BLOOD PRESSURE: 157 MMHG

## 2024-10-08 DIAGNOSIS — M17.11 ARTHRITIS OF KNEE, RIGHT: ICD-10-CM

## 2024-10-08 DIAGNOSIS — M48.061 SPINAL STENOSIS OF LUMBAR REGION, UNSPECIFIED WHETHER NEUROGENIC CLAUDICATION PRESENT: ICD-10-CM

## 2024-10-08 DIAGNOSIS — M70.61 TROCHANTERIC BURSITIS OF BOTH HIPS: Primary | ICD-10-CM

## 2024-10-08 DIAGNOSIS — M70.60 GREATER TROCHANTERIC BURSITIS, UNSPECIFIED LATERALITY: ICD-10-CM

## 2024-10-08 DIAGNOSIS — M70.62 TROCHANTERIC BURSITIS OF BOTH HIPS: Primary | ICD-10-CM

## 2024-10-08 LAB — POCT GLUCOSE: 105 MG/DL (ref 70–110)

## 2024-10-08 PROCEDURE — 20552 NJX 1/MLT TRIGGER POINT 1/2: CPT | Mod: 59 | Performed by: PHYSICAL MEDICINE & REHABILITATION

## 2024-10-08 PROCEDURE — 20552 NJX 1/MLT TRIGGER POINT 1/2: CPT | Mod: 59,,, | Performed by: PHYSICAL MEDICINE & REHABILITATION

## 2024-10-08 PROCEDURE — 82962 GLUCOSE BLOOD TEST: CPT | Performed by: PHYSICAL MEDICINE & REHABILITATION

## 2024-10-08 PROCEDURE — 63600175 PHARM REV CODE 636 W HCPCS: Performed by: PHYSICAL MEDICINE & REHABILITATION

## 2024-10-08 PROCEDURE — 25500020 PHARM REV CODE 255: Performed by: PHYSICAL MEDICINE & REHABILITATION

## 2024-10-08 PROCEDURE — 20610 DRAIN/INJ JOINT/BURSA W/O US: CPT | Mod: 50,,, | Performed by: PHYSICAL MEDICINE & REHABILITATION

## 2024-10-08 PROCEDURE — 20610 DRAIN/INJ JOINT/BURSA W/O US: CPT | Mod: 50 | Performed by: PHYSICAL MEDICINE & REHABILITATION

## 2024-10-08 RX ORDER — FENTANYL CITRATE 50 UG/ML
INJECTION, SOLUTION INTRAMUSCULAR; INTRAVENOUS
Status: DISCONTINUED | OUTPATIENT
Start: 2024-10-08 | End: 2024-10-08 | Stop reason: HOSPADM

## 2024-10-08 RX ORDER — MIDAZOLAM HYDROCHLORIDE 1 MG/ML
INJECTION, SOLUTION INTRAMUSCULAR; INTRAVENOUS
Status: DISCONTINUED | OUTPATIENT
Start: 2024-10-08 | End: 2024-10-08 | Stop reason: HOSPADM

## 2024-10-08 RX ORDER — METHYLPREDNISOLONE ACETATE 40 MG/ML
INJECTION, SUSPENSION INTRA-ARTICULAR; INTRALESIONAL; INTRAMUSCULAR; SOFT TISSUE
Status: DISCONTINUED | OUTPATIENT
Start: 2024-10-08 | End: 2024-10-08 | Stop reason: HOSPADM

## 2024-10-08 RX ORDER — ONDANSETRON HYDROCHLORIDE 2 MG/ML
4 INJECTION, SOLUTION INTRAVENOUS ONCE AS NEEDED
Status: DISCONTINUED | OUTPATIENT
Start: 2024-10-08 | End: 2024-10-08 | Stop reason: HOSPADM

## 2024-10-08 RX ORDER — BUPIVACAINE HYDROCHLORIDE 2.5 MG/ML
INJECTION, SOLUTION EPIDURAL; INFILTRATION; INTRACAUDAL
Status: DISCONTINUED | OUTPATIENT
Start: 2024-10-08 | End: 2024-10-08 | Stop reason: HOSPADM

## 2024-10-08 NOTE — DISCHARGE SUMMARY
Discharge Note  Short Stay      SUMMARY     Admit Date: 10/8/2024    Attending Physician: Wiliam Hemphill MD        Discharge Physician: Wiliam Hemphill MD        Discharge Date: 10/8/2024 8:09 AM    Procedure(s) (LRB):  Left GTB, ischial bursa, and piriformis trigger point injection and right GTB (Bilateral)    Final Diagnosis: Sacral radiculopathy [M54.18]  Trochanteric bursitis of both hips [M70.61, M70.62]  Ischial bursitis of left side [M70.72]  Piriformis syndrome of left side [G57.02]    Disposition: Home or self care    Patient Instructions:   Current Discharge Medication List        CONTINUE these medications which have NOT CHANGED    Details   carvediloL (COREG) 12.5 MG tablet Take 12.5 mg by mouth 2 (two) times daily.      predniSONE (DELTASONE) 5 MG tablet Take 1 tablet (5 mg total) by mouth once daily.  Qty: 15 tablet, Refills: 1    Associated Diagnoses: Arthritis of knee, right      ascorbic acid, vitamin C, (VITAMIN C) 1000 MG tablet Take 1,000 mg by mouth 2 (two) times daily.      asenapine maleate (SAPHRIS, BLACK SILVA, SL) Take by mouth as needed.      ASHWAGANDHA ROOT EXTRACT ORAL Take by mouth. With L-theanine      b complex vitamins tablet Take 1 tablet by mouth once daily.      biotin 5 mg Cap Take by mouth.      CALCIUM CITRATE ORAL Take 400 mg by mouth.      !! cholecalciferol, vitamin D3, (VITAMIN D3 ORAL) Take 2,000 mg by mouth.      CHROMIUM ORAL Take by mouth.      coenzyme Q10 10 mg capsule Take 60 mg by mouth once daily.      !! ergocalciferol, vitamin D2, (VITAMIN D ORAL) Take 2,000 mg by mouth.      lilliam primrose/linoleic/gamoleni (PRIMROSE OIL ORAL) Take by mouth.      !! fluticasone propionate (FLONASE) 50 mcg/actuation nasal spray SHAKE LIQUID AND USE 2 SPRAYS IN EACH NOSTRIL DAILY  Qty: 16 g, Refills: 12      !! fluticasone propionate (FLONASE) 50 mcg/actuation nasal spray 2 sprays (100 mcg total) by Each Nostril route once daily.  Qty: 16 g, Refills: 12      furosemide  (LASIX) 40 MG tablet TAKE 1 TABLET BY MOUTH AS NEEDED FORINCREASED EDEMA OF LE AND OR DESAI  Qty: 90 tablet, Refills: 2      glucosamine sulfate 500 mg Tab Take 1,000 mg by mouth as needed.       inositol 500 mg Tab Take 750 mg by mouth. BID      lansoprazole (PREVACID) 15 MG capsule Take 15 mg by mouth once daily.      MAGNESIUM ORAL Take 100 mg by mouth.      meloxicam (MOBIC) 15 MG tablet Take 1 tablet (15 mg total) by mouth once daily. Take with food  Qty: 30 tablet, Refills: 6    Associated Diagnoses: Spinal stenosis of lumbar region, unspecified whether neurogenic claudication present; Arthritis of knee, right      methylsulfonylmethane 1,000 mg Cap Take by mouth as needed.       multivitamin (THERAGRAN) per tablet Take 1 tablet by mouth once daily. Per pt [facility] Women's 50 plus      psyllium husk (METAMUCIL ORAL) Take by mouth 2 (two) times a day.      rosuvastatin (CRESTOR) 5 MG tablet Take 5 mg by mouth every evening.      spironolactone (ALDACTONE) 50 MG tablet Take 1 tablet (50 mg total) by mouth every evening.  Qty: 90 tablet, Refills: 3    Comments: .  Associated Diagnoses: Diastolic CHF, chronic      tiZANidine (ZANAFLEX) 2 MG tablet Take 2 tablets (4 mg total) by mouth nightly as needed.  Qty: 30 tablet, Refills: 5    Associated Diagnoses: Scoliosis of thoracolumbar spine, unspecified scoliosis type      UNABLE TO FIND Liver Refresh       !! - Potential duplicate medications found. Please discuss with provider.              Discharge Diagnosis: Sacral radiculopathy [M54.18]  Trochanteric bursitis of both hips [M70.61, M70.62]  Ischial bursitis of left side [M70.72]  Piriformis syndrome of left side [G57.02]  Condition on Discharge: Stable with no complications to procedure   Diet on Discharge: Same as before.  Activity: as per instruction sheet.  Discharge to: Home with a responsible adult.  Follow up: 2-4 weeks       Please call the office at [phone] if you experience any weakness or loss of  sensation, fever > 101.5, pain uncontrolled with oral medications, persistent nausea/vomiting/or diarrhea, redness or drainage from the incisions, or any other worrisome concerns. If physician on call was not reached or could not communicate with our office for any reason please go to the nearest emergency department

## 2024-10-08 NOTE — DISCHARGE INSTRUCTIONS

## 2024-10-08 NOTE — OP NOTE
Time-out taken to identify patient and procedure side prior to starting the procedure.     10/08/2024    PROCEDURE:  1) bilateral greater trochanteric bursa injection  2) Piriformis trigger point injection, left  3) Ischial bursa injection, left                       REASON FOR PROCEDURE:   Trochanteric bursitis of both hips [M70.61, M70.62]  Ischial bursitis of left side [M70.72]  Myalgia other site [M79.18]    PHYSICIAN: Wiliam Hemphill MD    ASSISTANTS: None    Sedation: Conscious sedation provided by M.D    The patient was monitored with continuous pulse oximetry, EKG, and intermittent blood pressure monitors, immediately prior to administration of sedation.  The patient was hemodynamically stable throughout the entire process was responsive to voice, and breathing spontaneously.  Supplemental O2 was provided at 2L/min via nasal cannula.  Patient was comfortable for the duration of the procedure.     There was a total of 2mg IV Midazolam and 50mcg Fentanyl titrated for the procedure    Total sedation time was >10minutes and <20minutes      MEDICATIONS INJECTED: 1mL 40mg/ml Depo-Medrol and 4mL Bupivacaine 0.25% into each site    LOCAL ANESTHETIC USED: Xylocaine 1% 6ml     ESTIMATED BLOOD LOSS: None.     COMPLICATIONS: None.       TECHNIQUE:   Greater trochanteric bursa injection:  The area overlying the greater trochanteric bursa was identified using fluoroscopy, and the area overlying the skin was prepped and draped in usual sterile fashion. Local Xylocaine was injected by raising a wheel and going down to the periosteum using a 27-gauge hypodermic needle. A 5 inch 22-gauge spinal needle was introduce into the left greater trochanteric bursa Negative pressure applied to confirm no intravascular placement. Omnipaque was injected to confirm placement and to confirm that there was no vascular runoff. 4 mL of 0.25% bupivacaine + 1mL of 40mg/mL depomedrol was then injected slowly.  Displacement of the contrast  "after injection of the medication confirmed that the medication went into the area of the greater trochanteric bursa bilaterally.     Piriformis trigger point injection:  The skin was prepped with chlorhexidine, and draped in a sterile fashion. The area overlying the left piriformis muscle was identified under fluoroscopy in the AP view, then 5 mL 1% lidocaine was injected into the subcutaneous tissue and deeper tissues over this area through a 25 gauge needle 1.5" needle. The right hip joint was visualized in an AP view. The tip of a 22-gauge 3 1/2 inch Quincke-type spinal needle was advanced 3 cm inferior and 3 cm lateral to the inferior aspect of the SI joint.  Once the piriformis muscle was thought to be encountered, 3 ml of Omnipaque 300 contrast agent was slowly injected. Confirmation of spread of contrast agent within the piriformis muscle was made in the AP fluoroscopic view. Subsequently,  4 ml of Bupivacaine 0.25% and 1mL of  40 mg/mL depomedrol was slowly administered. Displacement of the radio opaque contrast after injection of the medication confirmed that the medication went into the area of the piriformis muscle. The needle was removed and bleeding was nil.       Ischial Bursa Injection:  With the patient lying in the prone position the ischial tuberosity was palpated. The area was prepped and draped in the usual   sterile fashion. Local anesthetic was used, given by raising a wheal and   going down to the hub of the 27-gauge needle. A 3-1/2 inch 22-gauge   needle was introduced under fluoroscopy until the tip reached the ischial tuberosity. The tip of the needle was directed to the inferior aspect of the ischial tuberosity. When the tip of the needle was thought to be in appropriate position, contrast was injected to confirm proper placement. Medication was then injected slowly. The patient tolerated the procedure well.     The patient was monitored for approximately 30 minutes after the procedure. " Patient was given post procedure and discharge instructions to follow at home. We will see the patient back in two weeks or the patient may call to inform of status. The patient was discharged in a stable condition

## 2024-10-08 NOTE — H&P
HPI  Patient presenting for Procedure(s) (LRB):  Left GTB, ischial bursa, and piriformis trigger point injection and right GTB (Bilateral)       No health changes since previous encounter    Past Medical History:   Diagnosis Date    Abrasion     left eye    Abrasion and/or friction burn of abdominal wall with infection     left eye    Arthritis     gouty arthritis    Back pain     Diabetes mellitus     2011  11/17/2013    Diverticulosis     DM (diabetes mellitus) 2011     12/03/2014  A1C 6.0 x 2 weeks    DM (diabetes mellitus) 2011    BS 89 10/14/2017 A1C 5.7   Diet controlled    DM (diabetes mellitus) 2011    BS 90 am 10/23/2018     DM (diabetes mellitus) 2011    BS didn't check 10/23/2019    Hepatitis     Hx of B/C from cadaver in past    Hepatitis B     Hepatitis C     Hiatal hernia     Hip bursitis, left     HTN (hypertension)     Hypertensive CHF (congestive heart failure) 3/28/2014    Obesity     Pneumonia     Positive ALBIN (antinuclear antibody)     Pure hypercholesterolemia 9/30/2016    PVD (peripheral vascular disease) 1/23/2023    Rheumatoid arthritis     questionable diagnosis    Scoliosis     Type 2 diabetes mellitus without complication, without long-term current use of insulin     Urinary incontinence     intermittent- only when lifting heavy items > 20 lbs     Past Surgical History:   Procedure Laterality Date    APPENDECTOMY      bone grafts      CERVICAL CONIZATION   W/ LASER      COLONOSCOPY N/A 1/10/2019    Procedure: COLONOSCOPY;  Surgeon: Nixon Thornton MD;  Location: [facility] ENDO;  Service: Endoscopy;  Laterality: N/A;    COLONOSCOPY N/A 9/1/2022    Procedure: COLONOSCOPY;  Surgeon: Shanti Arriaga MD;  Location: [facility] ENDO;  Service: Endoscopy;  Laterality: N/A;    COSMETIC SURGERY Bilateral     ears    DILATION AND CURETTAGE OF UTERUS      INJECTION OF ANESTHETIC AGENT INTO SACROILIAC JOINT Bilateral 7/23/2020    Procedure: Bilateral BLOCK, SACROILIAC JOINT and bilatearl GTB with RN  "IV sedation;  Surgeon: Vin Shukla MD;  Location: [facility]IN [facility]GT;  Service: Pain Management;  Laterality: Bilateral;    INJECTION OF ANESTHETIC AGENT INTO SACROILIAC JOINT Bilateral 9/30/2020    Procedure: Bilateral GT bursa + bilateral SIJ injection;  Surgeon: Vin Shukla MD;  Location: [facility];  Service: Pain Management;  Laterality: Bilateral;    INJECTION OF JOINT Bilateral 9/30/2020    Procedure: Bilateral GT bursa + bilateral SIJ injection;  Surgeon: Vin Shukla MD;  Location: [facility];  Service: Pain Management;  Laterality: Bilateral;    KNEE ARTHROSCOPY W/ MENISCAL REPAIR Right     LIVER BIOPSY      sinus lift      2003    TONSILLECTOMY, ADENOIDECTOMY      TUBAL LIGATION       Review of patient's allergies indicates:   Allergen Reactions    Arb-angiotensin receptor antagonist Edema    Hydralazine analogues      "Lupus reaction"    Metoprolol Other (See Comments)     Alopecia    Sulfa (sulfonamide antibiotics)      Passed out and almost stopped breathing    Calcium channel blocking agents-dihydropyridines      Edema      Ace inhibitors Other (See Comments)     cough        No current facility-administered medications on file prior to encounter.     Current Outpatient Medications on File Prior to Encounter   Medication Sig Dispense Refill    carvediloL (COREG) 12.5 MG tablet Take 12.5 mg by mouth 2 (two) times daily.      predniSONE (DELTASONE) 5 MG tablet Take 1 tablet (5 mg total) by mouth once daily. 15 tablet 1    ascorbic acid, vitamin C, (VITAMIN C) 1000 MG tablet Take 1,000 mg by mouth 2 (two) times daily.      asenapine maleate (SAPHRIS, BLACK SILVA, SL) Take by mouth as needed.      ASHWAGANDHA ROOT EXTRACT ORAL Take by mouth. With L-theanine      b complex vitamins tablet Take 1 tablet by mouth once daily.      biotin 5 mg Cap Take by mouth.      CALCIUM CITRATE ORAL Take 400 mg by mouth.      cholecalciferol, vitamin D3, (VITAMIN D3 ORAL) Take 2,000 mg by mouth.      CHROMIUM ORAL " "Take by mouth.      coenzyme Q10 10 mg capsule Take 60 mg by mouth once daily.      ergocalciferol, vitamin D2, (VITAMIN D ORAL) Take 2,000 mg by mouth.      lilliam primrose/linoleic/gamoleni (PRIMROSE OIL ORAL) Take by mouth.      fluticasone propionate (FLONASE) 50 mcg/actuation nasal spray SHAKE LIQUID AND USE 2 SPRAYS IN EACH NOSTRIL DAILY 16 g 12    fluticasone propionate (FLONASE) 50 mcg/actuation nasal spray 2 sprays (100 mcg total) by Each Nostril route once daily. 16 g 12    furosemide (LASIX) 40 MG tablet TAKE 1 TABLET BY MOUTH AS NEEDED FOR 90 tablet 2    glucosamine sulfate 500 mg Tab Take 1,000 mg by mouth as needed.       inositol 500 mg Tab Take 750 mg by mouth. BID      lansoprazole (PREVACID) 15 MG capsule Take 15 mg by mouth once daily.      MAGNESIUM ORAL Take 100 mg by mouth.      meloxicam (MOBIC) 15 MG tablet Take 1 tablet (15 mg total) by mouth once daily. Take with food (Patient taking differently: Take 15 mg by mouth once daily. Take with food  Takes prn) 30 tablet 6    methylsulfonylmethane 1,000 mg Cap Take by mouth as needed.       multivitamin (THERAGRAN) per tablet Take 1 tablet by mouth once daily. Per pt C Women's 50 plus      psyllium husk (METAMUCIL ORAL) Take by mouth 2 (two) times a day.      rosuvastatin (CRESTOR) 5 MG tablet Take 5 mg by mouth every evening.      spironolactone (ALDACTONE) 50 MG tablet Take 1 tablet (50 mg total) by mouth every evening. 90 tablet 3    tiZANidine (ZANAFLEX) 2 MG tablet Take 2 tablets (4 mg total) by mouth nightly as needed. 30 tablet 5    UNABLE TO FIND Liver Refresh          PMHx, PSHx, Allergies, Medications reviewed in epic    ROS negative except pain complaints in HPI    OBJECTIVE:    BP (!) 170/87 (BP Location: Right arm, Patient Position: Sitting)   Pulse 69   Temp 97 °F (36.1 °C) (Temporal)   Resp 16   Ht 5' 6" (1.676 m)   Wt 91 kg (200 lb 9.9 oz)   SpO2 97%   Breastfeeding No   BMI 32.38 kg/m²     PHYSICAL EXAMINATION:    GENERAL: " Well appearing, in no acute distress, alert and oriented x3.  PSYCH:  Mood and affect appropriate.  SKIN: Skin color, texture, turgor normal, no rashes or lesions which will impact the procedure.  CV: RRR with palpation of the radial artery.  PULM: No evidence of respiratory difficulty, symmetric chest rise. Clear to auscultation.  NEURO: Cranial nerves grossly intact.    Plan:    Proceed with procedure as planned Procedure(s) (LRB):  Left GTB, ischial bursa, and piriformis trigger point injection and right GTB (Bilateral)    Wiliam Hemphill MD  10/08/2024

## 2024-10-19 DIAGNOSIS — J30.89 NON-SEASONAL ALLERGIC RHINITIS, UNSPECIFIED TRIGGER: Primary | ICD-10-CM

## 2024-10-22 RX ORDER — FLUTICASONE PROPIONATE 50 MCG
2 SPRAY, SUSPENSION (ML) NASAL DAILY
Qty: 16 G | Refills: 12 | Status: SHIPPED | OUTPATIENT
Start: 2024-10-22

## 2024-11-01 NOTE — TELEPHONE ENCOUNTER
PRESCRIBE COREG 6.25  MG BID     NURSE VISIT IN 2 WEEKS. TO CHECK PROGRESS- BP AND SIDE EFFECTS
What about Coreg- is [employer] covering?
Yes coreg is covered  
Lives in an [facility]
11-Jun-2021

## 2024-11-07 ENCOUNTER — OFFICE VISIT (OUTPATIENT)
Dept: PAIN MEDICINE | Facility: CLINIC | Age: 74
End: 2024-11-07
Payer: MEDICARE

## 2024-11-07 VITALS
HEART RATE: 68 BPM | SYSTOLIC BLOOD PRESSURE: 149 MMHG | WEIGHT: 200.63 LBS | DIASTOLIC BLOOD PRESSURE: 79 MMHG | HEIGHT: 66 IN | BODY MASS INDEX: 32.24 KG/M2

## 2024-11-07 DIAGNOSIS — M70.61 TROCHANTERIC BURSITIS OF BOTH HIPS: Primary | ICD-10-CM

## 2024-11-07 DIAGNOSIS — G57.02 PIRIFORMIS SYNDROME OF LEFT SIDE: ICD-10-CM

## 2024-11-07 DIAGNOSIS — M70.72 ISCHIAL BURSITIS OF LEFT SIDE: ICD-10-CM

## 2024-11-07 DIAGNOSIS — M70.62 TROCHANTERIC BURSITIS OF BOTH HIPS: Primary | ICD-10-CM

## 2024-11-07 PROCEDURE — 3288F FALL RISK ASSESSMENT DOCD: CPT | Mod: CPTII,S$GLB,, | Performed by: PHYSICIAN ASSISTANT

## 2024-11-07 PROCEDURE — 1159F MED LIST DOCD IN RCRD: CPT | Mod: CPTII,S$GLB,, | Performed by: PHYSICIAN ASSISTANT

## 2024-11-07 PROCEDURE — 99999 PR PBB SHADOW E&M-EST. PATIENT-LVL III: CPT | Mod: PBBFAC,,, | Performed by: PHYSICIAN ASSISTANT

## 2024-11-07 PROCEDURE — 99212 OFFICE O/P EST SF 10 MIN: CPT | Mod: S$GLB,,, | Performed by: PHYSICIAN ASSISTANT

## 2024-11-07 PROCEDURE — 3077F SYST BP >= 140 MM HG: CPT | Mod: CPTII,S$GLB,, | Performed by: PHYSICIAN ASSISTANT

## 2024-11-07 PROCEDURE — 1125F AMNT PAIN NOTED PAIN PRSNT: CPT | Mod: CPTII,S$GLB,, | Performed by: PHYSICIAN ASSISTANT

## 2024-11-07 PROCEDURE — 3078F DIAST BP <80 MM HG: CPT | Mod: CPTII,S$GLB,, | Performed by: PHYSICIAN ASSISTANT

## 2024-11-07 PROCEDURE — 1101F PT FALLS ASSESS-DOCD LE1/YR: CPT | Mod: CPTII,S$GLB,, | Performed by: PHYSICIAN ASSISTANT

## 2024-11-07 NOTE — PROGRESS NOTES
"Established Patient Chronic Pain Note (Follow up Visit)    Referring Physician: No ref. provider found    PCP: No, Primary Doctor    Chief Complaint:   Chief Complaint   Patient presents with    Knee Pain    Hip Pain        SUBJECTIVE:  Interval History (11/7/2024):  Deirdre Yanez presents today for follow-up visit.  she underwent bilateral GTB injections , left-sided ischial bursa and piriformis trigger point injection on 10/8/24.  The patient reports that she is/was better following the procedure.  she reports 100% pain relief.  The changes lasted one week and then down to 80% relief.  Patient reports pain as 5/10 today.  She is doing great with her right hip.   Patient reports that after the injections she could "stand more upright." However, 10 days after injections, she turned while emptying the  trying to dodge her cat and felt something pop in bursa area. For about a week she had difficulty walking but now she is doing better and "almost back to normal."      Initial HPI (9/23/2024):  Deirdre Yanez is a 74 y.o. female who presents to the clinic for the evaluation of lower back and leg pain.  She was referred by Orthopedics for further evaluation and management of this pain.  She has past medical history scoliosis, CHF, hypertension, hyperlipidemia, peripheral vascular disease, DM2, and multiple other medical comorbidities as listed in her chart.  The pain started several years ago following multiple different traumas, but had a fall about 2 years ago where she exacerbated her back and hip pain.  and symptoms have been worsening.The pain is located in the lateral lumbosacral area and radiates to the left buttock and hip area and right hip area.  The pain is described as  sharp, stabbing, aching  and is rated as 4/10. The pain is rated with a score of  3/10 on the BEST day and a score of 9/10 on the WORST day.  Symptoms interfere with daily activity. The pain is exacerbated by walking, prolonged " standing.  The pain is mitigated by anti-inflammatory. Employment status:  Retired teacher    Patient denies night fever/night sweats, urinary incontinence, bowel incontinence, significant weight loss, significant motor weakness, and loss of sensations.    Pain Disability Index Review:         11/7/2024    10:53 AM 10/28/2020     8:00 AM 9/16/2020     9:00 AM   Last 3 PDI Scores   Pain Disability Index (PDI) 35 31 27       Non-Pharmacologic Treatments:  Physical Therapy/Home Exercise: yes  Ice/Heat:yes  TENS: no  Acupuncture: no  Massage: no  Chiropractic: no    Other: no      Pain Medications:  - Opioids:  None recently  - Adjuvant Medications:  Mobic, prednisone, tizanidine  - Anti-Coagulants:  None     report:  Reviewed and consistent with medication use as prescribed.    Pain Procedures:   -previous SI joint and GTB injections  -previous knee joint injections    Dr. Hemphill:  - 10/8/2024: bilateral GTB injections , left-sided ischial bursa and piriformis trigger point injection with 100% pain relief initially    Imaging:   X-ray bilateral knees 11/09/2023:  1.  Negative for acute process involving the right or left knee.  2.  Tricompartment degenerative changes most significantly involving the right medial tibiofemoral compartment.  3.  Incidental findings as noted above.  This includes what is most likely large intra-articular is body within the right suprapatellar recess.    Past Medical History:   Diagnosis Date    Abrasion     left eye    Abrasion and/or friction burn of abdominal wall with infection     left eye    Arthritis     gouty arthritis    Back pain     Diabetes mellitus     2011  11/17/2013    Diverticulosis     DM (diabetes mellitus) 2011     12/03/2014  A1C 6.0 x 2 weeks    DM (diabetes mellitus) 2011    BS 89 10/14/2017 A1C 5.7   Diet controlled    DM (diabetes mellitus) 2011    BS 90 am 10/23/2018     DM (diabetes mellitus) 2011    BS didn't check 10/23/2019    Hepatitis     Hx of  B/C from cadaver in past    Hepatitis B     Hepatitis C     Hiatal hernia     Hip bursitis, left     HTN (hypertension)     Hypertensive CHF (congestive heart failure) 3/28/2014    Obesity     Pneumonia     Positive ALBIN (antinuclear antibody)     Pure hypercholesterolemia 9/30/2016    PVD (peripheral vascular disease) 1/23/2023    Rheumatoid arthritis     questionable diagnosis    Scoliosis     Type 2 diabetes mellitus without complication, without long-term current use of insulin     Urinary incontinence     intermittent- only when lifting heavy items > 20 lbs     Past Surgical History:   Procedure Laterality Date    APPENDECTOMY      bone grafts      CERVICAL CONIZATION   W/ LASER      COLONOSCOPY N/A 1/10/2019    Procedure: COLONOSCOPY;  Surgeon: Nixon Thornton MD;  Location: [facility] ENDO;  Service: Endoscopy;  Laterality: N/A;    COLONOSCOPY N/A 9/1/2022    Procedure: COLONOSCOPY;  Surgeon: Shanti Arriaga MD;  Location: [facility];  Service: Endoscopy;  Laterality: N/A;    COSMETIC SURGERY Bilateral     ears    DILATION AND CURETTAGE OF UTERUS      INJECTION Bilateral 10/8/2024    Procedure: Left GTB, ischial bursa, and piriformis trigger point injection and right GTB;  Surgeon: Wiliam Hemphill MD;  Location: [facility] PAIN [facility]GT;  Service: Pain Management;  Laterality: Bilateral;    INJECTION OF ANESTHETIC AGENT INTO SACROILIAC JOINT Bilateral 7/23/2020    Procedure: Bilateral BLOCK, SACROILIAC JOINT and bilatearl GTB with RN IV sedation;  Surgeon: Vin Shukla MD;  Location: [facility];  Service: Pain Management;  Laterality: Bilateral;    INJECTION OF ANESTHETIC AGENT INTO SACROILIAC JOINT Bilateral 9/30/2020    Procedure: Bilateral GT bursa + bilateral SIJ injection;  Surgeon: Vin Shukla MD;  Location: [facility];  Service: Pain Management;  Laterality: Bilateral;    INJECTION OF JOINT Bilateral 9/30/2020    Procedure: Bilateral GT bursa + bilateral SIJ injection;  Surgeon: Vin Shukla MD;  Location:  [facility] PAIN [facility]GT;  Service: Pain Management;  Laterality: Bilateral;    KNEE ARTHROSCOPY W/ MENISCAL REPAIR Right     LIVER BIOPSY      sinus lift      2003    TONSILLECTOMY, ADENOIDECTOMY      TUBAL LIGATION       Social History     Socioeconomic History    Marital status:     Number of children: 0    Highest education level: Master's degree (e.g., MA, MS, [employer], MEd, MSW, CARLOZ)   Occupational History    Occupation: Retired Teacher   Tobacco Use    Smoking status: Never    Smokeless tobacco: Never   Substance and Sexual Activity    Alcohol use: Yes     Alcohol/week: 3.0 standard drinks of alcohol     Types: 3 Glasses of wine per week    Drug use: No    Sexual activity: Yes     Partners: Male   Other Topics Concern    Are you pregnant or think you may be? No    Breast-feeding No     Social Drivers of Health     Financial Resource Strain: Low Risk  (1/22/2023)    Overall Financial Resource Strain (CARDIA)     Difficulty of Paying Living Expenses: Not hard at all   Food Insecurity: No Food Insecurity (1/22/2023)    Hunger Vital Sign     Worried About Running Out of Food in the Last Year: Never true     Ran Out of Food in the Last Year: Never true   Transportation Needs: No Transportation Needs (1/22/2023)    PRAPARE - Transportation     Lack of Transportation (Medical): No     Lack of Transportation (Non-Medical): No   Physical Activity: Insufficiently Active (1/22/2023)    Exercise Vital Sign     Days of Exercise per Week: 1 day     Minutes of Exercise per Session: 10 min   Stress: No Stress Concern Present (1/22/2023)    [employer] - Occupational Stress Questionnaire     Feeling of Stress : Not at all   Housing Stability: Low Risk  (1/22/2023)    Housing Stability Vital Sign     Unable to Pay for Housing in the Last Year: No     Number of Places Lived in the Last Year: 1     Unstable Housing in the Last Year: No     Family History   Problem Relation Name Age of Onset    Colon cancer  "Maternal Grandfather      Diabetes Maternal Grandmother      Hypertension Maternal Grandmother      Breast cancer Maternal Grandmother      Cataracts Mother      Glaucoma Mother      Macular degeneration Mother      Hypertension Mother      Aortic aneurysm Mother      Diabetes Paternal Grandmother      Cataracts Maternal Aunt      Glaucoma Maternal Aunt      Macular degeneration Maternal Aunt      Melanoma Maternal Aunt      Heart disease Unknown          pat side    Aneurysm Brother          brain    Stroke Neg Hx      Kidney disease Neg Hx      Hyperlipidemia Neg Hx         Review of patient's allergies indicates:   Allergen Reactions    Arb-angiotensin receptor antagonist Edema    Hydralazine analogues      "Lupus reaction"    Metoprolol Other (See Comments)     Alopecia    Sulfa (sulfonamide antibiotics)      Passed out and almost stopped breathing    Calcium channel blocking agents-dihydropyridines      Edema      Ace inhibitors Other (See Comments)     cough       Current Outpatient Medications   Medication Sig    ascorbic acid, vitamin C, (VITAMIN C) 1000 MG tablet Take 1,000 mg by mouth 2 (two) times daily.    asenapine maleate (SAPHRIS, BLACK SILVA, SL) Take by mouth as needed.    ASHWAGANDHA ROOT EXTRACT ORAL Take by mouth. With L-theanine    b complex vitamins tablet Take 1 tablet by mouth once daily.    biotin 5 mg Cap Take by mouth.    CALCIUM CITRATE ORAL Take 400 mg by mouth.    carvediloL (COREG) 12.5 MG tablet Take 12.5 mg by mouth 2 (two) times daily.    cholecalciferol, vitamin D3, (VITAMIN D3 ORAL) Take 2,000 mg by mouth.    CHROMIUM ORAL Take by mouth.    coenzyme Q10 10 mg capsule Take 60 mg by mouth once daily.    ergocalciferol, vitamin D2, (VITAMIN D ORAL) Take 2,000 mg by mouth.    lilliam primrose/linoleic/gamoleni (PRIMROSE OIL ORAL) Take by mouth.    fluticasone propionate (FLONASE) 50 mcg/actuation nasal spray SHAKE LIQUID AND USE 2 SPRAYS IN EACH NOSTRIL DAILY    fluticasone propionate " (FLONASE) 50 mcg/actuation nasal spray 2 sprays (100 mcg total) by Each Nostril route once daily.    furosemide (LASIX) 40 MG tablet TAKE 1 TABLET BY MOUTH AS NEEDED FOR INCREASED EDEMA OF LE AND OR DESAI    glucosamine sulfate 500 mg Tab Take 1,000 mg by mouth as needed.     inositol 500 mg Tab Take 750 mg by mouth. BID    lansoprazole (PREVACID) 15 MG capsule Take 15 mg by mouth once daily.    MAGNESIUM ORAL Take 100 mg by mouth.    meloxicam (MOBIC) 15 MG tablet Take 1 tablet (15 mg total) by mouth once daily. Take with food (Patient taking differently: Take 15 mg by mouth once daily. Take with food  Takes prn)    methylsulfonylmethane 1,000 mg Cap Take by mouth as needed.     multivitamin (THERAGRAN) per tablet Take 1 tablet by mouth once daily. Per pt C Women's 50 plus    predniSONE (DELTASONE) 5 MG tablet Take 1 tablet (5 mg total) by mouth once daily.    psyllium husk (METAMUCIL ORAL) Take by mouth 2 (two) times a day.    rosuvastatin (CRESTOR) 5 MG tablet Take 5 mg by mouth every evening.    spironolactone (ALDACTONE) 50 MG tablet Take 1 tablet (50 mg total) by mouth every evening.    tiZANidine (ZANAFLEX) 2 MG tablet Take 2 tablets (4 mg total) by mouth nightly as needed.    UNABLE TO FIND Liver Refresh    fish oil-omega-3 fatty acids 300-1,000 mg capsule Take 2 capsules by mouth once daily.     No current facility-administered medications for this visit.       Review of Systems     GENERAL:  No weight loss, malaise or fevers.  HEENT:   No recent changes in vision or hearing  NECK:  Negative for lumps, no difficulty with swallowing.  RESPIRATORY:  Negative for cough, wheezing or shortness of breath, patient denies any recent URI.  CARDIOVASCULAR:  Negative for chest pain, leg swelling or palpitations.  GI:  Negative for abdominal discomfort, blood in stools or black stools or change in bowel habits.  MUSCULOSKELETAL:  See HPI.  SKIN:  Negative for lesions, rash, and itching.  PSYCH:  No mood disorder or  "recent psychosocial stressors.  Patients sleep is not disturbed secondary to pain.  HEMATOLOGY/LYMPHOLOGY:  Negative for prolonged bleeding, bruising easily or swollen nodes.  Patient is not currently taking any anti-coagulants  NEURO:   No history of headaches, syncope, paralysis, seizures or tremors.  All other reviewed and negative other than HPI.    OBJECTIVE:    BP (!) 149/79   Pulse 68   Ht 5' 6" (1.676 m)   Wt 91 kg (200 lb 9.9 oz)   BMI 32.38 kg/m²         Physical Exam    GENERAL: Well appearing, in no acute distress, alert and oriented x3.  PSYCH:  Mood and affect appropriate.  SKIN: Skin color, texture, turgor normal, no rashes or lesions.  HEAD/FACE:  Normocephalic, atraumatic. Cranial nerves grossly intact.    PULM: No evidence of respiratory difficulty, symmetric chest rise.  GI:  Soft and non-tender.  BACK:   Straight leg raising in the sitting and supine positions is negative to radicular pain. No pain to palpation over the facet joints of the lumbar spine or spinous processes.    EXTREMITIES: No deformities, edema, or skin discoloration. Good capillary refill.  MUSCULOSKELETAL:  No TTP over the bilateral trochanteric bursa, left ischial bursa and left piriformis muscle. Mild amount of tenderness over the SI joints bilaterally.  Bilateral upper and lower extremity strength is normal and symmetric.  No atrophy or tone abnormalities are noted.  NEURO: Bilateral upper and lower extremity coordination and muscle stretch reflexes are physiologic and symmetric.  Plantar response are downgoing. No clonus.  No loss of sensation is noted.  GAIT:  Slow, antalgic.        ASSESSMENT: 74 y.o. year old female with buttock and hip pain, consistent with     1. Trochanteric bursitis of both hips        2. Piriformis syndrome of left side        3. Ischial bursitis of left side            PLAN:   - Interventions: None at this time.     - S/p bilateral GTB injections along with left-sided ischial bursa and " piriformis trigger point injection on 10/8/24 with 100% relief initially.    - Anticoagulation use:  None    - Medications: I have stressed the importance of physical activity and a home exercise plan to help with pain and improve health. and Patient can continue with medications for now since they are providing benefits, using them appropriately, and without side effects.     - Therapy:  Advised patient continue with activities as tolerated    - Psychological:  Discussed coping mechanisms to help address chronic pain issues    - Imaging: Reviewed available imaging with patient and answered any questions they had regarding study.Recommended updated imaging but patient will hold off.     - Consults/Referrals:  Continue to follow up with Orthopedics for bilateral knee pain.    - Records:  Reviewed/Obtain old records from outside physicians and imaging    - Follow up visit: return to clinic in 3 months or as needed    - Counseled patient regarding the importance of activity modification and physical therapy    - This condition does not require this patient to take time off of work, and the primary goal of our Pain Management services is to improve the patient's functional capacity.    - Patient Questions: Answered all of the patient's questions regarding diagnosis, therapy, and treatment        The above plan and management options were discussed at length with patient. Patient is in agreement with the above and verbalized understanding.    I discussed the goals of interventional chronic pain management with the patient on today's visit.  I explained the utility of injections for diagnostic and therapeutic purposes.  We discussed a multimodal approach to pain including treating the patient's given worst pain at any given time.  We will use a systematic approach to addressing pain.  We will also adopt a multimodal approach that includes injections, adjuvant medications, physical therapy, at times psychiatry.  There may  be a limited role for opioid use intermittently in the treatment of pain, more particularly for acute pain although no one approach can be used as a sole treatment modality.    I emphasized the importance of regular exercise, core strengthening and stretching, diet and weight loss as a cornerstone of long-term pain management.    Emperatriz Lopes PA-C  Interventional Pain Management  [facility]

## 2024-11-22 ENCOUNTER — PATIENT MESSAGE (OUTPATIENT)
Dept: ORTHOPEDICS | Facility: CLINIC | Age: 74
End: 2024-11-22
Payer: MEDICARE

## 2024-11-22 DIAGNOSIS — M17.11 ARTHRITIS OF KNEE, RIGHT: ICD-10-CM

## 2024-11-22 RX ORDER — PREDNISONE 5 MG/1
5 TABLET ORAL DAILY
Qty: 15 TABLET | Refills: 1 | Status: SHIPPED | OUTPATIENT
Start: 2024-11-22

## 2024-12-18 NOTE — PROGRESS NOTES
Patient arrived for blood pressure device check.    [facility] wall 150/82  Patient's home device 152/95    Second check  [facility] 150/80  Patient's device 150/95    Results reviewed per Dr. Castañeda and was instructed to subtract 10-15 from DBP.    Patient verbalized understanding.   Detail Level: Generalized Detail Level: Detailed Detail Level: Zone

## 2025-02-18 DIAGNOSIS — M25.561 CHRONIC PAIN OF BOTH KNEES: Primary | ICD-10-CM

## 2025-02-18 DIAGNOSIS — M25.562 CHRONIC PAIN OF BOTH KNEES: Primary | ICD-10-CM

## 2025-02-18 DIAGNOSIS — G89.29 CHRONIC PAIN OF BOTH KNEES: Primary | ICD-10-CM

## 2025-02-24 ENCOUNTER — HOSPITAL ENCOUNTER (OUTPATIENT)
Dept: RADIOLOGY | Facility: HOSPITAL | Age: 75
Discharge: HOME | End: 2025-02-24
Attending: PHYSICIAN ASSISTANT
Payer: MEDICARE

## 2025-02-24 ENCOUNTER — PROCEDURE VISIT (OUTPATIENT)
Dept: ORTHOPEDICS | Facility: CLINIC | Age: 75
End: 2025-02-24
Payer: MEDICARE

## 2025-02-24 VITALS — WEIGHT: 200 LBS | BODY MASS INDEX: 32.14 KG/M2 | HEIGHT: 66 IN

## 2025-02-24 DIAGNOSIS — G89.29 CHRONIC PAIN OF BOTH KNEES: ICD-10-CM

## 2025-02-24 DIAGNOSIS — M25.561 CHRONIC PAIN OF BOTH KNEES: ICD-10-CM

## 2025-02-24 DIAGNOSIS — M25.562 CHRONIC PAIN OF BOTH KNEES: ICD-10-CM

## 2025-02-24 DIAGNOSIS — M17.11 ARTHRITIS OF KNEE, RIGHT: Primary | ICD-10-CM

## 2025-02-24 PROCEDURE — 73564 X-RAY EXAM KNEE 4 OR MORE: CPT | Mod: TC,50

## 2025-02-24 PROCEDURE — 20610 DRAIN/INJ JOINT/BURSA W/O US: CPT | Mod: RT,S$GLB,, | Performed by: PHYSICIAN ASSISTANT

## 2025-02-24 PROCEDURE — 73564 X-RAY EXAM KNEE 4 OR MORE: CPT | Mod: 26,50,, | Performed by: RADIOLOGY

## 2025-02-24 PROCEDURE — 99499 UNLISTED E&M SERVICE: CPT | Mod: S$GLB,,, | Performed by: PHYSICIAN ASSISTANT

## 2025-02-24 RX ORDER — PREDNISONE 5 MG/1
5 TABLET ORAL DAILY
Qty: 15 TABLET | Refills: 1 | Status: SHIPPED | OUTPATIENT
Start: 2025-02-24

## 2025-02-24 NOTE — PROCEDURES
Large Joint Aspiration/Injection: R knee    Date/Time: 2/24/2025 10:45 AM    Performed by: Natasha Ko PA  Authorized by: Natasha Ko PA    Consent Done?:  Yes (Verbal)  Indications:  Arthritis, joint swelling and pain  Site marked: the procedure site was marked      Local anesthesia used?: Yes    Local anesthetic:  Topical anesthetic    Details:  Needle Size:  21 G  Approach:  Anterolateral  Location:  Knee  Site:  R knee  Medications:  32 mg triamcinolone acetonide 32 mg  Patient tolerance:  Patient tolerated the procedure well with no immediate complications     Verbal consent was obtained  The patient's ID, site, side was verified  The site was sterile prepped in standard fashion  The injection was performed in the dm-lateral side without complication  A sterile Band-Aid was applied    Patient was directed to apply ice today at roughly 15 minutes at a time as needed.  It was discussed that they may be sore for the next few days or so.  Please avoid strenuous activity over the next 24 hours.  It was also discussed that the patient may have a increase in glucose if diabetic and should monitor levels.  Patient was instructed to call as needed.

## 2025-03-03 ENCOUNTER — OFFICE VISIT (OUTPATIENT)
Dept: PAIN MEDICINE | Facility: CLINIC | Age: 75
End: 2025-03-03
Payer: MEDICARE

## 2025-03-03 VITALS
WEIGHT: 204.06 LBS | HEART RATE: 67 BPM | DIASTOLIC BLOOD PRESSURE: 94 MMHG | HEIGHT: 66 IN | RESPIRATION RATE: 17 BRPM | SYSTOLIC BLOOD PRESSURE: 183 MMHG | BODY MASS INDEX: 32.79 KG/M2

## 2025-03-03 DIAGNOSIS — M46.1 SACROILIITIS: Primary | ICD-10-CM

## 2025-03-03 PROCEDURE — 99999 PR PBB SHADOW E&M-EST. PATIENT-LVL III: CPT | Mod: PBBFAC,,, | Performed by: PHYSICIAN ASSISTANT

## 2025-03-03 PROCEDURE — 3077F SYST BP >= 140 MM HG: CPT | Mod: CPTII,S$GLB,, | Performed by: PHYSICIAN ASSISTANT

## 2025-03-03 PROCEDURE — 96372 THER/PROPH/DIAG INJ SC/IM: CPT | Mod: S$GLB,,, | Performed by: PHYSICIAN ASSISTANT

## 2025-03-03 PROCEDURE — 1101F PT FALLS ASSESS-DOCD LE1/YR: CPT | Mod: CPTII,S$GLB,, | Performed by: PHYSICIAN ASSISTANT

## 2025-03-03 PROCEDURE — 3080F DIAST BP >= 90 MM HG: CPT | Mod: CPTII,S$GLB,, | Performed by: PHYSICIAN ASSISTANT

## 2025-03-03 PROCEDURE — 1125F AMNT PAIN NOTED PAIN PRSNT: CPT | Mod: CPTII,S$GLB,, | Performed by: PHYSICIAN ASSISTANT

## 2025-03-03 PROCEDURE — 99213 OFFICE O/P EST LOW 20 MIN: CPT | Mod: 25,S$GLB,, | Performed by: PHYSICIAN ASSISTANT

## 2025-03-03 PROCEDURE — 3288F FALL RISK ASSESSMENT DOCD: CPT | Mod: CPTII,S$GLB,, | Performed by: PHYSICIAN ASSISTANT

## 2025-03-03 RX ORDER — KETOROLAC TROMETHAMINE 30 MG/ML
30 INJECTION, SOLUTION INTRAMUSCULAR; INTRAVENOUS
Status: COMPLETED | OUTPATIENT
Start: 2025-03-03 | End: 2025-03-03

## 2025-03-03 RX ADMIN — KETOROLAC TROMETHAMINE 30 MG: 30 INJECTION, SOLUTION INTRAMUSCULAR; INTRAVENOUS at 11:03

## 2025-03-03 NOTE — PROGRESS NOTES
"Established Patient Chronic Pain Note (Follow up Visit)    Referring Physician: No ref. provider found    PCP: No, Primary Doctor    Chief Complaint:   Chief Complaint   Patient presents with    Rectal Pain     Left side    Knee Pain     Right knee        SUBJECTIVE:    Interval History (3/3/2025):   Deirdre Yanez presents today for follow-up visit.  Patient was last seen on 11/7/2024.  Patient reports pain as  3/4 out of 10  today. She localizes her pain to "one spot" in the left lower back/buttock.She rolled over recently and really noticed the pain. She has to consciously move her legs together due to the pain. She is unable to roll over due to the pain.      Interval History (11/7/2024):  Deirdre Yanez presents today for follow-up visit.  she underwent bilateral GTB injections , left-sided ischial bursa and piriformis trigger point injection on 10/8/24.  The patient reports that she is/was better following the procedure.  she reports 100% pain relief.  The changes lasted one week and then down to 80% relief.  Patient reports pain as 5/10 today.  She is doing great with her right hip.   Patient reports that after the injections she could "stand more upright." However, 10 days after injections, she turned while emptying the  trying to dodge her cat and felt something pop in bursa area. For about a week she had difficulty walking but now she is doing better and "almost back to normal."      Initial HPI (11/7/2024):  Deirdre Yanez is a 74 y.o. female who presents to the clinic for the evaluation of lower back and leg pain.  She was referred by Orthopedics for further evaluation and management of this pain.  She has past medical history scoliosis, CHF, hypertension, hyperlipidemia, peripheral vascular disease, DM2, and multiple other medical comorbidities as listed in her chart.  The pain started several years ago following multiple different traumas, but had a fall about 2 years ago where she " exacerbated her back and hip pain.  and symptoms have been worsening.The pain is located in the lateral lumbosacral area and radiates to the left buttock and hip area and right hip area.  The pain is described as  sharp, stabbing, aching  and is rated as 4/10. The pain is rated with a score of  3/10 on the BEST day and a score of 9/10 on the WORST day.  Symptoms interfere with daily activity. The pain is exacerbated by walking, prolonged standing.  The pain is mitigated by anti-inflammatory. Employment status:  Retired teacher    Patient denies night fever/night sweats, urinary incontinence, bowel incontinence, significant weight loss, significant motor weakness, and loss of sensations.    Pain Disability Index Review:         3/3/2025    10:36 AM 11/7/2024    10:53 AM 10/28/2020     8:00 AM   Last 3 PDI Scores   Pain Disability Index (PDI) 18 35 31       Non-Pharmacologic Treatments:  Physical Therapy/Home Exercise: yes  Ice/Heat:yes  TENS: no  Acupuncture: no  Massage: no  Chiropractic: no    Other: no      Pain Medications:  - Opioids:  None recently  - Adjuvant Medications:  Mobic, prednisone, tizanidine  - Anti-Coagulants:  None     report:  Reviewed and consistent with medication use as prescribed.    Pain Procedures:   -previous SI joint and GTB injections  -previous knee joint injections    Dr. Hemphill:  - 10/8/2024: bilateral GTB injections , left-sided ischial bursa and piriformis trigger point injection with 100% pain relief initially    Imaging:   X-ray bilateral knees 11/09/2023:  1.  Negative for acute process involving the right or left knee.  2.  Tricompartment degenerative changes most significantly involving the right medial tibiofemoral compartment.  3.  Incidental findings as noted above.  This includes what is most likely large intra-articular is body within the right suprapatellar recess.    Past Medical History:   Diagnosis Date    Abrasion     left eye    Abrasion and/or friction burn of  abdominal wall with infection     left eye    Arthritis     gouty arthritis    Back pain     Diabetes mellitus     2011  11/17/2013    Diverticulosis     DM (diabetes mellitus) 2011     12/03/2014  A1C 6.0 x 2 weeks    DM (diabetes mellitus) 2011    BS 89 10/14/2017 A1C 5.7   Diet controlled    DM (diabetes mellitus) 2011    BS 90 am 10/23/2018     DM (diabetes mellitus) 2011    BS didn't check 10/23/2019    Hepatitis     Hx of B/C from cadaver in past    Hepatitis B     Hepatitis C     Hiatal hernia     Hip bursitis, left     HTN (hypertension)     Hypertensive CHF (congestive heart failure) 3/28/2014    Obesity     Pneumonia     Positive ALBIN (antinuclear antibody)     Pure hypercholesterolemia 9/30/2016    PVD (peripheral vascular disease) 1/23/2023    Rheumatoid arthritis     questionable diagnosis    Scoliosis     Type 2 diabetes mellitus without complication, without long-term current use of insulin     Urinary incontinence     intermittent- only when lifting heavy items > 20 lbs     Past Surgical History:   Procedure Laterality Date    APPENDECTOMY      bone grafts      CERVICAL CONIZATION   W/ LASER      COLONOSCOPY N/A 1/10/2019    Procedure: COLONOSCOPY;  Surgeon: Nixon Thornton MD;  Location: [facility];  Service: Endoscopy;  Laterality: N/A;    COLONOSCOPY N/A 9/1/2022    Procedure: COLONOSCOPY;  Surgeon: Shanti Arriaga MD;  Location: [facility];  Service: Endoscopy;  Laterality: N/A;    COSMETIC SURGERY Bilateral     ears    DILATION AND CURETTAGE OF UTERUS      INJECTION Bilateral 10/8/2024    Procedure: Left GTB, ischial bursa, and piriformis trigger point injection and right GTB;  Surgeon: Wiliam Hemphill MD;  Location: [facility];  Service: Pain Management;  Laterality: Bilateral;    INJECTION OF ANESTHETIC AGENT INTO SACROILIAC JOINT Bilateral 7/23/2020    Procedure: Bilateral BLOCK, SACROILIAC JOINT and bilatearl GTB with RN IV sedation;  Surgeon: Vin Shukla MD;  Location:  [facility];  Service: Pain Management;  Laterality: Bilateral;    INJECTION OF ANESTHETIC AGENT INTO SACROILIAC JOINT Bilateral 9/30/2020    Procedure: Bilateral GT bursa + bilateral SIJ injection;  Surgeon: Vin Shukla MD;  Location: [facility];  Service: Pain Management;  Laterality: Bilateral;    INJECTION OF JOINT Bilateral 9/30/2020    Procedure: Bilateral GT bursa + bilateral SIJ injection;  Surgeon: Vin Shukla MD;  Location: [facility];  Service: Pain Management;  Laterality: Bilateral;    KNEE ARTHROSCOPY W/ MENISCAL REPAIR Right     LIVER BIOPSY      sinus lift      2003    TONSILLECTOMY, ADENOIDECTOMY      TUBAL LIGATION       Social History     Socioeconomic History    Marital status:     Number of children: 0    Highest education level: Master's degree (e.g., MA, MS, [employer], MEd, MSW, CARLOZ)   Occupational History    Occupation: Retired Teacher   Tobacco Use    Smoking status: Never    Smokeless tobacco: Never   Substance and Sexual Activity    Alcohol use: Yes     Alcohol/week: 3.0 standard drinks of alcohol     Types: 3 Glasses of wine per week    Drug use: No    Sexual activity: Yes     Partners: Male   Other Topics Concern    Are you pregnant or think you may be? No    Breast-feeding No     Social Drivers of Health     Financial Resource Strain: Low Risk  (1/22/2023)    Overall Financial Resource Strain (CARDIA)     Difficulty of Paying Living Expenses: Not hard at all   Food Insecurity: No Food Insecurity (1/22/2023)    Hunger Vital Sign     Worried About Running Out of Food in the Last Year: Never true     Ran Out of Food in the Last Year: Never true   Transportation Needs: No Transportation Needs (1/22/2023)    PRAPARE - Transportation     Lack of Transportation (Medical): No     Lack of Transportation (Non-Medical): No   Physical Activity: Insufficiently Active (1/22/2023)    Exercise Vital Sign     Days of Exercise per Week: 1 day     Minutes of Exercise per Session: 10 min  "  Stress: No Stress Concern Present (1/22/2023)    [employer] - Occupational Stress Questionnaire     Feeling of Stress : Not at all   Housing Stability: Low Risk  (1/22/2023)    Housing Stability Vital Sign     Unable to Pay for Housing in the Last Year: No     Number of Places Lived in the Last Year: 1     Unstable Housing in the Last Year: No     Family History   Problem Relation Name Age of Onset    Colon cancer Maternal Grandfather      Diabetes Maternal Grandmother      Hypertension Maternal Grandmother      Breast cancer Maternal Grandmother      Cataracts Mother      Glaucoma Mother      Macular degeneration Mother      Hypertension Mother      Aortic aneurysm Mother      Diabetes Paternal Grandmother      Cataracts Maternal Aunt      Glaucoma Maternal Aunt      Macular degeneration Maternal Aunt      Melanoma Maternal Aunt      Heart disease Unknown          pat side    Aneurysm Brother          brain    Stroke Neg Hx      Kidney disease Neg Hx      Hyperlipidemia Neg Hx         Review of patient's allergies indicates:   Allergen Reactions    Arb-angiotensin receptor antagonist Edema    Hydralazine analogues      "Lupus reaction"    Metoprolol Other (See Comments)     Alopecia    Sulfa (sulfonamide antibiotics)      Passed out and almost stopped breathing    Calcium channel blocking agents-dihydropyridines      Edema      Ace inhibitors Other (See Comments)     cough       Current Outpatient Medications   Medication Sig    ascorbic acid, vitamin C, (VITAMIN C) 1000 MG tablet Take 1,000 mg by mouth 2 (two) times daily.    asenapine maleate (SAPHRIS, BLACK SILVA, SL) Take by mouth as needed.    ASHWAGANDHA ROOT EXTRACT ORAL Take by mouth. With L-theanine    b complex vitamins tablet Take 1 tablet by mouth once daily.    biotin 5 mg Cap Take by mouth.    CALCIUM CITRATE ORAL Take 400 mg by mouth.    carvediloL (COREG) 12.5 MG tablet Take 12.5 mg by mouth 2 (two) times daily.    " cholecalciferol, vitamin D3, (VITAMIN D3 ORAL) Take 2,000 mg by mouth.    CHROMIUM ORAL Take by mouth.    coenzyme Q10 10 mg capsule Take 60 mg by mouth once daily.    ergocalciferol, vitamin D2, (VITAMIN D ORAL) Take 2,000 mg by mouth.    lilliam primrose/linoleic/gamoleni (PRIMROSE OIL ORAL) Take by mouth.    fluticasone propionate (FLONASE) 50 mcg/actuation nasal spray SHAKE LIQUID AND USE 2 SPRAYS IN EACH NOSTRIL DAILY    fluticasone propionate (FLONASE) 50 mcg/actuation nasal spray 2 sprays (100 mcg total) by Each Nostril route once daily.    furosemide (LASIX) 40 MG tablet TAKE 1 TABLET BY MOUTH AS NEEDED FOR INCREASED EDEMA OF LE AND OR DESAI    glucosamine sulfate 500 mg Tab Take 1,000 mg by mouth as needed.     inositol 500 mg Tab Take 750 mg by mouth. BID    lansoprazole (PREVACID) 15 MG capsule Take 15 mg by mouth once daily.    MAGNESIUM ORAL Take 100 mg by mouth.    meloxicam (MOBIC) 15 MG tablet Take 1 tablet (15 mg total) by mouth once daily. Take with food (Patient taking differently: Take 15 mg by mouth once daily. Take with food  Takes prn)    methylsulfonylmethane 1,000 mg Cap Take by mouth as needed.     multivitamin (THERAGRAN) per tablet Take 1 tablet by mouth once daily. Per pt C Women's 50 plus    predniSONE (DELTASONE) 5 MG tablet Take 1 tablet (5 mg total) by mouth once daily.    psyllium husk (METAMUCIL ORAL) Take by mouth 2 (two) times a day.    rosuvastatin (CRESTOR) 5 MG tablet Take 5 mg by mouth every evening.    spironolactone (ALDACTONE) 50 MG tablet Take 1 tablet (50 mg total) by mouth every evening.    tiZANidine (ZANAFLEX) 2 MG tablet Take 2 tablets (4 mg total) by mouth nightly as needed.    UNABLE TO FIND Liver Refresh    fish oil-omega-3 fatty acids 300-1,000 mg capsule Take 2 capsules by mouth once daily.     No current facility-administered medications for this visit.       Review of Systems     GENERAL:  No weight loss, malaise or fevers.  HEENT:   No recent changes in vision or  "hearing  NECK:  Negative for lumps, no difficulty with swallowing.  RESPIRATORY:  Negative for cough, wheezing or shortness of breath, patient denies any recent URI.  CARDIOVASCULAR:  Negative for chest pain, leg swelling or palpitations.  GI:  Negative for abdominal discomfort, blood in stools or black stools or change in bowel habits.  MUSCULOSKELETAL:  See HPI.  SKIN:  Negative for lesions, rash, and itching.  PSYCH:  No mood disorder or recent psychosocial stressors.  Patients sleep is not disturbed secondary to pain.  HEMATOLOGY/LYMPHOLOGY:  Negative for prolonged bleeding, bruising easily or swollen nodes.  Patient is not currently taking any anti-coagulants  NEURO:   No history of headaches, syncope, paralysis, seizures or tremors.  All other reviewed and negative other than HPI.    OBJECTIVE:    BP (!) 183/94 (BP Location: Left arm, Patient Position: Sitting)   Pulse 67   Resp 17   Ht 5' 6" (1.676 m)   Wt 92.5 kg (204 lb 0.6 oz)   BMI 32.93 kg/m²         Physical Exam    GENERAL: Well appearing, in no acute distress, alert and oriented x3.  PSYCH:  Mood and affect appropriate.  SKIN: Skin color, texture, turgor normal, no rashes or lesions.  HEAD/FACE:  Normocephalic, atraumatic. Cranial nerves grossly intact.    PULM: No evidence of respiratory difficulty, symmetric chest rise.  GI:  Soft and non-tender.  BACK:   Straight leg raising in the sitting and supine positions is negative to radicular pain. No pain to palpation over the facet joints of the lumbar spine or spinous processes.    EXTREMITIES: No deformities, edema, or skin discoloration. Good capillary refill.  MUSCULOSKELETAL:  No TTP over the bilateral trochanteric bursa, left ischial bursa or left piriformis muscle. There is pain with palpation over the SI joint on the left side.  Bilateral upper and lower extremity strength is normal and symmetric.  No atrophy or tone abnormalities are noted.  NEURO: Bilateral upper and lower extremity " coordination and muscle stretch reflexes are physiologic and symmetric.  Plantar response are downgoing. No clonus.  No loss of sensation is noted.  GAIT:  Slow, antalgic.        ASSESSMENT: 74 y.o. year old female with buttock and hip pain, consistent with     1. Sacroiliitis              PLAN:   - Interventions: Return to clinic for  US guided Left SI Joint injection- in clinic, with Dr. Hemphill (extended visit if possible).       - Procedure note: An IM injection of (ketolorac 30mg/1mL) was administered during clinic visit by our medical assistant.  This was well tolerated.    - S/p bilateral GTB injections along with left-sided ischial bursa and piriformis trigger point injection on 10/8/24 with 100% relief initially.    - Anticoagulation use:  None    - Medications: I have stressed the importance of physical activity and a home exercise plan to help with pain and improve health. and Patient can continue with medications for now since they are providing benefits, using them appropriately, and without side effects.     - Can continue to use OTC lidocaine roll on or Salon Pas pain patches as needed.    - Patient has tried Tumeric but unable to take daily due to increased risk for bleeds. She takes it 3x weekly and reports it is amazing for pain relief.    - Therapy:  Advised patient continue with activities as tolerated    - Psychological:  Discussed coping mechanisms to help address chronic pain issues    - Imaging: Reviewed available imaging with patient and answered any questions they had regarding study.Recommended updated imaging but patient will hold off.     - Records:  Reviewed/Obtain old records from outside physicians and imaging    - Follow up visit: return to clinic for US guided left SIJI with Dr. Hemphill    - Counseled patient regarding the importance of activity modification and physical therapy    - This condition does not require this patient to take time off of work, and the primary goal of our Pain  Management services is to improve the patient's functional capacity.    - Patient Questions: Answered all of the patient's questions regarding diagnosis, therapy, and treatment        The above plan and management options were discussed at length with patient. Patient is in agreement with the above and verbalized understanding.    I discussed the goals of interventional chronic pain management with the patient on today's visit.  I explained the utility of injections for diagnostic and therapeutic purposes.  We discussed a multimodal approach to pain including treating the patient's given worst pain at any given time.  We will use a systematic approach to addressing pain.  We will also adopt a multimodal approach that includes injections, adjuvant medications, physical therapy, at times psychiatry.  There may be a limited role for opioid use intermittently in the treatment of pain, more particularly for acute pain although no one approach can be used as a sole treatment modality.    I emphasized the importance of regular exercise, core strengthening and stretching, diet and weight loss as a cornerstone of long-term pain management.    Emperatriz Lopes PA-C  Interventional Pain Management  [facility]

## 2025-04-11 ENCOUNTER — TELEPHONE (OUTPATIENT)
Dept: PAIN MEDICINE | Facility: CLINIC | Age: 75
End: 2025-04-11
Payer: MEDICARE

## 2025-04-11 NOTE — TELEPHONE ENCOUNTER
Tried calling the patient to confirm appointment for Monday but no answer, I left a voicemail.    Marianela KAY ([facility])

## 2025-04-14 ENCOUNTER — OFFICE VISIT (OUTPATIENT)
Dept: PAIN MEDICINE | Facility: CLINIC | Age: 75
End: 2025-04-14
Payer: MEDICARE

## 2025-04-14 VITALS
HEART RATE: 66 BPM | SYSTOLIC BLOOD PRESSURE: 147 MMHG | BODY MASS INDEX: 32.59 KG/M2 | HEIGHT: 66 IN | RESPIRATION RATE: 17 BRPM | DIASTOLIC BLOOD PRESSURE: 96 MMHG | WEIGHT: 202.81 LBS

## 2025-04-14 DIAGNOSIS — M46.1 SACROILIITIS: Primary | ICD-10-CM

## 2025-04-14 PROCEDURE — 99499 UNLISTED E&M SERVICE: CPT | Mod: 25,S$GLB,, | Performed by: PHYSICAL MEDICINE & REHABILITATION

## 2025-04-14 PROCEDURE — 99999 PR PBB SHADOW E&M-EST. PATIENT-LVL III: CPT | Mod: PBBFAC,,, | Performed by: PHYSICAL MEDICINE & REHABILITATION

## 2025-04-14 PROCEDURE — 27096 INJECT SACROILIAC JOINT: CPT | Mod: LT,S$GLB,, | Performed by: PHYSICAL MEDICINE & REHABILITATION

## 2025-04-14 RX ORDER — METHYLPREDNISOLONE ACETATE 40 MG/ML
40 INJECTION, SUSPENSION INTRA-ARTICULAR; INTRALESIONAL; INTRAMUSCULAR; SOFT TISSUE
Status: COMPLETED | OUTPATIENT
Start: 2025-04-14 | End: 2025-04-14

## 2025-04-14 RX ADMIN — METHYLPREDNISOLONE ACETATE 40 MG: 40 INJECTION, SUSPENSION INTRA-ARTICULAR; INTRALESIONAL; INTRAMUSCULAR; SOFT TISSUE at 10:04

## 2025-04-14 NOTE — PROGRESS NOTES
"  Patient Name: Deirdre Yanez  MRN: [id number]    INFORMED CONSENT: The procedure, risks, benefits and options were discussed with patient. There are no contraindications to the procedure. The patient expressed understanding and agreed to proceed. The personnel performing the procedure was discussed. I verify that I personally obtained Deirdre's consent prior to the start of the procedure and the signed consent can be found on the patient's chart.    Procedure Date: 04/14/2025      Pre Procedure diagnosis:   1. Sacroiliitis        Post-Procedure diagnosis: same      Sedation: None    PROCEDURE:  Left Sacroiliac Joint injection under ultrasound guidance      DESCRIPTION OF PROCEDURE: The patient was brought to the procedure room. . The patient was positioned prone on table. . . The skin overlying the left sacroiliac area was prepped with chlorhexidene and draped in a sterile fashion.  A  21 gauge 4" simplex needle was slowly advanced through under ultrasound guidance into the cleft between the bony contours of the sacrum and ileum representing the posterior aspect of the SI joint with caudal tilt to enter the lower portion of the joint. When the needle tip was clearly visualized in the joint space. . Negative aspiration was confirmed. . A combination of 4cc of lidocaine 1% and 40 mg Depo-Medrol was easily injected. The needle was removed and bleeding was nil. A sterile dressing was applied.    Blood Loss: Nill  Specimen: None    Wiliam Hemphill MD    "

## 2025-05-29 ENCOUNTER — PROCEDURE VISIT (OUTPATIENT)
Dept: ORTHOPEDICS | Facility: CLINIC | Age: 75
End: 2025-05-29
Payer: MEDICARE

## 2025-05-29 VITALS
DIASTOLIC BLOOD PRESSURE: 94 MMHG | HEART RATE: 66 BPM | HEIGHT: 66 IN | WEIGHT: 202 LBS | BODY MASS INDEX: 32.47 KG/M2 | SYSTOLIC BLOOD PRESSURE: 154 MMHG

## 2025-05-29 DIAGNOSIS — M17.11 ARTHRITIS OF KNEE, RIGHT: ICD-10-CM

## 2025-05-29 DIAGNOSIS — M21.161 ACQUIRED VARUS DEFORMITY KNEE, RIGHT: ICD-10-CM

## 2025-05-29 DIAGNOSIS — G89.29 CHRONIC PAIN OF BOTH KNEES: ICD-10-CM

## 2025-05-29 DIAGNOSIS — M17.11 PRIMARY OSTEOARTHRITIS OF RIGHT KNEE: Primary | ICD-10-CM

## 2025-05-29 DIAGNOSIS — M25.561 CHRONIC PAIN OF BOTH KNEES: ICD-10-CM

## 2025-05-29 DIAGNOSIS — M25.562 CHRONIC PAIN OF BOTH KNEES: ICD-10-CM

## 2025-05-29 PROCEDURE — 99499 UNLISTED E&M SERVICE: CPT | Mod: S$GLB,,, | Performed by: PHYSICIAN ASSISTANT

## 2025-05-29 PROCEDURE — 20610 DRAIN/INJ JOINT/BURSA W/O US: CPT | Mod: RT,S$GLB,, | Performed by: PHYSICIAN ASSISTANT

## 2025-05-29 RX ORDER — METHYLPREDNISOLONE ACETATE 80 MG/ML
80 INJECTION, SUSPENSION INTRA-ARTICULAR; INTRALESIONAL; INTRAMUSCULAR; SOFT TISSUE
Status: DISCONTINUED | OUTPATIENT
Start: 2025-05-29 | End: 2025-05-29 | Stop reason: HOSPADM

## 2025-05-29 RX ORDER — LIDOCAINE HYDROCHLORIDE 10 MG/ML
5 INJECTION, SOLUTION INFILTRATION; PERINEURAL
Status: DISCONTINUED | OUTPATIENT
Start: 2025-05-29 | End: 2025-05-29 | Stop reason: HOSPADM

## 2025-05-29 RX ADMIN — LIDOCAINE HYDROCHLORIDE 5 ML: 10 INJECTION, SOLUTION INFILTRATION; PERINEURAL at 11:05

## 2025-05-29 RX ADMIN — METHYLPREDNISOLONE ACETATE 80 MG: 80 INJECTION, SUSPENSION INTRA-ARTICULAR; INTRALESIONAL; INTRAMUSCULAR; SOFT TISSUE at 11:05

## 2025-05-29 NOTE — PROCEDURES
Large Joint Aspiration/Injection: R knee    Date/Time: 5/29/2025 11:45 AM    Performed by: Natasha Ko PA  Authorized by: Natasha Ko PA    Consent Done?:  Yes (Verbal)  Indications:  Arthritis, joint swelling and pain  Site marked: the procedure site was marked      Local anesthesia used?: Yes    Local anesthetic:  Topical anesthetic    Details:  Needle Size:  21 G  Approach:  Anterolateral  Location:  Knee  Site:  R knee  Medications:  5 mL LIDOcaine HCL 10 mg/ml (1%) 10 mg/mL (1 %); 80 mg methylPREDNISolone acetate 80 mg/mL  Patient tolerance:  Patient tolerated the procedure well with no immediate complications     Verbal consent was obtained  The patient's ID, site, side was verified  The site was sterile prepped in standard fashion  The injection was performed in the dm-lateral side without complication  A sterile Band-Aid was applied    Patient was directed to apply ice today at roughly 15 minutes at a time as needed.  It was discussed that they may be sore for the next few days or so.  Please avoid strenuous activity over the next 24 hours.  It was also discussed that the patient may have a increase in glucose if diabetic and should monitor levels.  Patient was instructed to call as needed.

## 2025-06-02 RX ORDER — PREDNISONE 5 MG/1
5 TABLET ORAL DAILY
Qty: 15 TABLET | Refills: 1 | Status: SHIPPED | OUTPATIENT
Start: 2025-06-02

## 2025-06-09 ENCOUNTER — OFFICE VISIT (OUTPATIENT)
Dept: PAIN MEDICINE | Facility: CLINIC | Age: 75
End: 2025-06-09
Payer: MEDICARE

## 2025-06-09 VITALS
HEART RATE: 68 BPM | SYSTOLIC BLOOD PRESSURE: 134 MMHG | DIASTOLIC BLOOD PRESSURE: 80 MMHG | HEIGHT: 66 IN | WEIGHT: 177 LBS | BODY MASS INDEX: 28.45 KG/M2

## 2025-06-09 DIAGNOSIS — M46.1 SACROILIITIS: Primary | ICD-10-CM

## 2025-06-09 PROCEDURE — 99212 OFFICE O/P EST SF 10 MIN: CPT | Mod: S$GLB,,, | Performed by: PHYSICIAN ASSISTANT

## 2025-06-09 PROCEDURE — 1159F MED LIST DOCD IN RCRD: CPT | Mod: CPTII,S$GLB,, | Performed by: PHYSICIAN ASSISTANT

## 2025-06-09 PROCEDURE — 1101F PT FALLS ASSESS-DOCD LE1/YR: CPT | Mod: CPTII,S$GLB,, | Performed by: PHYSICIAN ASSISTANT

## 2025-06-09 PROCEDURE — 1125F AMNT PAIN NOTED PAIN PRSNT: CPT | Mod: CPTII,S$GLB,, | Performed by: PHYSICIAN ASSISTANT

## 2025-06-09 PROCEDURE — 3288F FALL RISK ASSESSMENT DOCD: CPT | Mod: CPTII,S$GLB,, | Performed by: PHYSICIAN ASSISTANT

## 2025-06-09 PROCEDURE — 99999 PR PBB SHADOW E&M-EST. PATIENT-LVL III: CPT | Mod: PBBFAC,,, | Performed by: PHYSICIAN ASSISTANT

## 2025-06-09 PROCEDURE — 3079F DIAST BP 80-89 MM HG: CPT | Mod: CPTII,S$GLB,, | Performed by: PHYSICIAN ASSISTANT

## 2025-06-09 PROCEDURE — 3075F SYST BP GE 130 - 139MM HG: CPT | Mod: CPTII,S$GLB,, | Performed by: PHYSICIAN ASSISTANT

## 2025-06-09 NOTE — PROGRESS NOTES
"Established Patient Chronic Pain Note (Follow up Visit)    Referring Physician: No ref. provider found    PCP: No, Primary Doctor    Chief Complaint:   8 week follow up       SUBJECTIVE:      Interval History (6/9/2025):  Deirdre Yanez presents today for follow-up visit.  Patient was last seen on 4/14/2025. Patient reports pain as 1/10 today. At her last visit, she had an ultrasound guided injection of the left SI Joint with Dr. Hemphill; at least 80% relief is reported.  She had great relief up until starting a new statin.  Once she stopped the statin (it took 10 days) but she is back to normal now. She was taking it for almost 2 months.    She does c/o numbness in left index, long and ring fingers. It is not affecting her daily activities at this time.     Interval History (3/3/2025):   Deirdre Yanez presents today for follow-up visit.  Patient was last seen on 11/7/2024.  Patient reports pain as 3/4 out of 10 today. She localizes her pain to "one spot" in the left lower back/buttock.She rolled over recently and really noticed the pain. She has to consciously move her legs together due to the pain. She is unable to roll over due to the pain.      Interval History (11/7/2024):  Deirdre Yanez presents today for follow-up visit.  she underwent bilateral GTB injections , left-sided ischial bursa and piriformis trigger point injection on 10/8/24.  The patient reports that she is/was better following the procedure.  she reports 100% pain relief.  The changes lasted one week and then down to 80% relief.  Patient reports pain as 5/10 today.  She is doing great with her right hip.   Patient reports that after the injections she could "stand more upright." However, 10 days after injections, she turned while emptying the  trying to dodge her cat and felt something pop in bursa area. For about a week she had difficulty walking but now she is doing better and "almost back to normal."      Initial HPI " (4/14/2025):  Deirdre Yanez is a 75 y.o. female who presents to the clinic for the evaluation of lower back and leg pain.  She was referred by Orthopedics for further evaluation and management of this pain.  She has past medical history scoliosis, CHF, hypertension, hyperlipidemia, peripheral vascular disease, DM2, and multiple other medical comorbidities as listed in her chart.  The pain started several years ago following multiple different traumas, but had a fall about 2 years ago where she exacerbated her back and hip pain.  and symptoms have been worsening.The pain is located in the lateral lumbosacral area and radiates to the left buttock and hip area and right hip area.  The pain is described as sharp, stabbing, aching and is rated as 4/10. The pain is rated with a score of  3/10 on the BEST day and a score of 9/10 on the WORST day.  Symptoms interfere with daily activity. The pain is exacerbated by walking, prolonged standing.  The pain is mitigated by anti-inflammatory. Employment status:  Retired teacher    Patient denies night fever/night sweats, urinary incontinence, bowel incontinence, significant weight loss, significant motor weakness, and loss of sensations.    Pain Disability Index Review:         4/14/2025     8:28 AM 3/3/2025    10:36 AM 11/7/2024    10:53 AM   Last 3 PDI Scores   Pain Disability Index (PDI) 35 18 35       Non-Pharmacologic Treatments:  Physical Therapy/Home Exercise: yes  Ice/Heat:yes  TENS: no  Acupuncture: no  Massage: no  Chiropractic: no    Other: no      Pain Medications:  - Opioids:  None recently  - Adjuvant Medications:  Mobic, prednisone, tizanidine  - Anti-Coagulants:  None     report:  Reviewed and consistent with medication use as prescribed.    Pain Procedures:   -previous SI joint and GTB injections  -previous knee joint injections    Dr. Hemphill:  - 10/8/2024: bilateral GTB injections , left-sided ischial bursa and piriformis trigger point injection with 100%  pain relief initially    Imaging:   X-ray bilateral knees 11/09/2023:  1.  Negative for acute process involving the right or left knee.  2.  Tricompartment degenerative changes most significantly involving the right medial tibiofemoral compartment.  3.  Incidental findings as noted above.  This includes what is most likely large intra-articular is body within the right suprapatellar recess.    Past Medical History:   Diagnosis Date    Abrasion     left eye    Abrasion and/or friction burn of abdominal wall with infection     left eye    Arthritis     gouty arthritis    Back pain     Diabetes mellitus     2011  11/17/2013    Diverticulosis     DM (diabetes mellitus) 2011     12/03/2014  A1C 6.0 x 2 weeks    DM (diabetes mellitus) 2011    BS 89 10/14/2017 A1C 5.7   Diet controlled    DM (diabetes mellitus) 2011    BS 90 am 10/23/2018     DM (diabetes mellitus) 2011    BS didn't check 10/23/2019    Hepatitis     Hx of B/C from cadaver in past    Hepatitis B     Hepatitis C     Hiatal hernia     Hip bursitis, left     HTN (hypertension)     Hypertensive CHF (congestive heart failure) 3/28/2014    Obesity     Pneumonia     Positive ALBIN (antinuclear antibody)     Pure hypercholesterolemia 9/30/2016    PVD (peripheral vascular disease) 1/23/2023    Rheumatoid arthritis     questionable diagnosis    Scoliosis     Type 2 diabetes mellitus without complication, without long-term current use of insulin     Urinary incontinence     intermittent- only when lifting heavy items > 20 lbs     Past Surgical History:   Procedure Laterality Date    APPENDECTOMY      bone grafts      CERVICAL CONIZATION   W/ LASER      COLONOSCOPY N/A 1/10/2019    Procedure: COLONOSCOPY;  Surgeon: Nixon Thornton MD;  Location: [facility] ENDO;  Service: Endoscopy;  Laterality: N/A;    COLONOSCOPY N/A 9/1/2022    Procedure: COLONOSCOPY;  Surgeon: Shanti Arriaga MD;  Location: [facility] ENDO;  Service: Endoscopy;  Laterality: N/A;    COSMETIC SURGERY  Bilateral     ears    DILATION AND CURETTAGE OF UTERUS      INJECTION Bilateral 10/8/2024    Procedure: Left GTB, ischial bursa, and piriformis trigger point injection and right GTB;  Surgeon: Wiliam Hemphill MD;  Location: [facility] PAIN MGT;  Service: Pain Management;  Laterality: Bilateral;    INJECTION OF ANESTHETIC AGENT INTO SACROILIAC JOINT Bilateral 7/23/2020    Procedure: Bilateral BLOCK, SACROILIAC JOINT and bilatearl GTB with RN IV sedation;  Surgeon: Vin Shukla MD;  Location: [facility] PAIN MGT;  Service: Pain Management;  Laterality: Bilateral;    INJECTION OF ANESTHETIC AGENT INTO SACROILIAC JOINT Bilateral 9/30/2020    Procedure: Bilateral GT bursa + bilateral SIJ injection;  Surgeon: Vin Shukla MD;  Location: [facility] PAIN MGT;  Service: Pain Management;  Laterality: Bilateral;    INJECTION OF JOINT Bilateral 9/30/2020    Procedure: Bilateral GT bursa + bilateral SIJ injection;  Surgeon: Vin Shukla MD;  Location: [facility] PAIN MGT;  Service: Pain Management;  Laterality: Bilateral;    KNEE ARTHROSCOPY W/ MENISCAL REPAIR Right     LIVER BIOPSY      sinus lift      2003    TONSILLECTOMY, ADENOIDECTOMY      TUBAL LIGATION       Social History     Socioeconomic History    Marital status:     Number of children: 0    Highest education level: Master's degree (e.g., MA, MS, [employer], MEd, MSW, CARLOZ)   Occupational History    Occupation: Retired Teacher   Tobacco Use    Smoking status: Never    Smokeless tobacco: Never   Substance and Sexual Activity    Alcohol use: Yes     Alcohol/week: 3.0 standard drinks of alcohol     Types: 3 Glasses of wine per week    Drug use: No    Sexual activity: Yes     Partners: Male   Other Topics Concern    Are you pregnant or think you may be? No    Breast-feeding No     Social Drivers of Health     Financial Resource Strain: Low Risk  (1/22/2023)    Overall Financial Resource Strain (CARDIA)     Difficulty of Paying Living Expenses: Not hard at all   Food Insecurity: No Food  "Insecurity (1/22/2023)    Hunger Vital Sign     Worried About Running Out of Food in the Last Year: Never true     Ran Out of Food in the Last Year: Never true   Transportation Needs: No Transportation Needs (1/22/2023)    PRAPARE - Transportation     Lack of Transportation (Medical): No     Lack of Transportation (Non-Medical): No   Physical Activity: Insufficiently Active (1/22/2023)    Exercise Vital Sign     Days of Exercise per Week: 1 day     Minutes of Exercise per Session: 10 min   Stress: No Stress Concern Present (1/22/2023)    [place] [employer] - Occupational Stress Questionnaire     Feeling of Stress : Not at all   Housing Stability: Low Risk  (1/22/2023)    Housing Stability Vital Sign     Unable to Pay for Housing in the Last Year: No     Number of Places Lived in the Last Year: 1     Unstable Housing in the Last Year: No     Family History   Problem Relation Name Age of Onset    Colon cancer Maternal Grandfather      Diabetes Maternal Grandmother      Hypertension Maternal Grandmother      Breast cancer Maternal Grandmother      Cataracts Mother      Glaucoma Mother      Macular degeneration Mother      Hypertension Mother      Aortic aneurysm Mother      Diabetes Paternal Grandmother      Cataracts Maternal Aunt      Glaucoma Maternal Aunt      Macular degeneration Maternal Aunt      Melanoma Maternal Aunt      Heart disease Unknown          pat side    Aneurysm Brother          brain    Stroke Neg Hx      Kidney disease Neg Hx      Hyperlipidemia Neg Hx         Review of patient's allergies indicates:   Allergen Reactions    Arb-angiotensin receptor antagonist Edema    Hydralazine analogues      "Lupus reaction"    Metoprolol Other (See Comments)     Alopecia    Sulfa (sulfonamide antibiotics)      Passed out and almost stopped breathing    Calcium channel blocking agents-dihydropyridines      Edema      Ace inhibitors Other (See Comments)     cough       Current Outpatient " Medications   Medication Sig    ascorbic acid, vitamin C, (VITAMIN C) 1000 MG tablet Take 1,000 mg by mouth 2 (two) times daily.    asenapine maleate (SAPHRIS, BLACK SILVA, SL) Take by mouth as needed.    ASHWAGANDHA ROOT EXTRACT ORAL Take by mouth. With L-theanine    b complex vitamins tablet Take 1 tablet by mouth once daily.    biotin 5 mg Cap Take by mouth.    CALCIUM CITRATE ORAL Take 400 mg by mouth.    carvediloL (COREG) 12.5 MG tablet Take 12.5 mg by mouth 2 (two) times daily.    cholecalciferol, vitamin D3, (VITAMIN D3 ORAL) Take 2,000 mg by mouth.    CHROMIUM ORAL Take by mouth.    coenzyme Q10 10 mg capsule Take 60 mg by mouth once daily.    ergocalciferol, vitamin D2, (VITAMIN D ORAL) Take 2,000 mg by mouth.    lilliam primrose/linoleic/gamoleni (PRIMROSE OIL ORAL) Take by mouth.    fluticasone propionate (FLONASE) 50 mcg/actuation nasal spray SHAKE LIQUID AND USE 2 SPRAYS IN EACH NOSTRIL DAILY    fluticasone propionate (FLONASE) 50 mcg/actuation nasal spray 2 sprays (100 mcg total) by Each Nostril route once daily.    furosemide (LASIX) 40 MG tablet TAKE 1 TABLET BY MOUTH AS NEEDED FOR INCREASED EDEMA OF LE AND OR DESAI    glucosamine sulfate 500 mg Tab Take 1,000 mg by mouth as needed.     inositol 500 mg Tab Take 750 mg by mouth. BID    lansoprazole (PREVACID) 15 MG capsule Take 15 mg by mouth once daily.    MAGNESIUM ORAL Take 100 mg by mouth.    meloxicam (MOBIC) 15 MG tablet Take 1 tablet (15 mg total) by mouth once daily. Take with food (Patient taking differently: Take 15 mg by mouth once daily. Take with food  Takes prn)    methylsulfonylmethane 1,000 mg Cap Take by mouth as needed.     multivitamin (THERAGRAN) per tablet Take 1 tablet by mouth once daily. Per pt C Women's 50 plus    predniSONE (DELTASONE) 5 MG tablet Take 1 tablet (5 mg total) by mouth once daily.    psyllium husk (METAMUCIL ORAL) Take by mouth 2 (two) times a day.    rosuvastatin (CRESTOR) 5 MG tablet Take 5 mg by mouth every  "evening.    spironolactone (ALDACTONE) 50 MG tablet Take 1 tablet (50 mg total) by mouth every evening.    tiZANidine (ZANAFLEX) 2 MG tablet Take 2 tablets (4 mg total) by mouth nightly as needed.    UNABLE TO FIND Liver Refresh    fish oil-omega-3 fatty acids 300-1,000 mg capsule Take 2 capsules by mouth once daily.     No current facility-administered medications for this visit.       Review of Systems     GENERAL:  No weight loss, malaise or fevers.  HEENT:   No recent changes in vision or hearing  NECK:  Negative for lumps, no difficulty with swallowing.  RESPIRATORY:  Negative for cough, wheezing or shortness of breath, patient denies any recent URI.  CARDIOVASCULAR:  Negative for chest pain, leg swelling or palpitations.  GI:  Negative for abdominal discomfort, blood in stools or black stools or change in bowel habits.  MUSCULOSKELETAL:  See HPI.  SKIN:  Negative for lesions, rash, and itching.  PSYCH:  No mood disorder or recent psychosocial stressors.  Patients sleep is not disturbed secondary to pain.  HEMATOLOGY/LYMPHOLOGY:  Negative for prolonged bleeding, bruising easily or swollen nodes.  Patient is not currently taking any anti-coagulants  NEURO:   No history of headaches, syncope, paralysis, seizures or tremors.  All other reviewed and negative other than HPI.    OBJECTIVE:    /80   Pulse 68   Ht 5' 6" (1.676 m)   Wt 80.3 kg (177 lb 0.5 oz)   BMI 28.57 kg/m²         Physical Exam    GENERAL: Well appearing, in no acute distress, alert and oriented x3.  PSYCH:  Mood and affect appropriate.  SKIN: Skin color, texture, turgor normal, no rashes or lesions.  HEAD/FACE:  Normocephalic, atraumatic. Cranial nerves grossly intact.    PULM: No evidence of respiratory difficulty, symmetric chest rise.  GI:  Soft and non-tender.  BACK:   Straight leg raising in the sitting and supine positions is negative to radicular pain. No pain to palpation over the facet joints of the lumbar spine or spinous " processes.    EXTREMITIES: No deformities, edema, or skin discoloration. Good capillary refill.  MUSCULOSKELETAL:  No TTP over the bilateral trochanteric bursa, left ischial bursa or left piriformis muscle. There is no pain with palpation over the SI joint on the left side.  Bilateral upper and lower extremity strength is normal and symmetric.  No atrophy or tone abnormalities are noted.  NEURO: Bilateral upper and lower extremity coordination and muscle stretch reflexes are physiologic and symmetric.  Plantar response are downgoing. No clonus.  No loss of sensation is noted.  Left Hand Exam     Tests   Phalens Sign: negative  Tinel's sign (median nerve): negative        GAIT:  Slow, antalgic.        ASSESSMENT: 75 y.o. year old female with buttock and hip pain, consistent with     1. Sacroiliitis            PLAN:   - Interventions: None at this time. Can repeat Left SIJI in the clinic at next visit if needed.      - s/p US guided Left SI Joint injection on 4/14/25 with at least 80% relief  - S/p bilateral GTB injections along with left-sided ischial bursa and piriformis trigger point injection on 10/8/24 with 100% relief initially.    - Anticoagulation use:  None    - Medications: I have stressed the importance of physical activity and a home exercise plan to help with pain and improve health. and Patient can continue with medications for now since they are providing benefits, using them appropriately, and without side effects.     - Can continue to use OTC lidocaine roll-on or Salon Pas pain patches as needed.    - Patient has tried Tumeric but unable to take daily due to increased risk for bleeds. Patient usually takes one and then waits a few days later to take another one and this off sets it.     - Therapy:  Advised patient continue with activities as tolerated    - Psychological:  Discussed coping mechanisms to help address chronic pain issues    - Imaging: Reviewed available imaging with patient and  answered any questions they had regarding study.    - Records:  Reviewed/Obtain old records from outside physicians and imaging    - Consults/Referrals: Consider EMG/NCS with physiatry if numbness of left hand/fingers persists.    - Follow up visit: can follow up in 2 months for US guided left SIJI with Dr. Hemphill or as needed.     - Counseled patient regarding the importance of activity modification and physical therapy    - This condition does not require this patient to take time off of work, and the primary goal of our Pain Management services is to improve the patient's functional capacity.    - Patient Questions: Answered all of the patient's questions regarding diagnosis, therapy, and treatment        The above plan and management options were discussed at length with patient. Patient is in agreement with the above and verbalized understanding.    I discussed the goals of interventional chronic pain management with the patient on today's visit.  I explained the utility of injections for diagnostic and therapeutic purposes.  We discussed a multimodal approach to pain including treating the patient's given worst pain at any given time.  We will use a systematic approach to addressing pain.  We will also adopt a multimodal approach that includes injections, adjuvant medications, physical therapy, at times psychiatry.  There may be a limited role for opioid use intermittently in the treatment of pain, more particularly for acute pain although no one approach can be used as a sole treatment modality.    I emphasized the importance of regular exercise, core strengthening and stretching, diet and weight loss as a cornerstone of long-term pain management.    Emperatriz Lopes PA-C  Interventional Pain Management  [facility]

## 2025-08-20 ENCOUNTER — OFFICE VISIT (OUTPATIENT)
Dept: PAIN MEDICINE | Facility: CLINIC | Age: 75
End: 2025-08-20
Payer: MEDICARE

## 2025-08-20 VITALS
HEIGHT: 66 IN | HEART RATE: 61 BPM | WEIGHT: 192.88 LBS | SYSTOLIC BLOOD PRESSURE: 143 MMHG | DIASTOLIC BLOOD PRESSURE: 80 MMHG | BODY MASS INDEX: 31 KG/M2

## 2025-08-20 DIAGNOSIS — M46.1 SACROILIITIS: Primary | ICD-10-CM

## 2025-08-20 PROCEDURE — 99999 PR PBB SHADOW E&M-EST. PATIENT-LVL II: CPT | Mod: PBBFAC,,, | Performed by: PHYSICAL MEDICINE & REHABILITATION

## 2025-08-20 RX ORDER — METHYLPREDNISOLONE ACETATE 40 MG/ML
40 INJECTION, SUSPENSION INTRA-ARTICULAR; INTRALESIONAL; INTRAMUSCULAR; SOFT TISSUE
Status: COMPLETED | OUTPATIENT
Start: 2025-08-20 | End: 2025-08-20

## 2025-08-20 RX ADMIN — METHYLPREDNISOLONE ACETATE 40 MG: 40 INJECTION, SUSPENSION INTRA-ARTICULAR; INTRALESIONAL; INTRAMUSCULAR; SOFT TISSUE at 10:08

## 2025-08-28 ENCOUNTER — PROCEDURE VISIT (OUTPATIENT)
Dept: ORTHOPEDICS | Facility: CLINIC | Age: 75
End: 2025-08-28
Payer: MEDICARE

## 2025-08-28 VITALS
BODY MASS INDEX: 31 KG/M2 | HEIGHT: 66 IN | SYSTOLIC BLOOD PRESSURE: 138 MMHG | DIASTOLIC BLOOD PRESSURE: 87 MMHG | WEIGHT: 192.88 LBS

## 2025-08-28 DIAGNOSIS — M25.561 CHRONIC PAIN OF BOTH KNEES: Primary | ICD-10-CM

## 2025-08-28 DIAGNOSIS — M17.11 ARTHRITIS OF KNEE, RIGHT: ICD-10-CM

## 2025-08-28 DIAGNOSIS — M21.161 ACQUIRED VARUS DEFORMITY KNEE, RIGHT: ICD-10-CM

## 2025-08-28 DIAGNOSIS — M25.562 CHRONIC PAIN OF BOTH KNEES: Primary | ICD-10-CM

## 2025-08-28 DIAGNOSIS — M17.11 PRIMARY OSTEOARTHRITIS OF RIGHT KNEE: ICD-10-CM

## 2025-08-28 DIAGNOSIS — G89.29 CHRONIC PAIN OF BOTH KNEES: Primary | ICD-10-CM

## 2025-08-28 RX ORDER — PREDNISONE 5 MG/1
5 TABLET ORAL DAILY
Qty: 15 TABLET | Refills: 1 | OUTPATIENT
Start: 2025-08-28

## (undated) DEVICE — SEE MEDLINE ITEM 154981

## (undated) DEVICE — POSITIONER HEAD DONUT 9IN FOAM

## (undated) DEVICE — SEE MEDLINE ITEM 157027

## (undated) DEVICE — SUPPORT ULNA NERVE PROTECTOR

## (undated) DEVICE — GLOVE PROTEXIS HYDROGEL SZ6.5

## (undated) DEVICE — SEE MEDLINE ITEM 157181

## (undated) DEVICE — GLOVE PROTEXIS HYDROGEL SZ7

## (undated) DEVICE — SOL NS 1000CC

## (undated) DEVICE — PAD PERI POST REPLACEMNT

## (undated) DEVICE — DEVICE MYOSURE LITE TISS REM

## (undated) DEVICE — PACK DRAPE PERI/GYN TIBURON

## (undated) DEVICE — DRESSING TELFA N ADH 3X8

## (undated) DEVICE — CATH URETHRAL 18FR

## (undated) DEVICE — SEE MEDLINE ITEM 157117

## (undated) DEVICE — SEE L#152161

## (undated) DEVICE — TUBE AQUILEX INFLOW

## (undated) DEVICE — GLOVE PROTEXIS HYDROGEL SZ6

## (undated) DEVICE — SEE MEDLINE ITEM 152622

## (undated) DEVICE — TUBE AQUILEX OUTFLOW